# Patient Record
Sex: MALE | Race: OTHER | URBAN - METROPOLITAN AREA
[De-identification: names, ages, dates, MRNs, and addresses within clinical notes are randomized per-mention and may not be internally consistent; named-entity substitution may affect disease eponyms.]

---

## 2022-04-13 ENCOUNTER — APPOINTMENT (RX ONLY)
Dept: URBAN - METROPOLITAN AREA CLINIC 4 | Facility: CLINIC | Age: 37
Setting detail: DERMATOLOGY
End: 2022-04-13

## 2022-04-13 DIAGNOSIS — L72.8 OTHER FOLLICULAR CYSTS OF THE SKIN AND SUBCUTANEOUS TISSUE: ICD-10-CM

## 2022-04-13 DIAGNOSIS — L81.4 OTHER MELANIN HYPERPIGMENTATION: ICD-10-CM

## 2022-04-13 PROBLEM — D48.5 NEOPLASM OF UNCERTAIN BEHAVIOR OF SKIN: Status: ACTIVE | Noted: 2022-04-13

## 2022-04-13 PROCEDURE — ? DEFER

## 2022-04-13 PROCEDURE — 99203 OFFICE O/P NEW LOW 30 MIN: CPT

## 2022-04-13 PROCEDURE — ? OBSERVATION

## 2022-04-13 PROCEDURE — ? COUNSELING

## 2022-04-13 ASSESSMENT — LOCATION SIMPLE DESCRIPTION DERM: LOCATION SIMPLE: RIGHT ANTERIOR NECK

## 2022-04-13 ASSESSMENT — LOCATION DETAILED DESCRIPTION DERM: LOCATION DETAILED: RIGHT CLAVICULAR NECK

## 2022-04-13 ASSESSMENT — LOCATION ZONE DERM: LOCATION ZONE: NECK

## 2023-07-29 ENCOUNTER — APPOINTMENT (OUTPATIENT)
Dept: RADIOLOGY | Facility: MEDICAL CENTER | Age: 38
End: 2023-07-29
Payer: COMMERCIAL

## 2023-07-29 ENCOUNTER — HOSPITAL ENCOUNTER (OUTPATIENT)
Facility: MEDICAL CENTER | Age: 38
End: 2023-07-30
Attending: STUDENT IN AN ORGANIZED HEALTH CARE EDUCATION/TRAINING PROGRAM | Admitting: STUDENT IN AN ORGANIZED HEALTH CARE EDUCATION/TRAINING PROGRAM
Payer: COMMERCIAL

## 2023-07-29 ENCOUNTER — APPOINTMENT (OUTPATIENT)
Dept: RADIOLOGY | Facility: MEDICAL CENTER | Age: 38
End: 2023-07-29
Attending: STUDENT IN AN ORGANIZED HEALTH CARE EDUCATION/TRAINING PROGRAM
Payer: COMMERCIAL

## 2023-07-29 DIAGNOSIS — E83.42 HYPOMAGNESEMIA: ICD-10-CM

## 2023-07-29 DIAGNOSIS — E87.6 HYPOKALEMIA: ICD-10-CM

## 2023-07-29 PROBLEM — R53.1 WEAKNESS: Status: ACTIVE | Noted: 2023-07-29

## 2023-07-29 PROBLEM — I10 HYPERTENSION: Status: ACTIVE | Noted: 2023-07-29

## 2023-07-29 PROBLEM — R51.9 INTRACTABLE HEADACHE: Status: ACTIVE | Noted: 2023-07-29

## 2023-07-29 PROBLEM — E86.0 DEHYDRATION: Status: ACTIVE | Noted: 2023-07-29

## 2023-07-29 PROBLEM — R19.7 DIARRHEA: Status: ACTIVE | Noted: 2023-07-29

## 2023-07-29 PROBLEM — R73.9 HYPERGLYCEMIA: Status: ACTIVE | Noted: 2023-07-29

## 2023-07-29 PROBLEM — N17.9 AKI (ACUTE KIDNEY INJURY) (HCC): Status: ACTIVE | Noted: 2023-07-29

## 2023-07-29 LAB
ALBUMIN SERPL BCP-MCNC: 4.2 G/DL (ref 3.2–4.9)
ALBUMIN SERPL BCP-MCNC: 4.3 G/DL (ref 3.2–4.9)
ALBUMIN/GLOB SERPL: 1.3 G/DL
ALBUMIN/GLOB SERPL: 1.4 G/DL
ALP SERPL-CCNC: 59 U/L (ref 30–99)
ALP SERPL-CCNC: 62 U/L (ref 30–99)
ALT SERPL-CCNC: 52 U/L (ref 2–50)
ALT SERPL-CCNC: 53 U/L (ref 2–50)
AMPHET UR QL SCN: NEGATIVE
ANION GAP SERPL CALC-SCNC: 14 MMOL/L (ref 7–16)
ANION GAP SERPL CALC-SCNC: 16 MMOL/L (ref 7–16)
APPEARANCE UR: CLEAR
AST SERPL-CCNC: 46 U/L (ref 12–45)
AST SERPL-CCNC: 49 U/L (ref 12–45)
BARBITURATES UR QL SCN: NEGATIVE
BASOPHILS # BLD AUTO: 0.9 % (ref 0–1.8)
BASOPHILS # BLD: 0.05 K/UL (ref 0–0.12)
BENZODIAZ UR QL SCN: NEGATIVE
BILIRUB SERPL-MCNC: 0.4 MG/DL (ref 0.1–1.5)
BILIRUB SERPL-MCNC: 0.7 MG/DL (ref 0.1–1.5)
BILIRUB UR QL STRIP.AUTO: NEGATIVE
BUN SERPL-MCNC: 13 MG/DL (ref 8–22)
BUN SERPL-MCNC: 15 MG/DL (ref 8–22)
BZE UR QL SCN: NEGATIVE
CALCIUM ALBUM COR SERPL-MCNC: 8.7 MG/DL (ref 8.5–10.5)
CALCIUM ALBUM COR SERPL-MCNC: 8.9 MG/DL (ref 8.5–10.5)
CALCIUM SERPL-MCNC: 8.9 MG/DL (ref 8.5–10.5)
CALCIUM SERPL-MCNC: 9.1 MG/DL (ref 8.5–10.5)
CANNABINOIDS UR QL SCN: NEGATIVE
CHLORIDE SERPL-SCNC: 101 MMOL/L (ref 96–112)
CHLORIDE SERPL-SCNC: 102 MMOL/L (ref 96–112)
CO2 SERPL-SCNC: 19 MMOL/L (ref 20–33)
CO2 SERPL-SCNC: 24 MMOL/L (ref 20–33)
COLOR UR: YELLOW
CORTIS SERPL-MCNC: 11 UG/DL (ref 0–23)
CREAT SERPL-MCNC: 0.97 MG/DL (ref 0.5–1.4)
CREAT SERPL-MCNC: 1 MG/DL (ref 0.5–1.4)
CREAT UR-MCNC: 131.35 MG/DL
CRP SERPL HS-MCNC: <0.3 MG/DL (ref 0–0.75)
EKG IMPRESSION: NORMAL
EOSINOPHIL # BLD AUTO: 0.07 K/UL (ref 0–0.51)
EOSINOPHIL NFR BLD: 1.3 % (ref 0–6.9)
ERYTHROCYTE [DISTWIDTH] IN BLOOD BY AUTOMATED COUNT: 41.4 FL (ref 35.9–50)
ERYTHROCYTE [DISTWIDTH] IN BLOOD BY AUTOMATED COUNT: 42 FL (ref 35.9–50)
ERYTHROCYTE [SEDIMENTATION RATE] IN BLOOD BY WESTERGREN METHOD: 4 MM/HOUR (ref 0–20)
EST. AVERAGE GLUCOSE BLD GHB EST-MCNC: 114 MG/DL
FENTANYL UR QL: NEGATIVE
GFR SERPLBLD CREATININE-BSD FMLA CKD-EPI: 100 ML/MIN/1.73 M 2
GFR SERPLBLD CREATININE-BSD FMLA CKD-EPI: 104 ML/MIN/1.73 M 2
GGT SERPL-CCNC: 211 U/L (ref 7–51)
GLOBULIN SER CALC-MCNC: 3 G/DL (ref 1.9–3.5)
GLOBULIN SER CALC-MCNC: 3.3 G/DL (ref 1.9–3.5)
GLUCOSE SERPL-MCNC: 102 MG/DL (ref 65–99)
GLUCOSE SERPL-MCNC: 95 MG/DL (ref 65–99)
GLUCOSE UR STRIP.AUTO-MCNC: NEGATIVE MG/DL
HAV IGM SERPL QL IA: NORMAL
HBA1C MFR BLD: 5.6 % (ref 4–5.6)
HBV CORE IGM SER QL: NORMAL
HBV SURFACE AG SER QL: NORMAL
HCT VFR BLD AUTO: 45.7 % (ref 42–52)
HCT VFR BLD AUTO: 46.2 % (ref 42–52)
HCV AB SER QL: NORMAL
HGB BLD-MCNC: 16.2 G/DL (ref 14–18)
HGB BLD-MCNC: 16.5 G/DL (ref 14–18)
HIV 1+2 AB+HIV1 P24 AG SERPL QL IA: NORMAL
IMM GRANULOCYTES # BLD AUTO: 0.01 K/UL (ref 0–0.11)
IMM GRANULOCYTES NFR BLD AUTO: 0.2 % (ref 0–0.9)
INR PPP: 1.04 (ref 0.87–1.13)
KETONES UR STRIP.AUTO-MCNC: NEGATIVE MG/DL
LACTATE SERPL-SCNC: 2.7 MMOL/L (ref 0.5–2)
LACTATE SERPL-SCNC: 3.1 MMOL/L (ref 0.5–2)
LACTATE SERPL-SCNC: 3.2 MMOL/L (ref 0.5–2)
LACTATE SERPL-SCNC: 3.2 MMOL/L (ref 0.5–2)
LACTATE SERPL-SCNC: 4 MMOL/L (ref 0.5–2)
LDH SERPL L TO P-CCNC: 211 U/L (ref 107–266)
LEUKOCYTE ESTERASE UR QL STRIP.AUTO: NEGATIVE
LIPASE SERPL-CCNC: 50 U/L (ref 11–82)
LYMPHOCYTES # BLD AUTO: 2.03 K/UL (ref 1–4.8)
LYMPHOCYTES NFR BLD: 36.3 % (ref 22–41)
MAGNESIUM SERPL-MCNC: 1.6 MG/DL (ref 1.5–2.5)
MAGNESIUM SERPL-MCNC: 1.9 MG/DL (ref 1.5–2.5)
MCH RBC QN AUTO: 32.4 PG (ref 27–33)
MCH RBC QN AUTO: 32.5 PG (ref 27–33)
MCHC RBC AUTO-ENTMCNC: 35.4 G/DL (ref 32.3–36.5)
MCHC RBC AUTO-ENTMCNC: 35.7 G/DL (ref 32.3–36.5)
MCV RBC AUTO: 91.1 FL (ref 81.4–97.8)
MCV RBC AUTO: 91.4 FL (ref 81.4–97.8)
METHADONE UR QL SCN: NEGATIVE
MICRO URNS: NORMAL
MONOCYTES # BLD AUTO: 0.46 K/UL (ref 0–0.85)
MONOCYTES NFR BLD AUTO: 8.2 % (ref 0–13.4)
NEUTROPHILS # BLD AUTO: 2.98 K/UL (ref 1.82–7.42)
NEUTROPHILS NFR BLD: 53.1 % (ref 44–72)
NITRITE UR QL STRIP.AUTO: NEGATIVE
NRBC # BLD AUTO: 0 K/UL
NRBC BLD-RTO: 0 /100 WBC (ref 0–0.2)
OPIATES UR QL SCN: NEGATIVE
OXYCODONE UR QL SCN: NEGATIVE
PCP UR QL SCN: NEGATIVE
PH UR STRIP.AUTO: 6.5 [PH] (ref 5–8)
PHOSPHATE SERPL-MCNC: 2.8 MG/DL (ref 2.5–4.5)
PLATELET # BLD AUTO: 293 K/UL (ref 164–446)
PLATELET # BLD AUTO: 294 K/UL (ref 164–446)
PMV BLD AUTO: 8.9 FL (ref 9–12.9)
PMV BLD AUTO: 9.1 FL (ref 9–12.9)
POTASSIUM SERPL-SCNC: 3.5 MMOL/L (ref 3.6–5.5)
POTASSIUM SERPL-SCNC: 4.2 MMOL/L (ref 3.6–5.5)
PREALB SERPL-MCNC: 37 MG/DL (ref 18–38)
PROCALCITONIN SERPL-MCNC: 0.07 NG/ML
PROPOXYPH UR QL SCN: NEGATIVE
PROT SERPL-MCNC: 7.2 G/DL (ref 6–8.2)
PROT SERPL-MCNC: 7.6 G/DL (ref 6–8.2)
PROT UR QL STRIP: NEGATIVE MG/DL
PROTHROMBIN TIME: 13.5 SEC (ref 12–14.6)
RBC # BLD AUTO: 5 M/UL (ref 4.7–6.1)
RBC # BLD AUTO: 5.07 M/UL (ref 4.7–6.1)
RBC UR QL AUTO: NEGATIVE
SODIUM SERPL-SCNC: 136 MMOL/L (ref 135–145)
SODIUM SERPL-SCNC: 140 MMOL/L (ref 135–145)
SODIUM UR-SCNC: 249 MMOL/L
SP GR UR STRIP.AUTO: 1.02
TROPONIN T SERPL-MCNC: <6 NG/L (ref 6–19)
UROBILINOGEN UR STRIP.AUTO-MCNC: 0.2 MG/DL
WBC # BLD AUTO: 5.6 K/UL (ref 4.8–10.8)
WBC # BLD AUTO: 7 K/UL (ref 4.8–10.8)

## 2023-07-29 PROCEDURE — 93005 ELECTROCARDIOGRAM TRACING: CPT | Performed by: STUDENT IN AN ORGANIZED HEALTH CARE EDUCATION/TRAINING PROGRAM

## 2023-07-29 PROCEDURE — 99223 1ST HOSP IP/OBS HIGH 75: CPT | Performed by: STUDENT IN AN ORGANIZED HEALTH CARE EDUCATION/TRAINING PROGRAM

## 2023-07-29 PROCEDURE — 84100 ASSAY OF PHOSPHORUS: CPT

## 2023-07-29 PROCEDURE — 700101 HCHG RX REV CODE 250: Performed by: STUDENT IN AN ORGANIZED HEALTH CARE EDUCATION/TRAINING PROGRAM

## 2023-07-29 PROCEDURE — 700111 HCHG RX REV CODE 636 W/ 250 OVERRIDE (IP): Mod: JZ

## 2023-07-29 PROCEDURE — 84145 PROCALCITONIN (PCT): CPT

## 2023-07-29 PROCEDURE — 85652 RBC SED RATE AUTOMATED: CPT

## 2023-07-29 PROCEDURE — 87389 HIV-1 AG W/HIV-1&-2 AB AG IA: CPT

## 2023-07-29 PROCEDURE — 83605 ASSAY OF LACTIC ACID: CPT

## 2023-07-29 PROCEDURE — 84484 ASSAY OF TROPONIN QUANT: CPT

## 2023-07-29 PROCEDURE — 83690 ASSAY OF LIPASE: CPT

## 2023-07-29 PROCEDURE — 76700 US EXAM ABDOM COMPLETE: CPT

## 2023-07-29 PROCEDURE — 80307 DRUG TEST PRSMV CHEM ANLYZR: CPT

## 2023-07-29 PROCEDURE — 700111 HCHG RX REV CODE 636 W/ 250 OVERRIDE (IP): Mod: JZ | Performed by: STUDENT IN AN ORGANIZED HEALTH CARE EDUCATION/TRAINING PROGRAM

## 2023-07-29 PROCEDURE — 87040 BLOOD CULTURE FOR BACTERIA: CPT | Mod: 91

## 2023-07-29 PROCEDURE — 86140 C-REACTIVE PROTEIN: CPT

## 2023-07-29 PROCEDURE — 85027 COMPLETE CBC AUTOMATED: CPT

## 2023-07-29 PROCEDURE — 96375 TX/PRO/DX INJ NEW DRUG ADDON: CPT

## 2023-07-29 PROCEDURE — 700102 HCHG RX REV CODE 250 W/ 637 OVERRIDE(OP): Performed by: STUDENT IN AN ORGANIZED HEALTH CARE EDUCATION/TRAINING PROGRAM

## 2023-07-29 PROCEDURE — 82977 ASSAY OF GGT: CPT

## 2023-07-29 PROCEDURE — G0378 HOSPITAL OBSERVATION PER HR: HCPCS

## 2023-07-29 PROCEDURE — 84300 ASSAY OF URINE SODIUM: CPT

## 2023-07-29 PROCEDURE — 85025 COMPLETE CBC W/AUTO DIFF WBC: CPT

## 2023-07-29 PROCEDURE — 96365 THER/PROPH/DIAG IV INF INIT: CPT

## 2023-07-29 PROCEDURE — 83735 ASSAY OF MAGNESIUM: CPT

## 2023-07-29 PROCEDURE — 83615 LACTATE (LD) (LDH) ENZYME: CPT

## 2023-07-29 PROCEDURE — 82570 ASSAY OF URINE CREATININE: CPT

## 2023-07-29 PROCEDURE — 80074 ACUTE HEPATITIS PANEL: CPT

## 2023-07-29 PROCEDURE — 80053 COMPREHEN METABOLIC PANEL: CPT

## 2023-07-29 PROCEDURE — 84134 ASSAY OF PREALBUMIN: CPT

## 2023-07-29 PROCEDURE — 81003 URINALYSIS AUTO W/O SCOPE: CPT

## 2023-07-29 PROCEDURE — 71045 X-RAY EXAM CHEST 1 VIEW: CPT

## 2023-07-29 PROCEDURE — 96366 THER/PROPH/DIAG IV INF ADDON: CPT

## 2023-07-29 PROCEDURE — 83036 HEMOGLOBIN GLYCOSYLATED A1C: CPT

## 2023-07-29 PROCEDURE — 82533 TOTAL CORTISOL: CPT

## 2023-07-29 PROCEDURE — 93010 ELECTROCARDIOGRAM REPORT: CPT | Performed by: INTERNAL MEDICINE

## 2023-07-29 PROCEDURE — 36415 COLL VENOUS BLD VENIPUNCTURE: CPT

## 2023-07-29 PROCEDURE — 85610 PROTHROMBIN TIME: CPT

## 2023-07-29 PROCEDURE — 700105 HCHG RX REV CODE 258: Mod: JZ

## 2023-07-29 PROCEDURE — 74018 RADEX ABDOMEN 1 VIEW: CPT

## 2023-07-29 PROCEDURE — A9270 NON-COVERED ITEM OR SERVICE: HCPCS | Performed by: STUDENT IN AN ORGANIZED HEALTH CARE EDUCATION/TRAINING PROGRAM

## 2023-07-29 RX ORDER — SODIUM CHLORIDE AND POTASSIUM CHLORIDE 300; 900 MG/100ML; MG/100ML
INJECTION, SOLUTION INTRAVENOUS CONTINUOUS
Status: DISCONTINUED | OUTPATIENT
Start: 2023-07-29 | End: 2023-07-29

## 2023-07-29 RX ORDER — POLYETHYLENE GLYCOL 3350 17 G/17G
1 POWDER, FOR SOLUTION ORAL
Status: DISCONTINUED | OUTPATIENT
Start: 2023-07-29 | End: 2023-07-30 | Stop reason: HOSPADM

## 2023-07-29 RX ORDER — PROMETHAZINE HYDROCHLORIDE 25 MG/1
12.5-25 TABLET ORAL EVERY 4 HOURS PRN
Status: DISCONTINUED | OUTPATIENT
Start: 2023-07-29 | End: 2023-07-30 | Stop reason: HOSPADM

## 2023-07-29 RX ORDER — ACETAMINOPHEN 325 MG/1
650 TABLET ORAL EVERY 6 HOURS PRN
Status: DISCONTINUED | OUTPATIENT
Start: 2023-07-29 | End: 2023-07-30 | Stop reason: HOSPADM

## 2023-07-29 RX ORDER — GUAIFENESIN/DEXTROMETHORPHAN 100-10MG/5
10 SYRUP ORAL EVERY 6 HOURS PRN
Status: DISCONTINUED | OUTPATIENT
Start: 2023-07-29 | End: 2023-07-30 | Stop reason: HOSPADM

## 2023-07-29 RX ORDER — SODIUM CHLORIDE, SODIUM LACTATE, POTASSIUM CHLORIDE, CALCIUM CHLORIDE 600; 310; 30; 20 MG/100ML; MG/100ML; MG/100ML; MG/100ML
INJECTION, SOLUTION INTRAVENOUS CONTINUOUS
Status: DISCONTINUED | OUTPATIENT
Start: 2023-07-29 | End: 2023-07-30

## 2023-07-29 RX ORDER — SODIUM CHLORIDE 9 MG/ML
500 INJECTION, SOLUTION INTRAVENOUS ONCE
Status: COMPLETED | OUTPATIENT
Start: 2023-07-29 | End: 2023-07-29

## 2023-07-29 RX ORDER — PROMETHAZINE HYDROCHLORIDE 12.5 MG/1
12.5-25 SUPPOSITORY RECTAL EVERY 4 HOURS PRN
Status: DISCONTINUED | OUTPATIENT
Start: 2023-07-29 | End: 2023-07-30 | Stop reason: HOSPADM

## 2023-07-29 RX ORDER — BUTALBITAL, ACETAMINOPHEN AND CAFFEINE 50; 325; 40 MG/1; MG/1; MG/1
1 TABLET ORAL EVERY 6 HOURS PRN
Status: DISCONTINUED | OUTPATIENT
Start: 2023-07-29 | End: 2023-07-30 | Stop reason: HOSPADM

## 2023-07-29 RX ORDER — LISINOPRIL 10 MG/1
10 TABLET ORAL DAILY
Status: ON HOLD | COMMUNITY
End: 2024-01-02

## 2023-07-29 RX ORDER — MAGNESIUM SULFATE HEPTAHYDRATE 40 MG/ML
2 INJECTION, SOLUTION INTRAVENOUS ONCE
Status: COMPLETED | OUTPATIENT
Start: 2023-07-29 | End: 2023-07-29

## 2023-07-29 RX ORDER — SODIUM CHLORIDE AND POTASSIUM CHLORIDE 150; 900 MG/100ML; MG/100ML
2000 INJECTION, SOLUTION INTRAVENOUS CONTINUOUS
Status: DISCONTINUED | OUTPATIENT
Start: 2023-07-29 | End: 2023-07-29

## 2023-07-29 RX ORDER — ONDANSETRON 4 MG/1
4 TABLET, ORALLY DISINTEGRATING ORAL EVERY 4 HOURS PRN
Status: DISCONTINUED | OUTPATIENT
Start: 2023-07-29 | End: 2023-07-30 | Stop reason: HOSPADM

## 2023-07-29 RX ORDER — SODIUM CHLORIDE AND POTASSIUM CHLORIDE 150; 900 MG/100ML; MG/100ML
INJECTION, SOLUTION INTRAVENOUS CONTINUOUS
Status: DISCONTINUED | OUTPATIENT
Start: 2023-07-29 | End: 2023-07-29

## 2023-07-29 RX ORDER — SODIUM CHLORIDE, SODIUM LACTATE, POTASSIUM CHLORIDE, AND CALCIUM CHLORIDE .6; .31; .03; .02 G/100ML; G/100ML; G/100ML; G/100ML
500 INJECTION, SOLUTION INTRAVENOUS
Status: DISCONTINUED | OUTPATIENT
Start: 2023-07-29 | End: 2023-07-30 | Stop reason: HOSPADM

## 2023-07-29 RX ORDER — ONDANSETRON 2 MG/ML
4 INJECTION INTRAMUSCULAR; INTRAVENOUS EVERY 4 HOURS PRN
Status: DISCONTINUED | OUTPATIENT
Start: 2023-07-29 | End: 2023-07-30 | Stop reason: HOSPADM

## 2023-07-29 RX ORDER — LOPERAMIDE HYDROCHLORIDE 2 MG/1
2 CAPSULE ORAL 4 TIMES DAILY PRN
Status: DISCONTINUED | OUTPATIENT
Start: 2023-07-29 | End: 2023-07-30 | Stop reason: HOSPADM

## 2023-07-29 RX ORDER — AMOXICILLIN 250 MG
2 CAPSULE ORAL 2 TIMES DAILY
Status: DISCONTINUED | OUTPATIENT
Start: 2023-07-29 | End: 2023-07-30 | Stop reason: HOSPADM

## 2023-07-29 RX ORDER — LABETALOL HYDROCHLORIDE 5 MG/ML
10 INJECTION, SOLUTION INTRAVENOUS EVERY 4 HOURS PRN
Status: DISCONTINUED | OUTPATIENT
Start: 2023-07-29 | End: 2023-07-30 | Stop reason: HOSPADM

## 2023-07-29 RX ORDER — PROCHLORPERAZINE EDISYLATE 5 MG/ML
5-10 INJECTION INTRAMUSCULAR; INTRAVENOUS EVERY 4 HOURS PRN
Status: DISCONTINUED | OUTPATIENT
Start: 2023-07-29 | End: 2023-07-30 | Stop reason: HOSPADM

## 2023-07-29 RX ORDER — IPRATROPIUM BROMIDE AND ALBUTEROL SULFATE 2.5; .5 MG/3ML; MG/3ML
3 SOLUTION RESPIRATORY (INHALATION)
Status: DISCONTINUED | OUTPATIENT
Start: 2023-07-29 | End: 2023-07-30 | Stop reason: HOSPADM

## 2023-07-29 RX ORDER — BISACODYL 10 MG
10 SUPPOSITORY, RECTAL RECTAL
Status: DISCONTINUED | OUTPATIENT
Start: 2023-07-29 | End: 2023-07-30 | Stop reason: HOSPADM

## 2023-07-29 RX ADMIN — POTASSIUM CHLORIDE AND SODIUM CHLORIDE 2000 ML: 900; 150 INJECTION, SOLUTION INTRAVENOUS at 14:31

## 2023-07-29 RX ADMIN — MAGNESIUM SULFATE HEPTAHYDRATE 2 G: 2 INJECTION, SOLUTION INTRAVENOUS at 17:21

## 2023-07-29 RX ADMIN — ONDANSETRON 4 MG: 2 INJECTION INTRAMUSCULAR; INTRAVENOUS at 07:06

## 2023-07-29 RX ADMIN — ACETAMINOPHEN 650 MG: 325 TABLET, FILM COATED ORAL at 21:35

## 2023-07-29 RX ADMIN — SODIUM CHLORIDE 500 ML: 9 INJECTION, SOLUTION INTRAVENOUS at 10:16

## 2023-07-29 RX ADMIN — SODIUM CHLORIDE, POTASSIUM CHLORIDE, SODIUM LACTATE AND CALCIUM CHLORIDE: 600; 310; 30; 20 INJECTION, SOLUTION INTRAVENOUS at 17:25

## 2023-07-29 RX ADMIN — POTASSIUM CHLORIDE AND SODIUM CHLORIDE 2000 ML: 900; 150 INJECTION, SOLUTION INTRAVENOUS at 05:08

## 2023-07-29 ASSESSMENT — COPD QUESTIONNAIRES
HAVE YOU SMOKED AT LEAST 100 CIGARETTES IN YOUR ENTIRE LIFE: NO/DON'T KNOW
DURING THE PAST 4 WEEKS HOW MUCH DID YOU FEEL SHORT OF BREATH: NONE/LITTLE OF THE TIME
DO YOU EVER COUGH UP ANY MUCUS OR PHLEGM?: NO/ONLY WITH OCCASIONAL COLDS OR INFECTIONS
COPD SCREENING SCORE: 0

## 2023-07-29 ASSESSMENT — ENCOUNTER SYMPTOMS
FEVER: 0
DIARRHEA: 0
VOMITING: 0
CHILLS: 0
CHILLS: 1
BLURRED VISION: 0
MYALGIAS: 0
MYALGIAS: 1
HEARTBURN: 0
SHORTNESS OF BREATH: 0
COUGH: 0
ABDOMINAL PAIN: 0
DIZZINESS: 0
HEADACHES: 0
NAUSEA: 0
DOUBLE VISION: 0
DEPRESSION: 0
BRUISES/BLEEDS EASILY: 0
PALPITATIONS: 0
DIARRHEA: 1
HEADACHES: 1
NAUSEA: 1
HEARTBURN: 1

## 2023-07-29 ASSESSMENT — LIFESTYLE VARIABLES
TOTAL SCORE: 0
ALCOHOL_USE: YES
SUBSTANCE_ABUSE: 0
DOES PATIENT WANT TO STOP DRINKING: NO
TOTAL SCORE: 0
AVERAGE NUMBER OF DAYS PER WEEK YOU HAVE A DRINK CONTAINING ALCOHOL: 2
HAVE PEOPLE ANNOYED YOU BY CRITICIZING YOUR DRINKING: NO
CONSUMPTION TOTAL: NEGATIVE
HOW MANY TIMES IN THE PAST YEAR HAVE YOU HAD 5 OR MORE DRINKS IN A DAY: 0
HAVE YOU EVER FELT YOU SHOULD CUT DOWN ON YOUR DRINKING: NO
ON A TYPICAL DAY WHEN YOU DRINK ALCOHOL HOW MANY DRINKS DO YOU HAVE: 3
EVER HAD A DRINK FIRST THING IN THE MORNING TO STEADY YOUR NERVES TO GET RID OF A HANGOVER: NO
TOTAL SCORE: 0
EVER FELT BAD OR GUILTY ABOUT YOUR DRINKING: NO

## 2023-07-29 ASSESSMENT — PATIENT HEALTH QUESTIONNAIRE - PHQ9
2. FEELING DOWN, DEPRESSED, IRRITABLE, OR HOPELESS: NOT AT ALL
2. FEELING DOWN, DEPRESSED, IRRITABLE, OR HOPELESS: NOT AT ALL
1. LITTLE INTEREST OR PLEASURE IN DOING THINGS: NOT AT ALL
SUM OF ALL RESPONSES TO PHQ9 QUESTIONS 1 AND 2: 0
SUM OF ALL RESPONSES TO PHQ9 QUESTIONS 1 AND 2: 0
1. LITTLE INTEREST OR PLEASURE IN DOING THINGS: NOT AT ALL

## 2023-07-29 ASSESSMENT — PAIN DESCRIPTION - PAIN TYPE
TYPE: ACUTE PAIN
TYPE: ACUTE PAIN

## 2023-07-29 NOTE — H&P
Hospital Medicine History & Physical Note    Date of Service  7/29/2023    Primary Care Physician  No primary care provider on file.    Consultants  None    Code Status  Full Code    Chief Complaint  No chief complaint on file.  Weakness    History of Presenting Illness  Abelardo Fenton is a 38 y.o. male with history of hypertension who presented 7/29/2023 as a direct transfer from [facility] to ER for evaluation of generalized weakness.  Patient reported traveling solo to [place] 4 days ago, reported to have viral-like illness with sinusitis, generalized weakness, diarrhea.  He denies sick contact.  Since returning home from the trip, reported difficulty with ambulating, generalized weakness, poor oral intake.  He went to ER 2 days ago, noted to have low potassium and calcium --replaced, no acute findings noted on CT head and therefore discharged.  However, patient return to ER the following day with same complaint, potassium low again per transfer ERP.  As patient requesting transfer for close monitoring and no beds available at [facility], agreed for transfer to [facility] as a direct admit.  Patient was seen by me around [facility] arrival, admitted to medicine service for observation.    I discussed the plan of care with patient and bedside RN.    Review of Systems  Review of Systems   Constitutional:  Positive for chills and malaise/fatigue. Negative for fever.   HENT:  Negative for hearing loss and tinnitus.    Eyes:  Negative for blurred vision and double vision.   Respiratory:  Negative for cough and shortness of breath.    Cardiovascular:  Negative for chest pain and palpitations.   Gastrointestinal:  Positive for diarrhea, heartburn and nausea. Negative for abdominal pain and vomiting.   Genitourinary:  Negative for dysuria and hematuria.   Musculoskeletal:  Negative for joint pain and myalgias.   Skin:  Negative for itching and rash.   Neurological:  Positive for headaches. Negative for dizziness.    Endo/Heme/Allergies:  Does not bruise/bleed easily.   Psychiatric/Behavioral:  Negative for depression and substance abuse.    All other systems reviewed and are negative.      Past Medical History   has no past medical history on file.  Hypertension    Surgical History   has no past surgical history on file.     Family History  family history is not on file.   Family history reviewed with patient. There is no family history that is pertinent to the chief complaint.     Social History   reports that he has never smoked. He has never used smokeless tobacco. He reports current alcohol use. He reports that he does not use drugs.    Allergies  No Known Allergies    Medications  None   Lisinopril 10 mg p.o. daily    Physical Exam  Temp:  [36.9 °C (98.5 °F)] 36.9 °C (98.5 °F)  Pulse:  [90] 90  Resp:  [16] 16  BP: (136)/(95) 136/95  SpO2:  [96 %] 96 %  Blood Pressure: (!) 136/95   Temperature: 36.9 °C (98.5 °F)   Pulse: 90   Respiration: 16   Pulse Oximetry: 96 %       Physical Exam  Vitals and nursing note reviewed.   Constitutional:       General: He is not in acute distress.  HENT:      Head: Normocephalic and atraumatic.      Nose: Nose normal.      Mouth/Throat:      Mouth: Mucous membranes are dry.      Pharynx: Oropharynx is clear.   Eyes:      General: No scleral icterus.     Extraocular Movements: Extraocular movements intact.   Cardiovascular:      Rate and Rhythm: Normal rate and regular rhythm.      Pulses: Normal pulses.      Heart sounds:      No friction rub.   Pulmonary:      Effort: No respiratory distress.      Breath sounds: No wheezing or rales.   Chest:      Chest wall: No tenderness.   Abdominal:      General: There is no distension.      Tenderness: There is no abdominal tenderness. There is no guarding or rebound.   Musculoskeletal:         General: No swelling or tenderness. Normal range of motion.      Cervical back: Neck supple. No tenderness.   Skin:     General: Skin is warm and dry.       Capillary Refill: Capillary refill takes less than 2 seconds.   Neurological:      General: No focal deficit present.      Mental Status: He is alert and oriented to person, place, and time.   Psychiatric:         Mood and Affect: Mood normal.         Laboratory:          No results for input(s): ALTSGPT, ASTSGOT, ALKPHOSPHAT, TBILIRUBIN, DBILIRUBIN, GAMMAGT, AMYLASE, LIPASE, ALB, PREALBUMIN, GLUCOSE in the last 72 hours.      No results for input(s): NTPROBNP in the last 72 hours.      No results for input(s): TROPONINT in the last 72 hours.    Imaging:  DX-CHEST-LIMITED (1 VIEW)    (Results Pending)   [id number] VIEW    (Results Pending)   US-ABDOMEN COMPLETE SURVEY    (Results Pending)       X-Ray:  I have personally reviewed the images and compared with prior images.  EKG:  I have personally reviewed the images and compared with prior images.    Assessment/Plan:  Justification for Admission Status  I anticipate this patient  is appropriate for observation status at this time.      * Weakness- (present on admission)  Assessment & Plan  Likely secondary to viral illness--URI/gastroenteritis  Supportive care  Support IVF  Replace electrolyte as needed  He had CT head at transferring facility which did not show any acute etiology    Diarrhea  Assessment & Plan  PRN imodium    Intractable headache  Assessment & Plan  Resolving  No acute findings noted on CT head at transferring facility  As needed Fioricet  No meningismus sign to suggest meningitis    Hypertension  Assessment & Plan  Hold lisinopril for now given UNIQUE, resume when appropriate    UNIQUE (acute kidney injury) (HCC)  Assessment & Plan  Unknown baseline creatinine  Creatinine 1.25 at facility    IVF  Check FeNa, abdominal ultrasound  Minimize nephrotoxic agents, renally dose meds    Dehydration  Assessment & Plan  H&H-17/47 --- highly hemoconcentrated  IVF    Hypokalemia  Assessment & Plan  Potassium 3.3, diarrhea  Replace as  appropriate    Hyperglycemia  Assessment & Plan  Check HbA1c        VTE prophylaxis: SCDs/TEDs

## 2023-07-29 NOTE — PROGRESS NOTES
This RN received report at change of shift from OSCAR Herrmann. Assumed care of pt, pt updated on plan of care.

## 2023-07-29 NOTE — ASSESSMENT & PLAN NOTE
Unknown baseline creatinine  Creatinine 1.25 at facility  Improved to 0.97 today after fluids.  Abdominal US no acute findings  Continue IVF  Recheck in AM

## 2023-07-29 NOTE — ASSESSMENT & PLAN NOTE
Started with dry cough, headache, diarrhea  Likely secondary to viral illness--URI/gastroenteritis. Covid/flu/rsv swab at OSH negative.  Supportive care  Support IVF  Replace electrolyte as needed  He had CT head at transferring facility which did not show any acute etiology

## 2023-07-29 NOTE — PROGRESS NOTES
4 Eyes Skin Assessment Completed by OSCAR Herrmann and OSCAR Merchant.    Head WDL  Ears WDL  Nose WDL  Mouth WDL  Neck WDL  Breast/Chest WDL  Shoulder Blades WDL  Spine WDL  (R) Arm/Elbow/Hand WDL  (L) Arm/Elbow/Hand WDL  Abdomen WDL  Groin WDL  Scrotum/Coccyx/Buttocks WDL  (R) Leg WDL  (L) Leg WDL  (R) Heel/Foot/Toe WDL  (L) Heel/Foot/Toe WDL          Devices In Places Pulse Ox      Interventions In Place N/A    Possible Skin Injury No    Pictures Uploaded Into Epic N/A  Wound Consult Placed N/A  RN Wound Prevention Protocol Ordered No

## 2023-07-29 NOTE — ASSESSMENT & PLAN NOTE
Resolving  No acute findings noted on CT head at transferring facility  As needed Fioricet  No meningismus sign to suggest meningitis

## 2023-07-29 NOTE — CARE PLAN
The patient is Stable - Low risk of patient condition declining or worsening    Shift Goals  Clinical Goals: chest/abdomen xray, IV fluids  Patient Goals: comfort  Family Goals: not at bedside    Progress made toward(s) clinical / shift goals:    Problem: Knowledge Deficit - Standard  Goal: Patient and family/care givers will demonstrate understanding of plan of care, disease process/condition, diagnostic tests and medications  Description: Target End Date: 7/30/2023    Document in Patient Education    1.  Patient and family/caregiver oriented to unit, equipment, visitation policy and means for communicating concern  2.  Complete/review Learning Assessment  3.  Assess knowledge level of disease process/condition, treatment plan, diagnostic tests and medications  4.  Explain disease process/condition, treatment plan, diagnostic tests and medications  Outcome: Progressing     Problem: Fluid Volume  Goal: Fluid volume balance will be maintained  Description: Target End Date:  7/30/2023    Document on I/O flowsheet    1.  Monitor intake and output as ordered  2.  Promote oral intake as appropriate  3.  Report inadequate intake or output to physician  4.  Administer IV therapy as ordered  5.  Weights per provider order  6.  Assess for signs and symptoms of bleeding  7.  Monitor for signs of fluid overload (respiratory changes, edema, weight gain, increased abdominal girth)  8.  Monitor of signs for inadequate fluid volume (poor skin turgor, dry mucous membranes)  9.  Instruct patient on adherence to fluid restrictions  Outcome: Progressing     Problem: Urinary - Renal Perfusion  Goal: Ability to achieve and maintain adequate renal perfusion and functioning will improve  Description: Target End Date:  7/30/2023    Document on I/O and Assessment flowsheet    1.  Urine output will remain greater than 0.5ml/Kg/HR  2.  Monitor amount and/or characteristics of urine per order/policy. Specific gravity per order/policy  3.   Assess signs and symptoms of renal dysfunction  Outcome: Progressing

## 2023-07-29 NOTE — ASSESSMENT & PLAN NOTE
In setting of recent diarrhea, possible viral illness  Symptoms improving with IVF and electrolyte replacement

## 2023-07-30 VITALS
SYSTOLIC BLOOD PRESSURE: 148 MMHG | DIASTOLIC BLOOD PRESSURE: 98 MMHG | TEMPERATURE: 97.7 F | RESPIRATION RATE: 18 BRPM | HEART RATE: 81 BPM | BODY MASS INDEX: 27.13 KG/M2 | OXYGEN SATURATION: 96 % | WEIGHT: 183.2 LBS | HEIGHT: 69 IN

## 2023-07-30 PROBLEM — E86.0 DEHYDRATION: Status: RESOLVED | Noted: 2023-07-29 | Resolved: 2023-07-30

## 2023-07-30 PROBLEM — R51.9 INTRACTABLE HEADACHE: Status: RESOLVED | Noted: 2023-07-29 | Resolved: 2023-07-30

## 2023-07-30 PROBLEM — R73.9 HYPERGLYCEMIA: Status: RESOLVED | Noted: 2023-07-29 | Resolved: 2023-07-30

## 2023-07-30 PROBLEM — E87.6 HYPOKALEMIA: Status: RESOLVED | Noted: 2023-07-29 | Resolved: 2023-07-30

## 2023-07-30 PROBLEM — R19.7 DIARRHEA: Status: RESOLVED | Noted: 2023-07-29 | Resolved: 2023-07-30

## 2023-07-30 PROBLEM — N17.9 AKI (ACUTE KIDNEY INJURY) (HCC): Status: RESOLVED | Noted: 2023-07-29 | Resolved: 2023-07-30

## 2023-07-30 PROBLEM — R53.1 WEAKNESS: Status: RESOLVED | Noted: 2023-07-29 | Resolved: 2023-07-30

## 2023-07-30 LAB
ALBUMIN SERPL BCP-MCNC: 4 G/DL (ref 3.2–4.9)
ALBUMIN/GLOB SERPL: 1.3 G/DL
ALP SERPL-CCNC: 63 U/L (ref 30–99)
ALT SERPL-CCNC: 46 U/L (ref 2–50)
ANION GAP SERPL CALC-SCNC: 10 MMOL/L (ref 7–16)
AST SERPL-CCNC: 47 U/L (ref 12–45)
BASOPHILS # BLD AUTO: 0.4 % (ref 0–1.8)
BASOPHILS # BLD: 0.03 K/UL (ref 0–0.12)
BILIRUB SERPL-MCNC: 1.1 MG/DL (ref 0.1–1.5)
BUN SERPL-MCNC: 11 MG/DL (ref 8–22)
CALCIUM ALBUM COR SERPL-MCNC: 8.8 MG/DL (ref 8.5–10.5)
CALCIUM SERPL-MCNC: 8.8 MG/DL (ref 8.5–10.5)
CHLORIDE SERPL-SCNC: 102 MMOL/L (ref 96–112)
CO2 SERPL-SCNC: 26 MMOL/L (ref 20–33)
CREAT SERPL-MCNC: 0.96 MG/DL (ref 0.5–1.4)
EOSINOPHIL # BLD AUTO: 0.13 K/UL (ref 0–0.51)
EOSINOPHIL NFR BLD: 1.6 % (ref 0–6.9)
ERYTHROCYTE [DISTWIDTH] IN BLOOD BY AUTOMATED COUNT: 40.9 FL (ref 35.9–50)
GFR SERPLBLD CREATININE-BSD FMLA CKD-EPI: 105 ML/MIN/1.73 M 2
GLOBULIN SER CALC-MCNC: 3 G/DL (ref 1.9–3.5)
GLUCOSE SERPL-MCNC: 102 MG/DL (ref 65–99)
HCT VFR BLD AUTO: 44.7 % (ref 42–52)
HGB BLD-MCNC: 15.7 G/DL (ref 14–18)
IMM GRANULOCYTES # BLD AUTO: 0.02 K/UL (ref 0–0.11)
IMM GRANULOCYTES NFR BLD AUTO: 0.3 % (ref 0–0.9)
LACTATE SERPL-SCNC: 1.4 MMOL/L (ref 0.5–2)
LYMPHOCYTES # BLD AUTO: 1.78 K/UL (ref 1–4.8)
LYMPHOCYTES NFR BLD: 22.5 % (ref 22–41)
MAGNESIUM SERPL-MCNC: 1.9 MG/DL (ref 1.5–2.5)
MCH RBC QN AUTO: 31.9 PG (ref 27–33)
MCHC RBC AUTO-ENTMCNC: 35.1 G/DL (ref 32.3–36.5)
MCV RBC AUTO: 90.9 FL (ref 81.4–97.8)
MONOCYTES # BLD AUTO: 0.81 K/UL (ref 0–0.85)
MONOCYTES NFR BLD AUTO: 10.2 % (ref 0–13.4)
NEUTROPHILS # BLD AUTO: 5.15 K/UL (ref 1.82–7.42)
NEUTROPHILS NFR BLD: 65 % (ref 44–72)
NRBC # BLD AUTO: 0 K/UL
NRBC BLD-RTO: 0 /100 WBC (ref 0–0.2)
PLATELET # BLD AUTO: 258 K/UL (ref 164–446)
PMV BLD AUTO: 9.2 FL (ref 9–12.9)
POTASSIUM SERPL-SCNC: 3.7 MMOL/L (ref 3.6–5.5)
PROT SERPL-MCNC: 7 G/DL (ref 6–8.2)
RBC # BLD AUTO: 4.92 M/UL (ref 4.7–6.1)
SODIUM SERPL-SCNC: 138 MMOL/L (ref 135–145)
WBC # BLD AUTO: 7.9 K/UL (ref 4.8–10.8)

## 2023-07-30 PROCEDURE — 700105 HCHG RX REV CODE 258: Mod: JZ

## 2023-07-30 PROCEDURE — 80053 COMPREHEN METABOLIC PANEL: CPT

## 2023-07-30 PROCEDURE — 700102 HCHG RX REV CODE 250 W/ 637 OVERRIDE(OP): Mod: JZ

## 2023-07-30 PROCEDURE — 99239 HOSP IP/OBS DSCHRG MGMT >30: CPT | Mod: FS | Performed by: INTERNAL MEDICINE

## 2023-07-30 PROCEDURE — 83605 ASSAY OF LACTIC ACID: CPT

## 2023-07-30 PROCEDURE — 36415 COLL VENOUS BLD VENIPUNCTURE: CPT

## 2023-07-30 PROCEDURE — G0378 HOSPITAL OBSERVATION PER HR: HCPCS

## 2023-07-30 PROCEDURE — 85025 COMPLETE CBC W/AUTO DIFF WBC: CPT

## 2023-07-30 PROCEDURE — 83735 ASSAY OF MAGNESIUM: CPT

## 2023-07-30 PROCEDURE — A9270 NON-COVERED ITEM OR SERVICE: HCPCS | Mod: JZ

## 2023-07-30 RX ORDER — POTASSIUM CHLORIDE 20 MEQ/1
40 TABLET, EXTENDED RELEASE ORAL ONCE
Status: COMPLETED | OUTPATIENT
Start: 2023-07-30 | End: 2023-07-30

## 2023-07-30 RX ADMIN — SODIUM CHLORIDE, POTASSIUM CHLORIDE, SODIUM LACTATE AND CALCIUM CHLORIDE: 600; 310; 30; 20 INJECTION, SOLUTION INTRAVENOUS at 02:22

## 2023-07-30 RX ADMIN — POTASSIUM CHLORIDE 40 MEQ: 1500 TABLET, EXTENDED RELEASE ORAL at 08:30

## 2023-07-30 ASSESSMENT — PAIN DESCRIPTION - PAIN TYPE: TYPE: ACUTE PAIN

## 2023-07-30 NOTE — DISCHARGE INSTRUCTIONS
Call your PCP to follow on your hospital visit at earliest opportunity  Can check BMP, magnesium level in a few days, before PCP appointment

## 2023-07-30 NOTE — PROGRESS NOTES
Hospital Medicine Daily Progress Note    Date of Service  7/29/2023    Chief Complaint  Abelardo Fenton is a 38 y.o. male admitted 7/29/2023 with generalized weakness    Hospital Course  Abelardo Fenton is a 38 y.o. male with history of hypertension who presented 7/29/2023 as a direct transfer from [place] to ER for evaluation of generalized weakness.      Patient reported traveling solo to [place] 4 days ago, reported to have viral-like illness with sinusitis, generalized weakness, diarrhea.  He denies sick contact.  Since returning home from the trip, reported difficulty with ambulating, generalized weakness, poor oral intake.  He went to ER 2 days ago, noted to have low potassium and calcium --replaced, no acute findings noted on CT head and therefore discharged.  However, patient return to ER the following day with same complaint, potassium low again per transfer ERP.  As patient requesting transfer for close monitoring and no beds available at [place] [facility], agreed for transfer to [facility] as a direct admit. On arrival, admitted to medicine service for observation.    On outside labs appeared dehydrated with hemoconcentration, UNIQUE with creatinine 1.25. Held lisinopril, started IV fluids. Electrolytes trended and replaced as needed.     Interval Problem Update  7/29: Continuing fluid replacement. Lactic acid elevated, gave additional 500mL bolus after which he reported symptomatic improvement. Replaced potassium and magnesium. In afternoon, K+ normalized. Changed fluids to LR. Ambulating unit independently, feels very close to baseline. Still some mild residual fatigue and body aches. Afebrile, heart rate and BP stable. No leukocytosis, no acute findings on abdominal US, procalcitonin WNL. Anticipate DC in AM.     I have discussed this patient's plan of care and discharge plan at IDT rounds today with Case Management, Nursing, Nursing leadership, and other members of the IDT  team.    Consultants/Specialty  none    Code Status  Full Code    Disposition  The patient is not medically cleared for discharge to home or a post-acute facility.  Anticipate discharge to: home with close outpatient follow-up    I have placed the appropriate orders for post-discharge needs.    Review of Systems  Review of Systems   Constitutional:  Positive for malaise/fatigue. Negative for chills and fever.   HENT:  Negative for hearing loss and tinnitus.    Eyes:  Negative for blurred vision and double vision.   Respiratory:  Negative for cough and shortness of breath.    Cardiovascular:  Negative for chest pain and palpitations.   Gastrointestinal:  Negative for abdominal pain, diarrhea, heartburn, nausea and vomiting.   Genitourinary:  Negative for dysuria and hematuria.   Musculoskeletal:  Positive for myalgias. Negative for joint pain.   Skin:  Negative for itching and rash.   Neurological:  Negative for dizziness and headaches.   Endo/Heme/Allergies:  Does not bruise/bleed easily.   Psychiatric/Behavioral:  Negative for depression and substance abuse.    All other systems reviewed and are negative.       Physical Exam  Temp:  [36.6 °C (97.8 °F)-37.6 °C (99.6 °F)] 37.6 °C (99.6 °F)  Pulse:  [66-90] 80  Resp:  [16-24] 24  BP: (123-147)/(76-96) 143/76  SpO2:  [95 %-97 %] 97 %    Physical Exam  Vitals and nursing note reviewed.   Constitutional:       General: He is not in acute distress.     Appearance: Normal appearance.   HENT:      Head: Normocephalic.      Nose: Nose normal.      Mouth/Throat:      Mouth: Mucous membranes are moist.      Pharynx: Oropharynx is clear.   Eyes:      Conjunctiva/sclera: Conjunctivae normal.      Pupils: Pupils are equal, round, and reactive to light.   Cardiovascular:      Rate and Rhythm: Normal rate and regular rhythm.      Pulses: Normal pulses.      Heart sounds: Normal heart sounds.   Pulmonary:      Effort: Pulmonary effort is normal. No respiratory distress.      Breath  sounds: Normal breath sounds. No rhonchi or rales.   Abdominal:      General: Abdomen is flat. Bowel sounds are normal.      Palpations: Abdomen is soft.      Tenderness: There is no abdominal tenderness.   Musculoskeletal:         General: Normal range of motion.      Cervical back: Normal range of motion and neck supple. No tenderness.   Skin:     General: Skin is warm and dry.      Capillary Refill: Capillary refill takes less than 2 seconds.   Neurological:      General: No focal deficit present.      Mental Status: He is alert and oriented to person, place, and time.   Psychiatric:         Mood and Affect: Mood normal.         Fluids    Intake/Output Summary (Last 24 hours) at 7/29/2023 2216  Last data filed at 7/29/2023 1200  Gross per 24 hour   Intake 1230.57 ml   Output --   Net 1230.57 ml       Laboratory  Recent Labs     07/29/23  0329 07/29/23  1402   WBC 5.6 7.0   RBC 5.00 5.07   HEMOGLOBIN 16.2 16.5   HEMATOCRIT 45.7 46.2   MCV 91.4 91.1   MCH 32.4 32.5   MCHC 35.4 35.7   RDW 42.0 41.4   PLATELETCT 293 294   MPV 9.1 8.9*     Recent Labs     07/29/23  0329 07/29/23  1402   SODIUM 140 136   POTASSIUM 3.5* 4.2   CHLORIDE 102 101   CO2 24 19*   GLUCOSE 102* 95   BUN 15 13   CREATININE 0.97 1.00   CALCIUM 8.9 9.1     Recent Labs     07/29/23  0329   INR 1.04               Imaging  US-ABDOMEN COMPLETE SURVEY   Final Result         1. No acute process seen.   2. Hepatic steatosis is noted.         [id number] VIEW   Final Result         1.  Moderate stool in the colon suggests changes of constipation, otherwise nonspecific bowel gas pattern in the upper abdomen      DX-CHEST-LIMITED (1 VIEW)   Final Result         1.  Slight asymmetric hazy density in the right lung base, could represent subtle infiltrate.           Assessment/Plan  * Weakness- (present on admission)  Assessment & Plan  Started with dry cough, headache, diarrhea  Likely secondary to viral illness--URI/gastroenteritis. Covid/flu/rsv swab at  OSH negative.  Supportive care  Support IVF  Replace electrolyte as needed  He had CT head at transferring facility which did not show any acute etiology    Diarrhea  Assessment & Plan  PRN imodium    Intractable headache  Assessment & Plan  Resolving  No acute findings noted on CT head at transferring facility  As needed Fioricet  No meningismus sign to suggest meningitis    Hyperglycemia  Assessment & Plan  HbA1c 5.6  Resolved    Hypertension  Assessment & Plan  Resume lisinopril in AM if renal function stable  PRN labetalol    UNIQUE (acute kidney injury) (HCC)  Assessment & Plan  Unknown baseline creatinine  Creatinine 1.25 at facility  Improved to 0.97 today after fluids.  Abdominal US no acute findings  Continue IVF  Recheck in AM      Dehydration  Assessment & Plan  In setting of recent diarrhea, possible viral illness  Symptoms improving with IVF and electrolyte replacement    Hypokalemia  Assessment & Plan  In setting of diarrhea  Now 4.1, diarrhea resolved  Trend, replace as appropriate      VTE prophylaxis:   SCDs/TEDs      I have performed a physical exam and reviewed and updated ROS and Plan today (7/29/2023). In review of yesterday's note (7/28/2023), there are no changes except as documented above.

## 2023-07-30 NOTE — PROGRESS NOTES
Patient has been cleared for discharge at this time to home. He has been given AVS paperwork and is without further questions regarding hospital stay and discharge. IV has been removed, patient is dressed and belongings have been collected. Patient is safe to dc

## 2023-07-30 NOTE — DISCHARGE SUMMARY
Discharge Summary    CHIEF COMPLAINT ON ADMISSION  No chief complaint on file.      Reason for Admission  Ataxia and weakness, hypocalcemia     Admission Date  7/29/2023    CODE STATUS  Full Code    HPI & HOSPITAL COURSE  Abelardo Fenton is a 38 y.o. male with history of hypertension who presented 7/29/2023 as a direct transfer from [place] to ER for evaluation of generalized weakness.      Patient reported traveling solo to [place] 4 days ago, reported to have viral-like illness with sinusitis, generalized weakness, diarrhea.  He denies sick contact.  Since returning home from the trip, reported difficulty with ambulating, generalized weakness, poor oral intake.  He went to ER 2 days ago, noted to have low potassium and calcium --replaced, no acute findings noted on CT head and therefore discharged.  However, patient return to ER the following day with same complaint, potassium low again per transfer ERP.  As patient requesting transfer for close monitoring and no beds available at [place], agreed for transfer to [facility] as a direct admit. On arrival, admitted to medicine service for observation and management of suspected viral URI/gastroenteritis.    On outside labs appeared dehydrated with hemoconcentration, UNIQUE with creatinine 1.25. Held lisinopril, started IV fluids. Electrolytes trended and replaced as needed.  Ultrasound abdomen with no acute findings, does have some hepatic steatosis.  Discussed this finding with him and recommended follow-up with PCP.     he morning of 7/29, lactate elevated-given additional fluid bolus and potassium magnesium replaced.  By afternoon potassium normalized patient feels very close to baseline and ambulating unit independently without fever chills or hemodynamic instability.  His lactic acid did elevate to a peak of 4.0 thus he was observed 1 more night on fluids.      On morning of 7/30 lactic acid within normal limits.  CBC and CMP normalized.  He is without  any symptoms and feels back to his baseline state of health with fatigue, body aches and diarrhea resolved.     He will follow with his primary care in 3 to 5 days.  Recommend he check BMP and magnesium before appointment.  UNIQUE resolved, can resume lisinopril.  No new prescriptions required on discharge.  Provided work note.     Therefore, he is discharged in good and stable condition to home with close outpatient follow-up.    The patient recovered much more quickly than anticipated on admission.    Discharge Date  7/30/2023    FOLLOW UP ITEMS POST DISCHARGE  PCP in 3-5 days  Check labs before PCP appointment, provide result to PCP.  Return to [facility] by doctor for decreased urine output, fever/chills, weakness, muscle cramps    DISCHARGE DIAGNOSES  Principal Problem (Resolved):    Weakness (POA: Yes)  Active Problems:    Hypertension (POA: Unknown)  Resolved Problems:    Hypokalemia (POA: Unknown)    Dehydration (POA: Unknown)    UNIQUE (acute kidney injury) (HCC) (POA: Unknown)    Hyperglycemia (POA: Unknown)    Intractable headache (POA: Unknown)    Diarrhea (POA: Unknown)      FOLLOW UP  Sheeba Lloyd M.D.  [address] [place] [id number] [phone]    Schedule an appointment as soon as possible for a visit in 3 day(s)        MEDICATIONS ON DISCHARGE     Medication List        CONTINUE taking these medications        Instructions   lisinopril 10 MG Tabs  Commonly known as: Prinivil   Take 10 mg by mouth every day.  Dose: 10 mg              Allergies  No Known Allergies    DIET  Orders Placed This Encounter   Procedures    Diet Order Diet: Regular     Standing Status:   Standing     Number of Occurrences:   1     Order Specific Question:   Diet:     Answer:   Regular [1]       ACTIVITY  As tolerated.  Weight bearing as tolerated    CONSULTATIONS  none    PROCEDURES  none    LABORATORY  Lab Results   Component Value Date    SODIUM 138 07/30/2023    POTASSIUM 3.7 07/30/2023    CHLORIDE 102  07/30/2023    CO2 26 07/30/2023    GLUCOSE 102 (H) 07/30/2023    BUN 11 07/30/2023    CREATININE 0.96 07/30/2023        Lab Results   Component Value Date    WBC 7.9 07/30/2023    HEMOGLOBIN 15.7 07/30/2023    HEMATOCRIT 44.7 07/30/2023    PLATELETCT 258 07/30/2023

## 2023-07-30 NOTE — CARE PLAN
The patient is Watcher - Medium risk of patient condition declining or worsening    Shift Goals  Clinical Goals: IVF, trend lactic  Patient Goals: go hoe  Family Goals: not at bedside    Progress made toward(s) clinical / shift goals:    Problem: Knowledge Deficit - Standard  Goal: Patient and family/care givers will demonstrate understanding of plan of care, disease process/condition, diagnostic tests and medications  Description: Target End Date: 7/30/2023    Document in Patient Education    1.  Patient and family/caregiver oriented to unit, equipment, visitation policy and means for communicating concern  2.  Complete/review Learning Assessment  3.  Assess knowledge level of disease process/condition, treatment plan, diagnostic tests and medications  4.  Explain disease process/condition, treatment plan, diagnostic tests and medications  Outcome: Progressing     Problem: Fluid Volume  Goal: Fluid volume balance will be maintained  Description: Target End Date:  7/30/2023    Document on I/O flowsheet    1.  Monitor intake and output as ordered  2.  Promote oral intake as appropriate  3.  Report inadequate intake or output to physician  4.  Administer IV therapy as ordered  5.  Weights per provider order  6.  Assess for signs and symptoms of bleeding  7.  Monitor for signs of fluid overload (respiratory changes, edema, weight gain, increased abdominal girth)  8.  Monitor of signs for inadequate fluid volume (poor skin turgor, dry mucous membranes)  9.  Instruct patient on adherence to fluid restrictions  Outcome: Progressing     Problem: Physical Regulation  Goal: Diagnostic test results will improve  Description: Target End Date:  7/30/2023  1.  Monitor lactic acid levels  2.  Monitor ABG's  3.  Monitor diagnostic test results  Outcome: Progressing

## 2023-07-30 NOTE — HOSPITAL COURSE
Abelardo Fenton is a 38 y.o. male with history of hypertension who presented 7/29/2023 as a direct transfer from [place] to ER for evaluation of generalized weakness.      Patient reported traveling solo to [place] 4 days ago, reported to have viral-like illness with sinusitis, generalized weakness, diarrhea.  He denies sick contact.  Since returning home from the trip, reported difficulty with ambulating, generalized weakness, poor oral intake.  He went to ER 2 days ago, noted to have low potassium and calcium --replaced, no acute findings noted on CT head and therefore discharged.  However, patient return to ER the following day with same complaint, potassium low again per transfer ERP.  As patient requesting transfer for close monitoring and no beds available at [place], agreed for transfer to [facility] as a direct admit. On arrival, admitted to medicine service for observation and management of suspected viral URI/gastroenteritis.    On outside labs appeared dehydrated with hemoconcentration, UNIQUE with creatinine 1.25. Held lisinopril, started IV fluids. Electrolytes trended and replaced as needed.  Ultrasound abdomen with no acute findings, does have some hepatic steatosis.  Discussed this finding with him and recommended follow-up with PCP.     he morning of 7/29, lactate elevated-given additional fluid bolus and potassium magnesium replaced.  By afternoon potassium normalized patient feels very close to baseline and ambulating unit independently without fever chills or hemodynamic instability.  His lactic acid did elevate to a peak of 4.0 thus he was observed 1 more night on fluids.      On morning of 7/30 lactic acid within normal limits.  CBC and CMP normalized.  He is without any symptoms and feels back to his baseline state of health with fatigue, body aches and diarrhea resolved.     He will follow with his primary care in 3 to 5 days.  Recommend he check BMP and magnesium before  appointment.  UNIQUE resolved, can resume lisinopril.  No new prescriptions required on discharge.  Provided work note.

## 2023-08-03 ENCOUNTER — HOSPITAL ENCOUNTER (OUTPATIENT)
Dept: RADIOLOGY | Facility: MEDICAL CENTER | Age: 38
End: 2023-08-03
Payer: COMMERCIAL

## 2023-08-03 LAB
BACTERIA BLD CULT: NORMAL
BACTERIA BLD CULT: NORMAL
SIGNIFICANT IND 70042: NORMAL
SIGNIFICANT IND 70042: NORMAL
SITE SITE: NORMAL
SITE SITE: NORMAL
SOURCE SOURCE: NORMAL
SOURCE SOURCE: NORMAL

## 2023-12-26 ENCOUNTER — HOSPITAL ENCOUNTER (OUTPATIENT)
Dept: RADIOLOGY | Facility: MEDICAL CENTER | Age: 38
End: 2023-12-26

## 2023-12-26 ENCOUNTER — HOSPITAL ENCOUNTER (INPATIENT)
Facility: MEDICAL CENTER | Age: 38
LOS: 7 days | DRG: 439 | End: 2024-01-02
Attending: INTERNAL MEDICINE | Admitting: INTERNAL MEDICINE
Payer: COMMERCIAL

## 2023-12-26 DIAGNOSIS — K85.90 SEVERE ACUTE PANCREATITIS: ICD-10-CM

## 2023-12-26 DIAGNOSIS — I10 PRIMARY HYPERTENSION: ICD-10-CM

## 2023-12-26 PROBLEM — R65.10 SIRS (SYSTEMIC INFLAMMATORY RESPONSE SYNDROME) (HCC): Status: ACTIVE | Noted: 2023-12-26

## 2023-12-26 PROBLEM — K86.1 OTHER CHRONIC PANCREATITIS (HCC): Status: ACTIVE | Noted: 2023-12-26

## 2023-12-26 PROBLEM — R74.01 TRANSAMINITIS: Status: ACTIVE | Noted: 2023-12-26

## 2023-12-26 LAB
ERYTHROCYTE [DISTWIDTH] IN BLOOD BY AUTOMATED COUNT: 44.1 FL (ref 35.9–50)
HAV IGM SERPL QL IA: NORMAL
HBV CORE IGM SER QL: NORMAL
HBV SURFACE AG SER QL: NORMAL
HCT VFR BLD AUTO: 54.7 % (ref 42–52)
HCV AB SER QL: NORMAL
HGB BLD-MCNC: 19.4 G/DL (ref 14–18)
LACTATE SERPL-SCNC: 3.7 MMOL/L (ref 0.5–2)
MCH RBC QN AUTO: 31.8 PG (ref 27–33)
MCHC RBC AUTO-ENTMCNC: 35.5 G/DL (ref 32.3–36.5)
MCV RBC AUTO: 89.7 FL (ref 81.4–97.8)
PLATELET # BLD AUTO: 247 K/UL (ref 164–446)
PMV BLD AUTO: 9.7 FL (ref 9–12.9)
RBC # BLD AUTO: 6.1 M/UL (ref 4.7–6.1)
WBC # BLD AUTO: 14 K/UL (ref 4.8–10.8)

## 2023-12-26 PROCEDURE — 83605 ASSAY OF LACTIC ACID: CPT

## 2023-12-26 PROCEDURE — 36415 COLL VENOUS BLD VENIPUNCTURE: CPT

## 2023-12-26 PROCEDURE — 700111 HCHG RX REV CODE 636 W/ 250 OVERRIDE (IP): Mod: JZ

## 2023-12-26 PROCEDURE — 770020 HCHG ROOM/CARE - TELE (206)

## 2023-12-26 PROCEDURE — 85027 COMPLETE CBC AUTOMATED: CPT

## 2023-12-26 PROCEDURE — 99223 1ST HOSP IP/OBS HIGH 75: CPT | Mod: AI | Performed by: INTERNAL MEDICINE

## 2023-12-26 PROCEDURE — 700105 HCHG RX REV CODE 258: Performed by: INTERNAL MEDICINE

## 2023-12-26 PROCEDURE — 80074 ACUTE HEPATITIS PANEL: CPT

## 2023-12-26 PROCEDURE — 700111 HCHG RX REV CODE 636 W/ 250 OVERRIDE (IP): Performed by: INTERNAL MEDICINE

## 2023-12-26 RX ORDER — ENOXAPARIN SODIUM 100 MG/ML
40 INJECTION SUBCUTANEOUS DAILY
Status: DISCONTINUED | OUTPATIENT
Start: 2023-12-27 | End: 2024-01-02 | Stop reason: HOSPADM

## 2023-12-26 RX ORDER — ACETAMINOPHEN 325 MG/1
650 TABLET ORAL EVERY 6 HOURS PRN
Status: ACTIVE | OUTPATIENT
Start: 2023-12-26 | End: 2023-12-26

## 2023-12-26 RX ORDER — HYDROMORPHONE HYDROCHLORIDE 1 MG/ML
1 INJECTION, SOLUTION INTRAMUSCULAR; INTRAVENOUS; SUBCUTANEOUS
Status: DISCONTINUED | OUTPATIENT
Start: 2023-12-26 | End: 2023-12-26

## 2023-12-26 RX ORDER — LABETALOL HYDROCHLORIDE 5 MG/ML
10 INJECTION, SOLUTION INTRAVENOUS EVERY 4 HOURS PRN
Status: DISCONTINUED | OUTPATIENT
Start: 2023-12-26 | End: 2023-12-28

## 2023-12-26 RX ORDER — ONDANSETRON 4 MG/1
4 TABLET, ORALLY DISINTEGRATING ORAL EVERY 4 HOURS PRN
Status: DISCONTINUED | OUTPATIENT
Start: 2023-12-26 | End: 2024-01-02 | Stop reason: HOSPADM

## 2023-12-26 RX ORDER — PROCHLORPERAZINE EDISYLATE 5 MG/ML
5-10 INJECTION INTRAMUSCULAR; INTRAVENOUS EVERY 4 HOURS PRN
Status: DISCONTINUED | OUTPATIENT
Start: 2023-12-26 | End: 2024-01-02 | Stop reason: HOSPADM

## 2023-12-26 RX ORDER — PROMETHAZINE HYDROCHLORIDE 25 MG/1
12.5-25 TABLET ORAL EVERY 4 HOURS PRN
Status: DISCONTINUED | OUTPATIENT
Start: 2023-12-26 | End: 2024-01-02 | Stop reason: HOSPADM

## 2023-12-26 RX ORDER — PROMETHAZINE HYDROCHLORIDE 12.5 MG/1
12.5-25 SUPPOSITORY RECTAL EVERY 4 HOURS PRN
Status: DISCONTINUED | OUTPATIENT
Start: 2023-12-26 | End: 2024-01-02 | Stop reason: HOSPADM

## 2023-12-26 RX ORDER — ONDANSETRON 2 MG/ML
4 INJECTION INTRAMUSCULAR; INTRAVENOUS EVERY 4 HOURS PRN
Status: DISCONTINUED | OUTPATIENT
Start: 2023-12-26 | End: 2024-01-02 | Stop reason: HOSPADM

## 2023-12-26 RX ORDER — AMOXICILLIN 250 MG
2 CAPSULE ORAL 2 TIMES DAILY
Status: DISCONTINUED | OUTPATIENT
Start: 2023-12-27 | End: 2024-01-02 | Stop reason: HOSPADM

## 2023-12-26 RX ORDER — OXYCODONE HYDROCHLORIDE 5 MG/1
5 TABLET ORAL
Status: DISCONTINUED | OUTPATIENT
Start: 2023-12-26 | End: 2024-01-02 | Stop reason: HOSPADM

## 2023-12-26 RX ORDER — OXYCODONE HYDROCHLORIDE 10 MG/1
10 TABLET ORAL
Status: DISCONTINUED | OUTPATIENT
Start: 2023-12-26 | End: 2024-01-02 | Stop reason: HOSPADM

## 2023-12-26 RX ORDER — POLYETHYLENE GLYCOL 3350 17 G/17G
1 POWDER, FOR SOLUTION ORAL
Status: DISCONTINUED | OUTPATIENT
Start: 2023-12-26 | End: 2024-01-02 | Stop reason: HOSPADM

## 2023-12-26 RX ORDER — SODIUM CHLORIDE 9 MG/ML
INJECTION, SOLUTION INTRAVENOUS CONTINUOUS
Status: DISCONTINUED | OUTPATIENT
Start: 2023-12-26 | End: 2023-12-29

## 2023-12-26 RX ORDER — BISACODYL 10 MG
10 SUPPOSITORY, RECTAL RECTAL
Status: DISCONTINUED | OUTPATIENT
Start: 2023-12-26 | End: 2024-01-02 | Stop reason: HOSPADM

## 2023-12-26 RX ORDER — ONDANSETRON 2 MG/ML
4 INJECTION INTRAMUSCULAR; INTRAVENOUS EVERY 4 HOURS PRN
Status: DISCONTINUED | OUTPATIENT
Start: 2023-12-26 | End: 2023-12-26

## 2023-12-26 RX ADMIN — FENTANYL CITRATE 25 MCG: 0.05 INJECTION, SOLUTION INTRAMUSCULAR; INTRAVENOUS at 23:11

## 2023-12-26 RX ADMIN — SODIUM CHLORIDE: 9 INJECTION, SOLUTION INTRAVENOUS at 19:54

## 2023-12-26 RX ADMIN — HYDROMORPHONE HYDROCHLORIDE 1 MG: 1 INJECTION, SOLUTION INTRAMUSCULAR; INTRAVENOUS; SUBCUTANEOUS at 18:55

## 2023-12-26 RX ADMIN — FENTANYL CITRATE 25 MCG: 0.05 INJECTION, SOLUTION INTRAMUSCULAR; INTRAVENOUS at 20:55

## 2023-12-26 ASSESSMENT — COGNITIVE AND FUNCTIONAL STATUS - GENERAL
SUGGESTED CMS G CODE MODIFIER MOBILITY: CH
SUGGESTED CMS G CODE MODIFIER DAILY ACTIVITY: CH
DAILY ACTIVITIY SCORE: 24
MOBILITY SCORE: 24

## 2023-12-26 ASSESSMENT — LIFESTYLE VARIABLES
TOTAL SCORE: 0
CONSUMPTION TOTAL: POSITIVE
DOES PATIENT WANT TO STOP DRINKING: NO
EVER FELT BAD OR GUILTY ABOUT YOUR DRINKING: NO
TOTAL SCORE: 0
HOW MANY TIMES IN THE PAST YEAR HAVE YOU HAD 5 OR MORE DRINKS IN A DAY: 10
AVERAGE NUMBER OF DAYS PER WEEK YOU HAVE A DRINK CONTAINING ALCOHOL: 3
EVER HAD A DRINK FIRST THING IN THE MORNING TO STEADY YOUR NERVES TO GET RID OF A HANGOVER: NO
ON A TYPICAL DAY WHEN YOU DRINK ALCOHOL HOW MANY DRINKS DO YOU HAVE: 4
ALCOHOL_USE: YES
TOTAL SCORE: 0
HAVE YOU EVER FELT YOU SHOULD CUT DOWN ON YOUR DRINKING: NO
HAVE PEOPLE ANNOYED YOU BY CRITICIZING YOUR DRINKING: NO

## 2023-12-26 ASSESSMENT — ENCOUNTER SYMPTOMS
DIZZINESS: 0
MYALGIAS: 0
VOMITING: 1
NAUSEA: 1
CONSTIPATION: 0
HEADACHES: 0
FEVER: 0
DEPRESSION: 0
SHORTNESS OF BREATH: 0
CHILLS: 0
DIARRHEA: 0
ABDOMINAL PAIN: 1
BLURRED VISION: 0

## 2023-12-26 ASSESSMENT — PAIN DESCRIPTION - PAIN TYPE
TYPE: ACUTE PAIN

## 2023-12-26 ASSESSMENT — FIBROSIS 4 INDEX
FIB4 SCORE: 1.01
FIB4 SCORE: 0.97

## 2023-12-26 ASSESSMENT — PATIENT HEALTH QUESTIONNAIRE - PHQ9
SUM OF ALL RESPONSES TO PHQ9 QUESTIONS 1 AND 2: 0
1. LITTLE INTEREST OR PLEASURE IN DOING THINGS: NOT AT ALL
2. FEELING DOWN, DEPRESSED, IRRITABLE, OR HOPELESS: NOT AT ALL

## 2023-12-26 NOTE — PROGRESS NOTES
[facility] DIRECT ADMISSION REPORT  Transferring facility: [facility] ED   Transferring physician: Dr. Hampton     Chief complaint: abdominal pain  Pertinent history & patient course:   37 yo Male with past medical history of GERD on Prilosec and hypertension not on medications who presented today for acute nausea, vomiting and severe epigastric abdominal pain radiating to the back.  Symptoms started last night. Patient drinks alcohol occasionally, denies heaving drinking.  Of note patient's father has a history of hyperlipidemia.  CT abdomen pelvis performed showed thickening around the duodenum, acute pancreatitis in the head of the pancreas, no gallstones.    Unfortunately part of their lab is down they are unable to get a lipase level, or lipid profile.  ERP suspects hyperlipidemia causing acute pancreatitis given family hx    LABS- Na 126. Wbc- 15K, K 3.9, Cr 1.7, GFR 46. Ca 8.0 , , Bilirubin 1.2, VS:  96.2F HR: Initially 138 down to 117, /106, RR 18, 96%-RA. EKG nonspecific changes, sinus tachycardia     Patient currently on IV fluids at 500 mL/h, given fentanyl and Dilaudid for pain control.  Requesting transfer for evaluation of pancreatitis.    Pertinent imaging & lab results: as above   Consultants called prior to transfer and pertinent input from consultants: N/A  Code Status: Full code per transferring provider, I personally verified with the transferring provider patient's code status and the transferring provider has confirmed this with the patient.  Reason for Transfer: acute pancreatitis, unable to perform needed work up due to laboratory issues.   Further work up or recommendations requested prior to transfer: N/A    Patient accepted for transfer: Yes  Accepting [facility] Facility: [facility] Nursing to notify the Triage Coordinator in the RTOC via Voalte or Phone ext. [phone] when patient arrives to the unit. The Triage Coordinator will assign the admitting  provider.    Consultants to be called upon arrival: N/A  Admission status: Inpatient.   Floor requested: telemetry   If ICU transfer, name of intensivist case discussed with and pertinent input from critical care: N/A    The admitting provider is the point of contact for questions or concerns regarding patient's care.

## 2023-12-27 ENCOUNTER — APPOINTMENT (OUTPATIENT)
Dept: RADIOLOGY | Facility: MEDICAL CENTER | Age: 38
DRG: 439 | End: 2023-12-27
Attending: INTERNAL MEDICINE
Payer: COMMERCIAL

## 2023-12-27 PROBLEM — E78.1 HIGH TRIGLYCERIDES: Status: ACTIVE | Noted: 2023-12-27

## 2023-12-27 PROBLEM — E83.42 HYPOMAGNESEMIA: Status: ACTIVE | Noted: 2023-12-27

## 2023-12-27 PROBLEM — E16.2 HYPOGLYCEMIA: Status: ACTIVE | Noted: 2023-12-27

## 2023-12-27 LAB
ALBUMIN SERPL BCP-MCNC: 2.7 G/DL (ref 3.2–4.9)
ALBUMIN SERPL BCP-MCNC: 3 G/DL (ref 3.2–4.9)
ALBUMIN/GLOB SERPL: 0.9 G/DL
ALP SERPL-CCNC: 70 U/L (ref 30–99)
ALT SERPL-CCNC: 106 U/L (ref 2–50)
ANION GAP SERPL CALC-SCNC: 13 MMOL/L (ref 7–16)
AST SERPL-CCNC: 102 U/L (ref 12–45)
BILIRUB SERPL-MCNC: 1.7 MG/DL (ref 0.1–1.5)
BUN SERPL-MCNC: 16 MG/DL (ref 8–22)
CALCIUM ALBUM COR SERPL-MCNC: 7 MG/DL (ref 8.5–10.5)
CALCIUM SERPL-MCNC: 6.2 MG/DL (ref 8.5–10.5)
CALCIUM SERPL-MCNC: 6.2 MG/DL (ref 8.5–10.5)
CHLORIDE SERPL-SCNC: 96 MMOL/L (ref 96–112)
CHOLEST SERPL-MCNC: 179 MG/DL (ref 100–199)
CO2 SERPL-SCNC: 19 MMOL/L (ref 20–33)
CREAT SERPL-MCNC: 1.17 MG/DL (ref 0.5–1.4)
ERYTHROCYTE [DISTWIDTH] IN BLOOD BY AUTOMATED COUNT: 44.7 FL (ref 35.9–50)
GFR SERPLBLD CREATININE-BSD FMLA CKD-EPI: 83 ML/MIN/1.73 M 2
GLOBULIN SER CALC-MCNC: 3.2 G/DL (ref 1.9–3.5)
GLUCOSE SERPL-MCNC: 168 MG/DL (ref 65–99)
HCT VFR BLD AUTO: 51.1 % (ref 42–52)
HDLC SERPL-MCNC: 18 MG/DL
HGB BLD-MCNC: 18.1 G/DL (ref 14–18)
LACTATE SERPL-SCNC: 1.7 MMOL/L (ref 0.5–2)
LACTATE SERPL-SCNC: 2 MMOL/L (ref 0.5–2)
LACTATE SERPL-SCNC: 2 MMOL/L (ref 0.5–2)
LACTATE SERPL-SCNC: 2.4 MMOL/L (ref 0.5–2)
LACTATE SERPL-SCNC: 3 MMOL/L (ref 0.5–2)
LDLC SERPL CALC-MCNC: ABNORMAL MG/DL
LIPASE SERPL-CCNC: 736 U/L (ref 11–82)
MAGNESIUM SERPL-MCNC: 1.4 MG/DL (ref 1.5–2.5)
MCH RBC QN AUTO: 32 PG (ref 27–33)
MCHC RBC AUTO-ENTMCNC: 35.4 G/DL (ref 32.3–36.5)
MCV RBC AUTO: 90.3 FL (ref 81.4–97.8)
PLATELET # BLD AUTO: 207 K/UL (ref 164–446)
PMV BLD AUTO: 9.4 FL (ref 9–12.9)
POTASSIUM SERPL-SCNC: 4.4 MMOL/L (ref 3.6–5.5)
PROT SERPL-MCNC: 6.2 G/DL (ref 6–8.2)
RBC # BLD AUTO: 5.66 M/UL (ref 4.7–6.1)
SODIUM SERPL-SCNC: 128 MMOL/L (ref 135–145)
TRIGL SERPL-MCNC: 684 MG/DL (ref 0–149)
WBC # BLD AUTO: 14.3 K/UL (ref 4.8–10.8)

## 2023-12-27 PROCEDURE — 76705 ECHO EXAM OF ABDOMEN: CPT

## 2023-12-27 PROCEDURE — 36415 COLL VENOUS BLD VENIPUNCTURE: CPT

## 2023-12-27 PROCEDURE — 85027 COMPLETE CBC AUTOMATED: CPT

## 2023-12-27 PROCEDURE — 700102 HCHG RX REV CODE 250 W/ 637 OVERRIDE(OP): Performed by: INTERNAL MEDICINE

## 2023-12-27 PROCEDURE — 700111 HCHG RX REV CODE 636 W/ 250 OVERRIDE (IP)

## 2023-12-27 PROCEDURE — 700111 HCHG RX REV CODE 636 W/ 250 OVERRIDE (IP): Performed by: INTERNAL MEDICINE

## 2023-12-27 PROCEDURE — 770020 HCHG ROOM/CARE - TELE (206)

## 2023-12-27 PROCEDURE — A9270 NON-COVERED ITEM OR SERVICE: HCPCS | Performed by: INTERNAL MEDICINE

## 2023-12-27 PROCEDURE — 99233 SBSQ HOSP IP/OBS HIGH 50: CPT | Performed by: INTERNAL MEDICINE

## 2023-12-27 PROCEDURE — 83605 ASSAY OF LACTIC ACID: CPT | Mod: 91

## 2023-12-27 PROCEDURE — 83735 ASSAY OF MAGNESIUM: CPT

## 2023-12-27 PROCEDURE — 83690 ASSAY OF LIPASE: CPT

## 2023-12-27 PROCEDURE — 80061 LIPID PANEL: CPT

## 2023-12-27 PROCEDURE — 80053 COMPREHEN METABOLIC PANEL: CPT

## 2023-12-27 PROCEDURE — 700105 HCHG RX REV CODE 258: Performed by: INTERNAL MEDICINE

## 2023-12-27 PROCEDURE — 82310 ASSAY OF CALCIUM: CPT

## 2023-12-27 PROCEDURE — 700111 HCHG RX REV CODE 636 W/ 250 OVERRIDE (IP): Mod: JZ | Performed by: INTERNAL MEDICINE

## 2023-12-27 PROCEDURE — 82040 ASSAY OF SERUM ALBUMIN: CPT

## 2023-12-27 RX ORDER — OMEPRAZOLE 20 MG/1
20 CAPSULE, DELAYED RELEASE ORAL DAILY
Status: DISCONTINUED | OUTPATIENT
Start: 2023-12-27 | End: 2023-12-27

## 2023-12-27 RX ORDER — MAGNESIUM SULFATE HEPTAHYDRATE 40 MG/ML
2 INJECTION, SOLUTION INTRAVENOUS ONCE
Status: COMPLETED | OUTPATIENT
Start: 2023-12-27 | End: 2023-12-27

## 2023-12-27 RX ORDER — CALCIUM GLUCONATE 20 MG/ML
2 INJECTION, SOLUTION INTRAVENOUS ONCE
Status: COMPLETED | OUTPATIENT
Start: 2023-12-27 | End: 2023-12-27

## 2023-12-27 RX ORDER — CALCIUM GLUCONATE 20 MG/ML
1 INJECTION, SOLUTION INTRAVENOUS ONCE
Status: COMPLETED | OUTPATIENT
Start: 2023-12-27 | End: 2023-12-28

## 2023-12-27 RX ORDER — CALCIUM CARBONATE 500 MG/1
1000 TABLET, CHEWABLE ORAL 2 TIMES DAILY
Status: DISCONTINUED | OUTPATIENT
Start: 2023-12-27 | End: 2023-12-27

## 2023-12-27 RX ORDER — OMEPRAZOLE 20 MG/1
20 CAPSULE, DELAYED RELEASE ORAL 2 TIMES DAILY
Status: DISCONTINUED | OUTPATIENT
Start: 2023-12-27 | End: 2023-12-30

## 2023-12-27 RX ORDER — CALCIUM GLUCONATE 20 MG/ML
2 INJECTION, SOLUTION INTRAVENOUS ONCE
Status: COMPLETED | OUTPATIENT
Start: 2023-12-27 | End: 2023-12-28

## 2023-12-27 RX ORDER — CALCIUM CARBONATE 500 MG/1
1000 TABLET, CHEWABLE ORAL 3 TIMES DAILY
Status: DISCONTINUED | OUTPATIENT
Start: 2023-12-27 | End: 2024-01-01

## 2023-12-27 RX ORDER — FENOFIBRATE 67 MG/1
67 CAPSULE ORAL DAILY
Status: DISCONTINUED | OUTPATIENT
Start: 2023-12-27 | End: 2024-01-02 | Stop reason: HOSPADM

## 2023-12-27 RX ADMIN — SODIUM CHLORIDE: 9 INJECTION, SOLUTION INTRAVENOUS at 01:03

## 2023-12-27 RX ADMIN — ENOXAPARIN SODIUM 40 MG: 100 INJECTION SUBCUTANEOUS at 17:04

## 2023-12-27 RX ADMIN — MAGNESIUM SULFATE HEPTAHYDRATE 2 G: 2 INJECTION, SOLUTION INTRAVENOUS at 14:59

## 2023-12-27 RX ADMIN — FENTANYL CITRATE 25 MCG: 0.05 INJECTION, SOLUTION INTRAMUSCULAR; INTRAVENOUS at 17:03

## 2023-12-27 RX ADMIN — OXYCODONE HYDROCHLORIDE 10 MG: 10 TABLET ORAL at 19:31

## 2023-12-27 RX ADMIN — LABETALOL HYDROCHLORIDE 10 MG: 5 INJECTION, SOLUTION INTRAVENOUS at 11:08

## 2023-12-27 RX ADMIN — FENTANYL CITRATE 25 MCG: 0.05 INJECTION, SOLUTION INTRAMUSCULAR; INTRAVENOUS at 09:46

## 2023-12-27 RX ADMIN — FENTANYL CITRATE 25 MCG: 0.05 INJECTION, SOLUTION INTRAMUSCULAR; INTRAVENOUS at 03:46

## 2023-12-27 RX ADMIN — OXYCODONE HYDROCHLORIDE 10 MG: 10 TABLET ORAL at 11:07

## 2023-12-27 RX ADMIN — ONDANSETRON 4 MG: 2 INJECTION INTRAMUSCULAR; INTRAVENOUS at 01:00

## 2023-12-27 RX ADMIN — SODIUM CHLORIDE: 9 INJECTION, SOLUTION INTRAVENOUS at 12:34

## 2023-12-27 RX ADMIN — FENOFIBRATE 67 MG: 67 CAPSULE ORAL at 14:56

## 2023-12-27 RX ADMIN — OMEPRAZOLE 20 MG: 20 CAPSULE, DELAYED RELEASE ORAL at 08:12

## 2023-12-27 RX ADMIN — OXYCODONE 5 MG: 5 TABLET ORAL at 14:56

## 2023-12-27 RX ADMIN — ANTACID TABLETS 1000 MG: 500 TABLET, CHEWABLE ORAL at 08:12

## 2023-12-27 RX ADMIN — ANTACID TABLETS 1000 MG: 500 TABLET, CHEWABLE ORAL at 17:04

## 2023-12-27 RX ADMIN — CALCIUM GLUCONATE 2 G: 20 INJECTION, SOLUTION INTRAVENOUS at 03:50

## 2023-12-27 RX ADMIN — CALCIUM GLUCONATE 2 G: 20 INJECTION, SOLUTION INTRAVENOUS at 23:07

## 2023-12-27 RX ADMIN — SODIUM CHLORIDE: 9 INJECTION, SOLUTION INTRAVENOUS at 21:01

## 2023-12-27 RX ADMIN — SODIUM CHLORIDE: 9 INJECTION, SOLUTION INTRAVENOUS at 07:25

## 2023-12-27 RX ADMIN — OXYCODONE HYDROCHLORIDE 10 MG: 10 TABLET ORAL at 07:24

## 2023-12-27 RX ADMIN — FENTANYL CITRATE 25 MCG: 0.05 INJECTION, SOLUTION INTRAMUSCULAR; INTRAVENOUS at 20:58

## 2023-12-27 RX ADMIN — FENTANYL CITRATE 25 MCG: 0.05 INJECTION, SOLUTION INTRAMUSCULAR; INTRAVENOUS at 12:33

## 2023-12-27 RX ADMIN — DOCUSATE SODIUM 50 MG AND SENNOSIDES 8.6 MG 2 TABLET: 8.6; 5 TABLET, FILM COATED ORAL at 06:03

## 2023-12-27 RX ADMIN — FENTANYL CITRATE 25 MCG: 0.05 INJECTION, SOLUTION INTRAMUSCULAR; INTRAVENOUS at 06:00

## 2023-12-27 RX ADMIN — FENTANYL CITRATE 25 MCG: 0.05 INJECTION, SOLUTION INTRAMUSCULAR; INTRAVENOUS at 23:04

## 2023-12-27 RX ADMIN — OMEPRAZOLE 20 MG: 20 CAPSULE, DELAYED RELEASE ORAL at 17:11

## 2023-12-27 RX ADMIN — OXYCODONE HYDROCHLORIDE 10 MG: 10 TABLET ORAL at 04:30

## 2023-12-27 RX ADMIN — LABETALOL HYDROCHLORIDE 10 MG: 5 INJECTION, SOLUTION INTRAVENOUS at 07:24

## 2023-12-27 RX ADMIN — FENTANYL CITRATE 25 MCG: 0.05 INJECTION, SOLUTION INTRAMUSCULAR; INTRAVENOUS at 01:26

## 2023-12-27 ASSESSMENT — PAIN DESCRIPTION - PAIN TYPE
TYPE: ACUTE PAIN

## 2023-12-27 ASSESSMENT — ENCOUNTER SYMPTOMS
WEIGHT LOSS: 0
ABDOMINAL PAIN: 1
PALPITATIONS: 0
DIZZINESS: 0
PHOTOPHOBIA: 0
WEAKNESS: 0
BLURRED VISION: 0
COUGH: 0
HEMOPTYSIS: 0
DOUBLE VISION: 0
CLAUDICATION: 0
FEVER: 0
NAUSEA: 0
DIARRHEA: 0
ORTHOPNEA: 0
SPEECH CHANGE: 0
NECK PAIN: 0
CONSTIPATION: 0
VOMITING: 1
MYALGIAS: 0
CHILLS: 0

## 2023-12-27 ASSESSMENT — FIBROSIS 4 INDEX: FIB4 SCORE: 1.72

## 2023-12-27 NOTE — PROGRESS NOTES
Hospital Medicine Daily Progress Note    Date of Service  12/27/2023    Chief Complaint  Abelardo Fenton is a 38 y.o. male admitted 12/26/2023 with abdominal pain    Hospital Course  38-year-old male with no significant past medical history except GERD presented 12/26 with epigastric pain.  Started couple days before the admission around epigastric area with nausea and vomiting and feeling heartburn, denied any fever or chills however had generalized weakness.  Trying to swallow the patient had some alcohol however patient denied any heavy alcohol use.  No drugs, no history of pancreatitis or other problems.  On admission CT scan for abdomen and pelvis showed thickening around the jejunum with acute pancreatitis in the head of the pancreas.  No gallstones, ultrasound did not show any obstruction.  Labs showed elevated liver enzymes with lipase 736.  IV fluid was initiated.  Labs also showed hypertriglyceridemia 684.        Interval Problem Update  -Evaluated examined the patient at bedside,  patient still having epigastric pain however no nausea or vomiting, tolerating clear diet  -Lipase more than 600 and triglyceride more than 600, start with fenofibrate, continue IV fluid  -Replace magnesium and calcium  -Order MRCP to rule out obstruction  -Labs daily    I have discussed this patient's plan of care and discharge plan at IDT rounds today with Case Management, Nursing, Nursing leadership, and other members of the IDT team.    Consultants/Specialty  None    Code Status  Full Code    Disposition  The patient is not medically cleared for discharge to home or a post-acute facility.  Anticipate discharge to: home with close outpatient follow-up    I have placed the appropriate orders for post-discharge needs.    Review of Systems  Review of Systems   Constitutional:  Negative for chills, fever and weight loss.   HENT:  Negative for ear pain, hearing loss and tinnitus.    Eyes:  Negative for blurred vision,  double vision and photophobia.   Respiratory:  Negative for cough and hemoptysis.    Cardiovascular:  Negative for chest pain, palpitations, orthopnea and claudication.   Gastrointestinal:  Positive for abdominal pain and vomiting. Negative for constipation, diarrhea and nausea.   Genitourinary:  Negative for dysuria, frequency and urgency.   Musculoskeletal:  Negative for myalgias and neck pain.   Skin:  Negative for rash.   Neurological:  Negative for dizziness, speech change and weakness.        Physical Exam  Temp:  [36.2 °C (97.2 °F)-36.5 °C (97.7 °F)] 36.3 °C (97.3 °F)  Pulse:  [105-139] 105  Resp:  [18-21] 18  BP: (135-172)/() 135/99  SpO2:  [92 %-96 %] 94 %    Physical Exam  Constitutional:       Appearance: He is ill-appearing.   Eyes:      General: No scleral icterus.  Cardiovascular:      Rate and Rhythm: Normal rate.      Heart sounds: No murmur heard.  Pulmonary:      Effort: No respiratory distress.      Breath sounds: No wheezing.   Abdominal:      General: There is no distension.      Tenderness: There is abdominal tenderness. There is no right CVA tenderness, left CVA tenderness or guarding.   Musculoskeletal:      Right lower leg: No edema.      Left lower leg: No edema.   Lymphadenopathy:      Cervical: No cervical adenopathy.   Skin:     Coloration: Skin is not jaundiced.      Findings: No bruising, lesion or rash.   Neurological:      General: No focal deficit present.      Mental Status: He is alert and oriented to person, place, and time. Mental status is at baseline.      Cranial Nerves: No cranial nerve deficit.      Motor: No weakness.      Gait: Gait normal.         Fluids    Intake/Output Summary (Last 24 hours) at 12/27/2023 1434  Last data filed at 12/27/2023 0955  Gross per 24 hour   Intake 360 ml   Output 0 ml   Net 360 ml       Laboratory  Recent Labs     12/26/23  1847 12/27/23  0006   WBC 14.0* 14.3*   RBC 6.10 5.66   HEMOGLOBIN 19.4* 18.1*   HEMATOCRIT 54.7* 51.1   MCV 89.7  90.3   MCH 31.8 32.0   MCHC 35.5 35.4   RDW 44.1 44.7   PLATELETCT 247 207   MPV 9.7 9.4     Recent Labs     12/27/23  0006   SODIUM 128*   POTASSIUM 4.4   CHLORIDE 96   CO2 19*   GLUCOSE 168*   BUN 16   CREATININE 1.17   CALCIUM 6.2*             Recent Labs     12/27/23  0006   TRIGLYCERIDE 684*   HDL 18*   LDL see below       Imaging  US-RUQ   Final Result         1. Hepatic steatosis.   2. No cholelithiasis.   3. Common bile duct was not visualized.      [id number]    (Results Pending)        Assessment/Plan  * Pancreatitis- (present on admission)  Assessment & Plan  Likely related to hypertriglyceridemia  Denies significant using alcohol  Diagnosed with CT and symptoms at outside hospital  Elevated lipase more than 600   Right upper quadrant ultrasound did not show any acute finding  Pain control  IV fluids diet n.p.o. and monitoring lipase daily  Start with fenofibrate    High triglycerides  Assessment & Plan  Labs showed triglycerides 684  Came with acute pancreatitis  Start with fenofibrate  Encouraged the patient healthy diet and monitor as outpatient    Hypomagnesemia  Assessment & Plan  Replace IV  Monitor with labs daily    Hypoglycemia  Assessment & Plan  Related to acute pancreatitis  Tums 1000 3 times daily  Labs daily    Transaminitis- (present on admission)  Assessment & Plan  No significant using of alcohol   CT scan reviewed ultrasound did not show any obstruction   Since patient came with elevated liver enzymes, bilirubin and pancreas, will order MRI to rule out any obstruction   or masses.    Continue monitoring with labs daily   Hepatitis panel is negative    SIRS (systemic inflammatory response syndrome) (HCC)- (present on admission)  Assessment & Plan  SIRS criteria identified on my evaluation include:  Tachycardia, with heart rate greater than 90 BPM and Leukocytosis, with WBC greater than 12,000  SIRS is likely related to pancreatitis  WBC 15 at outside facility  No signs of  infection      Hypertension- (present on admission)  Assessment & Plan  Not on any medications  As needed antihypertensives         VTE prophylaxis:   SCDs/TEDs   Xarelto 10mg daily as prophylaxis      I have performed a physical exam and reviewed and updated ROS and Plan today (12/27/2023). In review of yesterday's note (12/26/2023), there are no changes except as documented above.      Greater than 51 minutes spent prepping to see patient (e.g. review of tests) obtaining and/or reviewing separately obtained history. Performing a medically appropriate examination and/ evaluation.  Counseling and educating the patient/family/caregiver.  Ordering medications, tests, or procedures.  Referring and communicating with other health care professionals.  Documenting clinical information in EPIC.  Independently interpreting results and communicating results to patient/family/caregiver.  Care coordination

## 2023-12-27 NOTE — PROGRESS NOTES
4 Eyes Skin Assessment Completed by OSCAR Reich and OSCAR Armstrong.    Head WDL  Ears WDL  Nose WDL  Mouth WDL  Neck WDL  Breast/Chest WDL  Shoulder Blades WDL  Spine WDL  (R) Arm/Elbow/Hand WDL  (L) Arm/Elbow/Hand WDL  Abdomen WDL  Groin WDL  Scrotum/Coccyx/Buttocks WDL  (R) Leg WDL  (L) Leg WDL  (R) Heel/Foot/Toe WDL  (L) Heel/Foot/Toe WDL          Devices In Places Tele Box      Interventions In Place Gray Ear Foams and Pillows    Possible Skin Injury No    Pictures Uploaded Into Epic N/A  Wound Consult Placed N/A  RN Wound Prevention Protocol Ordered No

## 2023-12-27 NOTE — H&P
Hospital Medicine History & Physical Note    Date of Service  12/26/2023    Primary Care Physician  Sheeba Lloyd M.D.    Consultants  N/A      Code Status  Full Code    Chief Complaint  No chief complaint on file.      History of Presenting Illness  Abelardo Fenton is a 38 y.o. male who presented 12/26/2023 with acute nausea, vomiting and severe epigastric abdominal pain radiating to the back.  Symptoms started last night.  He reports normally he uses omeprazole for GERD but has not taken over the last few days.  Initially he thought this was just heartburn due to the holiday and excessive eating.  However the pain did not improve. Patient drinks alcohol occasionally, has been drinking alcohol over the holiday but denies any heavy alcohol use.  At outside hospital: CT abdomen pelvis performed showed thickening around the duodenum, acute pancreatitis in the head of the pancreas, no gallstones.   LABS- Na 126. Wbc- 15K, K 3.9, Cr 1.7, GFR 46. Ca 8.0 , , Bilirubin 1.2, VS:  96.2F HR: Initially 138 down to 117, /106, RR 18, 96%-RA. EKG nonspecific changes, sinus tachycardia.    On arrival to [facility] patient still tachycardic, with severe abdominal pain.  Did get multiple doses of fentanyl in transport still with 8/10 pain.  Reports nausea and vomiting have resolved.    I discussed the plan of care with patient and bedside RN.    Review of Systems  Review of Systems   Constitutional:  Positive for malaise/fatigue. Negative for chills and fever.   HENT:  Negative for congestion.    Eyes:  Negative for blurred vision.   Respiratory:  Negative for shortness of breath.    Cardiovascular:  Negative for chest pain.   Gastrointestinal:  Positive for abdominal pain, nausea and vomiting. Negative for constipation and diarrhea.   Genitourinary:  Negative for dysuria.   Musculoskeletal:  Negative for myalgias.   Skin:  Negative for rash.   Neurological:  Negative for dizziness and  headaches.   Psychiatric/Behavioral:  Negative for depression.    All other systems reviewed and are negative.      Past Medical History  GERD    Surgical History  Denies    Family History  Family history reviewed with patient. There is no family history that is pertinent to the chief complaint.     Social History   reports that he has never smoked. He has never used smokeless tobacco. He reports current alcohol use. He reports that he does not use drugs.    Allergies  No Known Allergies    Medications  Prior to Admission Medications   Prescriptions Last Dose Informant Patient Reported? Taking?   lisinopril (PRINIVIL) 10 MG Tab  Patient Yes No   Sig: Take 10 mg by mouth every day.      Facility-Administered Medications: None       Physical Exam  Temp:  [36.3 °C (97.3 °F)] 36.3 °C (97.3 °F)  Pulse:  [139] 139  Resp:  [21] 21  BP: (161)/(117) 161/117  SpO2:  [92 %] 92 %                          Physical Exam  Vitals and nursing note reviewed.   Constitutional:       General: He is not in acute distress.     Appearance: He is ill-appearing.   HENT:      Head: Normocephalic and atraumatic.      Right Ear: External ear normal.      Left Ear: External ear normal.      Nose: Nose normal.      Mouth/Throat:      Mouth: Mucous membranes are dry.      Pharynx: Oropharynx is clear.   Eyes:      Extraocular Movements: Extraocular movements intact.      Conjunctiva/sclera: Conjunctivae normal.      Pupils: Pupils are equal, round, and reactive to light.   Cardiovascular:      Rate and Rhythm: Regular rhythm. Tachycardia present.      Pulses: Normal pulses.   Pulmonary:      Effort: Pulmonary effort is normal. No respiratory distress.      Breath sounds: Normal breath sounds. No wheezing or rales.   Abdominal:      General: There is distension.      Tenderness: There is abdominal tenderness (Epigastric).   Musculoskeletal:         General: Normal range of motion.      Cervical back: Normal range of motion and neck supple.       "Right lower leg: No edema.      Left lower leg: No edema.   Skin:     General: Skin is warm and dry.   Neurological:      General: No focal deficit present.      Mental Status: He is alert and oriented to person, place, and time.      Cranial Nerves: No cranial nerve deficit.      Motor: No weakness.   Psychiatric:         Mood and Affect: Mood normal.         Behavior: Behavior normal.         Laboratory:          No results for input(s): \"ALTSGPT\", \"ASTSGOT\", \"ALKPHOSPHAT\", \"TBILIRUBIN\", \"DBILIRUBIN\", \"GAMMAGT\", \"AMYLASE\", \"LIPASE\", \"ALB\", \"PREALBUMIN\", \"GLUCOSE\" in the last 72 hours.      No results for input(s): \"NTPROBNP\" in the last 72 hours.      No results for input(s): \"TROPONINT\" in the last 72 hours.    Imaging:  US-RUQ    (Results Pending)       no X-Ray or EKG requiring interpretation    Assessment/Plan:  Justification for Admission Status  I anticipate this patient will require at least two midnights for appropriate medical management, necessitating inpatient admission because acute pancreatitis of unknown etiology, concern for hyperlipidemia from prior facility, stat labs pending.  Will get right upper quadrant ultrasound to look for gallstones.      * Pancreatitis- (present on admission)  Assessment & Plan  Unclear etiology  Diagnosed with CT and symptoms at outside hospital  Lipase, lipid panel pending  Right upper quadrant ultrasound pending  Pain control  Aggressive IV fluids    Transaminitis- (present on admission)  Assessment & Plan  Outside facility reports ,   Repeat CMP pending  CT showed no stones  Right upper quadrant ultrasound pending  Hepatitis panel pending    SIRS (systemic inflammatory response syndrome) (HCC)- (present on admission)  Assessment & Plan  SIRS criteria identified on my evaluation include:  Tachycardia, with heart rate greater than 90 BPM and Leukocytosis, with WBC greater than 12,000  SIRS is likely related to pancreatitis  WBC 15 at outside " facility      Hypertension- (present on admission)  Assessment & Plan  Not on any medications  As needed antihypertensives        VTE prophylaxis: enoxaparin ppx

## 2023-12-27 NOTE — ASSESSMENT & PLAN NOTE
No significant using of alcohol   CT scan reviewed ultrasound did not show any obstruction   Since patient came with elevated liver enzymes, bilirubin and pancreas, will order MRI to rule out any obstruction   or masses.    Continue monitoring with labs daily   Hepatitis panel is negative

## 2023-12-27 NOTE — PROGRESS NOTES
Pt arrived to unit via [facility] at 1825. Pt oriented to room, unit, and plan of care. Tele-monitor placed and monitor room notified. All questions answered at this time. Admitting MD notified of pt arrival; Call light within reach; fall precautions in place.

## 2023-12-27 NOTE — CARE PLAN
The patient is Stable - Low risk of patient condition declining or worsening    Shift Goals  Clinical Goals: pain management, hemodynamic stability  Patient Goals: pain management    Progress made toward(s) clinical / shift goals:    Problem: Knowledge Deficit - Standard  Goal: Patient and family/care givers will demonstrate understanding of plan of care, disease process/condition, diagnostic tests and medications  Description: Target End Date:  1-3 days or as soon as patient condition allows    Document in Patient Education    1.  Patient and family/caregiver oriented to unit, equipment, visitation policy and means for communicating concern  2.  Complete/review Learning Assessment  3.  Assess knowledge level of disease process/condition, treatment plan, diagnostic tests and medications  4.  Explain disease process/condition, treatment plan, diagnostic tests and medications  Outcome: Progressing     Problem: Pain - Standard  Goal: Alleviation of pain or a reduction in pain to the patient’s comfort goal  Description: Target End Date:  Prior to discharge or change in level of care    Document on Vitals flowsheet    1.  Document pain using the appropriate pain scale per order or unit policy  2.  Educate and implement non-pharmacologic comfort measures (i.e. relaxation, distraction, massage, cold/heat therapy, etc.)  3.  Pain management medications as ordered  4.  Reassess pain after pain med administration per policy  5.  If opiods administered assess patient's response to pain medication is appropriate per POSS sedation scale  6.  Follow pain management plan developed in collaboration with patient and interdisciplinary team (including palliative care or pain specialists if applicable)  Outcome: Progressing       Patient is not progressing towards the following goals:

## 2023-12-27 NOTE — PROGRESS NOTES
Telemetry Shift Summary     Rhythm: ST  HR: 114-117    Measurements: .11/.08/.33                  Normal Values  Rhythm: SR  HR:   Measurements: 0.12-0.20/0.08-0.10/0.30-0.52

## 2023-12-27 NOTE — ASSESSMENT & PLAN NOTE
SIRS criteria identified on my evaluation include:  Tachycardia, with heart rate greater than 90 BPM and Leukocytosis, with WBC greater than 12,000  SIRS is likely related to pancreatitis  WBC 15 at outside facility  No signs of infection

## 2023-12-27 NOTE — ASSESSMENT & PLAN NOTE
Labs showed triglycerides 684  Came with acute pancreatitis  Start with fenofibrate  Encouraged the patient healthy diet and monitor as outpatient

## 2023-12-27 NOTE — ASSESSMENT & PLAN NOTE
Likely related to alcoholism  Denies significant using alcohol initially and later admitted to drinking alcohol heavily, hard liquor  Diagnosed with CT and symptoms at outside hospital  Elevated lipase more than 600,\  Hemoglobin dropped significantly more than 15%  Right upper quadrant ultrasound did not show any acute finding  MRCP shows severe pancreatitis with no mass  Patient has pancreatitis before lisinopril or omeprazole  Pain control  Improving on his lipase  Had fever on 12/29, CT scan showed necrosis less than 20%  GI was consulted, no further recommendation, signed off  Continue improving, advance diet as tolerated

## 2023-12-27 NOTE — HOSPITAL COURSE
38-year-old male with no significant past medical history except GERD presented 12/26 with epigastric pain.  Started couple days before the admission around epigastric area with nausea and vomiting and feeling heartburn, denied any fever or chills however had generalized weakness.  Trying to swallow the patient had some alcohol however patient denied any heavy alcohol use.  No drugs, no history of pancreatitis or other problems.  On admission CT scan for abdomen and pelvis showed thickening around the jejunum with acute pancreatitis in the head of the pancreas.  No gallstones, ultrasound did not show any obstruction.  Labs showed elevated liver enzymes with lipase 736.  IV fluid was initiated.  Labs also showed hypertriglyceridemia 684.

## 2023-12-28 ENCOUNTER — APPOINTMENT (OUTPATIENT)
Dept: RADIOLOGY | Facility: MEDICAL CENTER | Age: 38
DRG: 439 | End: 2023-12-28
Attending: INTERNAL MEDICINE
Payer: COMMERCIAL

## 2023-12-28 PROBLEM — E83.51 HYPOCALCEMIA: Status: ACTIVE | Noted: 2023-12-27

## 2023-12-28 LAB
ALBUMIN SERPL BCP-MCNC: 2.8 G/DL (ref 3.2–4.9)
ALBUMIN/GLOB SERPL: 1 G/DL
ALP SERPL-CCNC: 58 U/L (ref 30–99)
ALT SERPL-CCNC: 61 U/L (ref 2–50)
ANION GAP SERPL CALC-SCNC: 11 MMOL/L (ref 7–16)
AST SERPL-CCNC: 84 U/L (ref 12–45)
BASOPHILS # BLD AUTO: 0 % (ref 0–1.8)
BASOPHILS # BLD: 0 K/UL (ref 0–0.12)
BILIRUB SERPL-MCNC: 1.9 MG/DL (ref 0.1–1.5)
BUN SERPL-MCNC: 14 MG/DL (ref 8–22)
CALCIUM ALBUM COR SERPL-MCNC: 8.2 MG/DL (ref 8.5–10.5)
CALCIUM SERPL-MCNC: 7.2 MG/DL (ref 8.5–10.5)
CHLORIDE SERPL-SCNC: 95 MMOL/L (ref 96–112)
CO2 SERPL-SCNC: 22 MMOL/L (ref 20–33)
CREAT SERPL-MCNC: 1 MG/DL (ref 0.5–1.4)
CRP SERPL HS-MCNC: 23.21 MG/DL (ref 0–0.75)
EOSINOPHIL # BLD AUTO: 0 K/UL (ref 0–0.51)
EOSINOPHIL NFR BLD: 0 % (ref 0–6.9)
ERYTHROCYTE [DISTWIDTH] IN BLOOD BY AUTOMATED COUNT: 45.5 FL (ref 35.9–50)
EST. AVERAGE GLUCOSE BLD GHB EST-MCNC: 108 MG/DL
GFR SERPLBLD CREATININE-BSD FMLA CKD-EPI: 100 ML/MIN/1.73 M 2
GLOBULIN SER CALC-MCNC: 2.7 G/DL (ref 1.9–3.5)
GLUCOSE SERPL-MCNC: 110 MG/DL (ref 65–99)
HBA1C MFR BLD: 5.4 % (ref 4–5.6)
HCT VFR BLD AUTO: 42.1 % (ref 42–52)
HGB BLD-MCNC: 14.8 G/DL (ref 14–18)
LACTATE SERPL-SCNC: 1.4 MMOL/L (ref 0.5–2)
LIPASE SERPL-CCNC: 278 U/L (ref 11–82)
LYMPHOCYTES # BLD AUTO: 0.8 K/UL (ref 1–4.8)
LYMPHOCYTES NFR BLD: 8.5 % (ref 22–41)
MAGNESIUM SERPL-MCNC: 2 MG/DL (ref 1.5–2.5)
MANUAL DIFF BLD: NORMAL
MCH RBC QN AUTO: 32.1 PG (ref 27–33)
MCHC RBC AUTO-ENTMCNC: 35.2 G/DL (ref 32.3–36.5)
MCV RBC AUTO: 91.3 FL (ref 81.4–97.8)
MONOCYTES # BLD AUTO: 0.39 K/UL (ref 0–0.85)
MONOCYTES NFR BLD AUTO: 4.2 % (ref 0–13.4)
MORPHOLOGY BLD-IMP: NORMAL
NEUTROPHILS # BLD AUTO: 8.21 K/UL (ref 1.82–7.42)
NEUTROPHILS NFR BLD: 83.9 % (ref 44–72)
NEUTS BAND NFR BLD MANUAL: 3.4 % (ref 0–10)
NRBC # BLD AUTO: 0 K/UL
NRBC BLD-RTO: 0 /100 WBC (ref 0–0.2)
PHOSPHATE SERPL-MCNC: 1.9 MG/DL (ref 2.5–4.5)
PLATELET # BLD AUTO: 154 K/UL (ref 164–446)
PLATELET BLD QL SMEAR: NORMAL
PMV BLD AUTO: 9.9 FL (ref 9–12.9)
POTASSIUM SERPL-SCNC: 3.9 MMOL/L (ref 3.6–5.5)
PROT SERPL-MCNC: 5.5 G/DL (ref 6–8.2)
RBC # BLD AUTO: 4.61 M/UL (ref 4.7–6.1)
RBC BLD AUTO: NORMAL
SODIUM SERPL-SCNC: 128 MMOL/L (ref 135–145)
TSH SERPL DL<=0.005 MIU/L-ACNC: 4.88 UIU/ML (ref 0.38–5.33)
WBC # BLD AUTO: 9.4 K/UL (ref 4.8–10.8)

## 2023-12-28 PROCEDURE — 86140 C-REACTIVE PROTEIN: CPT

## 2023-12-28 PROCEDURE — 700111 HCHG RX REV CODE 636 W/ 250 OVERRIDE (IP): Mod: JZ | Performed by: INTERNAL MEDICINE

## 2023-12-28 PROCEDURE — 83735 ASSAY OF MAGNESIUM: CPT

## 2023-12-28 PROCEDURE — 74181 MRI ABDOMEN W/O CONTRAST: CPT

## 2023-12-28 PROCEDURE — 99233 SBSQ HOSP IP/OBS HIGH 50: CPT | Performed by: INTERNAL MEDICINE

## 2023-12-28 PROCEDURE — A9270 NON-COVERED ITEM OR SERVICE: HCPCS | Performed by: INTERNAL MEDICINE

## 2023-12-28 PROCEDURE — 700111 HCHG RX REV CODE 636 W/ 250 OVERRIDE (IP)

## 2023-12-28 PROCEDURE — 84443 ASSAY THYROID STIM HORMONE: CPT

## 2023-12-28 PROCEDURE — 770020 HCHG ROOM/CARE - TELE (206)

## 2023-12-28 PROCEDURE — 700105 HCHG RX REV CODE 258: Performed by: INTERNAL MEDICINE

## 2023-12-28 PROCEDURE — 85007 BL SMEAR W/DIFF WBC COUNT: CPT

## 2023-12-28 PROCEDURE — 36415 COLL VENOUS BLD VENIPUNCTURE: CPT

## 2023-12-28 PROCEDURE — 700102 HCHG RX REV CODE 250 W/ 637 OVERRIDE(OP): Performed by: INTERNAL MEDICINE

## 2023-12-28 PROCEDURE — 85027 COMPLETE CBC AUTOMATED: CPT

## 2023-12-28 PROCEDURE — 83605 ASSAY OF LACTIC ACID: CPT

## 2023-12-28 PROCEDURE — 700101 HCHG RX REV CODE 250

## 2023-12-28 PROCEDURE — 83690 ASSAY OF LIPASE: CPT

## 2023-12-28 PROCEDURE — 83036 HEMOGLOBIN GLYCOSYLATED A1C: CPT

## 2023-12-28 PROCEDURE — 80053 COMPREHEN METABOLIC PANEL: CPT

## 2023-12-28 PROCEDURE — 84100 ASSAY OF PHOSPHORUS: CPT

## 2023-12-28 RX ORDER — LISINOPRIL 20 MG/1
20 TABLET ORAL
Status: DISCONTINUED | OUTPATIENT
Start: 2023-12-28 | End: 2023-12-30

## 2023-12-28 RX ORDER — METOPROLOL TARTRATE 1 MG/ML
5 INJECTION, SOLUTION INTRAVENOUS
Status: DISCONTINUED | OUTPATIENT
Start: 2023-12-28 | End: 2024-01-02 | Stop reason: HOSPADM

## 2023-12-28 RX ADMIN — ANTACID TABLETS 1000 MG: 500 TABLET, CHEWABLE ORAL at 11:47

## 2023-12-28 RX ADMIN — OMEPRAZOLE 20 MG: 20 CAPSULE, DELAYED RELEASE ORAL at 05:39

## 2023-12-28 RX ADMIN — LISINOPRIL 20 MG: 20 TABLET ORAL at 08:19

## 2023-12-28 RX ADMIN — OXYCODONE HYDROCHLORIDE 10 MG: 10 TABLET ORAL at 05:39

## 2023-12-28 RX ADMIN — FENOFIBRATE 67 MG: 67 CAPSULE ORAL at 05:38

## 2023-12-28 RX ADMIN — SODIUM CHLORIDE: 9 INJECTION, SOLUTION INTRAVENOUS at 19:44

## 2023-12-28 RX ADMIN — OXYCODONE 5 MG: 5 TABLET ORAL at 10:16

## 2023-12-28 RX ADMIN — OMEPRAZOLE 20 MG: 20 CAPSULE, DELAYED RELEASE ORAL at 17:15

## 2023-12-28 RX ADMIN — FENTANYL CITRATE 25 MCG: 0.05 INJECTION, SOLUTION INTRAMUSCULAR; INTRAVENOUS at 03:20

## 2023-12-28 RX ADMIN — SODIUM CHLORIDE: 9 INJECTION, SOLUTION INTRAVENOUS at 05:42

## 2023-12-28 RX ADMIN — CALCIUM GLUCONATE 1 G: 20 INJECTION, SOLUTION INTRAVENOUS at 00:46

## 2023-12-28 RX ADMIN — OXYCODONE HYDROCHLORIDE 10 MG: 10 TABLET ORAL at 13:15

## 2023-12-28 RX ADMIN — ANTACID TABLETS 1000 MG: 500 TABLET, CHEWABLE ORAL at 05:38

## 2023-12-28 RX ADMIN — OXYCODONE HYDROCHLORIDE 10 MG: 10 TABLET ORAL at 00:44

## 2023-12-28 RX ADMIN — ANTACID TABLETS 1000 MG: 500 TABLET, CHEWABLE ORAL at 17:15

## 2023-12-28 RX ADMIN — SODIUM CHLORIDE: 9 INJECTION, SOLUTION INTRAVENOUS at 10:54

## 2023-12-28 RX ADMIN — OXYCODONE HYDROCHLORIDE 10 MG: 10 TABLET ORAL at 21:24

## 2023-12-28 RX ADMIN — OXYCODONE HYDROCHLORIDE 10 MG: 10 TABLET ORAL at 17:15

## 2023-12-28 RX ADMIN — ENOXAPARIN SODIUM 40 MG: 100 INJECTION SUBCUTANEOUS at 17:16

## 2023-12-28 RX ADMIN — METOPROLOL TARTRATE 5 MG: 5 INJECTION INTRAVENOUS at 21:53

## 2023-12-28 ASSESSMENT — ENCOUNTER SYMPTOMS
MYALGIAS: 0
PHOTOPHOBIA: 0
DIZZINESS: 0
SPEECH CHANGE: 0
ABDOMINAL PAIN: 1
WEAKNESS: 0
BLURRED VISION: 0
COUGH: 0
NECK PAIN: 0
CHILLS: 0
WEIGHT LOSS: 0
DOUBLE VISION: 0
CONSTIPATION: 0
VOMITING: 0
FEVER: 0
HEMOPTYSIS: 0
ORTHOPNEA: 0
NAUSEA: 0
CLAUDICATION: 0
PALPITATIONS: 0
DIARRHEA: 0

## 2023-12-28 ASSESSMENT — FIBROSIS 4 INDEX: FIB4 SCORE: 2.51

## 2023-12-28 ASSESSMENT — PAIN DESCRIPTION - PAIN TYPE
TYPE: ACUTE PAIN

## 2023-12-28 NOTE — PROGRESS NOTES
Telemetry Shift Summary     Rhythm: -119    Measurements: .14/.07/.33            Normal Values  Rhythm: SR  HR:   Measurements: 0.12-0.20/0.08-0.10/0.30-0.52

## 2023-12-28 NOTE — CARE PLAN
The patient is Stable - Low risk of patient condition declining or worsening    Shift Goals  Clinical Goals: pain management, bowel rest  Patient Goals: pain management    Progress made toward(s) clinical / shift goals:  clinical goals and plan of care discussed with patient. Pt demonstrated understanding.     Problem: Knowledge Deficit - Standard  Goal: Patient and family/care givers will demonstrate understanding of plan of care, disease process/condition, diagnostic tests and medications  Description: Target End Date:  1-3 days or as soon as patient condition allows    Document in Patient Education    1.  Patient and family/caregiver oriented to unit, equipment, visitation policy and means for communicating concern  2.  Complete/review Learning Assessment  3.  Assess knowledge level of disease process/condition, treatment plan, diagnostic tests and medications  4.  Explain disease process/condition, treatment plan, diagnostic tests and medications  Outcome: Progressing       Patient is not progressing towards the following goals:

## 2023-12-28 NOTE — CARE PLAN
Problem: Pain - Standard  Goal: Alleviation of pain or a reduction in pain to the patient’s comfort goal  Outcome: Progressing   The patient is Stable - Low risk of patient condition declining or worsening  Patient reported 7/10 pain. Patient medicated per the MAR. Patient's pain was better tolerated and patient was able to rest.   Shift Goals  Clinical Goals: pain management  Patient Goals: comfort

## 2023-12-28 NOTE — PROGRESS NOTES
Hospital Medicine Daily Progress Note    Date of Service  12/28/2023    Chief Complaint  Abelardo Fenton is a 38 y.o. male admitted 12/26/2023 with abdominal pain    Hospital Course  38-year-old male with no significant past medical history except GERD presented 12/26 with epigastric pain.  Started couple days before the admission around epigastric area with nausea and vomiting and feeling heartburn, denied any fever or chills however had generalized weakness.  Trying to swallow the patient had some alcohol however patient denied any heavy alcohol use.  No drugs, no history of pancreatitis or other problems.  On admission CT scan for abdomen and pelvis showed thickening around the jejunum with acute pancreatitis in the head of the pancreas.  No gallstones, ultrasound did not show any obstruction.  Labs showed elevated liver enzymes with lipase 736.  IV fluid was initiated.  Labs also showed hypertriglyceridemia 684.        Interval Problem Update  -Evaluated examined the patient at bedside, some improving on his pain, patient agreed to start with clear diet  -Labs reviewed, improving on his lipase and elevated liver enzymes  -Reviewed MRI with the patient, showed severe pancreatitis with no mass, also showed ascites, decrease of fluid 125 and close monitoring  -Patient is not stable for discharge due to severe pancreatitis  -Replace magnesium and calcium    I have discussed this patient's plan of care and discharge plan at IDT rounds today with Case Management, Nursing, Nursing leadership, and other members of the IDT team.    Consultants/Specialty  None    Code Status  Full Code    Disposition  The patient is not medically cleared for discharge to home or a post-acute facility.  Anticipate discharge to: home with close outpatient follow-up    I have placed the appropriate orders for post-discharge needs.    Review of Systems  Review of Systems   Constitutional:  Negative for chills, fever and weight  loss.   HENT:  Negative for ear pain, hearing loss and tinnitus.    Eyes:  Negative for blurred vision, double vision and photophobia.   Respiratory:  Negative for cough and hemoptysis.    Cardiovascular:  Negative for chest pain, palpitations, orthopnea and claudication.   Gastrointestinal:  Positive for abdominal pain. Negative for constipation, diarrhea, nausea and vomiting.   Genitourinary:  Negative for dysuria, frequency and urgency.   Musculoskeletal:  Negative for myalgias and neck pain.   Skin:  Negative for rash.   Neurological:  Negative for dizziness, speech change and weakness.        Physical Exam  Temp:  [36.3 °C (97.3 °F)-37.5 °C (99.5 °F)] 37.2 °C (99 °F)  Pulse:  [110-130] 117  Resp:  [18] 18  BP: (141-155)/() 152/95  SpO2:  [82 %-94 %] 89 %    Physical Exam  Constitutional:       Appearance: He is ill-appearing.   Eyes:      General: No scleral icterus.  Cardiovascular:      Rate and Rhythm: Normal rate.      Heart sounds: No murmur heard.  Pulmonary:      Effort: No respiratory distress.      Breath sounds: No wheezing.   Abdominal:      General: There is no distension.      Tenderness: There is abdominal tenderness. There is no right CVA tenderness, left CVA tenderness or guarding.   Musculoskeletal:      Right lower leg: No edema.      Left lower leg: No edema.   Lymphadenopathy:      Cervical: No cervical adenopathy.   Skin:     Coloration: Skin is not jaundiced.      Findings: No bruising, lesion or rash.   Neurological:      General: No focal deficit present.      Mental Status: He is alert and oriented to person, place, and time. Mental status is at baseline.      Cranial Nerves: No cranial nerve deficit.      Motor: No weakness.      Gait: Gait normal.         Fluids    Intake/Output Summary (Last 24 hours) at 12/28/2023 1325  Last data filed at 12/28/2023 0800  Gross per 24 hour   Intake 4109.92 ml   Output --   Net 4109.92 ml       Laboratory  Recent Labs     12/26/23  2089  12/27/23  0006 12/28/23  0205   WBC 14.0* 14.3* 9.4   RBC 6.10 5.66 4.61*   HEMOGLOBIN 19.4* 18.1* 14.8   HEMATOCRIT 54.7* 51.1 42.1   MCV 89.7 90.3 91.3   MCH 31.8 32.0 32.1   MCHC 35.5 35.4 35.2   RDW 44.1 44.7 45.5   PLATELETCT 247 207 154*   MPV 9.7 9.4 9.9     Recent Labs     12/27/23  0006 12/27/23  1846 12/28/23  0205   SODIUM 128*  --  128*   POTASSIUM 4.4  --  3.9   CHLORIDE 96  --  95*   CO2 19*  --  22   GLUCOSE 168*  --  110*   BUN 16  --  14   CREATININE 1.17  --  1.00   CALCIUM 6.2* 6.2* 7.2*             Recent Labs     12/27/23  0006   TRIGLYCERIDE 684*   HDL 18*   LDL see below       Imaging  [id number]   Final Result      1.  Findings consistent with severe acute pancreatitis with extensive peripancreatic edema and ascites present.   2.  No biliary dilation or common bile duct stone.   3.  Unremarkable gallbladder.   4.  Small bilateral pleural effusions.   5.  Motion artifact limits exam.      US-RUQ   Final Result         1. Hepatic steatosis.   2. No cholelithiasis.   3. Common bile duct was not visualized.           Assessment/Plan  * Pancreatitis- (present on admission)  Assessment & Plan  Likely related to hypertriglyceridemia  Denies significant using alcohol  Diagnosed with CT and symptoms at outside hospital  Elevated lipase more than 600, improving  Right upper quadrant ultrasound did not show any acute finding  MRCP shows severe pancreatitis with no mass  Pain control  Start with diet and close monitoring  Labs daily    High triglycerides  Assessment & Plan  Labs showed triglycerides 684  Came with acute pancreatitis  Start with fenofibrate  Encouraged the patient healthy diet and monitor as outpatient    Hypomagnesemia  Assessment & Plan  Replace IV  Monitor with labs daily    Hypocalcemia  Assessment & Plan  Related to acute pancreatitis  Tums 1000 3 times daily  Labs daily  Improving  Replace magnesium    Transaminitis- (present on admission)  Assessment & Plan  No significant  using of alcohol   CT scan reviewed ultrasound did not show any obstruction   Since patient came with elevated liver enzymes, bilirubin and pancreas, will order MRI to rule out any obstruction   or masses.    Continue monitoring with labs daily   Hepatitis panel is negative    SIRS (systemic inflammatory response syndrome) (HCC)- (present on admission)  Assessment & Plan  SIRS criteria identified on my evaluation include:  Tachycardia, with heart rate greater than 90 BPM and Leukocytosis, with WBC greater than 12,000  SIRS is likely related to pancreatitis  WBC 15 at outside facility  No signs of infection      Hypertension- (present on admission)  Assessment & Plan  Not on any medications  As needed antihypertensives         VTE prophylaxis:   SCDs/TEDs   enoxaparin ppx      I have performed a physical exam and reviewed and updated ROS and Plan today (12/28/2023). In review of yesterday's note (12/27/2023), there are no changes except as documented above.      Greater than 51 minutes spent prepping to see patient (e.g. review of tests) obtaining and/or reviewing separately obtained history. Performing a medically appropriate examination and/ evaluation.  Counseling and educating the patient/family/caregiver.  Ordering medications, tests, or procedures.  Referring and communicating with other health care professionals.  Documenting clinical information in EPIC.  Independently interpreting results and communicating results to patient/family/caregiver.  Care coordination

## 2023-12-28 NOTE — CARE PLAN
Problem: Pain - Standard  Goal: Alleviation of pain or a reduction in pain to the patient’s comfort goal  Outcome: Progressing  Patient states since yesterday he is able to go longer without needing pain medication.   The patient is Watcher - Medium risk of patient condition declining or worsening    Shift Goals  Clinical Goals: monitor VS  Patient Goals: comfort

## 2023-12-29 ENCOUNTER — APPOINTMENT (OUTPATIENT)
Dept: RADIOLOGY | Facility: MEDICAL CENTER | Age: 38
DRG: 439 | End: 2023-12-29
Attending: INTERNAL MEDICINE
Payer: COMMERCIAL

## 2023-12-29 PROBLEM — R50.9 FEVER: Status: ACTIVE | Noted: 2023-12-29

## 2023-12-29 LAB
ALBUMIN SERPL BCP-MCNC: 2.8 G/DL (ref 3.2–4.9)
ALBUMIN/GLOB SERPL: 1 G/DL
ALP SERPL-CCNC: 54 U/L (ref 30–99)
ALT SERPL-CCNC: 57 U/L (ref 2–50)
ANION GAP SERPL CALC-SCNC: 11 MMOL/L (ref 7–16)
AST SERPL-CCNC: 94 U/L (ref 12–45)
BASOPHILS # BLD AUTO: 0.4 % (ref 0–1.8)
BASOPHILS # BLD: 0.04 K/UL (ref 0–0.12)
BILIRUB SERPL-MCNC: 1.5 MG/DL (ref 0.1–1.5)
BUN SERPL-MCNC: 9 MG/DL (ref 8–22)
CA-I SERPL-SCNC: 1 MMOL/L (ref 1.1–1.3)
CALCIUM ALBUM COR SERPL-MCNC: 7.7 MG/DL (ref 8.5–10.5)
CALCIUM SERPL-MCNC: 6.7 MG/DL (ref 8.5–10.5)
CHLORIDE SERPL-SCNC: 91 MMOL/L (ref 96–112)
CO2 SERPL-SCNC: 23 MMOL/L (ref 20–33)
CREAT SERPL-MCNC: 0.84 MG/DL (ref 0.5–1.4)
CRP SERPL HS-MCNC: 27.85 MG/DL (ref 0–0.75)
EOSINOPHIL # BLD AUTO: 0.04 K/UL (ref 0–0.51)
EOSINOPHIL NFR BLD: 0.4 % (ref 0–6.9)
ERYTHROCYTE [DISTWIDTH] IN BLOOD BY AUTOMATED COUNT: 45.7 FL (ref 35.9–50)
GFR SERPLBLD CREATININE-BSD FMLA CKD-EPI: 116 ML/MIN/1.73 M 2
GLOBULIN SER CALC-MCNC: 2.9 G/DL (ref 1.9–3.5)
GLUCOSE SERPL-MCNC: 84 MG/DL (ref 65–99)
HCT VFR BLD AUTO: 34.1 % (ref 42–52)
HGB BLD-MCNC: 11.8 G/DL (ref 14–18)
IMM GRANULOCYTES # BLD AUTO: 0.17 K/UL (ref 0–0.11)
IMM GRANULOCYTES NFR BLD AUTO: 1.7 % (ref 0–0.9)
LACTATE SERPL-SCNC: 1.1 MMOL/L (ref 0.5–2)
LACTATE SERPL-SCNC: 1.1 MMOL/L (ref 0.5–2)
LIPASE SERPL-CCNC: 159 U/L (ref 11–82)
LYMPHOCYTES # BLD AUTO: 1.18 K/UL (ref 1–4.8)
LYMPHOCYTES NFR BLD: 11.8 % (ref 22–41)
MAGNESIUM SERPL-MCNC: 2 MG/DL (ref 1.5–2.5)
MCH RBC QN AUTO: 32.3 PG (ref 27–33)
MCHC RBC AUTO-ENTMCNC: 34.6 G/DL (ref 32.3–36.5)
MCV RBC AUTO: 93.4 FL (ref 81.4–97.8)
MONOCYTES # BLD AUTO: 0.9 K/UL (ref 0–0.85)
MONOCYTES NFR BLD AUTO: 9 % (ref 0–13.4)
NEUTROPHILS # BLD AUTO: 7.65 K/UL (ref 1.82–7.42)
NEUTROPHILS NFR BLD: 76.7 % (ref 44–72)
NRBC # BLD AUTO: 0 K/UL
NRBC BLD-RTO: 0 /100 WBC (ref 0–0.2)
PHOSPHATE SERPL-MCNC: 1.6 MG/DL (ref 2.5–4.5)
PLATELET # BLD AUTO: 139 K/UL (ref 164–446)
PMV BLD AUTO: 10.3 FL (ref 9–12.9)
POTASSIUM SERPL-SCNC: 3.6 MMOL/L (ref 3.6–5.5)
PROCALCITONIN SERPL-MCNC: 0.82 NG/ML
PROT SERPL-MCNC: 5.7 G/DL (ref 6–8.2)
RBC # BLD AUTO: 3.65 M/UL (ref 4.7–6.1)
SODIUM SERPL-SCNC: 125 MMOL/L (ref 135–145)
SODIUM SERPL-SCNC: 126 MMOL/L (ref 135–145)
SODIUM SERPL-SCNC: 127 MMOL/L (ref 135–145)
WBC # BLD AUTO: 10 K/UL (ref 4.8–10.8)

## 2023-12-29 PROCEDURE — 84100 ASSAY OF PHOSPHORUS: CPT

## 2023-12-29 PROCEDURE — 80053 COMPREHEN METABOLIC PANEL: CPT

## 2023-12-29 PROCEDURE — 83735 ASSAY OF MAGNESIUM: CPT

## 2023-12-29 PROCEDURE — A9270 NON-COVERED ITEM OR SERVICE: HCPCS | Performed by: INTERNAL MEDICINE

## 2023-12-29 PROCEDURE — 84295 ASSAY OF SERUM SODIUM: CPT | Mod: 91

## 2023-12-29 PROCEDURE — 83690 ASSAY OF LIPASE: CPT

## 2023-12-29 PROCEDURE — 700101 HCHG RX REV CODE 250: Performed by: INTERNAL MEDICINE

## 2023-12-29 PROCEDURE — 700102 HCHG RX REV CODE 250 W/ 637 OVERRIDE(OP): Performed by: INTERNAL MEDICINE

## 2023-12-29 PROCEDURE — 700105 HCHG RX REV CODE 258: Performed by: INTERNAL MEDICINE

## 2023-12-29 PROCEDURE — 700111 HCHG RX REV CODE 636 W/ 250 OVERRIDE (IP): Mod: JZ | Performed by: INTERNAL MEDICINE

## 2023-12-29 PROCEDURE — 83605 ASSAY OF LACTIC ACID: CPT

## 2023-12-29 PROCEDURE — 87040 BLOOD CULTURE FOR BACTERIA: CPT | Mod: 91

## 2023-12-29 PROCEDURE — 86140 C-REACTIVE PROTEIN: CPT

## 2023-12-29 PROCEDURE — 82330 ASSAY OF CALCIUM: CPT

## 2023-12-29 PROCEDURE — 700117 HCHG RX CONTRAST REV CODE 255: Performed by: INTERNAL MEDICINE

## 2023-12-29 PROCEDURE — 74170 CT ABD WO CNTRST FLWD CNTRST: CPT

## 2023-12-29 PROCEDURE — 770020 HCHG ROOM/CARE - TELE (206)

## 2023-12-29 PROCEDURE — 85025 COMPLETE CBC W/AUTO DIFF WBC: CPT

## 2023-12-29 PROCEDURE — 71045 X-RAY EXAM CHEST 1 VIEW: CPT

## 2023-12-29 PROCEDURE — 36415 COLL VENOUS BLD VENIPUNCTURE: CPT

## 2023-12-29 PROCEDURE — 700111 HCHG RX REV CODE 636 W/ 250 OVERRIDE (IP): Performed by: INTERNAL MEDICINE

## 2023-12-29 PROCEDURE — 84145 PROCALCITONIN (PCT): CPT

## 2023-12-29 PROCEDURE — 99233 SBSQ HOSP IP/OBS HIGH 50: CPT | Performed by: INTERNAL MEDICINE

## 2023-12-29 RX ORDER — CALCIUM GLUCONATE 20 MG/ML
1 INJECTION, SOLUTION INTRAVENOUS ONCE
Status: DISCONTINUED | OUTPATIENT
Start: 2023-12-29 | End: 2023-12-29

## 2023-12-29 RX ORDER — FUROSEMIDE 10 MG/ML
40 INJECTION INTRAMUSCULAR; INTRAVENOUS
Status: DISCONTINUED | OUTPATIENT
Start: 2023-12-29 | End: 2023-12-30

## 2023-12-29 RX ORDER — CALCIUM GLUCONATE 20 MG/ML
1 INJECTION, SOLUTION INTRAVENOUS ONCE
Status: COMPLETED | OUTPATIENT
Start: 2023-12-29 | End: 2023-12-29

## 2023-12-29 RX ORDER — AMLODIPINE BESYLATE 5 MG/1
5 TABLET ORAL
Status: DISCONTINUED | OUTPATIENT
Start: 2023-12-29 | End: 2024-01-02 | Stop reason: HOSPADM

## 2023-12-29 RX ORDER — ACETAMINOPHEN 325 MG/1
650 TABLET ORAL EVERY 4 HOURS PRN
Status: DISCONTINUED | OUTPATIENT
Start: 2023-12-29 | End: 2024-01-02 | Stop reason: HOSPADM

## 2023-12-29 RX ORDER — CALCIUM GLUCONATE 20 MG/ML
2 INJECTION, SOLUTION INTRAVENOUS ONCE
Status: COMPLETED | OUTPATIENT
Start: 2023-12-29 | End: 2023-12-29

## 2023-12-29 RX ADMIN — FENOFIBRATE 67 MG: 67 CAPSULE ORAL at 05:07

## 2023-12-29 RX ADMIN — SODIUM PHOSPHATE, MONOBASIC, MONOHYDRATE AND SODIUM PHOSPHATE, DIBASIC, ANHYDROUS 30 MMOL: 142; 276 INJECTION, SOLUTION INTRAVENOUS at 10:00

## 2023-12-29 RX ADMIN — ANTACID TABLETS 1000 MG: 500 TABLET, CHEWABLE ORAL at 18:33

## 2023-12-29 RX ADMIN — CALCIUM GLUCONATE 1 G: 20 INJECTION, SOLUTION INTRAVENOUS at 13:11

## 2023-12-29 RX ADMIN — CALCIUM GLUCONATE 2 G: 20 INJECTION, SOLUTION INTRAVENOUS at 10:43

## 2023-12-29 RX ADMIN — OMEPRAZOLE 20 MG: 20 CAPSULE, DELAYED RELEASE ORAL at 05:06

## 2023-12-29 RX ADMIN — ANTACID TABLETS 1000 MG: 500 TABLET, CHEWABLE ORAL at 05:07

## 2023-12-29 RX ADMIN — OXYCODONE 5 MG: 5 TABLET ORAL at 09:10

## 2023-12-29 RX ADMIN — OXYCODONE HYDROCHLORIDE 10 MG: 10 TABLET ORAL at 14:37

## 2023-12-29 RX ADMIN — FUROSEMIDE 40 MG: 10 INJECTION, SOLUTION INTRAVENOUS at 16:27

## 2023-12-29 RX ADMIN — AMLODIPINE BESYLATE 5 MG: 5 TABLET ORAL at 13:08

## 2023-12-29 RX ADMIN — SODIUM CHLORIDE: 9 INJECTION, SOLUTION INTRAVENOUS at 03:31

## 2023-12-29 RX ADMIN — OXYCODONE 5 MG: 5 TABLET ORAL at 05:15

## 2023-12-29 RX ADMIN — IOHEXOL 90 ML: 350 INJECTION, SOLUTION INTRAVENOUS at 23:18

## 2023-12-29 RX ADMIN — OXYCODONE 5 MG: 5 TABLET ORAL at 01:24

## 2023-12-29 RX ADMIN — LISINOPRIL 20 MG: 20 TABLET ORAL at 05:07

## 2023-12-29 RX ADMIN — ENOXAPARIN SODIUM 40 MG: 100 INJECTION SUBCUTANEOUS at 18:32

## 2023-12-29 RX ADMIN — ONDANSETRON 4 MG: 2 INJECTION INTRAMUSCULAR; INTRAVENOUS at 21:08

## 2023-12-29 RX ADMIN — OXYCODONE HYDROCHLORIDE 10 MG: 10 TABLET ORAL at 21:59

## 2023-12-29 RX ADMIN — FUROSEMIDE 40 MG: 10 INJECTION, SOLUTION INTRAVENOUS at 11:31

## 2023-12-29 RX ADMIN — ANTACID TABLETS 1000 MG: 500 TABLET, CHEWABLE ORAL at 11:32

## 2023-12-29 RX ADMIN — ACETAMINOPHEN 650 MG: 325 TABLET, FILM COATED ORAL at 16:25

## 2023-12-29 RX ADMIN — OMEPRAZOLE 20 MG: 20 CAPSULE, DELAYED RELEASE ORAL at 18:33

## 2023-12-29 ASSESSMENT — ENCOUNTER SYMPTOMS
WEIGHT LOSS: 0
DIARRHEA: 0
ORTHOPNEA: 0
NAUSEA: 0
COUGH: 0
PALPITATIONS: 0
DOUBLE VISION: 0
DIZZINESS: 0
BLURRED VISION: 0
HEMOPTYSIS: 0
SPEECH CHANGE: 0
CONSTIPATION: 0
PHOTOPHOBIA: 0
NECK PAIN: 0
VOMITING: 0
CLAUDICATION: 0
CHILLS: 0
WEAKNESS: 0
ABDOMINAL PAIN: 1
FEVER: 0
MYALGIAS: 0

## 2023-12-29 ASSESSMENT — PAIN DESCRIPTION - PAIN TYPE
TYPE: ACUTE PAIN

## 2023-12-29 ASSESSMENT — FIBROSIS 4 INDEX: FIB4 SCORE: 3.22

## 2023-12-29 NOTE — PROGRESS NOTES
Bedside report received, assumed care of patient. Resting in bed. A&O x4. Tele monitoring in place. Educated on fall risk, all fall precautions in place. Call light within reach, bed locked and in lowest position, denied other needs at this time.

## 2023-12-29 NOTE — CARE PLAN
The patient is Stable - Low risk of patient condition declining or worsening    Shift Goals  Clinical Goals: Monitor BP, monitor HR, monitor labs  Patient Goals: pain control  Family Goals: GRANT    Progress made toward(s) clinical / shift goals:        Problem: Knowledge Deficit - Standard  Goal: Patient and family/care givers will demonstrate understanding of plan of care, disease process/condition, diagnostic tests and medications  Outcome: Progressing     Problem: Pain - Standard  Goal: Alleviation of pain or a reduction in pain to the patient’s comfort goal  Outcome: Progressing       Patient is not progressing towards the following goals:

## 2023-12-29 NOTE — DISCHARGE PLANNING
Case Management Discharge Planning    Admission Date: 12/26/2023  GMLOS: 3.1  ALOS: 3    6-Clicks ADL Score: 24  6-Clicks Mobility Score: 24    Anticipated Discharge Dispo: Discharge Disposition: Discharged to home/self care (01)    DME Needed: No    Action(s) Taken: Discussed pt during IDT rounds. Per attending, pt anticipated to dc in two days. Per attending, no needs anticipated.     Escalations Completed: None    Medically Clear: No    Next Steps: F/U with medical team to discuss discharge needs and barriers.    Barriers to Discharge: Medical clearance    Is the patient up for discharge tomorrow: No

## 2023-12-30 LAB
ALBUMIN SERPL BCP-MCNC: 2.9 G/DL (ref 3.2–4.9)
ALBUMIN/GLOB SERPL: 0.9 G/DL
ALP SERPL-CCNC: 58 U/L (ref 30–99)
ALT SERPL-CCNC: 58 U/L (ref 2–50)
ANION GAP SERPL CALC-SCNC: 16 MMOL/L (ref 7–16)
ANISOCYTOSIS BLD QL SMEAR: ABNORMAL
APPEARANCE UR: ABNORMAL
AST SERPL-CCNC: 95 U/L (ref 12–45)
BACTERIA #/AREA URNS HPF: NEGATIVE /HPF
BASOPHILS # BLD AUTO: 0 % (ref 0–1.8)
BASOPHILS # BLD: 0 K/UL (ref 0–0.12)
BILIRUB SERPL-MCNC: 1.3 MG/DL (ref 0.1–1.5)
BILIRUB UR QL STRIP.AUTO: NEGATIVE
BUN SERPL-MCNC: 6 MG/DL (ref 8–22)
CALCIUM ALBUM COR SERPL-MCNC: 8.4 MG/DL (ref 8.5–10.5)
CALCIUM SERPL-MCNC: 7.5 MG/DL (ref 8.5–10.5)
CHLORIDE SERPL-SCNC: 88 MMOL/L (ref 96–112)
CO2 SERPL-SCNC: 24 MMOL/L (ref 20–33)
COLOR UR: YELLOW
CREAT SERPL-MCNC: 0.77 MG/DL (ref 0.5–1.4)
CRP SERPL HS-MCNC: 30.72 MG/DL (ref 0–0.75)
EOSINOPHIL # BLD AUTO: 0 K/UL (ref 0–0.51)
EOSINOPHIL NFR BLD: 0 % (ref 0–6.9)
EPI CELLS #/AREA URNS HPF: NEGATIVE /HPF
ERYTHROCYTE [DISTWIDTH] IN BLOOD BY AUTOMATED COUNT: 42.8 FL (ref 35.9–50)
GFR SERPLBLD CREATININE-BSD FMLA CKD-EPI: 119 ML/MIN/1.73 M 2
GLOBULIN SER CALC-MCNC: 3.3 G/DL (ref 1.9–3.5)
GLUCOSE SERPL-MCNC: 109 MG/DL (ref 65–99)
GLUCOSE UR STRIP.AUTO-MCNC: NEGATIVE MG/DL
HCT VFR BLD AUTO: 33.3 % (ref 42–52)
HGB BLD-MCNC: 11.7 G/DL (ref 14–18)
HYALINE CASTS #/AREA URNS LPF: ABNORMAL /LPF
INR PPP: 1.15 (ref 0.87–1.13)
KETONES UR STRIP.AUTO-MCNC: 40 MG/DL
LACTATE SERPL-SCNC: 0.9 MMOL/L (ref 0.5–2)
LACTATE SERPL-SCNC: 1 MMOL/L (ref 0.5–2)
LACTATE SERPL-SCNC: 1 MMOL/L (ref 0.5–2)
LACTATE SERPL-SCNC: 1.1 MMOL/L (ref 0.5–2)
LDH SERPL L TO P-CCNC: 1088 U/L (ref 107–266)
LEUKOCYTE ESTERASE UR QL STRIP.AUTO: NEGATIVE
LIPASE SERPL-CCNC: 86 U/L (ref 11–82)
LYMPHOCYTES # BLD AUTO: 1.05 K/UL (ref 1–4.8)
LYMPHOCYTES NFR BLD: 10.8 % (ref 22–41)
MANUAL DIFF BLD: NORMAL
MCH RBC QN AUTO: 32.2 PG (ref 27–33)
MCHC RBC AUTO-ENTMCNC: 35.1 G/DL (ref 32.3–36.5)
MCV RBC AUTO: 91.7 FL (ref 81.4–97.8)
METAMYELOCYTES NFR BLD MANUAL: 4.2 %
MICRO URNS: ABNORMAL
MONOCYTES # BLD AUTO: 0.97 K/UL (ref 0–0.85)
MONOCYTES NFR BLD AUTO: 10 % (ref 0–13.4)
MORPHOLOGY BLD-IMP: NORMAL
MYELOCYTES NFR BLD MANUAL: 0.8 %
NEUTROPHILS # BLD AUTO: 7.2 K/UL (ref 1.82–7.42)
NEUTROPHILS NFR BLD: 72.5 % (ref 44–72)
NEUTS BAND NFR BLD MANUAL: 1.7 % (ref 0–10)
NITRITE UR QL STRIP.AUTO: NEGATIVE
NRBC # BLD AUTO: 0 K/UL
NRBC BLD-RTO: 0 /100 WBC (ref 0–0.2)
PH UR STRIP.AUTO: 7 [PH] (ref 5–8)
PLATELET # BLD AUTO: 158 K/UL (ref 164–446)
PLATELET BLD QL SMEAR: NORMAL
PMV BLD AUTO: 9.7 FL (ref 9–12.9)
POTASSIUM SERPL-SCNC: 3.2 MMOL/L (ref 3.6–5.5)
PROCALCITONIN SERPL-MCNC: 0.94 NG/ML
PROT SERPL-MCNC: 6.2 G/DL (ref 6–8.2)
PROT UR QL STRIP: 30 MG/DL
PROTHROMBIN TIME: 14.9 SEC (ref 12–14.6)
RBC # BLD AUTO: 3.63 M/UL (ref 4.7–6.1)
RBC # URNS HPF: ABNORMAL /HPF
RBC BLD AUTO: PRESENT
RBC UR QL AUTO: ABNORMAL
SODIUM SERPL-SCNC: 128 MMOL/L (ref 135–145)
SODIUM SERPL-SCNC: 128 MMOL/L (ref 135–145)
SODIUM SERPL-SCNC: 131 MMOL/L (ref 135–145)
SP GR UR STRIP.AUTO: 1.01
UROBILINOGEN UR STRIP.AUTO-MCNC: 1 MG/DL
WBC # BLD AUTO: 9.7 K/UL (ref 4.8–10.8)
WBC #/AREA URNS HPF: ABNORMAL /HPF

## 2023-12-30 PROCEDURE — 85610 PROTHROMBIN TIME: CPT

## 2023-12-30 PROCEDURE — 700105 HCHG RX REV CODE 258: Performed by: INTERNAL MEDICINE

## 2023-12-30 PROCEDURE — 84145 PROCALCITONIN (PCT): CPT

## 2023-12-30 PROCEDURE — 80053 COMPREHEN METABOLIC PANEL: CPT

## 2023-12-30 PROCEDURE — 770020 HCHG ROOM/CARE - TELE (206)

## 2023-12-30 PROCEDURE — 99233 SBSQ HOSP IP/OBS HIGH 50: CPT | Performed by: INTERNAL MEDICINE

## 2023-12-30 PROCEDURE — 84295 ASSAY OF SERUM SODIUM: CPT | Mod: 91

## 2023-12-30 PROCEDURE — 86140 C-REACTIVE PROTEIN: CPT

## 2023-12-30 PROCEDURE — 83690 ASSAY OF LIPASE: CPT

## 2023-12-30 PROCEDURE — A9270 NON-COVERED ITEM OR SERVICE: HCPCS | Performed by: INTERNAL MEDICINE

## 2023-12-30 PROCEDURE — 700111 HCHG RX REV CODE 636 W/ 250 OVERRIDE (IP): Mod: JZ | Performed by: INTERNAL MEDICINE

## 2023-12-30 PROCEDURE — 85007 BL SMEAR W/DIFF WBC COUNT: CPT

## 2023-12-30 PROCEDURE — 81001 URINALYSIS AUTO W/SCOPE: CPT

## 2023-12-30 PROCEDURE — 700102 HCHG RX REV CODE 250 W/ 637 OVERRIDE(OP): Performed by: INTERNAL MEDICINE

## 2023-12-30 PROCEDURE — 85027 COMPLETE CBC AUTOMATED: CPT

## 2023-12-30 PROCEDURE — 99222 1ST HOSP IP/OBS MODERATE 55: CPT | Performed by: SPECIALIST

## 2023-12-30 PROCEDURE — 83605 ASSAY OF LACTIC ACID: CPT | Mod: 91

## 2023-12-30 PROCEDURE — 36415 COLL VENOUS BLD VENIPUNCTURE: CPT

## 2023-12-30 PROCEDURE — 83615 LACTATE (LD) (LDH) ENZYME: CPT

## 2023-12-30 RX ORDER — POTASSIUM CHLORIDE 20 MEQ/1
40 TABLET, EXTENDED RELEASE ORAL 2 TIMES DAILY
Status: COMPLETED | OUTPATIENT
Start: 2023-12-30 | End: 2023-12-30

## 2023-12-30 RX ORDER — CALCIUM GLUCONATE 20 MG/ML
1 INJECTION, SOLUTION INTRAVENOUS ONCE
Status: COMPLETED | OUTPATIENT
Start: 2023-12-30 | End: 2023-12-30

## 2023-12-30 RX ORDER — SODIUM CHLORIDE 9 MG/ML
INJECTION, SOLUTION INTRAVENOUS CONTINUOUS
Status: DISCONTINUED | OUTPATIENT
Start: 2023-12-30 | End: 2024-01-01

## 2023-12-30 RX ADMIN — AMLODIPINE BESYLATE 5 MG: 5 TABLET ORAL at 04:44

## 2023-12-30 RX ADMIN — ACETAMINOPHEN 650 MG: 325 TABLET, FILM COATED ORAL at 03:27

## 2023-12-30 RX ADMIN — POTASSIUM CHLORIDE 40 MEQ: 1500 TABLET, EXTENDED RELEASE ORAL at 18:45

## 2023-12-30 RX ADMIN — OXYCODONE HYDROCHLORIDE 10 MG: 10 TABLET ORAL at 03:38

## 2023-12-30 RX ADMIN — CALCIUM GLUCONATE 1 G: 20 INJECTION, SOLUTION INTRAVENOUS at 09:49

## 2023-12-30 RX ADMIN — OXYCODONE 5 MG: 5 TABLET ORAL at 21:05

## 2023-12-30 RX ADMIN — FENOFIBRATE 67 MG: 67 CAPSULE ORAL at 04:43

## 2023-12-30 RX ADMIN — POTASSIUM CHLORIDE 40 MEQ: 1500 TABLET, EXTENDED RELEASE ORAL at 09:38

## 2023-12-30 RX ADMIN — OXYCODONE HYDROCHLORIDE 10 MG: 10 TABLET ORAL at 16:02

## 2023-12-30 RX ADMIN — ANTACID TABLETS 1000 MG: 500 TABLET, CHEWABLE ORAL at 04:44

## 2023-12-30 RX ADMIN — OMEPRAZOLE 20 MG: 20 CAPSULE, DELAYED RELEASE ORAL at 04:43

## 2023-12-30 RX ADMIN — OXYCODONE HYDROCHLORIDE 10 MG: 10 TABLET ORAL at 09:46

## 2023-12-30 RX ADMIN — SODIUM CHLORIDE: 9 INJECTION, SOLUTION INTRAVENOUS at 10:34

## 2023-12-30 RX ADMIN — ANTACID TABLETS 1000 MG: 500 TABLET, CHEWABLE ORAL at 18:45

## 2023-12-30 RX ADMIN — ENOXAPARIN SODIUM 40 MG: 100 INJECTION SUBCUTANEOUS at 18:45

## 2023-12-30 RX ADMIN — LISINOPRIL 20 MG: 20 TABLET ORAL at 04:44

## 2023-12-30 RX ADMIN — FUROSEMIDE 40 MG: 10 INJECTION, SOLUTION INTRAVENOUS at 04:44

## 2023-12-30 RX ADMIN — ANTACID TABLETS 1000 MG: 500 TABLET, CHEWABLE ORAL at 13:16

## 2023-12-30 ASSESSMENT — ENCOUNTER SYMPTOMS
ORTHOPNEA: 0
SPEECH CHANGE: 0
BLURRED VISION: 0
PHOTOPHOBIA: 0
CLAUDICATION: 0
FEVER: 0
CHILLS: 0
ABDOMINAL PAIN: 1
HEMOPTYSIS: 0
DOUBLE VISION: 0
COUGH: 0
PALPITATIONS: 0
DIARRHEA: 0
VOMITING: 0
WEAKNESS: 0
NAUSEA: 0
WEIGHT LOSS: 0
NECK PAIN: 0
CONSTIPATION: 0
MYALGIAS: 0
DIZZINESS: 0

## 2023-12-30 ASSESSMENT — PAIN DESCRIPTION - PAIN TYPE
TYPE: ACUTE PAIN
TYPE: ACUTE PAIN

## 2023-12-30 NOTE — CARE PLAN
Problem: Pain - Standard  Goal: Alleviation of pain or a reduction in pain to the patient’s comfort goal  Outcome: Progressing  Patient able to use non-pharmacological methods to help achieve his comfort goal.    The patient is Watcher - Medium risk of patient condition declining or worsening    Shift Goals  Clinical Goals: monitor VS  Patient Goals: comfort  Family Goals: GRANT

## 2023-12-30 NOTE — ASSESSMENT & PLAN NOTE
Developed fever on 12/29   Repeat CT scan for chest to rule out necrosis pancreatitis  Blood culture negative till now  Improved  Close monitoring and start with antibiotics if needed  Resolved

## 2023-12-30 NOTE — CONSULTS
GASTROENTEROLOGY CONSULTATION    PATIENT NAME: Abelardo Fenton  : 1985  CSN: [id number]  MRN:  [id number]     CONSULTATION DATE:  2023    PRIMARY CARE PROVIDER:  Sheeba Lloyd M.D.      REASON FOR CONSULT:  Acute pancreatitis  Consult requested by Dr. Romulo Solano    HISTORY OF PRESENT ILLNESS:  bAelardo Fenton is a 38 y.o. male who has a history of GERD and hypertension, taking Omeprazole and Lisinopril at home, started having epigastric pain and nausea 2 days before admission. He was found to have a lipase of 736 and CT scan findings consistent with pancreatitis. No gallbladder issues seen on CT scan. MRCP show findings consistent with severe acute pancreatitis with extensive peripancreatic edema and ascites present. Small bilateral pleural effusions. Patient does not drink or have family history of pancreatitis. He did develop pain after having broth yesterday and had a fever. Ct scan shows necrosis at less than 20 percent of pancreas but no gas. Today patient is improving in pain and overall feeling.     PAST MEDICAL HISTORY:  No past medical history on file.    PAST SURGICAL HISTORY:  No past surgical history on file.     CURRENT MEDS:  Current Facility-Administered Medications   Medication Dose Route Frequency Provider Last Rate Last Admin    potassium chloride SA (Kdur) tablet 40 mEq  40 mEq Oral BID Romulo Solano M.D.   40 mEq at 23 0938    NS infusion   Intravenous Continuous Romulo Solano M.D. 83 mL/hr at 23 1034 New Bag at 23 1034    amLODIPine (Norvasc) tablet 5 mg  5 mg Oral Q DAY Romulo Solano M.D.   5 mg at 23 0444    acetaminophen (Tylenol) tablet 650 mg  650 mg Oral Q4HRS PRN Romulo Solano M.D.   650 mg at 23 0327    lisinopril (Prinivil) tablet 20 mg  20 mg Oral Q DAY Romulo Solano M.D.   20 mg at 23 0444    Metoprolol Tartrate (Lopressor) injection 5 mg  5 mg Intravenous Q5 MIN PRN Breonna  L Gee, A.P.R.N.   5 mg at 12/28/23 2153    fenofibrate micronized (Lofibra) capsule 67 mg  67 mg Oral DAILY Solrosanna Solano M.D.   67 mg at 12/30/23 0443    omeprazole (PriLOSEC) capsule 20 mg  20 mg Oral BID Solrosanna Solano M.D.   20 mg at 12/30/23 0443    calcium carbonate (Tums) chewable tab 1,000 mg  1,000 mg Oral TID [facility]   1,000 mg at 12/30/23 0444    senna-docusate (Pericolace Or Senokot S) 8.6-50 MG per tablet 2 Tablet  2 Tablet Oral BID ABIOLA Francis.O.   2 Tablet at 12/27/23 0603    And    polyethylene glycol/lytes (Miralax) Packet 1 Packet  1 Packet Oral QDAY PRN Kristal Keenan D.O.        And    magnesium hydroxide (Milk Of Magnesia) suspension 30 mL  30 mL Oral QDAY PRN ABIOLA Francis.O.        And    bisacodyl (Dulcolax) suppository 10 mg  10 mg Rectal QDAY PRN REGI FrancisO.        enoxaparin (Lovenox) inj 40 mg  40 mg Subcutaneous DAILY AT 1800 ABIOLA Francis.O.   40 mg at 12/29/23 1832    ondansetron (Zofran) syringe/vial injection 4 mg  4 mg Intravenous Q4HRS PRN ABIOLA Francis.O.   4 mg at 12/29/23 2108    ondansetron (Zofran ODT) dispertab 4 mg  4 mg Oral Q4HRS PRN REGI FrancisO.        promethazine (Phenergan) tablet 12.5-25 mg  12.5-25 mg Oral Q4HRS PRN REGI FrancisO.        promethazine (Phenergan) suppository 12.5-25 mg  12.5-25 mg Rectal Q4HRS PRN REGI FrancisO.        prochlorperazine (Compazine) injection 5-10 mg  5-10 mg Intravenous Q4HRS PRN Kristal Keenan D.O.        Pharmacy Consult Request ...Pain Management Review 1 Each  1 Each Other PHARMACY TO DOSE Kristal Keenan D.O.        oxyCODONE immediate-release (Roxicodone) tablet 5 mg  5 mg Oral Q3HRS PRN Kristal Keenan D.O.   5 mg at 12/29/23 0910    Or    oxyCODONE immediate release (Roxicodone) tablet 10 mg  10 mg Oral Q3HRS PRN REGI FrancisOMalou   10 mg at 12/30/23 0946    fentaNYL (Sublimaze) injection 25 mcg  25 mcg Intravenous Q2HRS PRN Breonna WALTERS  "NATHANAEL Flynn   25 mcg at 12/28/23 0320        ALLERGIES:  No Known Allergies    SOCIAL HISTORY:  Social History     Socioeconomic History    Marital status: Single     Spouse name: Not on file    Number of children: Not on file    Years of education: Not on file    Highest education level: Not on file   Occupational History    Not on file   Tobacco Use    Smoking status: Never    Smokeless tobacco: Never   Vaping Use    Vaping Use: Never used   Substance and Sexual Activity    Alcohol use: Yes     Comment: socially and on weekends    Drug use: Never    Sexual activity: Yes   Other Topics Concern    Not on file   Social History Narrative    Not on file     Social Determinants of Health     Financial Resource Strain: Not on file   Food Insecurity: Not on file   Transportation Needs: Not on file   Physical Activity: Not on file   Stress: Not on file   Social Connections: Not on file   Intimate Partner Violence: Not on file   Housing Stability: Not on file       FAMILY HISTORY:  No family history on file.     REVIEW OF SYSTEMS:  General ROS: Negative for - chills, fever, night sweats or weight loss.  HEENT ROS: Negative  Respiratory ROS: Negative for - cough or shortness of breath.  Cardiovascular ROS:  Negative for - chest pain or palpitations.  Gastrointestinal ROS: As per the history of present illness.  Genito-Urinary ROS: Negative  Musculoskeletal ROS: Negative.  Neurological ROS: Negative  Skin ROS: negative  Hematology ROS: negative  Endocrinology ROS: Negative        PHYSICAL EXAM:  VITALS: /77   Pulse (!) 125   Temp 36.4 °C (97.5 °F) (Temporal)   Resp 16   Ht 1.753 m (5' 9\") Comment: populated per Flowsheet hx  Wt 91.7 kg (202 lb 2.6 oz)   SpO2 95%   BMI 29.85 kg/m²   GEN:  Abelardo Fenton is a 38 y.o. male in no acute distress.  HEENT: Mucous membranes pink and moist.  Sclera anicteric.    NECK:    Neck supple without lymphadenopathy or thyromegaly.  LUNGS: Clear to auscultation " "posteriorly.  HEART: Regular rate and rhythm. S1 and S2 normal. No murmurs, gallops  ABD:  Pain in epigastric area, RUQ  RECTAL: Not done at this time.  EXT:  Without cyanosis, deformity or pitting edema.  SKIN:  Pink, warm, dry.  NEURO: Grossly intact, A/OR.    LABS:  Recent Labs     12/28/23  0205 12/29/23  0052 12/30/23  0029   WBC 9.4 10.0 9.7   MCV 91.3 93.4 91.7     Recent Labs     12/28/23  0205 12/29/23  0242 12/30/23  0029   GLUCOSE 110* 84 109*   BUN 14 9 6*   CO2 22 23 24     Lab Results   Component Value Date    INR 1.15 (H) 12/30/2023    INR 1.04 07/29/2023     No components found for: \"ALT\", \"AST\", \"GGT\", \"ALKPHOS\"  No results found for: \"BILINEO\"      @LASTGCAT(BY2201)@     @LASTGCAT(AI9977)@       IMPRESSION/PLAN:  Acute pancreatitis  Necrosis - sterile, no gas seen  Abdominal pain - not tolerating orals at this time      The most likely etiology of pancreatitis is omeprazole followed by lisinopril He is still on both these meds and I would stop them and in future use other meds for GERD and hypertension.  Usually people do not get pancreatitis from triglycerides below 1000. Nevertheless high triglycerides can make pancreatitis worse in cases from other etiologies.   At this time there is no evidence of infected necrosis. If clinical picture does not improve it is recommended to repeat scan to further assess for infection, necrosis and/or pseudocyst formation       Elham Gupta M.D.  Gastroenterology      "

## 2023-12-30 NOTE — CARE PLAN
The patient is Stable - Low risk of patient condition declining or worsening    Shift Goals  Clinical Goals: Monitor vital signs, pain management  Patient Goals: Pain control  Family Goals: GRANT    Progress made toward(s) clinical / shift goals:        Problem: Knowledge Deficit - Standard  Goal: Patient and family/care givers will demonstrate understanding of plan of care, disease process/condition, diagnostic tests and medications  Outcome: Progressing     Problem: Pain - Standard  Goal: Alleviation of pain or a reduction in pain to the patient’s comfort goal  Outcome: Progressing       Patient is not progressing towards the following goals:

## 2023-12-30 NOTE — PROGRESS NOTES
Hospital Medicine Daily Progress Note    Date of Service  12/29/2023    Chief Complaint  Abelardo Fenton is a 38 y.o. male admitted 12/26/2023 with abdominal pain    Hospital Course:    38-year-old male with no significant past medical history except GERD presented 12/26 with epigastric pain.  Started couple days before the admission around epigastric area with nausea and vomiting and feeling heartburn, denied any fever or chills however had generalized weakness.  Trying to swallow the patient had some alcohol however patient denied any heavy alcohol use.  No drugs, no history of pancreatitis or other problems.  On admission CT scan for abdomen and pelvis showed thickening around the jejunum with acute pancreatitis in the head of the pancreas.  No gallstones, ultrasound did not show any obstruction.  Labs showed elevated liver enzymes with lipase 736.  IV fluid was initiated.  Labs also showed hypertriglyceridemia 684.  Fenofibrate was initiated, patient received IV fluid with improving his pain however patient developed some distention in his abdomen and swelling, IV fluid was discontinued and received IV Lasix, his hematocrit dropped from 19 on admission to 11.8 and patient needed multiple calcium IV doses, his lipase improved from 736 to 159.    On 12/29, patient developed fever, blood culture was ordered, repeat CT scan to rule out any abscess or fluid collection.        Interval Problem Update  -Evaluated examined the patient at bedside, some improving on her symptoms however patient still having abdominal pain with distention  -Significant worsening on his hematocrit, calcium is low at 6.7, replaced IV calcium, holding on IV fluid and started IV Lasix,   -chest x-ray showed congestion with mild pleural effusion, patient is on room air.  -Uncontrolled hypertension, increase lisinopril and add amlodipine  -Patient developed fever today, blood culture was ordered and repeat CT scan to evaluate his  pancreatitis  -Case was discussed with the patient and all family    I have discussed this patient's plan of care and discharge plan at IDT rounds today with Case Management, Nursing, Nursing leadership, and other members of the IDT team.    Consultants/Specialty  None    Code Status  Full Code    Disposition  The patient is not medically cleared for discharge to home or a post-acute facility.  Anticipate discharge to: home with close outpatient follow-up    I have placed the appropriate orders for post-discharge needs.    Review of Systems  Review of Systems   Constitutional:  Negative for chills, fever and weight loss.   HENT:  Negative for ear pain, hearing loss and tinnitus.    Eyes:  Negative for blurred vision, double vision and photophobia.   Respiratory:  Negative for cough and hemoptysis.    Cardiovascular:  Negative for chest pain, palpitations, orthopnea and claudication.   Gastrointestinal:  Positive for abdominal pain. Negative for constipation, diarrhea, nausea and vomiting.   Genitourinary:  Negative for dysuria, frequency and urgency.   Musculoskeletal:  Negative for myalgias and neck pain.   Skin:  Negative for rash.   Neurological:  Negative for dizziness, speech change and weakness.        Physical Exam  Temp:  [36.5 °C (97.7 °F)-38.2 °C (100.8 °F)] 38.2 °C (100.8 °F)  Pulse:  [1-145] 120  Resp:  [18-20] 20  BP: (119-177)/() 154/95  SpO2:  [89 %-94 %] 90 %    Physical Exam  Constitutional:       Appearance: He is ill-appearing.   Eyes:      General: No scleral icterus.  Cardiovascular:      Rate and Rhythm: Normal rate.      Heart sounds: No murmur heard.  Pulmonary:      Effort: No respiratory distress.      Breath sounds: Rales present. No wheezing.   Abdominal:      General: There is distension.      Tenderness: There is abdominal tenderness. There is no right CVA tenderness, left CVA tenderness or guarding.   Musculoskeletal:      Right lower leg: Edema present.      Left lower leg:  Edema present.   Lymphadenopathy:      Cervical: No cervical adenopathy.   Skin:     Coloration: Skin is not jaundiced.      Findings: No bruising, lesion or rash.   Neurological:      General: No focal deficit present.      Mental Status: He is alert and oriented to person, place, and time. Mental status is at baseline.      Cranial Nerves: No cranial nerve deficit.      Motor: No weakness.      Gait: Gait normal.         Fluids    Intake/Output Summary (Last 24 hours) at 12/29/2023 1615  Last data filed at 12/29/2023 1200  Gross per 24 hour   Intake 788.29 ml   Output 1500 ml   Net -711.71 ml       Laboratory  Recent Labs     12/27/23  0006 12/28/23  0205 12/29/23  0052   WBC 14.3* 9.4 10.0   RBC 5.66 4.61* 3.65*   HEMOGLOBIN 18.1* 14.8 11.8*   HEMATOCRIT 51.1 42.1 34.1*   MCV 90.3 91.3 93.4   MCH 32.0 32.1 32.3   MCHC 35.4 35.2 34.6   RDW 44.7 45.5 45.7   PLATELETCT 207 154* 139*   MPV 9.4 9.9 10.3     Recent Labs     12/27/23  0006 12/27/23  1846 12/28/23  0205 12/29/23  0242 12/29/23  0841   SODIUM 128*  --  128* 125* 126*   POTASSIUM 4.4  --  3.9 3.6  --    CHLORIDE 96  --  95* 91*  --    CO2 19*  --  22 23  --    GLUCOSE 168*  --  110* 84  --    BUN 16  --  14 9  --    CREATININE 1.17  --  1.00 0.84  --    CALCIUM 6.2* 6.2* 7.2* 6.7*  --              Recent Labs     12/27/23  0006   TRIGLYCERIDE 684*   HDL 18*   LDL see below       Imaging  DX-CHEST-PORTABLE (1 VIEW)   Final Result      1.  Low lung volumes with bilateral lower lung airspace disease.      [id number]   Final Result      1.  Findings consistent with severe acute pancreatitis with extensive peripancreatic edema and ascites present.   2.  No biliary dilation or common bile duct stone.   3.  Unremarkable gallbladder.   4.  Small bilateral pleural effusions.   5.  Motion artifact limits exam.      US-RUQ   Final Result         1. Hepatic steatosis.   2. No cholelithiasis.   3. Common bile duct was not visualized.      CT-PANCREAS AND ABDOMEN  WITH & W/O    (Results Pending)        Assessment/Plan  * Severe acute pancreatitis- (present on admission)  Assessment & Plan  Likely related to hypertriglyceridemia  Denies significant using alcohol  Diagnosed with CT and symptoms at outside hospital  Elevated lipase more than 600,\  Hemoglobin dropped significantly more than 15%  Right upper quadrant ultrasound did not show any acute finding  MRCP shows severe pancreatitis with no mass  Pain control  Improving on his lipase  Had fever on 12/29, CT scan of her abdomen is pending, to rule out necrosis or fluid collection  Decreased IV fluid and IV Lasix as needed    High triglycerides  Assessment & Plan  Labs showed triglycerides 684  Came with acute pancreatitis  Start with fenofibrate  Encouraged the patient healthy diet and monitor as outpatient    Fever  Assessment & Plan  Developed fever on 12/29  Repeat CT scan for chest to rule out necrosis pancreatitis  Blood culture  Close monitoring and start with antibiotics if needed    Hypomagnesemia  Assessment & Plan  Replace IV  Monitor with labs daily    Hypocalcemia  Assessment & Plan  Related to acute pancreatitis  Tums 1000 3 times daily  Labs daily  Improving  Replace magnesium    Transaminitis- (present on admission)  Assessment & Plan  No significant using of alcohol   CT scan reviewed ultrasound did not show any obstruction   Since patient came with elevated liver enzymes, bilirubin and pancreas, will order MRI to rule out any obstruction   or masses.    Continue monitoring with labs daily   Hepatitis panel is negative    SIRS (systemic inflammatory response syndrome) (HCC)- (present on admission)  Assessment & Plan  SIRS criteria identified on my evaluation include:  Tachycardia, with heart rate greater than 90 BPM and Leukocytosis, with WBC greater than 12,000  SIRS is likely related to pancreatitis  WBC 15 at outside facility  No signs of infection      Hypertension- (present on admission)  Assessment &  Plan  Not on any medications  Started lisinopril and later added amlodipine         VTE prophylaxis:   SCDs/TEDs   enoxaparin ppx    Patient has severe pancreatitis and high risk for deterioration, low threshold to discuss the case with intensivist and transferred to the ICU if needed      I have performed a physical exam and reviewed and updated ROS and Plan today (12/29/2023). In review of yesterday's note (12/28/2023), there are no changes except as documented above.      Greater than 51 minutes spent prepping to see patient (e.g. review of tests) obtaining and/or reviewing separately obtained history. Performing a medically appropriate examination and/ evaluation.  Counseling and educating the patient/family/caregiver.  Ordering medications, tests, or procedures.  Referring and communicating with other health care professionals.  Documenting clinical information in EPIC.  Independently interpreting results and communicating results to patient/family/caregiver.  Care coordination

## 2023-12-30 NOTE — PROGRESS NOTES
Bedside report received, assumed care of patient. Resting in bed A&O x4. Tele monitoring in place. Educated on fall risk, all fall precautions in place. Call light within reach, bed locked and in lowest position, denied other needs at this time.

## 2023-12-31 LAB
ALBUMIN SERPL BCP-MCNC: 2.9 G/DL (ref 3.2–4.9)
ALBUMIN/GLOB SERPL: 0.9 G/DL
ALP SERPL-CCNC: 57 U/L (ref 30–99)
ALT SERPL-CCNC: 53 U/L (ref 2–50)
ANION GAP SERPL CALC-SCNC: 15 MMOL/L (ref 7–16)
ANISOCYTOSIS BLD QL SMEAR: ABNORMAL
AST SERPL-CCNC: 68 U/L (ref 12–45)
BASOPHILS # BLD AUTO: 0 % (ref 0–1.8)
BASOPHILS # BLD: 0 K/UL (ref 0–0.12)
BILIRUB SERPL-MCNC: 0.8 MG/DL (ref 0.1–1.5)
BUN SERPL-MCNC: 6 MG/DL (ref 8–22)
CALCIUM ALBUM COR SERPL-MCNC: 8.7 MG/DL (ref 8.5–10.5)
CALCIUM SERPL-MCNC: 7.8 MG/DL (ref 8.5–10.5)
CHLORIDE SERPL-SCNC: 92 MMOL/L (ref 96–112)
CO2 SERPL-SCNC: 23 MMOL/L (ref 20–33)
CREAT SERPL-MCNC: 0.7 MG/DL (ref 0.5–1.4)
CRP SERPL HS-MCNC: 31.81 MG/DL (ref 0–0.75)
EOSINOPHIL # BLD AUTO: 0 K/UL (ref 0–0.51)
EOSINOPHIL NFR BLD: 0 % (ref 0–6.9)
ERYTHROCYTE [DISTWIDTH] IN BLOOD BY AUTOMATED COUNT: 45.5 FL (ref 35.9–50)
GFR SERPLBLD CREATININE-BSD FMLA CKD-EPI: 122 ML/MIN/1.73 M 2
GLOBULIN SER CALC-MCNC: 3.2 G/DL (ref 1.9–3.5)
GLUCOSE SERPL-MCNC: 96 MG/DL (ref 65–99)
HCT VFR BLD AUTO: 32.3 % (ref 42–52)
HGB BLD-MCNC: 11.2 G/DL (ref 14–18)
LDH SERPL L TO P-CCNC: 1063 U/L (ref 107–266)
LIPASE SERPL-CCNC: 58 U/L (ref 11–82)
LYMPHOCYTES # BLD AUTO: 1.32 K/UL (ref 1–4.8)
LYMPHOCYTES NFR BLD: 12.1 % (ref 22–41)
MAGNESIUM SERPL-MCNC: 2.3 MG/DL (ref 1.5–2.5)
MANUAL DIFF BLD: NORMAL
MCH RBC QN AUTO: 32.6 PG (ref 27–33)
MCHC RBC AUTO-ENTMCNC: 34.7 G/DL (ref 32.3–36.5)
MCV RBC AUTO: 93.9 FL (ref 81.4–97.8)
MICROCYTES BLD QL SMEAR: ABNORMAL
MONOCYTES # BLD AUTO: 1.79 K/UL (ref 0–0.85)
MONOCYTES NFR BLD AUTO: 16.4 % (ref 0–13.4)
MORPHOLOGY BLD-IMP: NORMAL
MYELOCYTES NFR BLD MANUAL: 0.8 %
NEUTROPHILS # BLD AUTO: 7.71 K/UL (ref 1.82–7.42)
NEUTROPHILS NFR BLD: 69 % (ref 44–72)
NEUTS BAND NFR BLD MANUAL: 1.7 % (ref 0–10)
NRBC # BLD AUTO: 0 K/UL
NRBC BLD-RTO: 0 /100 WBC (ref 0–0.2)
PHOSPHATE SERPL-MCNC: 1.2 MG/DL (ref 2.5–4.5)
PLATELET # BLD AUTO: 212 K/UL (ref 164–446)
PLATELET BLD QL SMEAR: NORMAL
PMV BLD AUTO: 10.1 FL (ref 9–12.9)
POTASSIUM SERPL-SCNC: 3.4 MMOL/L (ref 3.6–5.5)
PROCALCITONIN SERPL-MCNC: 0.84 NG/ML
PROT SERPL-MCNC: 6.1 G/DL (ref 6–8.2)
RBC # BLD AUTO: 3.44 M/UL (ref 4.7–6.1)
RBC BLD AUTO: PRESENT
SODIUM SERPL-SCNC: 127 MMOL/L (ref 135–145)
SODIUM SERPL-SCNC: 127 MMOL/L (ref 135–145)
SODIUM SERPL-SCNC: 130 MMOL/L (ref 135–145)
WBC # BLD AUTO: 10.9 K/UL (ref 4.8–10.8)

## 2023-12-31 PROCEDURE — 770020 HCHG ROOM/CARE - TELE (206)

## 2023-12-31 PROCEDURE — 700102 HCHG RX REV CODE 250 W/ 637 OVERRIDE(OP): Performed by: INTERNAL MEDICINE

## 2023-12-31 PROCEDURE — A9270 NON-COVERED ITEM OR SERVICE: HCPCS | Performed by: INTERNAL MEDICINE

## 2023-12-31 PROCEDURE — 36415 COLL VENOUS BLD VENIPUNCTURE: CPT

## 2023-12-31 PROCEDURE — 700111 HCHG RX REV CODE 636 W/ 250 OVERRIDE (IP): Mod: JZ | Performed by: INTERNAL MEDICINE

## 2023-12-31 PROCEDURE — 83690 ASSAY OF LIPASE: CPT

## 2023-12-31 PROCEDURE — 83735 ASSAY OF MAGNESIUM: CPT

## 2023-12-31 PROCEDURE — 84145 PROCALCITONIN (PCT): CPT

## 2023-12-31 PROCEDURE — 85007 BL SMEAR W/DIFF WBC COUNT: CPT

## 2023-12-31 PROCEDURE — 700105 HCHG RX REV CODE 258: Performed by: INTERNAL MEDICINE

## 2023-12-31 PROCEDURE — 84295 ASSAY OF SERUM SODIUM: CPT | Mod: 91

## 2023-12-31 PROCEDURE — 83615 LACTATE (LD) (LDH) ENZYME: CPT

## 2023-12-31 PROCEDURE — 84100 ASSAY OF PHOSPHORUS: CPT

## 2023-12-31 PROCEDURE — 86140 C-REACTIVE PROTEIN: CPT

## 2023-12-31 PROCEDURE — 700101 HCHG RX REV CODE 250: Performed by: INTERNAL MEDICINE

## 2023-12-31 PROCEDURE — 99232 SBSQ HOSP IP/OBS MODERATE 35: CPT | Performed by: INTERNAL MEDICINE

## 2023-12-31 PROCEDURE — 85027 COMPLETE CBC AUTOMATED: CPT

## 2023-12-31 PROCEDURE — 80053 COMPREHEN METABOLIC PANEL: CPT

## 2023-12-31 RX ORDER — FUROSEMIDE 10 MG/ML
40 INJECTION INTRAMUSCULAR; INTRAVENOUS ONCE
Status: COMPLETED | OUTPATIENT
Start: 2023-12-31 | End: 2023-12-31

## 2023-12-31 RX ORDER — FAMOTIDINE 20 MG/1
20 TABLET, FILM COATED ORAL 2 TIMES DAILY
Status: DISCONTINUED | OUTPATIENT
Start: 2023-12-31 | End: 2024-01-02 | Stop reason: HOSPADM

## 2023-12-31 RX ADMIN — SODIUM CHLORIDE: 9 INJECTION, SOLUTION INTRAVENOUS at 05:01

## 2023-12-31 RX ADMIN — OXYCODONE HYDROCHLORIDE 10 MG: 10 TABLET ORAL at 08:33

## 2023-12-31 RX ADMIN — FUROSEMIDE 40 MG: 10 INJECTION, SOLUTION INTRAVENOUS at 10:46

## 2023-12-31 RX ADMIN — OXYCODONE HYDROCHLORIDE 10 MG: 10 TABLET ORAL at 00:48

## 2023-12-31 RX ADMIN — ONDANSETRON 4 MG: 4 TABLET, ORALLY DISINTEGRATING ORAL at 21:26

## 2023-12-31 RX ADMIN — ANTACID TABLETS 1000 MG: 500 TABLET, CHEWABLE ORAL at 04:56

## 2023-12-31 RX ADMIN — OXYCODONE 5 MG: 5 TABLET ORAL at 16:22

## 2023-12-31 RX ADMIN — OXYCODONE HYDROCHLORIDE 10 MG: 10 TABLET ORAL at 21:26

## 2023-12-31 RX ADMIN — ENOXAPARIN SODIUM 40 MG: 100 INJECTION SUBCUTANEOUS at 17:15

## 2023-12-31 RX ADMIN — FAMOTIDINE 20 MG: 20 TABLET ORAL at 17:15

## 2023-12-31 RX ADMIN — FENOFIBRATE 67 MG: 67 CAPSULE ORAL at 04:56

## 2023-12-31 RX ADMIN — ANTACID TABLETS 1000 MG: 500 TABLET, CHEWABLE ORAL at 12:26

## 2023-12-31 RX ADMIN — POTASSIUM PHOSPHATE, MONOBASIC AND POTASSIUM PHOSPHATE, DIBASIC 30 MMOL: 224; 236 INJECTION, SOLUTION, CONCENTRATE INTRAVENOUS at 08:34

## 2023-12-31 RX ADMIN — AMLODIPINE BESYLATE 5 MG: 5 TABLET ORAL at 04:57

## 2023-12-31 RX ADMIN — ANTACID TABLETS 1000 MG: 500 TABLET, CHEWABLE ORAL at 17:15

## 2023-12-31 ASSESSMENT — ENCOUNTER SYMPTOMS
VOMITING: 0
ABDOMINAL PAIN: 1
SPEECH CHANGE: 0
COUGH: 0
FEVER: 0
NAUSEA: 0
PALPITATIONS: 0
CHILLS: 0
MYALGIAS: 0
CLAUDICATION: 0
CONSTIPATION: 0
DIARRHEA: 0
PHOTOPHOBIA: 0
DOUBLE VISION: 0
DIZZINESS: 0
WEAKNESS: 0
WEIGHT LOSS: 0
NECK PAIN: 0
BLURRED VISION: 0
HEMOPTYSIS: 0
ORTHOPNEA: 0

## 2023-12-31 ASSESSMENT — PAIN DESCRIPTION - PAIN TYPE
TYPE: ACUTE PAIN
TYPE: ACUTE PAIN

## 2023-12-31 ASSESSMENT — FIBROSIS 4 INDEX: FIB4 SCORE: 1.59

## 2023-12-31 NOTE — PROGRESS NOTES
Hospital Medicine Daily Progress Note    Date of Service  12/30/2023    Chief Complaint  Abelardo Fenton is a 38 y.o. male admitted 12/26/2023 with abdominal pain    Hospital Course:    38-year-old male with no significant past medical history except GERD presented 12/26 with epigastric pain.  Started couple days before the admission around epigastric area with nausea and vomiting and feeling heartburn, denied any fever or chills however had generalized weakness.  Trying to swallow the patient had some alcohol however patient denied any heavy alcohol use.  No drugs, no history of pancreatitis or other problems.  On admission CT scan for abdomen and pelvis showed thickening around the jejunum with acute pancreatitis in the head of the pancreas.  No gallstones, ultrasound did not show any obstruction.  Labs showed elevated liver enzymes with lipase 736.  IV fluid was initiated.  Labs also showed hypertriglyceridemia 684.  Fenofibrate was initiated, patient received IV fluid with improving his pain however patient developed some distention in his abdomen and swelling, IV fluid was discontinued and received IV Lasix, his hematocrit dropped from 19 on admission to 11.8 and patient needed multiple calcium IV doses, his lipase improved from 736 to 159.  Due to still continuous pain and developing fever, CT scan was done on 12/29 and showed necrosis less than 20%, GI was consulted and continue same treatment, holding lisinopril and omeprazole.    On 12/29, patient developed fever, blood culture was ordered, repeat CT scan to rule out any abscess or fluid collection.        Interval Problem Update  -Evaluated examined the patient at bedside, patient still having pain, CT scan showed necrosis around 20%, GI was consulted, holding omeprazole and lisinopril.  -Patient had fever last night, blood cultures still pending, continue holding antibiotics  -Continue monitoring sodium  -Improving on his lipase  -Continue IV  fluid gently with close monitoring, clear diet  -Case was discussed in detail with the family and patient      I have discussed this patient's plan of care and discharge plan at IDT rounds today with Case Management, Nursing, Nursing leadership, and other members of the IDT team.    Consultants/Specialty  None    Code Status  Full Code    Disposition  The patient is not medically cleared for discharge to home or a post-acute facility.  Anticipate discharge to: home with close outpatient follow-up    I have placed the appropriate orders for post-discharge needs.    Review of Systems  Review of Systems   Constitutional:  Negative for chills, fever and weight loss.   HENT:  Negative for ear pain, hearing loss and tinnitus.    Eyes:  Negative for blurred vision, double vision and photophobia.   Respiratory:  Negative for cough and hemoptysis.    Cardiovascular:  Negative for chest pain, palpitations, orthopnea and claudication.   Gastrointestinal:  Positive for abdominal pain. Negative for constipation, diarrhea, nausea and vomiting.   Genitourinary:  Negative for dysuria, frequency and urgency.   Musculoskeletal:  Negative for myalgias and neck pain.   Skin:  Negative for rash.   Neurological:  Negative for dizziness, speech change and weakness.        Physical Exam  Temp:  [36.1 °C (97 °F)-38.5 °C (101.3 °F)] 37.6 °C (99.7 °F)  Pulse:  [108-132] 125  Resp:  [16-22] 22  BP: (113-147)/() 138/97  SpO2:  [92 %-95 %] 92 %    Physical Exam  Constitutional:       Appearance: He is ill-appearing.   Eyes:      General: No scleral icterus.  Cardiovascular:      Rate and Rhythm: Normal rate.      Heart sounds: No murmur heard.  Pulmonary:      Effort: No respiratory distress.      Breath sounds: Rales present. No wheezing.   Abdominal:      General: There is distension.      Tenderness: There is abdominal tenderness. There is no right CVA tenderness, left CVA tenderness or guarding.   Musculoskeletal:      Right lower leg:  Edema present.      Left lower leg: Edema present.   Lymphadenopathy:      Cervical: No cervical adenopathy.   Skin:     Coloration: Skin is not jaundiced.      Findings: No bruising, lesion or rash.   Neurological:      General: No focal deficit present.      Mental Status: He is alert and oriented to person, place, and time. Mental status is at baseline.      Cranial Nerves: No cranial nerve deficit.      Motor: No weakness.      Gait: Gait normal.         Fluids    Intake/Output Summary (Last 24 hours) at 12/30/2023 1739  Last data filed at 12/30/2023 1211  Gross per 24 hour   Intake 1220 ml   Output 1795 ml   Net -575 ml       Laboratory  Recent Labs     12/28/23  0205 12/29/23  0052 12/30/23  0029   WBC 9.4 10.0 9.7   RBC 4.61* 3.65* 3.63*   HEMOGLOBIN 14.8 11.8* 11.7*   HEMATOCRIT 42.1 34.1* 33.3*   MCV 91.3 93.4 91.7   MCH 32.1 32.3 32.2   MCHC 35.2 34.6 35.1   RDW 45.5 45.7 42.8   PLATELETCT 154* 139* 158*   MPV 9.9 10.3 9.7     Recent Labs     12/28/23  0205 12/29/23  0242 12/29/23  0841 12/29/23  1611 12/30/23  0029 12/30/23  0855   SODIUM 128* 125*   < > 127* 128* 128*   POTASSIUM 3.9 3.6  --   --  3.2*  --    CHLORIDE 95* 91*  --   --  88*  --    CO2 22 23  --   --  24  --    GLUCOSE 110* 84  --   --  109*  --    BUN 14 9  --   --  6*  --    CREATININE 1.00 0.84  --   --  0.77  --    CALCIUM 7.2* 6.7*  --   --  7.5*  --     < > = values in this interval not displayed.     Recent Labs     12/30/23  1021   INR 1.15*                 Imaging  CT-PANCREAS AND ABDOMEN WITH & W/O   Final Result      1.  Acute pancreatitis with some degree of pancreatic necrosis involving less than 20% of the pancreatic parenchyma.   2.  Hyperdense fluid anterior to the pancreas, likely representing a component of hemorrhagic pancreatitis.   3.  No organized hematomas. No organized/drainable pancreatic or peripancreatic fluid collections.   4.  Small volume ascites.   5.  Hepatic steatosis.   6.  Partially visualized bilateral  pleural effusions and associated atelectasis.            DX-CHEST-PORTABLE (1 VIEW)   Final Result      1.  Low lung volumes with bilateral lower lung airspace disease.      [id number]   Final Result      1.  Findings consistent with severe acute pancreatitis with extensive peripancreatic edema and ascites present.   2.  No biliary dilation or common bile duct stone.   3.  Unremarkable gallbladder.   4.  Small bilateral pleural effusions.   5.  Motion artifact limits exam.      US-RUQ   Final Result         1. Hepatic steatosis.   2. No cholelithiasis.   3. Common bile duct was not visualized.           Assessment/Plan  * Severe acute pancreatitis- (present on admission)  Assessment & Plan  Versus medications likely related to hypertriglyceridemia  Denies significant using alcohol  Diagnosed with CT and symptoms at outside hospital  Elevated lipase more than 600,\  Hemoglobin dropped significantly more than 15%  Right upper quadrant ultrasound did not show any acute finding  MRCP shows severe pancreatitis with no mass  Pain control  Improving on his lipase  Had fever on 12/29, CT scan showed necrosis less than 20%  GI was consulted, continue IV fluid and holding omeprazole and lisinopril for possible cause of pancreatitis    High triglycerides  Assessment & Plan  Labs showed triglycerides 684  Came with acute pancreatitis  Start with fenofibrate  Encouraged the patient healthy diet and monitor as outpatient    Fever  Assessment & Plan  Developed fever on 12/29  Repeat CT scan for chest to rule out necrosis pancreatitis  Blood culture is pending  Close monitoring and start with antibiotics if needed    Hypomagnesemia  Assessment & Plan  Replace IV  Monitor with labs daily    Hypocalcemia  Assessment & Plan  Related to acute pancreatitis  Tums 1000 3 times daily  Labs daily  Improving  Replace magnesium    Transaminitis- (present on admission)  Assessment & Plan  No significant using of alcohol   CT scan reviewed  ultrasound did not show any obstruction   Since patient came with elevated liver enzymes, bilirubin and pancreas, will order MRI to rule out any obstruction   or masses.    Continue monitoring with labs daily   Hepatitis panel is negative    SIRS (systemic inflammatory response syndrome) (HCC)- (present on admission)  Assessment & Plan  SIRS criteria identified on my evaluation include:  Tachycardia, with heart rate greater than 90 BPM and Leukocytosis, with WBC greater than 12,000  SIRS is likely related to pancreatitis  WBC 15 at outside facility  No signs of infection      Hypertension- (present on admission)  Assessment & Plan  Not on any medications  Started lisinopril and later added amlodipine         VTE prophylaxis:   SCDs/TEDs   enoxaparin ppx    Patient has severe pancreatitis and high risk for deterioration, low threshold to discuss the case with intensivist and transferred to the ICU if needed      I have performed a physical exam and reviewed and updated ROS and Plan today (12/30/2023). In review of yesterday's note (12/29/2023), there are no changes except as documented above.      Greater than 51 minutes spent prepping to see patient (e.g. review of tests) obtaining and/or reviewing separately obtained history. Performing a medically appropriate examination and/ evaluation.  Counseling and educating the patient/family/caregiver.  Ordering medications, tests, or procedures.  Referring and communicating with other health care professionals.  Documenting clinical information in EPIC.  Independently interpreting results and communicating results to patient/family/caregiver.  Care coordination

## 2023-12-31 NOTE — PROGRESS NOTES
Hospital Medicine Daily Progress Note    Date of Service  12/31/2023    Chief Complaint  Abelardo Fenton is a 38 y.o. male admitted 12/26/2023 with abdominal pain    Hospital Course:    38-year-old male with no significant past medical history except GERD presented 12/26 with epigastric pain.  Started couple days before the admission around epigastric area with nausea and vomiting and feeling heartburn, denied any fever or chills however had generalized weakness.  Trying to swallow the patient had some alcohol however patient denied any heavy alcohol use.  No drugs, no history of pancreatitis or other problems.  On admission CT scan for abdomen and pelvis showed thickening around the jejunum with acute pancreatitis in the head of the pancreas.  No gallstones, ultrasound did not show any obstruction.  Labs showed elevated liver enzymes with lipase 736.  IV fluid was initiated.  Labs also showed hypertriglyceridemia 684.  Fenofibrate was initiated, patient received IV fluid with improving his pain however patient developed some distention in his abdomen and swelling, IV fluid was discontinued and received IV Lasix, his hematocrit dropped from 19 on admission to 11.8 and patient needed multiple calcium IV doses, his lipase improved from 736 to 159.  Due to still continuous pain and developing fever, CT scan was done on 12/29 and showed necrosis less than 20%, GI was consulted and continue same treatment, holding lisinopril and omeprazole.    On 12/29, patient developed fever, blood culture was ordered, repeat CT scan to rule out any abscess or fluid collection.        Interval Problem Update  -Evaluated examined the patient at bedside, mild abdominal pain, distention, patient has lower extremity edema, decreased IV fluid.  -No leukocytosis and improving on his lipase.  -No more fever.  -Improving hospital  -Advance diet as tolerated      I have discussed this patient's plan of care and discharge plan at IDT  rounds today with Case Management, Nursing, Nursing leadership, and other members of the IDT team.    Consultants/Specialty  None    Code Status  Full Code    Disposition  The patient is not medically cleared for discharge to home or a post-acute facility.  Anticipate discharge to: home with close outpatient follow-up    I have placed the appropriate orders for post-discharge needs.    Review of Systems  Review of Systems   Constitutional:  Negative for chills, fever and weight loss.   HENT:  Negative for ear pain, hearing loss and tinnitus.    Eyes:  Negative for blurred vision, double vision and photophobia.   Respiratory:  Negative for cough and hemoptysis.    Cardiovascular:  Negative for chest pain, palpitations, orthopnea and claudication.   Gastrointestinal:  Positive for abdominal pain. Negative for constipation, diarrhea, nausea and vomiting.   Genitourinary:  Negative for dysuria, frequency and urgency.   Musculoskeletal:  Negative for myalgias and neck pain.   Skin:  Negative for rash.   Neurological:  Negative for dizziness, speech change and weakness.        Physical Exam  Temp:  [36.7 °C (98.1 °F)-37.6 °C (99.7 °F)] 36.7 °C (98.1 °F)  Pulse:  [119-125] 121  Resp:  [14-22] 19  BP: (130-164)/() 130/87  SpO2:  [88 %-92 %] 92 %    Physical Exam  Constitutional:       Appearance: He is ill-appearing.   Eyes:      General: No scleral icterus.  Cardiovascular:      Rate and Rhythm: Normal rate.      Heart sounds: No murmur heard.  Pulmonary:      Effort: No respiratory distress.      Breath sounds: Rales present. No wheezing.   Abdominal:      General: There is distension.      Tenderness: There is abdominal tenderness. There is no right CVA tenderness, left CVA tenderness or guarding.   Musculoskeletal:      Right lower leg: Edema present.      Left lower leg: Edema present.   Lymphadenopathy:      Cervical: No cervical adenopathy.   Skin:     Coloration: Skin is not jaundiced.      Findings: No  bruising, lesion or rash.   Neurological:      General: No focal deficit present.      Mental Status: He is alert and oriented to person, place, and time. Mental status is at baseline.      Cranial Nerves: No cranial nerve deficit.      Motor: No weakness.      Gait: Gait normal.         Fluids    Intake/Output Summary (Last 24 hours) at 12/31/2023 1355  Last data filed at 12/31/2023 0501  Gross per 24 hour   Intake 60 ml   Output 2700 ml   Net -2640 ml       Laboratory  Recent Labs     12/29/23  0052 12/30/23  0029 12/31/23  0105   WBC 10.0 9.7 10.9*   RBC 3.65* 3.63* 3.44*   HEMOGLOBIN 11.8* 11.7* 11.2*   HEMATOCRIT 34.1* 33.3* 32.3*   MCV 93.4 91.7 93.9   MCH 32.3 32.2 32.6   MCHC 34.6 35.1 34.7   RDW 45.7 42.8 45.5   PLATELETCT 139* 158* 212   MPV 10.3 9.7 10.1     Recent Labs     12/29/23  0242 12/29/23  0841 12/30/23  0029 12/30/23  0855 12/30/23  1802 12/31/23  0105 12/31/23  0913   SODIUM 125*   < > 128*   < > 131* 130* 127*   POTASSIUM 3.6  --  3.2*  --   --  3.4*  --    CHLORIDE 91*  --  88*  --   --  92*  --    CO2 23  --  24  --   --  23  --    GLUCOSE 84  --  109*  --   --  96  --    BUN 9  --  6*  --   --  6*  --    CREATININE 0.84  --  0.77  --   --  0.70  --    CALCIUM 6.7*  --  7.5*  --   --  7.8*  --     < > = values in this interval not displayed.     Recent Labs     12/30/23  1021   INR 1.15*                 Imaging  CT-PANCREAS AND ABDOMEN WITH & W/O   Final Result      1.  Acute pancreatitis with some degree of pancreatic necrosis involving less than 20% of the pancreatic parenchyma.   2.  Hyperdense fluid anterior to the pancreas, likely representing a component of hemorrhagic pancreatitis.   3.  No organized hematomas. No organized/drainable pancreatic or peripancreatic fluid collections.   4.  Small volume ascites.   5.  Hepatic steatosis.   6.  Partially visualized bilateral pleural effusions and associated atelectasis.            DX-CHEST-PORTABLE (1 VIEW)   Final Result      1.  Low lung  volumes with bilateral lower lung airspace disease.      [id number]   Final Result      1.  Findings consistent with severe acute pancreatitis with extensive peripancreatic edema and ascites present.   2.  No biliary dilation or common bile duct stone.   3.  Unremarkable gallbladder.   4.  Small bilateral pleural effusions.   5.  Motion artifact limits exam.      US-RUQ   Final Result         1. Hepatic steatosis.   2. No cholelithiasis.   3. Common bile duct was not visualized.      MR-BRAIN-WITH & W/O    (Results Pending)   MR-ORBITS,FACE,NECK-WITH&W/O & SEQUENCES    (Results Pending)        Assessment/Plan  * Severe acute pancreatitis- (present on admission)  Assessment & Plan  Versus medications likely related to hypertriglyceridemia  Denies significant using alcohol  Diagnosed with CT and symptoms at outside hospital  Elevated lipase more than 600,\  Hemoglobin dropped significantly more than 15%  Right upper quadrant ultrasound did not show any acute finding  MRCP shows severe pancreatitis with no mass  Patient has pancreatitis before lisinopril or omeprazole  Pain control  Improving on his lipase  Had fever on 12/29, CT scan showed necrosis less than 20%  GI was consulted, no further recommendation, signed off  Continue improving, advance diet as tolerated    High triglycerides  Assessment & Plan  Labs showed triglycerides 684  Came with acute pancreatitis  Start with fenofibrate  Encouraged the patient healthy diet and monitor as outpatient    Fever  Assessment & Plan  Developed fever on 12/29  Repeat CT scan for chest to rule out necrosis pancreatitis  Blood culture negative till now  Improved  Close monitoring and start with antibiotics if needed      Hypomagnesemia  Assessment & Plan  Replace IV  Monitor with labs daily    Hypocalcemia  Assessment & Plan  Related to acute pancreatitis  Tums 1000 3 times daily  Labs daily  Improving  Replace magnesium    Transaminitis- (present on  admission)  Assessment & Plan  No significant using of alcohol   CT scan reviewed ultrasound did not show any obstruction   Since patient came with elevated liver enzymes, bilirubin and pancreas, will order MRI to rule out any obstruction   or masses.    Continue monitoring with labs daily   Hepatitis panel is negative    SIRS (systemic inflammatory response syndrome) (HCC)- (present on admission)  Assessment & Plan  SIRS criteria identified on my evaluation include:  Tachycardia, with heart rate greater than 90 BPM and Leukocytosis, with WBC greater than 12,000  SIRS is likely related to pancreatitis  WBC 15 at outside facility  No signs of infection      Hypertension- (present on admission)  Assessment & Plan  Not on any medications  Started lisinopril and later added amlodipine         VTE prophylaxis:   SCDs/TEDs   enoxaparin ppx    Patient has severe pancreatitis and high risk for deterioration, low threshold to discuss the case with intensivist and transferred to the ICU if needed      I have performed a physical exam and reviewed and updated ROS and Plan today (12/31/2023). In review of yesterday's note (12/30/2023), there are no changes except as documented above.

## 2023-12-31 NOTE — CARE PLAN
Problem: Urinary - Renal Perfusion  Goal: Ability to achieve and maintain adequate renal perfusion and functioning will improve  Outcome: Progressing  Patient has adequate urine output.    The patient is Watcher - Medium risk of patient condition declining or worsening    Shift Goals  Clinical Goals: monitor electrolytes  Patient Goals: pain control  Family Goals: GRANT

## 2023-12-31 NOTE — CARE PLAN
The patient is Stable - Low risk of patient condition declining or worsening    Shift Goals  Clinical Goals: Pain control  Patient Goals: Pain control  Family Goals: GRANT    Progress made toward(s) clinical / shift goals:        Problem: Knowledge Deficit - Standard  Goal: Patient and family/care givers will demonstrate understanding of plan of care, disease process/condition, diagnostic tests and medications  Outcome: Progressing     Problem: Pain - Standard  Goal: Alleviation of pain or a reduction in pain to the patient’s comfort goal  Outcome: Progressing     Problem: Hemodynamics  Goal: Patient's hemodynamics, fluid balance and neurologic status will be stable or improve  Outcome: Progressing     Problem: Respiratory  Goal: Patient will achieve/maintain optimum respiratory ventilation and gas exchange  Outcome: Progressing       Patient is not progressing towards the following goals:

## 2023-12-31 NOTE — PROGRESS NOTES
Lipase down trending. Mild leukocytosis 10.9. if patient develops concerns/signs for infected necrosis or does not improve, repeat scan.   No changes from previous recommendation to refrain from omeprazole and lisinopril and to utilize other agents that cause GERD and hypertension.     No inventions from acute GI team.  GI to sign off.  Please reconsult for any further questions or concerns.

## 2024-01-01 ENCOUNTER — APPOINTMENT (OUTPATIENT)
Dept: RADIOLOGY | Facility: MEDICAL CENTER | Age: 39
DRG: 439 | End: 2024-01-01
Attending: INTERNAL MEDICINE
Payer: COMMERCIAL

## 2024-01-01 PROBLEM — H02.402 DROOPING EYELID, LEFT: Status: ACTIVE | Noted: 2024-01-01

## 2024-01-01 LAB
ALBUMIN SERPL BCP-MCNC: 3.1 G/DL (ref 3.2–4.9)
ALBUMIN/GLOB SERPL: 0.9 G/DL
ALP SERPL-CCNC: 72 U/L (ref 30–99)
ALT SERPL-CCNC: 64 U/L (ref 2–50)
ANION GAP SERPL CALC-SCNC: 16 MMOL/L (ref 7–16)
AST SERPL-CCNC: 82 U/L (ref 12–45)
BASOPHILS # BLD AUTO: 0 % (ref 0–1.8)
BASOPHILS # BLD: 0 K/UL (ref 0–0.12)
BILIRUB SERPL-MCNC: 0.7 MG/DL (ref 0.1–1.5)
BUN SERPL-MCNC: 7 MG/DL (ref 8–22)
CALCIUM ALBUM COR SERPL-MCNC: 8.8 MG/DL (ref 8.5–10.5)
CALCIUM SERPL-MCNC: 8.1 MG/DL (ref 8.5–10.5)
CHLORIDE SERPL-SCNC: 89 MMOL/L (ref 96–112)
CO2 SERPL-SCNC: 23 MMOL/L (ref 20–33)
CREAT SERPL-MCNC: 0.7 MG/DL (ref 0.5–1.4)
CRP SERPL HS-MCNC: 26.37 MG/DL (ref 0–0.75)
EOSINOPHIL # BLD AUTO: 0.1 K/UL (ref 0–0.51)
EOSINOPHIL NFR BLD: 0.8 % (ref 0–6.9)
ERYTHROCYTE [DISTWIDTH] IN BLOOD BY AUTOMATED COUNT: 44.7 FL (ref 35.9–50)
GFR SERPLBLD CREATININE-BSD FMLA CKD-EPI: 122 ML/MIN/1.73 M 2
GLOBULIN SER CALC-MCNC: 3.4 G/DL (ref 1.9–3.5)
GLUCOSE SERPL-MCNC: 109 MG/DL (ref 65–99)
HCT VFR BLD AUTO: 32.5 % (ref 42–52)
HGB BLD-MCNC: 11.3 G/DL (ref 14–18)
LDH SERPL L TO P-CCNC: 925 U/L (ref 107–266)
LYMPHOCYTES # BLD AUTO: 0.99 K/UL (ref 1–4.8)
LYMPHOCYTES NFR BLD: 7.7 % (ref 22–41)
MAGNESIUM SERPL-MCNC: 2.3 MG/DL (ref 1.5–2.5)
MANUAL DIFF BLD: NORMAL
MCH RBC QN AUTO: 32.2 PG (ref 27–33)
MCHC RBC AUTO-ENTMCNC: 34.8 G/DL (ref 32.3–36.5)
MCV RBC AUTO: 92.6 FL (ref 81.4–97.8)
METAMYELOCYTES NFR BLD MANUAL: 2.6 %
MONOCYTES # BLD AUTO: 1.54 K/UL (ref 0–0.85)
MONOCYTES NFR BLD AUTO: 12 % (ref 0–13.4)
MORPHOLOGY BLD-IMP: NORMAL
NEUTROPHILS # BLD AUTO: 9.84 K/UL (ref 1.82–7.42)
NEUTROPHILS NFR BLD: 75.2 % (ref 44–72)
NEUTS BAND NFR BLD MANUAL: 1.7 % (ref 0–10)
NRBC # BLD AUTO: 0 K/UL
NRBC BLD-RTO: 0 /100 WBC (ref 0–0.2)
PHOSPHATE SERPL-MCNC: 1.6 MG/DL (ref 2.5–4.5)
PLATELET # BLD AUTO: 299 K/UL (ref 164–446)
PLATELET BLD QL SMEAR: NORMAL
PMV BLD AUTO: 10 FL (ref 9–12.9)
POIKILOCYTOSIS BLD QL SMEAR: NORMAL
POLYCHROMASIA BLD QL SMEAR: NORMAL
POTASSIUM SERPL-SCNC: 3.2 MMOL/L (ref 3.6–5.5)
PROCALCITONIN SERPL-MCNC: 0.58 NG/ML
PROT SERPL-MCNC: 6.5 G/DL (ref 6–8.2)
RBC # BLD AUTO: 3.51 M/UL (ref 4.7–6.1)
RBC BLD AUTO: PRESENT
SODIUM SERPL-SCNC: 128 MMOL/L (ref 135–145)
SODIUM SERPL-SCNC: 128 MMOL/L (ref 135–145)
TARGETS BLD QL SMEAR: NORMAL
WBC # BLD AUTO: 12.8 K/UL (ref 4.8–10.8)

## 2024-01-01 PROCEDURE — A9270 NON-COVERED ITEM OR SERVICE: HCPCS | Performed by: INTERNAL MEDICINE

## 2024-01-01 PROCEDURE — 86140 C-REACTIVE PROTEIN: CPT

## 2024-01-01 PROCEDURE — 700105 HCHG RX REV CODE 258: Performed by: INTERNAL MEDICINE

## 2024-01-01 PROCEDURE — 770020 HCHG ROOM/CARE - TELE (206)

## 2024-01-01 PROCEDURE — 80053 COMPREHEN METABOLIC PANEL: CPT

## 2024-01-01 PROCEDURE — 700102 HCHG RX REV CODE 250 W/ 637 OVERRIDE(OP): Performed by: INTERNAL MEDICINE

## 2024-01-01 PROCEDURE — 70543 MRI ORBT/FAC/NCK W/O &W/DYE: CPT

## 2024-01-01 PROCEDURE — 700111 HCHG RX REV CODE 636 W/ 250 OVERRIDE (IP): Mod: JZ | Performed by: INTERNAL MEDICINE

## 2024-01-01 PROCEDURE — 99233 SBSQ HOSP IP/OBS HIGH 50: CPT | Performed by: INTERNAL MEDICINE

## 2024-01-01 PROCEDURE — 70553 MRI BRAIN STEM W/O & W/DYE: CPT

## 2024-01-01 PROCEDURE — 700117 HCHG RX CONTRAST REV CODE 255: Performed by: INTERNAL MEDICINE

## 2024-01-01 PROCEDURE — 84295 ASSAY OF SERUM SODIUM: CPT

## 2024-01-01 PROCEDURE — 84100 ASSAY OF PHOSPHORUS: CPT

## 2024-01-01 PROCEDURE — 85007 BL SMEAR W/DIFF WBC COUNT: CPT

## 2024-01-01 PROCEDURE — 83615 LACTATE (LD) (LDH) ENZYME: CPT

## 2024-01-01 PROCEDURE — 84145 PROCALCITONIN (PCT): CPT

## 2024-01-01 PROCEDURE — 83735 ASSAY OF MAGNESIUM: CPT

## 2024-01-01 PROCEDURE — 700101 HCHG RX REV CODE 250: Performed by: INTERNAL MEDICINE

## 2024-01-01 PROCEDURE — 85027 COMPLETE CBC AUTOMATED: CPT

## 2024-01-01 PROCEDURE — A9579 GAD-BASE MR CONTRAST NOS,1ML: HCPCS | Performed by: INTERNAL MEDICINE

## 2024-01-01 RX ORDER — CALCIUM CARBONATE 500 MG/1
1000 TABLET, CHEWABLE ORAL 2 TIMES DAILY
Status: DISCONTINUED | OUTPATIENT
Start: 2024-01-01 | End: 2024-01-02 | Stop reason: HOSPADM

## 2024-01-01 RX ORDER — FUROSEMIDE 10 MG/ML
40 INJECTION INTRAMUSCULAR; INTRAVENOUS ONCE
Status: COMPLETED | OUTPATIENT
Start: 2024-01-01 | End: 2024-01-01

## 2024-01-01 RX ADMIN — POTASSIUM PHOSPHATE, MONOBASIC AND POTASSIUM PHOSPHATE, DIBASIC 30 MMOL: 224; 236 INJECTION, SOLUTION, CONCENTRATE INTRAVENOUS at 09:46

## 2024-01-01 RX ADMIN — ACETAMINOPHEN 650 MG: 325 TABLET, FILM COATED ORAL at 18:01

## 2024-01-01 RX ADMIN — FENOFIBRATE 67 MG: 67 CAPSULE ORAL at 04:47

## 2024-01-01 RX ADMIN — ANTACID TABLETS 1000 MG: 500 TABLET, CHEWABLE ORAL at 04:47

## 2024-01-01 RX ADMIN — FAMOTIDINE 20 MG: 20 TABLET ORAL at 04:47

## 2024-01-01 RX ADMIN — FUROSEMIDE 40 MG: 10 INJECTION, SOLUTION INTRAVENOUS at 17:16

## 2024-01-01 RX ADMIN — OXYCODONE HYDROCHLORIDE 10 MG: 10 TABLET ORAL at 06:50

## 2024-01-01 RX ADMIN — OXYCODONE 5 MG: 5 TABLET ORAL at 02:56

## 2024-01-01 RX ADMIN — ENOXAPARIN SODIUM 40 MG: 100 INJECTION SUBCUTANEOUS at 17:15

## 2024-01-01 RX ADMIN — ANTACID TABLETS 1000 MG: 500 TABLET, CHEWABLE ORAL at 17:15

## 2024-01-01 RX ADMIN — GADOTERIDOL 18 ML: 279.3 INJECTION, SOLUTION INTRAVENOUS at 06:55

## 2024-01-01 RX ADMIN — OXYCODONE HYDROCHLORIDE 10 MG: 10 TABLET ORAL at 12:47

## 2024-01-01 RX ADMIN — AMLODIPINE BESYLATE 5 MG: 5 TABLET ORAL at 04:43

## 2024-01-01 RX ADMIN — OXYCODONE HYDROCHLORIDE 10 MG: 10 TABLET ORAL at 19:40

## 2024-01-01 RX ADMIN — ACETAMINOPHEN 650 MG: 325 TABLET, FILM COATED ORAL at 12:50

## 2024-01-01 RX ADMIN — FAMOTIDINE 20 MG: 20 TABLET ORAL at 18:00

## 2024-01-01 RX ADMIN — SODIUM CHLORIDE: 9 INJECTION, SOLUTION INTRAVENOUS at 03:00

## 2024-01-01 ASSESSMENT — ENCOUNTER SYMPTOMS
ABDOMINAL PAIN: 1
CHILLS: 0
CLAUDICATION: 0
BLURRED VISION: 0
MYALGIAS: 0
DIZZINESS: 0
NECK PAIN: 0
COUGH: 0
NAUSEA: 0
DOUBLE VISION: 0
PHOTOPHOBIA: 0
HEMOPTYSIS: 0
DIARRHEA: 0
FEVER: 0
CONSTIPATION: 0
ORTHOPNEA: 0
WEIGHT LOSS: 0
PALPITATIONS: 0
WEAKNESS: 0
VOMITING: 0
SPEECH CHANGE: 0

## 2024-01-01 ASSESSMENT — PAIN DESCRIPTION - PAIN TYPE
TYPE: ACUTE PAIN
TYPE: ACUTE PAIN;CHRONIC PAIN
TYPE: ACUTE PAIN

## 2024-01-01 ASSESSMENT — FIBROSIS 4 INDEX: FIB4 SCORE: 1.23

## 2024-01-01 NOTE — PROGRESS NOTES
Monitor Summary:     Rhythm: ST  Rate: 114-129  Ectopy: None  Measurements: .13/.08/.34                 12 Hour Chart Check

## 2024-01-01 NOTE — DIETARY
Nutrition Services: Brief Update    Pt is on day 6 of NPO/clear liquids.     Discussed with RN, appears to be tolerating clear liquids and MD in agreement to advance diet to low fat. Weight trend is up and down, via stand up scales pt is -4.2 kg in 1 day.     Will monitor for adequate PO intake. RD following.

## 2024-01-01 NOTE — CARE PLAN
The patient is Stable - Low risk of patient condition declining or worsening    Shift Goals  Clinical Goals: Pain control  Patient Goals: Pain / Nausea control  Family Goals: GRANT    Progress made toward(s) clinical / shift goals:        Problem: Knowledge Deficit - Standard  Goal: Patient and family/care givers will demonstrate understanding of plan of care, disease process/condition, diagnostic tests and medications  Outcome: Progressing     Problem: Pain - Standard  Goal: Alleviation of pain or a reduction in pain to the patient’s comfort goal  Outcome: Not Progressing     Problem: Hemodynamics  Goal: Patient's hemodynamics, fluid balance and neurologic status will be stable or improve  Outcome: Progressing     Problem: Respiratory  Goal: Patient will achieve/maintain optimum respiratory ventilation and gas exchange  Outcome: Progressing       Patient is not progressing towards the following goals:      Problem: Pain - Standard  Goal: Alleviation of pain or a reduction in pain to the patient’s comfort goal  Outcome: Not Progressing

## 2024-01-02 ENCOUNTER — PHARMACY VISIT (OUTPATIENT)
Dept: PHARMACY | Facility: MEDICAL CENTER | Age: 39
End: 2024-01-02
Payer: COMMERCIAL

## 2024-01-02 VITALS
OXYGEN SATURATION: 94 % | TEMPERATURE: 97.9 F | SYSTOLIC BLOOD PRESSURE: 131 MMHG | RESPIRATION RATE: 18 BRPM | HEIGHT: 69 IN | HEART RATE: 107 BPM | WEIGHT: 179.23 LBS | BODY MASS INDEX: 26.55 KG/M2 | DIASTOLIC BLOOD PRESSURE: 86 MMHG

## 2024-01-02 LAB
ALBUMIN SERPL BCP-MCNC: 3.2 G/DL (ref 3.2–4.9)
ALBUMIN/GLOB SERPL: 0.9 G/DL
ALP SERPL-CCNC: 80 U/L (ref 30–99)
ALT SERPL-CCNC: 60 U/L (ref 2–50)
ANION GAP SERPL CALC-SCNC: 17 MMOL/L (ref 7–16)
AST SERPL-CCNC: 49 U/L (ref 12–45)
BASOPHILS # BLD AUTO: 0.9 % (ref 0–1.8)
BASOPHILS # BLD: 0.16 K/UL (ref 0–0.12)
BILIRUB SERPL-MCNC: 0.6 MG/DL (ref 0.1–1.5)
BUN SERPL-MCNC: 8 MG/DL (ref 8–22)
CALCIUM ALBUM COR SERPL-MCNC: 8.9 MG/DL (ref 8.5–10.5)
CALCIUM SERPL-MCNC: 8.3 MG/DL (ref 8.5–10.5)
CHLORIDE SERPL-SCNC: 90 MMOL/L (ref 96–112)
CO2 SERPL-SCNC: 22 MMOL/L (ref 20–33)
CREAT SERPL-MCNC: 0.71 MG/DL (ref 0.5–1.4)
CRP SERPL HS-MCNC: 24.98 MG/DL (ref 0–0.75)
EOSINOPHIL # BLD AUTO: 0 K/UL (ref 0–0.51)
EOSINOPHIL NFR BLD: 0 % (ref 0–6.9)
ERYTHROCYTE [DISTWIDTH] IN BLOOD BY AUTOMATED COUNT: 43.1 FL (ref 35.9–50)
GFR SERPLBLD CREATININE-BSD FMLA CKD-EPI: 122 ML/MIN/1.73 M 2
GLOBULIN SER CALC-MCNC: 3.5 G/DL (ref 1.9–3.5)
GLUCOSE SERPL-MCNC: 111 MG/DL (ref 65–99)
HCT VFR BLD AUTO: 33.5 % (ref 42–52)
HGB BLD-MCNC: 11.9 G/DL (ref 14–18)
LIPASE SERPL-CCNC: 85 U/L (ref 11–82)
LYMPHOCYTES # BLD AUTO: 0.91 K/UL (ref 1–4.8)
LYMPHOCYTES NFR BLD: 5.1 % (ref 22–41)
MAGNESIUM SERPL-MCNC: 2.3 MG/DL (ref 1.5–2.5)
MANUAL DIFF BLD: NORMAL
MCH RBC QN AUTO: 32.2 PG (ref 27–33)
MCHC RBC AUTO-ENTMCNC: 35.5 G/DL (ref 32.3–36.5)
MCV RBC AUTO: 90.8 FL (ref 81.4–97.8)
METAMYELOCYTES NFR BLD MANUAL: 1.7 %
MONOCYTES # BLD AUTO: 0.91 K/UL (ref 0–0.85)
MONOCYTES NFR BLD AUTO: 5.1 % (ref 0–13.4)
MORPHOLOGY BLD-IMP: NORMAL
NEUTROPHILS # BLD AUTO: 15.61 K/UL (ref 1.82–7.42)
NEUTROPHILS NFR BLD: 84.7 % (ref 44–72)
NEUTS BAND NFR BLD MANUAL: 2.5 % (ref 0–10)
NRBC # BLD AUTO: 0 K/UL
NRBC BLD-RTO: 0 /100 WBC (ref 0–0.2)
PHOSPHATE SERPL-MCNC: 2.4 MG/DL (ref 2.5–4.5)
PLATELET # BLD AUTO: 403 K/UL (ref 164–446)
PLATELET BLD QL SMEAR: NORMAL
PMV BLD AUTO: 10 FL (ref 9–12.9)
POTASSIUM SERPL-SCNC: 2.9 MMOL/L (ref 3.6–5.5)
POTASSIUM SERPL-SCNC: 3.5 MMOL/L (ref 3.6–5.5)
PROT SERPL-MCNC: 6.7 G/DL (ref 6–8.2)
RBC # BLD AUTO: 3.69 M/UL (ref 4.7–6.1)
RBC BLD AUTO: NORMAL
SODIUM SERPL-SCNC: 129 MMOL/L (ref 135–145)
WBC # BLD AUTO: 17.9 K/UL (ref 4.8–10.8)

## 2024-01-02 PROCEDURE — 83690 ASSAY OF LIPASE: CPT

## 2024-01-02 PROCEDURE — A9270 NON-COVERED ITEM OR SERVICE: HCPCS | Performed by: INTERNAL MEDICINE

## 2024-01-02 PROCEDURE — 86140 C-REACTIVE PROTEIN: CPT

## 2024-01-02 PROCEDURE — 700102 HCHG RX REV CODE 250 W/ 637 OVERRIDE(OP): Performed by: INTERNAL MEDICINE

## 2024-01-02 PROCEDURE — 85007 BL SMEAR W/DIFF WBC COUNT: CPT

## 2024-01-02 PROCEDURE — 99239 HOSP IP/OBS DSCHRG MGMT >30: CPT | Performed by: INTERNAL MEDICINE

## 2024-01-02 PROCEDURE — 700111 HCHG RX REV CODE 636 W/ 250 OVERRIDE (IP): Performed by: INTERNAL MEDICINE

## 2024-01-02 PROCEDURE — RXMED WILLOW AMBULATORY MEDICATION CHARGE: Performed by: INTERNAL MEDICINE

## 2024-01-02 PROCEDURE — 84100 ASSAY OF PHOSPHORUS: CPT

## 2024-01-02 PROCEDURE — 85027 COMPLETE CBC AUTOMATED: CPT

## 2024-01-02 PROCEDURE — 83735 ASSAY OF MAGNESIUM: CPT

## 2024-01-02 PROCEDURE — 80053 COMPREHEN METABOLIC PANEL: CPT

## 2024-01-02 PROCEDURE — 84132 ASSAY OF SERUM POTASSIUM: CPT

## 2024-01-02 RX ORDER — FENOFIBRATE 67 MG/1
67 CAPSULE ORAL DAILY
Qty: 30 CAPSULE | Refills: 2 | Status: ON HOLD | OUTPATIENT
Start: 2024-01-03 | End: 2024-01-19

## 2024-01-02 RX ORDER — M-VIT,TX,IRON,MINS/CALC/FOLIC 27MG-0.4MG
1 TABLET ORAL DAILY
Qty: 30 TABLET | Refills: 11 | Status: SHIPPED | OUTPATIENT
Start: 2024-01-02 | End: 2024-01-17

## 2024-01-02 RX ORDER — POTASSIUM CHLORIDE 7.45 MG/ML
10 INJECTION INTRAVENOUS
Status: COMPLETED | OUTPATIENT
Start: 2024-01-02 | End: 2024-01-02

## 2024-01-02 RX ORDER — FAMOTIDINE 20 MG/1
20 TABLET, FILM COATED ORAL 2 TIMES DAILY
Qty: 60 TABLET | Refills: 2 | Status: ON HOLD | OUTPATIENT
Start: 2024-01-02 | End: 2024-02-15

## 2024-01-02 RX ORDER — AMLODIPINE BESYLATE 5 MG/1
5 TABLET ORAL DAILY
Qty: 30 TABLET | Refills: 2 | Status: SHIPPED | OUTPATIENT
Start: 2024-01-03

## 2024-01-02 RX ORDER — ACETAMINOPHEN 325 MG/1
650 TABLET ORAL EVERY 4 HOURS PRN
Qty: 30 TABLET | Refills: 0 | Status: SHIPPED | OUTPATIENT
Start: 2024-01-02 | End: 2024-03-25

## 2024-01-02 RX ORDER — OXYCODONE HYDROCHLORIDE 5 MG/1
5 TABLET ORAL
Qty: 15 TABLET | Refills: 0 | Status: SHIPPED | OUTPATIENT
Start: 2024-01-02 | End: 2024-01-07

## 2024-01-02 RX ADMIN — FAMOTIDINE 20 MG: 20 TABLET ORAL at 05:54

## 2024-01-02 RX ADMIN — POTASSIUM CHLORIDE 10 MEQ: 7.46 INJECTION, SOLUTION INTRAVENOUS at 10:36

## 2024-01-02 RX ADMIN — METOPROLOL TARTRATE 12.5 MG: 25 TABLET, FILM COATED ORAL at 10:36

## 2024-01-02 RX ADMIN — FENOFIBRATE 67 MG: 67 CAPSULE ORAL at 05:53

## 2024-01-02 RX ADMIN — OXYCODONE HYDROCHLORIDE 10 MG: 10 TABLET ORAL at 09:37

## 2024-01-02 RX ADMIN — ANTACID TABLETS 1000 MG: 500 TABLET, CHEWABLE ORAL at 05:52

## 2024-01-02 RX ADMIN — POTASSIUM CHLORIDE 10 MEQ: 7.46 INJECTION, SOLUTION INTRAVENOUS at 11:41

## 2024-01-02 RX ADMIN — OXYCODONE 5 MG: 5 TABLET ORAL at 05:54

## 2024-01-02 RX ADMIN — POTASSIUM CHLORIDE 10 MEQ: 7.46 INJECTION, SOLUTION INTRAVENOUS at 09:35

## 2024-01-02 RX ADMIN — OXYCODONE HYDROCHLORIDE 10 MG: 10 TABLET ORAL at 00:39

## 2024-01-02 RX ADMIN — AMLODIPINE BESYLATE 5 MG: 5 TABLET ORAL at 05:53

## 2024-01-02 ASSESSMENT — PAIN DESCRIPTION - PAIN TYPE
TYPE: ACUTE PAIN

## 2024-01-02 NOTE — ASSESSMENT & PLAN NOTE
MRI brain is pending however MRI orbital did not show any acute finding  Possible related to inflammation??  Continue monitoring, no other neurofocal deficit

## 2024-01-02 NOTE — DISCHARGE INSTRUCTIONS
Acute Pancreatitis    Acute pancreatitis happens when a gland called the pancreas suddenly develops inflammation, making it irritated and swollen. The pancreas is found on the left side of the abdomen, behind the stomach. The pancreas makes proteins (enzymes) that help to digest food. It also releases the hormones glucagon and insulin. These help to regulate blood sugar.  Most sudden (acute) attacks of this condition last a few days and can cause serious problems. Some people become dehydrated and develop low blood pressure. In severe cases, bleeding in the abdomen can lead to shock and can be life-threatening. The lungs, heart, and kidneys may stop working.  What are the causes?  This condition may be caused by:  Heavy alcohol use.  Drug use.  Gallstones or other conditions that can block the tube that drains the pancreas (pancreatic duct).  A tumor in the pancreas.  Other causes include:  Being exposed to certain medicines or certain chemicals.  Having health conditions such as diabetes, high triglycerides, or high calcium levels in your blood. High calcium levels are usually caused by the parathyroid gland being too active.  An infection in the pancreas.  Damage caused by an accident (trauma) or by the poison (venom) of a scorpion sting.  Abdominal surgery.  Autoimmune pancreatitis. This is when the body's disease-fighting system (immune system) attacks the pancreas.  Genes that are passed from parent to child (inherited).  In some cases, the cause of this condition is not known.  What are the signs or symptoms?  Symptoms of this condition include:  Pain in the upper abdomen that may spread (radiate) to the back. Pain may be severe and often worsens after you eat.  A tender and swollen abdomen.  Nausea and vomiting.  Fever.  How is this diagnosed?  This condition may be diagnosed based on:  A physical exam.  Blood tests. These include an increased (elevated) level of lipase or amylase.  Imaging tests, such as CT  scans, MRIs, or an ultrasound of the abdomen.  How is this treated?  Treatment for this condition often requires a hospital stay and may include:  Pain medicine.  IV fluids.  Placing a tube in the stomach to remove stomach contents and to control vomiting (nasogastric tube, or NG tube).  Not eating until vomiting has lessened.  Treating any underlying conditions that may be the cause. Treatment may include:  Antibiotic medicines, if your condition is caused by an infection.  Steroid medicine, if your condition is caused by your immune system attacking your pancreas (autoimmune disease).  Surgery on the gallbladder or pancreas, if your condition is caused by gallstones or another blockage.  Follow these instructions at home:  Medicines  Take over-the-counter and prescription medicines only as told by your health care provider.  If you were prescribed an antibiotic medicine, take it as told by your health care provider. Do not stop using the antibiotic even if you start to feel better.  Ask your health care provider if the medicine prescribed to you:  Requires you to avoid driving or using machinery.  Can cause constipation. You may need to take these actions to prevent or treat constipation:  Take over-the-counter or prescription medicines.  Eat foods that are high in fiber, such as beans, whole grains, and fresh fruits and vegetables.  Limit foods that are high in fat and processed sugars, such as fried or sweet foods.  Eating and drinking    Follow instructions from your health care provider about diet. This may involve avoiding alcohol and having less fat in your diet.  Eat smaller, more frequent meals. Doing this causes the pancreas to make less digestive fluid.  Drink enough fluid to keep your urine pale yellow.  Do not drink alcohol if it caused your condition.  General instructions  Do not use any products that contain nicotine or tobacco. These products include cigarettes, chewing tobacco, and vaping devices,  such as e-cigarettes. If you need help quitting, ask your health care provider.  Get plenty of rest.  If directed, check your blood sugar at home as told by your health care provider.  Keep all follow-up visits. This is important.  Contact a health care provider if:  You do not get better as fast as expected.  Your symptoms get worse or you get new symptoms.  You keep having pain, weakness, or nausea.  You get better and then pain comes back.  You have a fever.  Get help right away if:  You vomit every time you eat or drink.  Your pain becomes severe.  Your skin or the white parts of your eyes turn yellow (jaundice).  You have sudden swelling in your abdomen.  You feel dizzy or you faint.  Your blood sugar is high (over 300 mg/dL).  You vomit blood.  These symptoms may be an emergency. Get help right away. Call 911.  Do not wait to see if the symptoms will go away.  Do not drive yourself to the hospital.  Summary  Acute pancreatitis happens when inflammation of the pancreas suddenly occurs and the pancreas becomes irritated and swollen.  This condition is typically caused by heavy alcohol use, drug use, or gallstones.  Treatment for this condition usually requires a stay in the hospital.  This information is not intended to replace advice given to you by your health care provider. Make sure you discuss any questions you have with your health care provider.  Document Revised: 11/08/2022 Document Reviewed: 11/08/2022  [employer] Patient Education © 2023 [employer]  Alcohol Intoxication  Alcohol intoxication happens when you cannot think clearly or function well after drinking alcohol. This can happen after just one drink. The effect that alcohol has on how you think and function depends on:  How much alcohol you drank.  Your age, your weight, and whether you are a man or a woman.  How often you drink alcohol.  If you have other medical problems.  Alcohol intoxication can range from mild to severe. It can be  dangerous, especially if:  You drink a large amount of alcohol in a short time (binge drink).  You drink alcohol and take drugs or medicines.  What are the causes?  This condition is caused by drinking alcohol.  What increases the risk?  Peer pressure in young adults.  Difficulty managing stress.  History of drug or alcohol abuse.  Drinking alcohol and taking drugs.  Family history of drug or alcohol abuse.  Low body weight.  Binge drinking.  What are the signs or symptoms?  Feeling relaxed or sleepy.  Having mild difficulty with coordination, speech, memory, or attention.  Strong anger or extreme sadness.  Severe difficulty with coordination, speech, memory, or attention.  Other symptoms may include:  Passing out.  Vomiting.  Confusion.  Slow breathing.  Coma.  How is this treated?  This condition may be treated with:  Close monitoring in the hospital.  IV fluids to add water in your body.  Medicine to treat vomiting or to get rid of alcohol in the body.  Counseling about the dangers of alcohol.  Oxygen therapy or a breathing machine (ventilator).  You may also be treated for substance use disorder.  Follow these instructions at home:  Eating and drinking    Do not drink alcohol if:  Your doctor tells you not to drink.  You are pregnant, may be pregnant, or are planning to get pregnant.  You are under the legal drinking age (21 years old in the [place]  You are taking medicines that you should not take with alcohol.  Alcohol causes your medical problem to get worse.  You have to drive or do activities that need you to be alert.  You have substance use disorder. This is when using alcohol again and again causes problems with your health, your relationships, or with what you need to do at work, home, or school.  Ask your doctor if alcohol is safe for you. If you are allowed to drink, limit how much you have to:  0-1 drink a day for women who are not pregnant.  0-2 drinks a day for men.  Know how much alcohol is in  your drink. In the [place] one drink equals one 12 oz bottle of beer (355 mL), one 5 oz glass of wine (148 mL), or one 1½ oz glass of hard liquor (44 mL).  Be sure to eat before you drink alcohol.  Alcohol increases peeing. It is important to stay hydrated and avoid caffeine.  Caffeine may be in coffee, tea, and some sodas.  Caffeine can make you thirsty.  Do not drink more than one drink in an hour.  If you are having more than one drink, have a drink without alcohol (such as water) between your drinks.  General instructions    Take over-the-counter and prescription medicines only as told by your doctor.  Do not drive after drinking any amount of alcohol. Plan for a designated  or another way to go home.  Have someone you trust stay with you while you are intoxicated. Youshould not be left alone.  Contact a doctor if:  You do not feel better after a few days.  You have problems at work, at school, or at home due to drinking.  You have trouble with any of the following:  Movement.  Talking.  Memory.  Paying attention to things.  Get help right away if:  You have trouble staying awake.  You are light-headed or faint.  You feel very confused.  You have trouble staying awake.  You are vomiting blood. The blood may be bright red or look like coffee grounds.  You have been told that you have had jerky movements that you cannot control (seizure).  You have blood in your poop (stool). The blood may:  Be bright red.  Make your poop black and tarry and make it smell bad.  These symptoms may be an emergency. Get help right away. Call 911.  Do not wait to see if the symptoms will go away.  Do not drive yourself to the hospital.  Also, get help right away if:  You have thoughts about hurting yourself or others.  Take one of these steps if you feel like you may hurt yourself or others, or have thoughts about taking your own life:  Call 911.  Call the National Suicide Prevention Lifeline at 1-929.560.4556 or 782. This is open  24 hours a day.  Text the Crisis Text Line at 373808.  Summary  Alcohol intoxication happens when you cannot think clearly or function well after drinking alcohol. This can happen after just one drink.  If your doctor says that alcohol is safe for you, limit how much you drink.  Contact a doctor if you have problems at work, at school, or at home due to drinking.  Get help right away if you have thoughts about hurting yourself or others.  This information is not intended to replace advice given to you by your health care provider. Make sure you discuss any questions you have with your health care provider.  Document Revised: 03/06/2023 Document Reviewed: 03/06/2023  [employer] Patient Education © 2023 [employer]  Pancreatitis Eating Plan  Pancreatitis is when your pancreas gets irritated and swollen (inflamed). The pancreas is a small gland behind your stomach. It helps your body digest food and regulate your blood sugar. Pancreatitis can affect how your body digests food, especially foods with fat. You may also have other symptoms such as pain in your abdomen (abdominal pain) or nausea.  When you have pancreatitis, following a low-fat eating plan may help you manage symptoms and recover faster. Work with your health care provider or a dietitian to create an eating plan that is right for you.  What are tips for following this plan?  Reading food labels  Use the information on food labels to help keep track of how much fat you eat:  Check the serving size.  Look for the amount of total fat in grams (g) in one serving.  Low-fat foods have 3 g of fat or less per serving.  Fat-free foods have 0.5 g of fat or less per serving.  Keep track of how much fat you eat based on how many servings you eat.  For example, if you eat two servings, the amount of fat you eat will be twice what is listed on the label.  Shopping    Buy low-fat or nonfat foods, such as:  Fresh, frozen, or canned fruits and vegetables.  Grains, including  pasta, bread, and rice.  Lean meat, poultry, fish, and other protein foods.  Low-fat or nonfat dairy.  Avoid buying bakery products and other sweets made with whole milk, butter, and eggs.  Avoid buying snack foods with added fat, such as anything with butter or cheese flavoring.  Cooking  Remove skin from poultry, and remove extra fat from meat.  Limit the amount of fat and oil you use to 6 tsp (30 mL) or less per day.  Cook using low-fat methods, such as boiling, broiling, grilling, steaming, or baking.  Use spray oil to cook. Add fat-free chicken broth to add flavor and moisture.  Avoid adding cream to thicken soups or sauces. Use other thickeners such as corn starch or tomato paste.  Meal planning    Eat a low-fat diet as told by your dietitian. For most people, this means having no more than 55-65 g of fat each day.  Eat small, frequent meals throughout the day. For example, you may have 5 or 6 small meals instead of 3 large meals.  Drink enough fluid to keep your urine pale yellow.  Do not drink alcohol. Talk to your health care provider if you need help stopping.  Limit how much caffeine you have, including black coffee, black and green tea, soft drinks with caffeine, and energy drinks.  Plan to include a variety of foods in your diet. Include fruits, vegetables, whole grains and lean proteins, and low-fat or nonfat dairy. You need a balanced diet for good overall health.  General information  Let your health care provider or dietitian know if you have unplanned weight loss on this eating plan.  You may be told to follow a clear liquid or soft food diet when symptoms come back, which is called a flare. Talk with your health care provider about how to manage your diet during symptoms of a flare.  Take any vitamins or supplements as told by your health care provider. You may need to take:  Extra vitamins that dissolve in fat (are fat soluble), such as vitamins A, D, E, and K.  Nutritional supplements.  Work  with a dietitian, especially if you have other conditions such as obesity, osteoporosis, or diabetes mellitus.  Some people need extra treatments, such as:  Pills or capsules to replace enzymes (oral pancreatic enzyme replacement therapy).  Feedings through a tube in the stomach or intestine (enteral feedings).  What foods should I avoid?  Fruits  Fried fruits. Fruits served with butter or cream.  Vegetables  Fried vegetables. Vegetables cooked with butter, cheese, or cream.  Grains  Biscuits, waffles, donuts, pastries, and croissants. Pies and cookies. Butter-flavored popcorn. Regular crackers.  Meats and other proteins  Fatty cuts of meat. Poultry with skin. Organ meats. Precooked or cured meat, such as sausages or meat loaves. Whole eggs. Nuts and nut butters.  Dairy  Whole and 2% milk. Whole milk yogurt. Whole milk ice cream. Cream and half-and-half. Cheese, such as cream cheese. Sour cream.  Beverages  Wine, beer, and liquor.  The items listed above may not be a complete list of foods and beverages you should avoid. Contact a dietitian for more information.  Summary  Pancreatitis can affect how your body digests food, especially foods with fat.  When you have pancreatitis, it is recommended that you follow a low-fat eating plan to help you recover faster and manage symptoms.  Do not drink alcohol. Limit the amount of caffeine you have, and drink enough fluid to keep your urine pale yellow.  This information is not intended to replace advice given to you by your health care provider. Make sure you discuss any questions you have with your health care provider.  Document Revised: 12/07/2022 Document Reviewed: 12/07/2022  [employer] Patient Education © 2023 [employer]

## 2024-01-02 NOTE — PROGRESS NOTES
[facility] Medicine Daily Progress Note    Date of Service  1/1/2024    Chief Complaint  Abelardo Fenton is a 38 y.o. male admitted 12/26/2023 with abdominal pain    Hospital Course:    38-year-old male with no significant past medical history except GERD presented 12/26 with epigastric pain.  Started couple days before the admission around epigastric area with nausea and vomiting and feeling heartburn, denied any fever or chills however had generalized weakness.  Trying to swallow the patient had some alcohol however patient denied any heavy alcohol use.  No drugs, no history of pancreatitis or other problems.  On admission CT scan for abdomen and pelvis showed thickening around the jejunum with acute pancreatitis in the head of the pancreas.  No gallstones, ultrasound did not show any obstruction.  Labs showed elevated liver enzymes with lipase 736.  IV fluid was initiated.  Labs also showed hypertriglyceridemia 684.  Fenofibrate was initiated, patient received IV fluid with improving his pain however patient developed some distention in his abdomen and swelling, IV fluid was discontinued and received IV Lasix, his hematocrit dropped from 19 on admission to 11.8 and patient needed multiple calcium IV doses, his lipase improved from 736 to 159.  Due to still continuous pain and developing fever, CT scan was done on 12/29 and showed necrosis less than 20%, GI was consulted and continue same treatment, holding lisinopril and omeprazole.  However later patient admitted he was using alcohol heavily recently.    On 12/29, patient developed fever, blood culture was ordered, repeat CT scan to rule out any abscess or fluid collection.        Interval Problem Update  -Evaluated examined the patient at bedside, mild abdominal pain, no fever all chills, abdominal distention, patient feels better, patient asked to advance his diet, continue IV fluids gently.  -Improving on his inflammation markers, mildly worsening  leukocytosis  -Patient admitted he was drinking heavily before admission, encouraged him to quit drinking.      I have discussed this patient's plan of care and discharge plan at IDT rounds today with Case Management, Nursing, Nursing leadership, and other members of the IDT team.    Consultants/Specialty  None    Code Status  Full Code    Disposition  The patient is not medically cleared for discharge to home or a post-acute facility.  Anticipate discharge to: home with close outpatient follow-up    I have placed the appropriate orders for post-discharge needs.    Review of Systems  Review of Systems   Constitutional:  Negative for chills, fever and weight loss.   HENT:  Negative for ear pain, hearing loss and tinnitus.    Eyes:  Negative for blurred vision, double vision and photophobia.   Respiratory:  Negative for cough and hemoptysis.    Cardiovascular:  Negative for chest pain, palpitations, orthopnea and claudication.   Gastrointestinal:  Positive for abdominal pain. Negative for constipation, diarrhea, nausea and vomiting.   Genitourinary:  Negative for dysuria, frequency and urgency.   Musculoskeletal:  Negative for myalgias and neck pain.   Skin:  Negative for rash.   Neurological:  Negative for dizziness, speech change and weakness.        Physical Exam  Temp:  [36.6 °C (97.9 °F)-37.2 °C (99 °F)] 36.7 °C (98.1 °F)  Pulse:  [106-119] 106  Resp:  [17-19] 17  BP: (128-160)/() 148/105  SpO2:  [91 %-96 %] 93 %    Physical Exam  Constitutional:       Appearance: He is not ill-appearing.   Eyes:      General: No scleral icterus.  Cardiovascular:      Rate and Rhythm: Normal rate.      Heart sounds: No murmur heard.  Pulmonary:      Effort: No respiratory distress.      Breath sounds: No wheezing or rales.   Abdominal:      General: There is distension.      Tenderness: There is abdominal tenderness. There is no right CVA tenderness, left CVA tenderness or guarding.   Musculoskeletal:      Right lower leg:  No edema.      Left lower leg: No edema.   Lymphadenopathy:      Cervical: No cervical adenopathy.   Skin:     Coloration: Skin is not jaundiced.      Findings: No bruising, lesion or rash.   Neurological:      General: No focal deficit present.      Mental Status: He is alert and oriented to person, place, and time. Mental status is at baseline.      Cranial Nerves: No cranial nerve deficit.      Motor: No weakness.      Gait: Gait normal.      Comments: droop on the left  Pupil are equal and react  No other neurofocal deficit         Fluids    Intake/Output Summary (Last 24 hours) at 1/1/2024 1604  Last data filed at 1/1/2024 0900  Gross per 24 hour   Intake 1270 ml   Output 1600 ml   Net -330 ml       Laboratory  Recent Labs     12/30/23  0029 12/31/23  0105 01/01/24  0111   WBC 9.7 10.9* 12.8*   RBC 3.63* 3.44* 3.51*   HEMOGLOBIN 11.7* 11.2* 11.3*   HEMATOCRIT 33.3* 32.3* 32.5*   MCV 91.7 93.9 92.6   MCH 32.2 32.6 32.2   MCHC 35.1 34.7 34.8   RDW 42.8 45.5 44.7   PLATELETCT 158* 212 299   MPV 9.7 10.1 10.0     Recent Labs     12/30/23  0029 12/30/23  0855 12/31/23  0105 12/31/23  0913 12/31/23  1740 01/01/24  0111 01/01/24  0910   SODIUM 128*   < > 130*   < > 127* 128* 128*   POTASSIUM 3.2*  --  3.4*  --   --  3.2*  --    CHLORIDE 88*  --  92*  --   --  89*  --    CO2 24  --  23  --   --  23  --    GLUCOSE 109*  --  96  --   --  109*  --    BUN 6*  --  6*  --   --  7*  --    CREATININE 0.77  --  0.70  --   --  0.70  --    CALCIUM 7.5*  --  7.8*  --   --  8.1*  --     < > = values in this interval not displayed.     Recent Labs     12/30/23  1021   INR 1.15*                 Imaging  MR-ORBITS,FACE,NECK-WITH&W/O & SEQUENCES   Final Result      1.  Normal MRI scan of the orbits with and without gadolinium enhancement.      2.  No evidence of mass in the cavernous sinus or evidence of intraconal or extraconal mass or abnormal enhancement of the left 3rd cranial nerve..      CT-PANCREAS AND ABDOMEN WITH & W/O   Final  Result      1.  Acute pancreatitis with some degree of pancreatic necrosis involving less than 20% of the pancreatic parenchyma.   2.  Hyperdense fluid anterior to the pancreas, likely representing a component of hemorrhagic pancreatitis.   3.  No organized hematomas. No organized/drainable pancreatic or peripancreatic fluid collections.   4.  Small volume ascites.   5.  Hepatic steatosis.   6.  Partially visualized bilateral pleural effusions and associated atelectasis.            DX-CHEST-PORTABLE (1 VIEW)   Final Result      1.  Low lung volumes with bilateral lower lung airspace disease.      HG-[id number]-A/O   Final Result      1.  Findings consistent with severe acute pancreatitis with extensive peripancreatic edema and ascites present.   2.  No biliary dilation or common bile duct stone.   3.  Unremarkable gallbladder.   4.  Small bilateral pleural effusions.   5.  Motion artifact limits exam.      US-RUQ   Final Result         1. Hepatic steatosis.   2. No cholelithiasis.   3. Common bile duct was not visualized.      MR-BRAIN-WITH & W/O    (Results Pending)        Assessment/Plan  * Severe acute pancreatitis- (present on admission)  Assessment & Plan  Likely related to alcoholism  Denies significant using alcohol initially and later admitted to drinking alcohol heavily, hard liquor  Diagnosed with CT and symptoms at outside hospital  Elevated lipase more than 600,\  Hemoglobin dropped significantly more than 15%  Right upper quadrant ultrasound did not show any acute finding  MRCP shows severe pancreatitis with no mass  Patient has pancreatitis before lisinopril or omeprazole  Pain control  Improving on his lipase  Had fever on 12/29, CT scan showed necrosis less than 20%  GI was consulted, no further recommendation, signed off  Continue improving, advance diet as tolerated    High triglycerides  Assessment & Plan  Labs showed triglycerides 684  Came with acute pancreatitis  Start with fenofibrate  Encouraged  the patient healthy diet and monitor as outpatient    Drooping eyelid, left  Assessment & Plan  MRI brain is pending however MRI orbital did not show any acute finding  Possible related to inflammation??  Continue monitoring, no other neurofocal deficit    Fever  Assessment & Plan  Developed fever on 12/29   Repeat CT scan for chest to rule out necrosis pancreatitis  Blood culture negative till now  Improved  Close monitoring and start with antibiotics if needed  Resolved      Hypomagnesemia  Assessment & Plan  Replace IV  Monitor with labs daily    Hypocalcemia  Assessment & Plan  Related to acute pancreatitis  Tums 1000 3 times daily  Labs daily  Improving  Replace magnesium    Transaminitis- (present on admission)  Assessment & Plan  No significant using of alcohol   CT scan reviewed ultrasound did not show any obstruction   Since patient came with elevated liver enzymes, bilirubin and pancreas, will order MRI to rule out any obstruction   or masses.    Continue monitoring with labs daily   Hepatitis panel is negative    SIRS (systemic inflammatory response syndrome) (HCC)- (present on admission)  Assessment & Plan  SIRS criteria identified on my evaluation include:  Tachycardia, with heart rate greater than 90 BPM and Leukocytosis, with WBC greater than 12,000  SIRS is likely related to pancreatitis  WBC 15 at outside facility  No signs of infection      Hypertension- (present on admission)  Assessment & Plan  Not on any medications  Started lisinopril and later added amlodipine         VTE prophylaxis:   SCDs/TEDs   enoxaparin ppx    Patient has severe pancreatitis and high risk for deterioration, low threshold to discuss the case with intensivist and transferred to the ICU if needed      I have performed a physical exam and reviewed and updated ROS and Plan today (1/1/2024). In review of yesterday's note (12/31/2023), there are no changes except as documented above.

## 2024-01-02 NOTE — PROGRESS NOTES
Monitor summary:        Rhythm: sinus tach  Rate: 109-118  Ectopy:   Measurements: .14/.06/.30        12hr chart check

## 2024-01-02 NOTE — PROGRESS NOTES
Bedside report received from day RN; assumed pt care. Pt assessment complete. Pt AxO4. Reviewed plan of care with pt. Pt tele monitored sinus rhythm. Chart and labs reviewed. Bed in lowest position, and 2 side rails up. Pt educated on call light; call light with in reach.

## 2024-01-02 NOTE — PROGRESS NOTES
Patient being discharged via discharge lounge. Pt educated on discharge instructions and new prescriptions, verbalized understanding. Follow up appointment made with PCP. PIV removed prior to coming down to lounge per pt. Patient going home via car with family.

## 2024-01-02 NOTE — DISCHARGE PLANNING
Case Management Discharge Planning    Admission Date: 12/26/2023  GMLOS: 3.1  ALOS: 7    6-Clicks ADL Score: 24  6-Clicks Mobility Score: 24    Anticipated Discharge Dispo: Discharge Disposition: Discharged to home/self care (01)    DME Needed: No    Action(s) Taken: Discussed pt during morning rounds. Per attending, pt anticipated to dc later today or tomorrow. Pending medical clearance. No CM needs identified.    Discussed pt during IDT rounds. Per attending, pt anticipated to dc today. Medically cleared.    Escalations Completed: None    Medically Clear: No    Next Steps: F/U with medical team to discuss discharge needs and barriers.    Barriers to Discharge: Medical clearance

## 2024-01-02 NOTE — DISCHARGE SUMMARY
Discharge Summary    CHIEF COMPLAINT ON ADMISSION  No chief complaint on file.      Reason for Admission  Severe acute pancreatitis likely related to alcoholism    Admission Date  12/26/2023    CODE STATUS  Full Code    HPI & HOSPITAL COURSE    38-year-old male with no significant past medical history except GERD presented 12/26 with epigastric pain.  Started couple days before the admission around epigastric area with nausea and vomiting and feeling heartburn, denied any fever or chills however had generalized weakness.  Trying to swallow the patient had some alcohol however patient denied any heavy alcohol use.  No drugs, no history of pancreatitis or other problems.  On admission CT scan for abdomen and pelvis showed thickening around the jejunum with acute pancreatitis in the head of the pancreas.  No gallstones, ultrasound did not show any obstruction.  Labs showed elevated liver enzymes with lipase 736.  IV fluid was initiated.  Labs also showed hypertriglyceridemia 684.  Fenofibrate was initiated, patient received IV fluid with improving his pain however patient developed some distention in his abdomen and swelling, IV fluid was discontinued and received IV Lasix, his hematocrit dropped from 19 on admission to 11.8 and patient needed multiple calcium IV doses, his lipase improved from 736 to 159.  Due to still continuous pain and developing fever, CT scan was done on 12/29 and showed necrosis less than 20%, GI was consulted and continue same treatment, holding lisinopril and omeprazole.  However later patient admitted he was using alcohol heavily recently.  Patient was tolerating diet, no significant pain, improving on his labs except leukocytosis however no fever, patient feels okay and asks for discharge.  Long discussion was done with the patient about importance of following diet, avoid alcohol drinking and follow-up closely with PCP, GI referral was placed, encouraged the patient to come back to the  emergency for any severe pain or severe fever, patient understood and agreed.  Discussed with the patient the risk of complication including not limited to pseudocyst, and abscess, patient understood and patient states he will follow closely with GI and PCP.    Patient had drooping of eyelid on the left, MRI for orbital and brain did not show any nerve damage or mass, it has been improving, follow-up with PCP.    Patient has history of hypertension, not taking any medications at home, will discontinue lisinopril since put him at higher risk for pancreatitis, start with metoprolol continue amlodipine with improving.    Patient has hypertriglyceridemia and ratio around 600, start with fenofibrate and follow-up with PCP also encouraged the patient for healthy diet.    Therefore, he is discharged in good and stable condition to home with close outpatient follow-up.    The patient met 2-midnight criteria for an inpatient stay at the time of discharge.    Discharge Date  01/02/24      FOLLOW UP ITEMS POST DISCHARGE  Follow-up with PCP to repeat labs  Follow-up with PCP for GI referral    DISCHARGE DIAGNOSES  Principal Problem:    Severe acute pancreatitis (POA: Yes)  Active Problems:    High triglycerides (POA: Unknown)    Hypertension (POA: Yes)    SIRS (systemic inflammatory response syndrome) (HCC) (POA: Yes)    Transaminitis (POA: Yes)    Hypocalcemia (POA: Unknown)    Hypomagnesemia (POA: Unknown)    Fever (POA: Unknown)    Drooping eyelid, left (POA: Unknown)  Resolved Problems:    * No resolved hospital problems. *      FOLLOW UP  Sheeba Lloyd M.D.  [address] [facility] [address]  [phone]    Follow up in 2 day(s)      [facility] - [address] [address] [phone]  Follow up in 1 week(s)        MEDICATIONS ON DISCHARGE     Medication List        START taking these medications        Instructions   acetaminophen 325 MG  Tabs  Commonly known as: Tylenol   Take 2 Tablets by mouth every four hours as needed for Fever or Moderate Pain.  Dose: 650 mg     amLODIPine 5 MG Tabs  Start taking on: January 3, 2024  Commonly known as: Norvasc   Take 1 Tablet by mouth every day.  Dose: 5 mg     Calcium Carbonate Antacid 1000 MG Chew   Chew 1 Tablet 2 times a day.  Dose: 1,000 mg     famotidine 20 MG Tabs  Commonly known as: Pepcid   Take 1 Tablet by mouth 2 times a day.  Dose: 20 mg     fenofibrate micronized 67 MG capsule  Start taking on: January 3, 2024  Commonly known as: Lofibra   Take 1 Capsule by mouth every day.  Dose: 67 mg     metoprolol tartrate 25 MG Tabs  Commonly known as: Lopressor   Take 0.5 Tablets by mouth 2 times a day.  Dose: 12.5 mg     oxyCODONE immediate-release 5 MG Tabs  Commonly known as: Roxicodone   Take 1 Tablet by mouth every 3 hours as needed for Severe Pain for up to 5 days.  Dose: 5 mg     therapeutic multivitamin-minerals Tabs   Take 1 Tablet by mouth every day.  Dose: 1 Tablet            STOP taking these medications      lisinopril 10 MG Tabs  Commonly known as: Prinivil              Allergies  No Known Allergies    DIET  Orders Placed This Encounter   Procedures    Diet Order Diet: Regular; Nutrient modifications: (optional): Low Fat     Standing Status:   Standing     Number of Occurrences:   1     Order Specific Question:   Diet:     Answer:   Regular [1]     Order Specific Question:   Nutrient modifications: (optional)     Answer:   Low Fat [5]       ACTIVITY  As tolerated.  Weight bearing as tolerated    CONSULTATIONS  GI    PROCEDURES  None    LABORATORY  Lab Results   Component Value Date    SODIUM 129 (L) 01/02/2024    POTASSIUM 2.9 (L) 01/02/2024    CHLORIDE 90 (L) 01/02/2024    CO2 22 01/02/2024    GLUCOSE 111 (H) 01/02/2024    BUN 8 01/02/2024    CREATININE 0.71 01/02/2024        Lab Results   Component Value Date    WBC 17.9 (H) 01/02/2024    HEMOGLOBIN 11.9 (L) 01/02/2024    HEMATOCRIT 33.5 (L)  01/02/2024    PLATELETCT 403 01/02/2024        Total time of the discharge process exceeds 35 minutes.

## 2024-01-02 NOTE — CARE PLAN
The patient is Stable - Low risk of patient condition declining or worsening    Shift Goals  Clinical Goals: Pain management  Patient Goals: Pain control  Family Goals: GRANT    Progress made toward(s) clinical / shift goals:    Problem: Pain - Standard  Goal: Alleviation of pain or a reduction in pain to the patient’s comfort goal  Outcome: Not Progressing       Patient is not progressing towards the following goals:      Problem: Pain - Standard  Goal: Alleviation of pain or a reduction in pain to the patient’s comfort goal  Outcome: Not Progressing

## 2024-01-02 NOTE — PROGRESS NOTES
Received report from NOC shift RN. Patient is A&Ox4, VSS, no signs of distress. All questions and concerns answered, call light in reach, plan of care ongoing.

## 2024-01-17 ENCOUNTER — HOSPITAL ENCOUNTER (OUTPATIENT)
Dept: RADIOLOGY | Facility: MEDICAL CENTER | Age: 39
End: 2024-01-17

## 2024-01-17 ENCOUNTER — APPOINTMENT (OUTPATIENT)
Dept: RADIOLOGY | Facility: MEDICAL CENTER | Age: 39
DRG: 439 | End: 2024-01-17
Attending: EMERGENCY MEDICINE
Payer: COMMERCIAL

## 2024-01-17 ENCOUNTER — HOSPITAL ENCOUNTER (INPATIENT)
Facility: MEDICAL CENTER | Age: 39
LOS: 2 days | DRG: 439 | End: 2024-01-19
Attending: EMERGENCY MEDICINE | Admitting: STUDENT IN AN ORGANIZED HEALTH CARE EDUCATION/TRAINING PROGRAM
Payer: COMMERCIAL

## 2024-01-17 DIAGNOSIS — K86.3 PANCREATIC PSEUDOCYST: ICD-10-CM

## 2024-01-17 DIAGNOSIS — I10 PRIMARY HYPERTENSION: ICD-10-CM

## 2024-01-17 PROBLEM — A41.9 SEPSIS (HCC): Status: ACTIVE | Noted: 2024-01-17

## 2024-01-17 LAB
ALBUMIN SERPL BCP-MCNC: 3.8 G/DL (ref 3.2–4.9)
ALBUMIN/GLOB SERPL: 1 G/DL
ALP SERPL-CCNC: 129 U/L (ref 30–99)
ALT SERPL-CCNC: 35 U/L (ref 2–50)
ANION GAP SERPL CALC-SCNC: 16 MMOL/L (ref 7–16)
APPEARANCE UR: CLEAR
AST SERPL-CCNC: 32 U/L (ref 12–45)
BASOPHILS # BLD AUTO: 0.5 % (ref 0–1.8)
BASOPHILS # BLD: 0.05 K/UL (ref 0–0.12)
BILIRUB SERPL-MCNC: 0.3 MG/DL (ref 0.1–1.5)
BILIRUB UR QL STRIP.AUTO: NEGATIVE
BUN SERPL-MCNC: 12 MG/DL (ref 8–22)
CALCIUM ALBUM COR SERPL-MCNC: 9.4 MG/DL (ref 8.5–10.5)
CALCIUM SERPL-MCNC: 9.2 MG/DL (ref 8.5–10.5)
CHLORIDE SERPL-SCNC: 100 MMOL/L (ref 96–112)
CO2 SERPL-SCNC: 21 MMOL/L (ref 20–33)
COLOR UR: YELLOW
CREAT SERPL-MCNC: 1 MG/DL (ref 0.5–1.4)
EOSINOPHIL # BLD AUTO: 0.05 K/UL (ref 0–0.51)
EOSINOPHIL NFR BLD: 0.5 % (ref 0–6.9)
ERYTHROCYTE [DISTWIDTH] IN BLOOD BY AUTOMATED COUNT: 46.9 FL (ref 35.9–50)
GFR SERPLBLD CREATININE-BSD FMLA CKD-EPI: 100 ML/MIN/1.73 M 2
GLOBULIN SER CALC-MCNC: 3.8 G/DL (ref 1.9–3.5)
GLUCOSE SERPL-MCNC: 95 MG/DL (ref 65–99)
GLUCOSE UR STRIP.AUTO-MCNC: NEGATIVE MG/DL
HCT VFR BLD AUTO: 32.7 % (ref 42–52)
HGB BLD-MCNC: 10.8 G/DL (ref 14–18)
IMM GRANULOCYTES # BLD AUTO: 0.04 K/UL (ref 0–0.11)
IMM GRANULOCYTES NFR BLD AUTO: 0.4 % (ref 0–0.9)
KETONES UR STRIP.AUTO-MCNC: 15 MG/DL
LACTATE SERPL-SCNC: 2 MMOL/L (ref 0.5–2)
LEUKOCYTE ESTERASE UR QL STRIP.AUTO: NEGATIVE
LIPASE SERPL-CCNC: 78 U/L (ref 11–82)
LYMPHOCYTES # BLD AUTO: 1.44 K/UL (ref 1–4.8)
LYMPHOCYTES NFR BLD: 13.1 % (ref 22–41)
MCH RBC QN AUTO: 31.2 PG (ref 27–33)
MCHC RBC AUTO-ENTMCNC: 33 G/DL (ref 32.3–36.5)
MCV RBC AUTO: 94.5 FL (ref 81.4–97.8)
MICRO URNS: ABNORMAL
MONOCYTES # BLD AUTO: 1.4 K/UL (ref 0–0.85)
MONOCYTES NFR BLD AUTO: 12.7 % (ref 0–13.4)
NEUTROPHILS # BLD AUTO: 8.01 K/UL (ref 1.82–7.42)
NEUTROPHILS NFR BLD: 72.8 % (ref 44–72)
NITRITE UR QL STRIP.AUTO: NEGATIVE
NRBC # BLD AUTO: 0 K/UL
NRBC BLD-RTO: 0 /100 WBC (ref 0–0.2)
PH UR STRIP.AUTO: 5 [PH] (ref 5–8)
PLATELET # BLD AUTO: 639 K/UL (ref 164–446)
PMV BLD AUTO: 9.1 FL (ref 9–12.9)
POTASSIUM SERPL-SCNC: 4 MMOL/L (ref 3.6–5.5)
PROT SERPL-MCNC: 7.6 G/DL (ref 6–8.2)
PROT UR QL STRIP: NEGATIVE MG/DL
RBC # BLD AUTO: 3.46 M/UL (ref 4.7–6.1)
RBC UR QL AUTO: NEGATIVE
SODIUM SERPL-SCNC: 137 MMOL/L (ref 135–145)
SP GR UR STRIP.AUTO: 1.04
UROBILINOGEN UR STRIP.AUTO-MCNC: 0.2 MG/DL
WBC # BLD AUTO: 11 K/UL (ref 4.8–10.8)

## 2024-01-17 PROCEDURE — 700102 HCHG RX REV CODE 250 W/ 637 OVERRIDE(OP): Performed by: STUDENT IN AN ORGANIZED HEALTH CARE EDUCATION/TRAINING PROGRAM

## 2024-01-17 PROCEDURE — 81003 URINALYSIS AUTO W/O SCOPE: CPT

## 2024-01-17 PROCEDURE — 700111 HCHG RX REV CODE 636 W/ 250 OVERRIDE (IP): Performed by: STUDENT IN AN ORGANIZED HEALTH CARE EDUCATION/TRAINING PROGRAM

## 2024-01-17 PROCEDURE — 700111 HCHG RX REV CODE 636 W/ 250 OVERRIDE (IP): Mod: JZ | Performed by: EMERGENCY MEDICINE

## 2024-01-17 PROCEDURE — 700111 HCHG RX REV CODE 636 W/ 250 OVERRIDE (IP): Mod: JZ | Performed by: STUDENT IN AN ORGANIZED HEALTH CARE EDUCATION/TRAINING PROGRAM

## 2024-01-17 PROCEDURE — 87040 BLOOD CULTURE FOR BACTERIA: CPT

## 2024-01-17 PROCEDURE — 87086 URINE CULTURE/COLONY COUNT: CPT

## 2024-01-17 PROCEDURE — 96376 TX/PRO/DX INJ SAME DRUG ADON: CPT

## 2024-01-17 PROCEDURE — 85025 COMPLETE CBC W/AUTO DIFF WBC: CPT

## 2024-01-17 PROCEDURE — 83690 ASSAY OF LIPASE: CPT

## 2024-01-17 PROCEDURE — 700105 HCHG RX REV CODE 258: Performed by: STUDENT IN AN ORGANIZED HEALTH CARE EDUCATION/TRAINING PROGRAM

## 2024-01-17 PROCEDURE — 36415 COLL VENOUS BLD VENIPUNCTURE: CPT

## 2024-01-17 PROCEDURE — 71045 X-RAY EXAM CHEST 1 VIEW: CPT

## 2024-01-17 PROCEDURE — 99222 1ST HOSP IP/OBS MODERATE 55: CPT | Performed by: SPECIALIST

## 2024-01-17 PROCEDURE — 99285 EMERGENCY DEPT VISIT HI MDM: CPT

## 2024-01-17 PROCEDURE — 96375 TX/PRO/DX INJ NEW DRUG ADDON: CPT

## 2024-01-17 PROCEDURE — 770020 HCHG ROOM/CARE - TELE (206)

## 2024-01-17 PROCEDURE — 80053 COMPREHEN METABOLIC PANEL: CPT

## 2024-01-17 PROCEDURE — 99223 1ST HOSP IP/OBS HIGH 75: CPT | Performed by: STUDENT IN AN ORGANIZED HEALTH CARE EDUCATION/TRAINING PROGRAM

## 2024-01-17 PROCEDURE — A9270 NON-COVERED ITEM OR SERVICE: HCPCS | Performed by: STUDENT IN AN ORGANIZED HEALTH CARE EDUCATION/TRAINING PROGRAM

## 2024-01-17 PROCEDURE — 83605 ASSAY OF LACTIC ACID: CPT

## 2024-01-17 PROCEDURE — 700105 HCHG RX REV CODE 258: Performed by: EMERGENCY MEDICINE

## 2024-01-17 PROCEDURE — 96365 THER/PROPH/DIAG IV INF INIT: CPT

## 2024-01-17 RX ORDER — FENOFIBRATE 67 MG/1
67 CAPSULE ORAL DAILY
Status: DISCONTINUED | OUTPATIENT
Start: 2024-01-18 | End: 2024-01-19 | Stop reason: HOSPADM

## 2024-01-17 RX ORDER — BISACODYL 10 MG
10 SUPPOSITORY, RECTAL RECTAL
Status: DISCONTINUED | OUTPATIENT
Start: 2024-01-17 | End: 2024-01-19 | Stop reason: HOSPADM

## 2024-01-17 RX ORDER — SODIUM CHLORIDE, SODIUM LACTATE, POTASSIUM CHLORIDE, CALCIUM CHLORIDE 600; 310; 30; 20 MG/100ML; MG/100ML; MG/100ML; MG/100ML
INJECTION, SOLUTION INTRAVENOUS CONTINUOUS
Status: ACTIVE | OUTPATIENT
Start: 2024-01-17 | End: 2024-01-18

## 2024-01-17 RX ORDER — HYDROMORPHONE HYDROCHLORIDE 1 MG/ML
0.25 INJECTION, SOLUTION INTRAMUSCULAR; INTRAVENOUS; SUBCUTANEOUS
Status: DISCONTINUED | OUTPATIENT
Start: 2024-01-17 | End: 2024-01-19 | Stop reason: HOSPADM

## 2024-01-17 RX ORDER — SODIUM CHLORIDE, SODIUM LACTATE, POTASSIUM CHLORIDE, AND CALCIUM CHLORIDE .6; .31; .03; .02 G/100ML; G/100ML; G/100ML; G/100ML
30 INJECTION, SOLUTION INTRAVENOUS ONCE
Status: COMPLETED | OUTPATIENT
Start: 2024-01-17 | End: 2024-01-17

## 2024-01-17 RX ORDER — MORPHINE SULFATE 4 MG/ML
4 INJECTION INTRAVENOUS ONCE
Status: COMPLETED | OUTPATIENT
Start: 2024-01-17 | End: 2024-01-17

## 2024-01-17 RX ORDER — OXYCODONE HYDROCHLORIDE 5 MG/1
5 TABLET ORAL
Status: DISCONTINUED | OUTPATIENT
Start: 2024-01-17 | End: 2024-01-19 | Stop reason: HOSPADM

## 2024-01-17 RX ORDER — SODIUM CHLORIDE, SODIUM LACTATE, POTASSIUM CHLORIDE, CALCIUM CHLORIDE 600; 310; 30; 20 MG/100ML; MG/100ML; MG/100ML; MG/100ML
1000 INJECTION, SOLUTION INTRAVENOUS ONCE
Status: COMPLETED | OUTPATIENT
Start: 2024-01-17 | End: 2024-01-17

## 2024-01-17 RX ORDER — HEPARIN SODIUM 5000 [USP'U]/ML
5000 INJECTION, SOLUTION INTRAVENOUS; SUBCUTANEOUS EVERY 8 HOURS
Status: DISCONTINUED | OUTPATIENT
Start: 2024-01-17 | End: 2024-01-19 | Stop reason: HOSPADM

## 2024-01-17 RX ORDER — OXYCODONE HYDROCHLORIDE 5 MG/1
2.5 TABLET ORAL
Status: DISCONTINUED | OUTPATIENT
Start: 2024-01-17 | End: 2024-01-19 | Stop reason: HOSPADM

## 2024-01-17 RX ORDER — POLYETHYLENE GLYCOL 3350 17 G/17G
1 POWDER, FOR SOLUTION ORAL
Status: DISCONTINUED | OUTPATIENT
Start: 2024-01-17 | End: 2024-01-19 | Stop reason: HOSPADM

## 2024-01-17 RX ORDER — ACETAMINOPHEN 10 MG/ML
1000 INJECTION, SOLUTION INTRAVENOUS ONCE
Status: COMPLETED | OUTPATIENT
Start: 2024-01-17 | End: 2024-01-17

## 2024-01-17 RX ORDER — OXYCODONE HYDROCHLORIDE 5 MG/1
5 TABLET ORAL EVERY 6 HOURS PRN
COMMUNITY
Start: 2024-01-16

## 2024-01-17 RX ORDER — AMOXICILLIN 250 MG
2 CAPSULE ORAL 2 TIMES DAILY
Status: DISCONTINUED | OUTPATIENT
Start: 2024-01-17 | End: 2024-01-19 | Stop reason: HOSPADM

## 2024-01-17 RX ORDER — ACETAMINOPHEN 325 MG/1
650 TABLET ORAL EVERY 6 HOURS PRN
Status: DISCONTINUED | OUTPATIENT
Start: 2024-01-17 | End: 2024-01-19 | Stop reason: HOSPADM

## 2024-01-17 RX ADMIN — HEPARIN SODIUM 5000 UNITS: 5000 INJECTION, SOLUTION INTRAVENOUS; SUBCUTANEOUS at 21:57

## 2024-01-17 RX ADMIN — PIPERACILLIN AND TAZOBACTAM 4.5 G: 4; .5 INJECTION, POWDER, FOR SOLUTION INTRAVENOUS at 16:55

## 2024-01-17 RX ADMIN — PIPERACILLIN AND TAZOBACTAM 4.5 G: 4; .5 INJECTION, POWDER, FOR SOLUTION INTRAVENOUS at 21:57

## 2024-01-17 RX ADMIN — MORPHINE SULFATE 4 MG: 4 INJECTION, SOLUTION INTRAMUSCULAR; INTRAVENOUS at 15:55

## 2024-01-17 RX ADMIN — ACETAMINOPHEN 1000 MG: 1000 INJECTION INTRAVENOUS at 16:53

## 2024-01-17 RX ADMIN — OXYCODONE 5 MG: 5 TABLET ORAL at 21:35

## 2024-01-17 RX ADMIN — MORPHINE SULFATE 4 MG: 4 INJECTION, SOLUTION INTRAMUSCULAR; INTRAVENOUS at 16:28

## 2024-01-17 RX ADMIN — SODIUM CHLORIDE, POTASSIUM CHLORIDE, SODIUM LACTATE AND CALCIUM CHLORIDE: 600; 310; 30; 20 INJECTION, SOLUTION INTRAVENOUS at 21:41

## 2024-01-17 RX ADMIN — SODIUM CHLORIDE, POTASSIUM CHLORIDE, SODIUM LACTATE AND CALCIUM CHLORIDE 1000 ML: 600; 310; 30; 20 INJECTION, SOLUTION INTRAVENOUS at 16:25

## 2024-01-17 RX ADMIN — SODIUM CHLORIDE, POTASSIUM CHLORIDE, SODIUM LACTATE AND CALCIUM CHLORIDE 2244 ML: 600; 310; 30; 20 INJECTION, SOLUTION INTRAVENOUS at 18:08

## 2024-01-17 ASSESSMENT — ENCOUNTER SYMPTOMS
ABDOMINAL PAIN: 1
CHILLS: 1
VOMITING: 1
SHORTNESS OF BREATH: 0
COUGH: 0
FEVER: 1
SPUTUM PRODUCTION: 0
NAUSEA: 1

## 2024-01-17 ASSESSMENT — PATIENT HEALTH QUESTIONNAIRE - PHQ9
2. FEELING DOWN, DEPRESSED, IRRITABLE, OR HOPELESS: NOT AT ALL
SUM OF ALL RESPONSES TO PHQ9 QUESTIONS 1 AND 2: 0
1. LITTLE INTEREST OR PLEASURE IN DOING THINGS: NOT AT ALL

## 2024-01-17 ASSESSMENT — LIFESTYLE VARIABLES
TOTAL SCORE: 0
HAVE PEOPLE ANNOYED YOU BY CRITICIZING YOUR DRINKING: NO
DOES PATIENT WANT TO STOP DRINKING: NO
EVER HAD A DRINK FIRST THING IN THE MORNING TO STEADY YOUR NERVES TO GET RID OF A HANGOVER: NO
TOTAL SCORE: 0
HAVE YOU EVER FELT YOU SHOULD CUT DOWN ON YOUR DRINKING: NO
CONSUMPTION TOTAL: INCOMPLETE
TOTAL SCORE: 0
EVER FELT BAD OR GUILTY ABOUT YOUR DRINKING: NO
ALCOHOL_USE: NO

## 2024-01-17 ASSESSMENT — PAIN DESCRIPTION - PAIN TYPE
TYPE: ACUTE PAIN

## 2024-01-17 ASSESSMENT — COGNITIVE AND FUNCTIONAL STATUS - GENERAL
SUGGESTED CMS G CODE MODIFIER DAILY ACTIVITY: CH
MOBILITY SCORE: 24
DAILY ACTIVITIY SCORE: 24
SUGGESTED CMS G CODE MODIFIER MOBILITY: CH

## 2024-01-17 ASSESSMENT — PAIN DESCRIPTION - DESCRIPTORS: DESCRIPTORS: ACHING;SHARP

## 2024-01-17 ASSESSMENT — FIBROSIS 4 INDEX
FIB4 SCORE: 0.3
FIB4 SCORE: 0.57

## 2024-01-17 NOTE — ED TRIAGE NOTES
BIB Fixed wing from [facility] with   Chief Complaint   Patient presents with    Abdominal Pain     Right upper/lower ABD pain radiating to back     Recent hx of Pancreatitis with hospital admit on 12/26/23. States symptoms worsening and pain never resolved. Presented for a scheduled US today and was sent to ED for R upper/lower ABD pain 10/10 radiating to back.     Pain 1/10; ; Afebrile. Denies N/V/D.     CT results showed pancreatic mass/fluid. Received 1 gram Rocephin, 2 mg dilaudid, and 1 liter NS bolus PTA.

## 2024-01-17 NOTE — ED PROVIDER NOTES
ED PHYSICIAN NOTE    CHIEF COMPLAINT  Chief Complaint   Patient presents with    Abdominal Pain     Right upper/lower ABD pain radiating to back         EXTERNAL RECORDS REVIEWED  Inpatient Notes  , Outpatient Notes  , and Outpatient labs & studies      HPI/ROS  LIMITATION TO HISTORY   Select: : None  OUTSIDE HISTORIAN(S):       Patient is a 38 y.o. male w/ a PMHx of GERD, recent hospitalization from 12/26-1/2/23 for pancreatitis 2/2 hypertriglyceridemia of 684 and alcohol use complicated by necrosis <20%, who presents to the ED on 1/17/24 for abdominal pain. This AM, was scheduled for an ultrasound due to increased abdominal swelling, showed abnormalities. Did a CT scan, showed edema around the pancreas, pancreatic pseudocyst, and was sent to the [facility] ED for further evaluation by ER doctor at [place]. Review of CT scan states there is a 15 cm x 11 cm x19 cm pseudocyst without necrosis.  Has constant abdominal pain since 12/26, but worst today. Described as stinging, intermittent, radiates to the lower abdomen and back. Has associated night sweats (2 hours after he sleeps, started 1 week ago), 20 pound weight loss, and early satiety. Denies fever, chills, dizziness, lightheadedness, nausea, vomiting, diarrhea, constipation, melena, hematochezia, chest pain, palpitations, SOB, cough, nor dysuria. Aggravated with movement, denies any aggravation with eating. Alleviated with oxycodone and Dilaudid. Denies any family history of malignancy. Denies any family history of early MI. Family history of cholesterol problems. Denies any smoking, no alcohol use since 25'th, nor marijuana drug use.     Labs from [place] include . No leukocytosis, normocytic anemia of HgB 12.2, Plt 703, Cr 1.19 (Baseline 0.70), Ca 9.3, AST/ALT/T bili WNL, lipid panel WNL, Trig 132, lipase 91.     PAST MEDICAL HISTORY  No past medical history on file.    SOCIAL HISTORY  Social History     Tobacco Use    Smoking status: Never     "Smokeless tobacco: Never   Vaping Use    Vaping Use: Never used   Substance Use Topics    Alcohol use: Yes     Comment: socially and on weekends    Drug use: Never       CURRENT MEDICATIONS  Home Medications    **Home medications have not yet been reviewed for this encounter**         ALLERGIES  No Known Allergies    PHYSICAL EXAM  VITAL SIGNS: Ht 1.753 m (5' 9\")   Wt 74.8 kg (164 lb 14.5 oz)   BMI 24.35 kg/m²    Constitutional: Awake and alert. Appears uncomfortable.  HENT: Normal inspection  Eyes: Normal inspection  Neck: Grossly normal range of motion.  Cardiovascular: Normal heart rate, Normal rhythm.  Symmetric peripheral pulses.   Thorax & Lungs: No respiratory distress, No wheezing, No rales, No rhonchi, No chest tenderness.   Abdomen: Bowel sounds normal. Distended abdomen, tense, especially in the epigastric, mild rebound tenderness, no guarding.   Skin: No obvious rash.  Back: No tenderness, No CVA tenderness.   Extremities: No clubbing, cyanosis, edema, no Homans or cords.  Neurologic: Grossly normal   Psychiatric: Normal for situation     DIAGNOSTIC STUDIES / PROCEDURES  LABS/EKG  Results for orders placed or performed during the hospital encounter of 01/17/24   LIPASE   Result Value Ref Range    Lipase 78 11 - 82 U/L   COMP METABOLIC PANEL   Result Value Ref Range    Sodium 137 135 - 145 mmol/L    Potassium 4.0 3.6 - 5.5 mmol/L    Chloride 100 96 - 112 mmol/L    Co2 21 20 - 33 mmol/L    Anion Gap 16.0 7.0 - 16.0    Glucose 95 65 - 99 mg/dL    Bun 12 8 - 22 mg/dL    Creatinine 1.00 0.50 - 1.40 mg/dL    Calcium 9.2 8.5 - 10.5 mg/dL    Correct Calcium 9.4 8.5 - 10.5 mg/dL    AST(SGOT) 32 12 - 45 U/L    ALT(SGPT) 35 2 - 50 U/L    Alkaline Phosphatase 129 (H) 30 - 99 U/L    Total Bilirubin 0.3 0.1 - 1.5 mg/dL    Albumin 3.8 3.2 - 4.9 g/dL    Total Protein 7.6 6.0 - 8.2 g/dL    Globulin 3.8 (H) 1.9 - 3.5 g/dL    A-G Ratio 1.0 g/dL   CBC WITH DIFFERENTIAL   Result Value Ref Range    WBC 11.0 (H) 4.8 - 10.8 " K/uL    RBC 3.46 (L) 4.70 - 6.10 M/uL    Hemoglobin 10.8 (L) 14.0 - 18.0 g/dL    Hematocrit 32.7 (L) 42.0 - 52.0 %    MCV 94.5 81.4 - 97.8 fL    MCH 31.2 27.0 - 33.0 pg    MCHC 33.0 32.3 - 36.5 g/dL    RDW 46.9 35.9 - 50.0 fL    Platelet Count 639 (H) 164 - 446 K/uL    MPV 9.1 9.0 - 12.9 fL    Neutrophils-Polys 72.80 (H) 44.00 - 72.00 %    Lymphocytes 13.10 (L) 22.00 - 41.00 %    Monocytes 12.70 0.00 - 13.40 %    Eosinophils 0.50 0.00 - 6.90 %    Basophils 0.50 0.00 - 1.80 %    Immature Granulocytes 0.40 0.00 - 0.90 %    Nucleated RBC 0.00 0.00 - 0.20 /100 WBC    Neutrophils (Absolute) 8.01 (H) 1.82 - 7.42 K/uL    Lymphs (Absolute) 1.44 1.00 - 4.80 K/uL    Monos (Absolute) 1.40 (H) 0.00 - 0.85 K/uL    Eos (Absolute) 0.05 0.00 - 0.51 K/uL    Baso (Absolute) 0.05 0.00 - 0.12 K/uL    Immature Granulocytes (abs) 0.04 0.00 - 0.11 K/uL    NRBC (Absolute) 0.00 K/uL   ESTIMATED GFR   Result Value Ref Range    GFR (CKD-EPI) 100 >60 mL/min/1.73 m 2      I have independently interpreted this EKG as documented above  Rhythm strip interpretation-        COURSE & MEDICAL DECISION MAKING    INITIAL ASSESSMENT, COURSE AND PLAN  Care Narrative:     1430: Patient is a 38 y.o. male w/ a PMHx of GERD, recent hospitalization from 12/26-1/2/23 for pancreatitis 2/2 hypertriglyceridemia of 684 and alcohol use complicated by necrosis <20%, who presents to the ED on 1/17/24 for abdominal pain. Was seen at [place] today.  Alarming physical exam.  Abnormal CT imaging with large 15 cm x 11 cm x 19 cm pancreatic pseudocyst. Patient was sent to [facility] ED for further evaluation.  Will repeat CBC, CMP, and lipase. Will consult GI. Labs from [facility] showed Cr 1.19 with baseline 0.70. Will continue to monitor.  Consult gastroenterology    1530: Appears appears uncomfortable. Will give 1 dose of morphine 4 mg IVP.    Patient complained of continued pain.  He was given morphine.    I spoke with Dr. Gupta.  She reviewed imaging.  Stated  patient would be better served by EUS drainage of pseudocyst.  This is not offered at this facility.  It is available at [facility].    I spoke with Dr. Jack at [facility].  She does not perform EUS.  Recommended alternative facility.    Reached out to [facility].  [facility] spoke with Fabrice Pemberton and Dr. Santiago who reviewed images.  They informed us the patient is not a candidate for EUS.  Stated patient would be better served by interventional.    Patient developed a fever here of 101.  I have ordered IV fluid.  He was ordered Zosyn.  I now am concerned that patient has an infected pseudocyst.  Patient will be admitted to this hospital.  Hospitalist giovanna.  Notified Dr. Gupta.        DISPOSITION AND DISCUSSIONS  Discussed with Dr. Condon, gastroenterology.  Consulted Dr. June for admission     IMPRESSION  1. Pancreatic pseudocyst with associated infection      This dictation was created using voice recognition software. The accuracy of the dictation is limited to the abilities of the software. I expect there may be some errors of grammar and possibly content. The nursing notes were reviewed and certain aspects of this information were incorporated into this note.     Electronically signed by: Chris Francois M.D., 1/17/2024.  Patient was seen independently and in conjunction with [facility] internal medicine resident     Electronically signed by: Art Woods D.O., 1/17/2024

## 2024-01-17 NOTE — ED NOTES
Med Rec complete per patient   Allergies reviewed  Antibiotics in the past 30 days:no  Anticoagulant in past 14 days:no  Pharmacy patient utilizes:[facility] in [place]

## 2024-01-18 PROBLEM — R00.0 TACHYCARDIA: Status: ACTIVE | Noted: 2024-01-18

## 2024-01-18 LAB
ANION GAP SERPL CALC-SCNC: 11 MMOL/L (ref 7–16)
BUN SERPL-MCNC: 8 MG/DL (ref 8–22)
CALCIUM SERPL-MCNC: 8.7 MG/DL (ref 8.5–10.5)
CHLORIDE SERPL-SCNC: 99 MMOL/L (ref 96–112)
CO2 SERPL-SCNC: 23 MMOL/L (ref 20–33)
CREAT SERPL-MCNC: 0.82 MG/DL (ref 0.5–1.4)
ERYTHROCYTE [DISTWIDTH] IN BLOOD BY AUTOMATED COUNT: 46 FL (ref 35.9–50)
GFR SERPLBLD CREATININE-BSD FMLA CKD-EPI: 116 ML/MIN/1.73 M 2
GLUCOSE SERPL-MCNC: 110 MG/DL (ref 65–99)
HCT VFR BLD AUTO: 33.9 % (ref 42–52)
HGB BLD-MCNC: 11.3 G/DL (ref 14–18)
INR PPP: 1.28 (ref 0.87–1.13)
LACTATE SERPL-SCNC: 0.7 MMOL/L (ref 0.5–2)
LACTATE SERPL-SCNC: 1.3 MMOL/L (ref 0.5–2)
MCH RBC QN AUTO: 30.9 PG (ref 27–33)
MCHC RBC AUTO-ENTMCNC: 33.3 G/DL (ref 32.3–36.5)
MCV RBC AUTO: 92.6 FL (ref 81.4–97.8)
PLATELET # BLD AUTO: 616 K/UL (ref 164–446)
PMV BLD AUTO: 8.8 FL (ref 9–12.9)
POTASSIUM SERPL-SCNC: 3.6 MMOL/L (ref 3.6–5.5)
PROTHROMBIN TIME: 16.1 SEC (ref 12–14.6)
RBC # BLD AUTO: 3.66 M/UL (ref 4.7–6.1)
SODIUM SERPL-SCNC: 133 MMOL/L (ref 135–145)
WBC # BLD AUTO: 10 K/UL (ref 4.8–10.8)

## 2024-01-18 PROCEDURE — 80048 BASIC METABOLIC PNL TOTAL CA: CPT

## 2024-01-18 PROCEDURE — 83605 ASSAY OF LACTIC ACID: CPT | Mod: 91

## 2024-01-18 PROCEDURE — 700105 HCHG RX REV CODE 258: Performed by: STUDENT IN AN ORGANIZED HEALTH CARE EDUCATION/TRAINING PROGRAM

## 2024-01-18 PROCEDURE — 36415 COLL VENOUS BLD VENIPUNCTURE: CPT

## 2024-01-18 PROCEDURE — 700102 HCHG RX REV CODE 250 W/ 637 OVERRIDE(OP): Performed by: INTERNAL MEDICINE

## 2024-01-18 PROCEDURE — 85610 PROTHROMBIN TIME: CPT

## 2024-01-18 PROCEDURE — A9270 NON-COVERED ITEM OR SERVICE: HCPCS | Performed by: INTERNAL MEDICINE

## 2024-01-18 PROCEDURE — 99233 SBSQ HOSP IP/OBS HIGH 50: CPT | Performed by: INTERNAL MEDICINE

## 2024-01-18 PROCEDURE — 700111 HCHG RX REV CODE 636 W/ 250 OVERRIDE (IP): Mod: JZ | Performed by: STUDENT IN AN ORGANIZED HEALTH CARE EDUCATION/TRAINING PROGRAM

## 2024-01-18 PROCEDURE — 700102 HCHG RX REV CODE 250 W/ 637 OVERRIDE(OP): Performed by: STUDENT IN AN ORGANIZED HEALTH CARE EDUCATION/TRAINING PROGRAM

## 2024-01-18 PROCEDURE — 99232 SBSQ HOSP IP/OBS MODERATE 35: CPT | Performed by: NURSE PRACTITIONER

## 2024-01-18 PROCEDURE — A9270 NON-COVERED ITEM OR SERVICE: HCPCS | Performed by: STUDENT IN AN ORGANIZED HEALTH CARE EDUCATION/TRAINING PROGRAM

## 2024-01-18 PROCEDURE — 85027 COMPLETE CBC AUTOMATED: CPT

## 2024-01-18 PROCEDURE — 770020 HCHG ROOM/CARE - TELE (206)

## 2024-01-18 RX ORDER — AMOXICILLIN AND CLAVULANATE POTASSIUM 875; 125 MG/1; MG/1
1 TABLET, FILM COATED ORAL EVERY 12 HOURS
Status: DISCONTINUED | OUTPATIENT
Start: 2024-01-18 | End: 2024-01-19 | Stop reason: HOSPADM

## 2024-01-18 RX ADMIN — AMOXICILLIN AND CLAVULANATE POTASSIUM 1 TABLET: 875; 125 TABLET, FILM COATED ORAL at 14:30

## 2024-01-18 RX ADMIN — OXYCODONE 5 MG: 5 TABLET ORAL at 08:43

## 2024-01-18 RX ADMIN — OXYCODONE 5 MG: 5 TABLET ORAL at 00:38

## 2024-01-18 RX ADMIN — HEPARIN SODIUM 5000 UNITS: 5000 INJECTION, SOLUTION INTRAVENOUS; SUBCUTANEOUS at 21:55

## 2024-01-18 RX ADMIN — OXYCODONE 5 MG: 5 TABLET ORAL at 03:59

## 2024-01-18 RX ADMIN — OXYCODONE 5 MG: 5 TABLET ORAL at 21:54

## 2024-01-18 RX ADMIN — OXYCODONE 5 MG: 5 TABLET ORAL at 15:52

## 2024-01-18 RX ADMIN — ACETAMINOPHEN 650 MG: 325 TABLET, FILM COATED ORAL at 05:43

## 2024-01-18 RX ADMIN — HEPARIN SODIUM 5000 UNITS: 5000 INJECTION, SOLUTION INTRAVENOUS; SUBCUTANEOUS at 14:30

## 2024-01-18 RX ADMIN — METOPROLOL TARTRATE 12.5 MG: 25 TABLET, FILM COATED ORAL at 17:44

## 2024-01-18 RX ADMIN — FENOFIBRATE 67 MG: 67 CAPSULE ORAL at 05:44

## 2024-01-18 RX ADMIN — METOPROLOL TARTRATE 12.5 MG: 25 TABLET, FILM COATED ORAL at 08:40

## 2024-01-18 RX ADMIN — PIPERACILLIN AND TAZOBACTAM 4.5 G: 4; .5 INJECTION, POWDER, FOR SOLUTION INTRAVENOUS at 05:41

## 2024-01-18 RX ADMIN — OXYCODONE 5 MG: 5 TABLET ORAL at 12:14

## 2024-01-18 RX ADMIN — OXYCODONE 5 MG: 5 TABLET ORAL at 18:53

## 2024-01-18 RX ADMIN — HEPARIN SODIUM 5000 UNITS: 5000 INJECTION, SOLUTION INTRAVENOUS; SUBCUTANEOUS at 05:44

## 2024-01-18 RX ADMIN — ACETAMINOPHEN 650 MG: 325 TABLET, FILM COATED ORAL at 19:46

## 2024-01-18 ASSESSMENT — ENCOUNTER SYMPTOMS
VOMITING: 0
VOMITING: 1
BACK PAIN: 0
DEPRESSION: 0
COUGH: 0
DIZZINESS: 0
NAUSEA: 0
WEAKNESS: 0
NAUSEA: 1
FEVER: 0
SHORTNESS OF BREATH: 0
DIARRHEA: 0
CHILLS: 1
CONSTIPATION: 0
FEVER: 1
BLOOD IN STOOL: 0
HEARTBURN: 0
CHILLS: 0
SPUTUM PRODUCTION: 0
ABDOMINAL PAIN: 1
BLURRED VISION: 0

## 2024-01-18 ASSESSMENT — PAIN DESCRIPTION - PAIN TYPE: TYPE: ACUTE PAIN

## 2024-01-18 ASSESSMENT — FIBROSIS 4 INDEX: FIB4 SCORE: 0.32

## 2024-01-18 NOTE — PROGRESS NOTES
..Gastroenterology Progress Note               Author:  NATHANAEL Denise Date & Time Created: 1/18/2024 8:22 AM       Patient ID:  Name:             Abelardo Fenton  YOB: 1985  Age:                 38 y.o.  male  MRN:               [id number]        Medical Decision Making, by Problem:  Active Hospital Problems    Diagnosis     Sepsis (HCC) [A41.9]     Pseudocyst, pancreas [K86.3]     SIRS (systemic inflammatory response syndrome) (HCC) [R65.10]            Presenting Chief Complaint:  Pancreatic pseudocyst    Interval History:  1/18: Patient seen and examined with father at bedside.  Continues to have abdominal distention with discomfort, having normal bowel movements.  Has been n.p.o. and is hungry.  Continues to have significant drenching night sweats.  Labs reviewed.      Hospital Medications:  Current Facility-Administered Medications   Medication Dose Frequency Provider Last Rate Last Admin    metoprolol tartrate (Lopressor) tablet 12.5 mg  12.5 mg BID Natalia Gordon M.D.        fenofibrate micronized (Lofibra) capsule 67 mg  67 mg DAILY Wood June M.D.   67 mg at 01/18/24 0544    senna-docusate (Pericolace Or Senokot S) 8.6-50 MG per tablet 2 Tablet  2 Tablet BID Wood June M.D.        And    polyethylene glycol/lytes (Miralax) Packet 1 Packet  1 Packet QDAY PRN Wood June M.D.        And    magnesium hydroxide (Milk Of Magnesia) suspension 30 mL  30 mL QDAY PRN Wood June M.D.        And    bisacodyl (Dulcolax) suppository 10 mg  10 mg QDAY PRN Wood June M.D.        lactated ringers infusion   Continuous Nancie Kauffman A.P.R.N. 125 mL/hr at 01/18/24 0550 Rate Change at 01/18/24 0550    heparin injection 5,000 Units  5,000 Units Q8HRS Wood June M.D.   5,000 Units at 01/18/24 0544    acetaminophen (Tylenol) tablet 650 mg  650 mg Q6HRS PRN Wood June M.D.   650 mg at 01/18/24 0516    Pharmacy Consult Request ...Pain Management Review 1 Each  1 Each PHARMACY  "TO DOSE Wood June M.D.        oxyCODONE immediate-release (Roxicodone) tablet 2.5 mg  2.5 mg Q3HRS PRN Wood June M.D.        Or    oxyCODONE immediate-release (Roxicodone) tablet 5 mg  5 mg Q3HRS PRN Wood June M.D.   5 mg at 01/18/24 0359    Or    HYDROmorphone (Dilaudid) injection 0.25 mg  0.25 mg Q3HRS PRN Wood June M.D.        piperacillin-tazobactam (Zosyn) 4.5 g in  mL IVPB  4.5 g Q8HRS Wood June M.D. 25 mL/hr at 01/18/24 0541 4.5 g at 01/18/24 0541   Last reviewed on 1/17/2024  6:41 PM by Maxwell Pickard R.N.       Review of Systems:  Review of Systems   Constitutional:  Negative for chills, fever and malaise/fatigue.   HENT:  Negative for hearing loss.    Eyes:  Negative for blurred vision.   Respiratory:  Negative for cough and shortness of breath.    Cardiovascular:  Negative for chest pain and leg swelling.   Gastrointestinal:  Positive for abdominal pain. Negative for blood in stool, constipation, diarrhea, heartburn, melena, nausea and vomiting.   Genitourinary:  Negative for dysuria.   Musculoskeletal:  Negative for back pain.   Skin:  Negative for rash.   Neurological:  Negative for dizziness and weakness.   Psychiatric/Behavioral:  Negative for depression.    All other systems reviewed and are negative.        Vital signs:  Weight/BMI: Body mass index is 24.42 kg/m².  /85   Pulse (!) 113   Temp 36.8 °C (98.2 °F) (Temporal)   Resp 18   Ht 1.753 m (5' 9\")   Wt 75 kg (165 lb 5.5 oz)   SpO2 97%   Vitals:    01/17/24 1931 01/18/24 0012 01/18/24 0355 01/18/24 0733   BP: 135/83 (!) 138/96 (!) 143/90 134/85   Pulse: (!) 104 (!) 117 (!) 123 (!) 113   Resp: 18 18 20 18   Temp: 36.7 °C (98.1 °F) 37.3 °C (99.1 °F) 37.3 °C (99.1 °F) 36.8 °C (98.2 °F)   TempSrc: Temporal Temporal Temporal Temporal   SpO2: 96% 94% 93% 97%   Weight:   75 kg (165 lb 5.5 oz)    Height:         Oxygen Therapy:  Pulse Oximetry: 97 %, O2 (LPM): 0, O2 Delivery Device: None - Room Air    Intake/Output " Summary (Last 24 hours) at 1/18/2024 0822  Last data filed at 1/18/2024 0733  Gross per 24 hour   Intake 1340 ml   Output 300 ml   Net 1040 ml         Physical Exam:  Physical Exam  Vitals and nursing note reviewed.   Constitutional:       General: He is not in acute distress.     Appearance: Normal appearance. He is not ill-appearing.   HENT:      Head: Normocephalic and atraumatic.      Right Ear: External ear normal.      Left Ear: External ear normal.      Nose: Nose normal.      Mouth/Throat:      Mouth: Mucous membranes are moist.      Pharynx: Oropharynx is clear.   Eyes:      General: No scleral icterus.  Cardiovascular:      Rate and Rhythm: Normal rate and regular rhythm.      Pulses: Normal pulses.      Heart sounds: Normal heart sounds.   Pulmonary:      Effort: Pulmonary effort is normal.      Breath sounds: Normal breath sounds.   Abdominal:      General: Bowel sounds are normal. There is distension.      Tenderness: There is abdominal tenderness.   Musculoskeletal:         General: Normal range of motion.      Cervical back: Normal range of motion and neck supple.   Skin:     General: Skin is warm and dry.      Capillary Refill: Capillary refill takes less than 2 seconds.      Coloration: Skin is pale.   Neurological:      Mental Status: He is alert and oriented to person, place, and time.   Psychiatric:         Mood and Affect: Mood normal.         Behavior: Behavior normal.           Labs:  Recent Labs     01/17/24  1436 01/18/24  0017   SODIUM 137 133*   POTASSIUM 4.0 3.6   CHLORIDE 100 99   CO2 21 23   BUN 12 8   CREATININE 1.00 0.82   CALCIUM 9.2 8.7     Recent Labs     01/17/24  1436 01/18/24  0017   ALTSGPT 35  --    ASTSGOT 32  --    ALKPHOSPHAT 129*  --    TBILIRUBIN 0.3  --    LIPASE 78  --    GLUCOSE 95 110*     Recent Labs     01/17/24  1436 01/18/24  0017   WBC 11.0* 10.0   NEUTSPOLYS 72.80*  --    LYMPHOCYTES 13.10*  --    MONOCYTES 12.70  --    EOSINOPHILS 0.50  --    BASOPHILS 0.50  --     ASTSGOT 32  --    ALTSGPT 35  --    ALKPHOSPHAT 129*  --    TBILIRUBIN 0.3  --      Recent Labs     01/17/24  1436 01/18/24  0017   RBC 3.46* 3.66*   HEMOGLOBIN 10.8* 11.3*   HEMATOCRIT 32.7* 33.9*   PLATELETCT 639* 616*   PROTHROMBTM  --  16.1*   INR  --  1.28*     Recent Results (from the past 24 hour(s))   LIPASE    Collection Time: 01/17/24  2:36 PM   Result Value Ref Range    Lipase 78 11 - 82 U/L   COMP METABOLIC PANEL    Collection Time: 01/17/24  2:36 PM   Result Value Ref Range    Sodium 137 135 - 145 mmol/L    Potassium 4.0 3.6 - 5.5 mmol/L    Chloride 100 96 - 112 mmol/L    Co2 21 20 - 33 mmol/L    Anion Gap 16.0 7.0 - 16.0    Glucose 95 65 - 99 mg/dL    Bun 12 8 - 22 mg/dL    Creatinine 1.00 0.50 - 1.40 mg/dL    Calcium 9.2 8.5 - 10.5 mg/dL    Correct Calcium 9.4 8.5 - 10.5 mg/dL    AST(SGOT) 32 12 - 45 U/L    ALT(SGPT) 35 2 - 50 U/L    Alkaline Phosphatase 129 (H) 30 - 99 U/L    Total Bilirubin 0.3 0.1 - 1.5 mg/dL    Albumin 3.8 3.2 - 4.9 g/dL    Total Protein 7.6 6.0 - 8.2 g/dL    Globulin 3.8 (H) 1.9 - 3.5 g/dL    A-G Ratio 1.0 g/dL   CBC WITH DIFFERENTIAL    Collection Time: 01/17/24  2:36 PM   Result Value Ref Range    WBC 11.0 (H) 4.8 - 10.8 K/uL    RBC 3.46 (L) 4.70 - 6.10 M/uL    Hemoglobin 10.8 (L) 14.0 - 18.0 g/dL    Hematocrit 32.7 (L) 42.0 - 52.0 %    MCV 94.5 81.4 - 97.8 fL    MCH 31.2 27.0 - 33.0 pg    MCHC 33.0 32.3 - 36.5 g/dL    RDW 46.9 35.9 - 50.0 fL    Platelet Count 639 (H) 164 - 446 K/uL    MPV 9.1 9.0 - 12.9 fL    Neutrophils-Polys 72.80 (H) 44.00 - 72.00 %    Lymphocytes 13.10 (L) 22.00 - 41.00 %    Monocytes 12.70 0.00 - 13.40 %    Eosinophils 0.50 0.00 - 6.90 %    Basophils 0.50 0.00 - 1.80 %    Immature Granulocytes 0.40 0.00 - 0.90 %    Nucleated RBC 0.00 0.00 - 0.20 /100 WBC    Neutrophils (Absolute) 8.01 (H) 1.82 - 7.42 K/uL    Lymphs (Absolute) 1.44 1.00 - 4.80 K/uL    Monos (Absolute) 1.40 (H) 0.00 - 0.85 K/uL    Eos (Absolute) 0.05 0.00 - 0.51 K/uL    Baso (Absolute)  0.05 0.00 - 0.12 K/uL    Immature Granulocytes (abs) 0.04 0.00 - 0.11 K/uL    NRBC (Absolute) 0.00 K/uL   ESTIMATED GFR    Collection Time: 01/17/24  2:36 PM   Result Value Ref Range    GFR (CKD-EPI) 100 >60 mL/min/1.73 m 2   Lactic Acid    Collection Time: 01/17/24  4:42 PM   Result Value Ref Range    Lactic Acid 2.0 0.5 - 2.0 mmol/L   Urinalysis    Collection Time: 01/17/24  7:45 PM    Specimen: Urine   Result Value Ref Range    Color Yellow     Character Clear     Specific Gravity 1.042 <1.035    Ph 5.0 5.0 - 8.0    Glucose Negative Negative mg/dL    Ketones 15 (A) Negative mg/dL    Protein Negative Negative mg/dL    Bilirubin Negative Negative    Urobilinogen, Urine 0.2 Negative    Nitrite Negative Negative    Leukocyte Esterase Negative Negative    Occult Blood Negative Negative    Micro Urine Req see below    CBC without Differential    Collection Time: 01/18/24 12:17 AM   Result Value Ref Range    WBC 10.0 4.8 - 10.8 K/uL    RBC 3.66 (L) 4.70 - 6.10 M/uL    Hemoglobin 11.3 (L) 14.0 - 18.0 g/dL    Hematocrit 33.9 (L) 42.0 - 52.0 %    MCV 92.6 81.4 - 97.8 fL    MCH 30.9 27.0 - 33.0 pg    MCHC 33.3 32.3 - 36.5 g/dL    RDW 46.0 35.9 - 50.0 fL    Platelet Count 616 (H) 164 - 446 K/uL    MPV 8.8 (L) 9.0 - 12.9 fL   Basic Metabolic Panel (BMP)    Collection Time: 01/18/24 12:17 AM   Result Value Ref Range    Sodium 133 (L) 135 - 145 mmol/L    Potassium 3.6 3.6 - 5.5 mmol/L    Chloride 99 96 - 112 mmol/L    Co2 23 20 - 33 mmol/L    Glucose 110 (H) 65 - 99 mg/dL    Bun 8 8 - 22 mg/dL    Creatinine 0.82 0.50 - 1.40 mg/dL    Calcium 8.7 8.5 - 10.5 mg/dL    Anion Gap 11.0 7.0 - 16.0   Prothrombin time (INR)    Collection Time: 01/18/24 12:17 AM   Result Value Ref Range    PT 16.1 (H) 12.0 - 14.6 sec    INR 1.28 (H) 0.87 - 1.13   Lactic Acid    Collection Time: 01/18/24 12:17 AM   Result Value Ref Range    Lactic Acid 1.3 0.5 - 2.0 mmol/L   ESTIMATED GFR    Collection Time: 01/18/24 12:17 AM   Result Value Ref Range     GFR (CKD-EPI) 116 >60 mL/min/1.73 m 2   Repeat Lactic Acid    Collection Time: 01/18/24  2:21 AM   Result Value Ref Range    Lactic Acid 0.7 0.5 - 2.0 mmol/L       Radiology Review:  DX-CHEST-PORTABLE (1 VIEW)   Final Result      No acute cardiac or pulmonary abnormalities are identified.      OUTSIDE IMAGES-CT ABDOMEN /PELVIS   Final Result      OUTSIDE IMAGES-US ABDOMEN   Final Result            MDM (Data Review):   -Records reviewed and summarized in current documentation  -I personally reviewed and interpreted the laboratory results  -I personally reviewed the radiology images    Assessment/Recommendations:  Pancreatitis secondary to alcohol in December 2023  Pancreatic pseudocyst  Prior alcohol use  Abdominal distention and pain  Night sweats, unclear etiology, discussed with primary team  Hepatic steatosis  Elevated INR  Thrombocytosis    Recommendations:  Reviewed ultrasound from [facility], no ascites to drain  Distention secondary to pancreatic pseudocyst  Zosyn switched to Augmentin   Low-fat diet  Patient has not had an alcoholic beverage since December 25  Extensive discussion had with him and his father about etiology and pathophysiology of pancreatic pseudocyst and that they need time to mature in order for any type of gastroenterology intervention and that sometimes they will resolve on their own  All questions answered  Patient has an appointment with [facility] on February 16 at 1130.    Discussed with patient, his father, Dr. Gordon, Dr. Choe    Core Quality Measures   Reviewed items::  Labs, Medications and Radiology reports reviewed

## 2024-01-18 NOTE — CARE PLAN
The patient is Stable - Low risk of patient condition declining or worsening    Shift Goals  Clinical Goals: Pain control, VSS, NPO midnight  Patient Goals: pain control, sleep  Family Goals: NA    Progress made toward(s) clinical / shift goals:    Problem: Pain - Standard  Goal: Alleviation of pain or a reduction in pain to the patient’s comfort goal  Outcome: Progressing     Problem: Hemodynamics  Goal: Patient's hemodynamics, fluid balance and neurologic status will be stable or improve  Outcome: Progressing

## 2024-01-18 NOTE — ASSESSMENT & PLAN NOTE
Patient on metoprolol as outpatient but was held on admission   HR in the 110-120 this AM  Starting metoprolol back   Monitor on tele

## 2024-01-18 NOTE — CONSULTS
GASTROENTEROLOGY CONSULTATION    PATIENT NAME: Abelardo Fenton  : 1985  CSN: [id number]  MRN:  [id number]     CONSULTATION DATE:  2024    PRIMARY CARE PROVIDER:  Sheeba Lloyd M.D.      REASON FOR CONSULT:  Pancreatic pseudocyst  Consult requested by Dr. Chris Francois    HISTORY OF PRESENT ILLNESS:  Abelardo Fenton is a 38 y.o. male Patient is a 38 y.o. male w/ a PMHx of GERD, recent hospitalization from -23 for pancreatitis secondary to alcohol use complicated by necrosis <20%, who presents to the ED on 24 for abdominal pain/fullness.. This morning he was scheduled for an ultrasound due to increased abdominal swelling, showed abnormalities. Did a CT scan, showed edema around the pancreas, pancreatic pseudocyst, and was sent to the ER or further evaluation from [place].  Review of CT scan states there is a 15 cm x 11 cm x19 cm pseudocyst without necrosis.  Has constant abdominal pain since , but worst today. Described as stinging, intermittent, radiates to the lower abdomen and back. Has associated night sweats (2 hours after he sleeps, started 1 week ago), 20 pound weight loss, and early satiety. Denies fever, chills,  nausea, vomiting or diarrhea,. Aggravated with movement, denies any aggravation with eating. Alleviated with oxycodone and Dilaudid.  no alcohol use since , nor marijuana drug use.        PAST MEDICAL HISTORY:  Past Medical History:   Diagnosis Date    Hyperlipemia     Hypertension        PAST SURGICAL HISTORY:  History reviewed. No pertinent surgical history.     CURRENT MEDS:  Current Facility-Administered Medications   Medication Dose Route Frequency Provider Last Rate Last Admin    fenofibrate micronized (Lofibra) capsule 67 mg  67 mg Oral DAILY Wood June M.D.   67 mg at 24 0544    senna-docusate (Pericolace Or Senokot S) 8.6-50 MG per tablet 2 Tablet  2 Tablet Oral BID Wood June M.D.        And    polyethylene  glycol/lytes (Miralax) Packet 1 Packet  1 Packet Oral QDAY PRN Wood June M.D.        And    magnesium hydroxide (Milk Of Magnesia) suspension 30 mL  30 mL Oral QDAY PRN Wood June M.D.        And    bisacodyl (Dulcolax) suppository 10 mg  10 mg Rectal QDAY PRN Wood June M.D.        lactated ringers infusion   Intravenous Continuous Nancie DAVE Kauffman, A.P.R.N. 125 mL/hr at 01/18/24 0550 Rate Change at 01/18/24 0550    heparin injection 5,000 Units  5,000 Units Subcutaneous Q8HRS Wood June M.D.   5,000 Units at 01/18/24 0544    acetaminophen (Tylenol) tablet 650 mg  650 mg Oral Q6HRS PRN Wood June M.D.   650 mg at 01/18/24 0543    Pharmacy Consult Request ...Pain Management Review 1 Each  1 Each Other PHARMACY TO DOSE Wood June M.D.        oxyCODONE immediate-release (Roxicodone) tablet 2.5 mg  2.5 mg Oral Q3HRS PRN Wood June M.D.        Or    oxyCODONE immediate-release (Roxicodone) tablet 5 mg  5 mg Oral Q3HRS PRN Wood June M.D.   5 mg at 01/18/24 0359    Or    HYDROmorphone (Dilaudid) injection 0.25 mg  0.25 mg Intravenous Q3HRS PRN Wood June M.D.        piperacillin-tazobactam (Zosyn) 4.5 g in  mL IVPB  4.5 g Intravenous Q8HRS Wood June M.D. 25 mL/hr at 01/18/24 0541 4.5 g at 01/18/24 0541        ALLERGIES:  No Known Allergies    SOCIAL HISTORY:  Social History     Socioeconomic History    Marital status: Single     Spouse name: Not on file    Number of children: Not on file    Years of education: Not on file    Highest education level: Not on file   Occupational History    Not on file   Tobacco Use    Smoking status: Never    Smokeless tobacco: Never   Vaping Use    Vaping Use: Never used   Substance and Sexual Activity    Alcohol use: Yes     Comment: No alcohol since 12/25/23    Drug use: Never    Sexual activity: Yes   Other Topics Concern    Not on file   Social History Narrative    Not on file     Social Determinants of Health     Financial Resource Strain: Not on file   Food  "Insecurity: Not on file   Transportation Needs: Not on file   Physical Activity: Not on file   Stress: Not on file   Social Connections: Not on file   Intimate Partner Violence: Not on file   Housing Stability: Not on file       FAMILY HISTORY:  History reviewed. No pertinent family history.     REVIEW OF SYSTEMS:  General ROS: Negative for - chills, fever, night sweats or weight loss.  HEENT ROS: Negative  Respiratory ROS: Negative for - cough or shortness of breath.  Cardiovascular ROS:  Negative for - chest pain or palpitations.  Gastrointestinal ROS: As per the history of present illness.  Genito-Urinary ROS: Negative  Musculoskeletal ROS: Negative.  Neurological ROS: Negative  Skin ROS: negative  Hematology ROS: negative  Endocrinology ROS: Negative        PHYSICAL EXAM:  VITALS: BP (!) 143/90 Comment: RN notified  Pulse (!) 123 Comment: RN notified  Temp 37.3 °C (99.1 °F) (Temporal)   Resp 20   Ht 1.753 m (5' 9\")   Wt 75 kg (165 lb 5.5 oz)   SpO2 93%   BMI 24.42 kg/m²   GEN:  Abelardo Fenton is a 38 y.o. male in no acute distress.  HEENT: Mucous membranes pink and moist.  Sclera anicteric.    NECK:    Neck supple without lymphadenopathy or thyromegaly.  LUNGS: Clear to auscultation posteriorly.  HEART: Regular rate and rhythm. S1 and S2 normal. No murmurs, gallops  RECTAL: Not done at this time.  EXT:  Without cyanosis, deformity or pitting edema.  SKIN:  Pink, warm, dry.  NEURO: Grossly intact, A/OR.    LABS:  Recent Labs     01/17/24  1436 01/18/24  0017   WBC 11.0* 10.0   MCV 94.5 92.6     Recent Labs     01/17/24  1436 01/18/24  0017   GLUCOSE 95 110*   BUN 12 8   CO2 21 23     Lab Results   Component Value Date    INR 1.28 (H) 01/18/2024    INR 1.15 (H) 12/30/2023    INR 1.04 07/29/2023     No components found for: \"ALT\", \"AST\", \"GGT\", \"ALKPHOS\"  No results found for: \"BILINEO\"      @LASTIMGCAT([id number])@     @LASTIMGCAT([id number])@       IMPRESSION/PLAN:  Pancreatitis secondary to alcohol " 12/2023. Triglycerides usually do not cause pancreatitis unless they are over a 1000. Elevations can worsen the course however  Pancreatic pseudocyst - we were called from the ED for curbside consult. We looked at films and discussed findings with radiologist. This was done bc leticia order to do an EUS drainage a mature rim is needed on the pseudocyst. He also notes a hemorrhagic versus complex fluid collection that may make drainage more difficult.  He does not identify any mass effect on stomach or duodenum. I did not recommend EUS drainage. I recommended consulting the advanced procedure Gi doc at [facility] to see if he would like to perform drainage. We do not have EUS at [facility] so these patients should not be sent here if this is a probability to occur. EUS drainage has much more success then IR drainage therefore this is the modality preferred. Dr. Miller would like a full 6 weeks of development of cyst before EUS drainage even though the rim is thickened and complete. He also hopes that he has resolution of the cyst without intervention needed. Due to this patient was not transfered.       Elham Gupta M.D.  Gastroenterology

## 2024-01-18 NOTE — ED NOTES
Pt noted to be shivering. Temp 101.6 F. Dr. Francois notified, blood cultures & lactic drawn + second IV started.

## 2024-01-18 NOTE — H&P
Hospital Medicine History & Physical Note    Date of Service  1/17/2024    Primary Care Physician  Sheeba Lloyd M.D.    Consultants  GI        Code Status  Full Code    Chief Complaint  Chief Complaint   Patient presents with    Abdominal Pain     Right upper/lower ABD pain radiating to back         History of Presenting Illness  Abelardo Fenton is a 38 y.o. male with past medical history of pancreatitis who was transferred from [facility] on 1/17/2024 with pseudocyst.  Reportedly patient was evaluated this a.m. for abdominal pain subsequent CT noted with 15 cm x 11 cm x19 cm pseudocyst without necrosis.  Case was discussed with GI Dr. Gupta who recommended drainage via EUS.  ER physician discussed case with Dr. Esqueda who recommended no drainage.  GI team to evaluate in the a.m.      Patient seen and examined at bedside.  Continue to complain of abdominal pain with nausea.  He does report some chills, had episode of fever in the ED one 101.6 .    Labs reviewed noted with leukocytosis, hemoglobin 10.8, sodium 137, lipase 28, lactic acid 2.  Chest x-ray unremarkable.    I spoke and discussed the case with the ER physician, Dr. Elizabeth .    Patient will be admitted to the hospital for further evaluation and management for acute persistent abdominal pain in setting of pseudocyst, need for further workup for ongoing fever..Need to rule out sepsis. Requiring IV narcotics for pain management, monitor for toxicity      .    I discussed the plan of care with patient and family.    Review of Systems  Review of Systems   Constitutional:  Positive for chills and fever.   Respiratory:  Negative for cough, sputum production and shortness of breath.    Cardiovascular:  Negative for chest pain.   Gastrointestinal:  Positive for abdominal pain, nausea and vomiting.       Past Medical History   has a past medical history of Hyperlipemia and Hypertension.    Surgical History   has no past surgical  history on file.     Family History  family history is not on file.   Family history reviewed with patient. There is no family history that is pertinent to the chief complaint.     Social History   reports that he has never smoked. He has never used smokeless tobacco. He reports current alcohol use. He reports that he does not use drugs.    Allergies  No Known Allergies    Medications  Prior to Admission Medications   Prescriptions Last Dose Informant Patient Reported? Taking?   Calcium Carbonate Antacid 1000 MG Chew Tab 1/16/2024 at PM Patient No No   Sig: Chew 1 Tablet 2 times a day.   acetaminophen (TYLENOL) 325 MG Tab 1/15/2024 at PRN Patient No No   Sig: Take 2 Tablets by mouth every four hours as needed for Fever or Moderate Pain.   amLODIPine (NORVASC) 5 MG Tab 1/16/2024 at AM Patient No No   Sig: Take 1 Tablet by mouth every day.   famotidine (PEPCID) 20 MG Tab 1/16/2024 at PM Patient No No   Sig: Take 1 Tablet by mouth 2 times a day.   fenofibrate micronized (LOFIBRA) 67 MG capsule 1/16/2024 at AM Patient No No   Sig: Take 1 Capsule by mouth every day.   metoprolol tartrate (LOPRESSOR) 25 MG Tab 1/16/2024 at PM Patient No No   Sig: Take 0.5 Tablets by mouth 2 times a day.   oxyCODONE immediate-release (ROXICODONE) 5 MG Tab 1/17/2024 at 0000 Patient Yes Yes   Sig: Take 5 mg by mouth every 6 hours as needed for Severe Pain.      Facility-Administered Medications: None       Physical Exam  Temp:  [36.1 °C (96.9 °F)-38.7 °C (101.6 °F)] 38.7 °C (101.6 °F)  Pulse:  [105-114] 110  Resp:  [16-22] 22  BP: (156-158)/() 156/89  SpO2:  [94 %-97 %] 94 %  Blood Pressure: (!) 156/89   Temperature: (!) 38.7 °C (101.6 °F)   Pulse: (!) 110   Respiration: (!) 22   Pulse Oximetry: 94 %       Physical Exam  Constitutional:       Appearance: He is ill-appearing.   Cardiovascular:      Rate and Rhythm: Regular rhythm. Tachycardia present.      Pulses: Normal pulses.      Heart sounds: Normal heart sounds.   Pulmonary:       "Effort: Pulmonary effort is normal.      Breath sounds: Normal breath sounds.   Abdominal:      Tenderness: There is abdominal tenderness.   Musculoskeletal:      Right lower leg: No edema.      Left lower leg: No edema.   Skin:     General: Skin is warm.   Neurological:      General: No focal deficit present.      Mental Status: He is alert and oriented to person, place, and time.   Psychiatric:         Mood and Affect: Mood normal.         Laboratory:  Recent Labs     01/17/24  1436   WBC 11.0*   RBC 3.46*   HEMOGLOBIN 10.8*   HEMATOCRIT 32.7*   MCV 94.5   MCH 31.2   MCHC 33.0   RDW 46.9   PLATELETCT 639*   MPV 9.1     Recent Labs     01/17/24  1436   SODIUM 137   POTASSIUM 4.0   CHLORIDE 100   CO2 21   GLUCOSE 95   BUN 12   CREATININE 1.00   CALCIUM 9.2     Recent Labs     01/17/24  1436   ALTSGPT 35   ASTSGOT 32   ALKPHOSPHAT 129*   TBILIRUBIN 0.3   LIPASE 78   GLUCOSE 95         No results for input(s): \"NTPROBNP\" in the last 72 hours.      No results for input(s): \"TROPONINT\" in the last 72 hours.    Imaging:  DX-CHEST-PORTABLE (1 VIEW)   Final Result      No acute cardiac or pulmonary abnormalities are identified.      OUTSIDE IMAGES-CT ABDOMEN /PELVIS   Final Result      OUTSIDE IMAGES-US ABDOMEN   Final Result          X-Ray:  I have personally reviewed the images and compared with prior images.    Assessment/Plan:  Justification for Admission Status  I anticipate this patient will require at least two midnights for appropriate medical management, necessitating inpatient admission for further evaluation and management for acute persistent abdominal pain in setting of pseudocyst, need for further workup for ongoing fever..Need to rule out sepsis. Requiring IV narcotics for pain management, monitor for toxicity        Pseudocyst, pancreas  Assessment & Plan  CT from outside facility noted with 15 cm x 11 cm x19 cm pseudocyst    Dr. Gupta who recommended drainage via EUS.    ER physician discussed case with " Dr. Esqueda who recommended no drainage.    GI team to evaluate in the a.m.    Continue IV fluid  On IV antibiotic  Pain management, requiring IV narcotics, monitor for toxicity     SIRS (systemic inflammatory response syndrome) (HCC)- (present on admission)  Assessment & Plan  SIRS criteria identified on my evaluation include:  Fever, with temperature greater than 100.9 deg F, Tachycardia, with heart rate greater than 90 BPM, and Tachypnea, with respirations greater than 20 per minute  SIRS is non-infectious, the patient does not have sepsis      Fever also could be from ongoing pancreatitis/pseudocyst  Will continue empirical antibiotic  De-escalate antibiotic as needed              VTE prophylaxis: SCDs/TEDs and heparin ppx    I independently reviewed pertinent clinical lab tests since admission and ordered additional follow up clinical lab tests.  Admission order was placed.  Labs ordered for follow-up.  I independently reviewed pertinent radiographic images and the radiologist's reports since admission and ordered additional follow up radiographic studies.  The details of the available patient records in [facility] (including laboratory tests, culture data, medications, imaging, and other pertinent diagnostic tests) and that information was utilized as warranted in today's medical decision making for this patient.  I personally evaluated the patient condition at bedside.     Care interventions include:   Review of interval medical and surgical history, current medications and outpatient medication reconciliation, interval imaging studies and radiologist interpretation, and interval laboratory values.

## 2024-01-18 NOTE — ASSESSMENT & PLAN NOTE
SIRS criteria identified on my evaluation include:  Fever, with temperature greater than 100.9 deg F, Tachycardia, with heart rate greater than 90 BPM, and Tachypnea, with respirations greater than 20 per minute  SIRS is non-infectious, the patient does not have sepsis      Fever also could be from ongoing pancreatitis/pseudocyst  Will continue empirical antibiotic  De-escalate antibiotic as needed

## 2024-01-18 NOTE — PROGRESS NOTES
Patient is a 38-year-old male with a past medical history of GERD and recent hospitalization from 12/26/2023 through 1/2/2024 for pancreatitis.  CT at that time demonstrated acute pancreatitis with no gallbladder issues.  MRCP consistent with severe acute pancreatitis with extensive peripancreatic edema and ascites present.  Repeat CT scan showed necrosis at less than 20% of the pancreas and no gas.  He was discharged with recommendations to follow-up with outpatient gastroenterology.    He went to an appointment this morning for an ultrasound due to increased abdominal swelling which showed abnormalities and subsequently underwent a CT scan.  Read at the [facility] showed edema around the pancreas with pancreatic pseudocyst and therefore he was sent to [facility] for further evaluation.     Gastroenterology team consulted by ED physician. Dr. Gupta and myself discussed the results of the CT completed today 1/17 with our radiologist who reports a multiloculated pseudocyst which does have a mature rim amenable to drainage via EUS.  He also notes a hemorrhagic versus complex fluid collection.  He does not identify any mass effect.    Given that [facility] does not have EUS capability, we recommend that patient transfer to [facility] for intervention.     ..MIRIAM Denise.

## 2024-01-18 NOTE — DISCHARGE PLANNING
Case Management Discharge Planning    Admission Date: 1/17/2024  GMLOS: 3.1  ALOS: 1    6-Clicks ADL Score: 24  6-Clicks Mobility Score: 24      Anticipated Discharge Dispo: Discharge Disposition: Discharged to home/self care (01)    DME Needed: No    Action(s) Taken: Updated Provider/Nurse on Discharge Plan and DC Assessment Complete (See below)    Escalations Completed: None    Medically Clear: No    Next Steps: Anticipate discharge to home tomorrow. No needs identified.    Barriers to Discharge: Medical clearance    Is the patient up for discharge tomorrow: Yes    Is transport arranged for discharge disposition: Yes, pt;s father will  when medically cleared.

## 2024-01-18 NOTE — CARE PLAN
The patient is Stable - Low risk of patient condition declining or worsening    Shift Goals  Clinical Goals: Pain control, VSS, NPO midnight  Patient Goals: pain control, sleep  Family Goals: NA    Progress made toward(s) clinical / shift goals:      Problem: Pain - Standard  Goal: Alleviation of pain or a reduction in pain to the patient’s comfort goal  Outcome: Progressing     Problem: Knowledge Deficit - Standard  Goal: Patient and family/care givers will demonstrate understanding of plan of care, disease process/condition, diagnostic tests and medications  Outcome: Progressing     Problem: Fluid Volume  Goal: Fluid volume balance will be maintained  Outcome: Progressing     Problem: Physical Regulation  Goal: Diagnostic test results will improve  Outcome: Progressing       Patient is not progressing towards the following goals:

## 2024-01-18 NOTE — PROGRESS NOTES
Hospital Medicine Daily Progress Note    Date of Service  1/18/2024    Chief Complaint  Abelardo Fenton is a 38 y.o. male admitted 1/17/2024 with abdominal pain     Hospital Course  This is a 38 y.o. male with past medical history of pancreatitis who was transferred from [facility] on 1/17/2024 with pseudocyst.  Reportedly patient was evaluated this a.m. for abdominal pain subsequent CT noted with 15 cm x 11 cm x19 cm pseudocyst without necrosis.  Case was discussed with GI Dr. Gupta who recommended drainage via EUS.  ER physician discussed case with Dr. Esqueda who recommended no drainage   Will need total of 6 weeks to let the pseudocyst mature before doing a EUS       Interval Problem Update  Patient seen and examined, and case discussed with GI,  darinel khan.  He is tachycardic this AM, was on metoprolol as outpatient but this has been held on admission. Restarting it back   Will witch abx from zosyn to Augmentin   I have discussed this patient's plan of care and discharge plan at IDT rounds today with Case Management, Nursing, Nursing leadership, and other members of the IDT team.    Consultants/Specialty  GI    Code Status  Full Code    Disposition  The patient is not medically cleared for discharge to home or a post-acute facility.      I have placed the appropriate orders for post-discharge needs.    Review of Systems  Review of Systems   Constitutional:  Positive for chills and fever.   Respiratory:  Negative for cough, sputum production and shortness of breath.    Cardiovascular:  Negative for chest pain.   Gastrointestinal:  Positive for abdominal pain, nausea and vomiting.        Physical Exam  Temp:  [36.1 °C (96.9 °F)-38.7 °C (101.6 °F)] 37.2 °C (99 °F)  Pulse:  [104-123] 108  Resp:  [16-22] 18  BP: (132-158)/() 132/84  SpO2:  [93 %-97 %] 97 %    Physical Exam  Constitutional:       Appearance: He is ill-appearing.   Cardiovascular:      Rate and Rhythm: Regular rhythm.  Tachycardia present.      Pulses: Normal pulses.      Heart sounds: Normal heart sounds.   Pulmonary:      Effort: Pulmonary effort is normal.      Breath sounds: Normal breath sounds.   Abdominal:      Tenderness: There is abdominal tenderness.   Musculoskeletal:      Right lower leg: No edema.      Left lower leg: No edema.   Skin:     General: Skin is warm.   Neurological:      General: No focal deficit present.      Mental Status: He is alert and oriented to person, place, and time.   Psychiatric:         Mood and Affect: Mood normal.         Fluids    Intake/Output Summary (Last 24 hours) at 1/18/2024 1233  Last data filed at 1/18/2024 1209  Gross per 24 hour   Intake 1820 ml   Output 300 ml   Net 1520 ml       Laboratory  Recent Labs     01/17/24  1436 01/18/24  0017   WBC 11.0* 10.0   RBC 3.46* 3.66*   HEMOGLOBIN 10.8* 11.3*   HEMATOCRIT 32.7* 33.9*   MCV 94.5 92.6   MCH 31.2 30.9   MCHC 33.0 33.3   RDW 46.9 46.0   PLATELETCT 639* 616*   MPV 9.1 8.8*     Recent Labs     01/17/24  1436 01/18/24  0017   SODIUM 137 133*   POTASSIUM 4.0 3.6   CHLORIDE 100 99   CO2 21 23   GLUCOSE 95 110*   BUN 12 8   CREATININE 1.00 0.82   CALCIUM 9.2 8.7     Recent Labs     01/18/24  0017   INR 1.28*               Imaging  DX-CHEST-PORTABLE (1 VIEW)   Final Result      No acute cardiac or pulmonary abnormalities are identified.      OUTSIDE IMAGES-CT ABDOMEN /PELVIS   Final Result      OUTSIDE IMAGES-US ABDOMEN   Final Result           Assessment/Plan  Tachycardia  Assessment & Plan  Patient on metoprolol as outpatient but was held on admission   HR in the 110-120 this AM  Starting metoprolol back   Monitor on tele       Pseudocyst, pancreas  Assessment & Plan  CT from outside facility noted with 15 cm x 11 cm x19 cm pseudocyst    Dr. Gupta who recommended drainage via EUS.    ER physician discussed case with Dr. Esqueda who recommended no drainage.    GI following today and case discussed with GI. Will need to follow up as  outpatient for EUS as per GI rec   Was on zosyn will switch to Augmentin today   Pain management, requiring IV narcotics, monitor for toxicity     SIRS (systemic inflammatory response syndrome) (HCC)- (present on admission)  Assessment & Plan  SIRS criteria identified on my evaluation include:  Fever, with temperature greater than 100.9 deg F, Tachycardia, with heart rate greater than 90 BPM, and Tachypnea, with respirations greater than 20 per minute  SIRS is non-infectious, the patient does not have sepsis      Fever also could be from ongoing pancreatitis/pseudocyst  Will continue empirical antibiotic  De-escalate antibiotic as needed             VTE prophylaxis: heparin ppx     Greater than 51 minutes spent prepping to see patient (e.g. review of tests) obtaining and/or reviewing separately obtained history. Performing a medically appropriate examination and/ evaluation.  Counseling and educating the patient/family/caregiver.  Ordering medications, tests, or procedures.  Referring and communicating with other health care professionals.  Documenting clinical information in EPIC.  Independently interpreting results and communicating results to patient/family/caregiver.  Care coordination.    I have performed a physical exam and reviewed and updated ROS and Plan today (1/18/2024). In review of yesterday's note (1/17/2024), there are no changes except as documented above.

## 2024-01-18 NOTE — PROGRESS NOTES
Bedside report received at 1900 and assumed care of patient. Patient is resting in bed, denied pain at this time. A&O x4 and on room air. Tele monitoring in place. Educated on fall risk, all fall precautions in place. Call light and belongings within reach, bed locked and in lowest position, denied other needs at this time. Hourly rounding in place.

## 2024-01-18 NOTE — DIETARY
"Nutrition services: Day 1 of admit.  Abelardo Fenton is a 38 y.o. male with admitting DX of Sepsis (HCC) [A41.9]    Consult received for MST 2: unplanned wt loss 14-23 lb x 1 month, no poor PO intake PTA. RD visited pt at bedside. Pt reports weighed 185 lb on 12/26. States decreased PO intake r/t admit diet being an \"all liquid diet\" then progressing to low fat. Pt reports early satiety, \"eating quite a bit less\" than usual. RD encouraged 5-6 small meals daily to help achieve adequate PO intake in setting of early satiety. Pt declined small meal portions, but agreeable to snacks and nutrition supplements between meals to optimize PO intake. Pt would like a low fat diet this admit.    Assessment:  Height: 175.3 cm (5' 9\")  Weight: 75 kg (165 lb 5.5 oz)  Body mass index is 24.42 kg/m²., BMI classification: Normal  Diet/Intake: Regular; % x 1 meal per ADLs    Evaluation:   PMHx includes pancreatitis, HDL, HTN. Dx list includes sepsis, pancreatic pseudocyst, SIRS, tachycardia.  Labs: Na 133, glu 110  MAR: augmentin, LR, oxycodone  No documented wounds or edema  +BM 1/16  Wt hx per chart review shows 9.7% wt loss in ~5.5 months, 7.8% wt loss x 2.5 weeks; severe wt loss:  01/18/24 75 kg (165 lb 5.5 oz)  01/01/24 81.3 kg (179 lb 3.7 oz)  07/29/23 83.1 kg (183 lb 3.2 oz)    Malnutrition Risk: Pt meets 2 ASPEN criteria for severe acute malnutrition related to pancreatitis AEB pt w/ severe wt loss 7.8% x 2.5 weeks per chart hx and pt report of PO intake likely <50% of normal based on pt interview.    Recommendations/Plan:  Provide low fat diet per pt preference. Provide BID protein snacks between meals to optimize kcal/protein intake and promote wt stability.   Encourage intake of meals and snacks %.  Document intake of all PO as % taken in ADL's to provide interdisciplinary communication across all shifts.   Monitor weight.  Nutrition rep will continue to see patient for ongoing meal and snack " preferences.     RD following.

## 2024-01-18 NOTE — DISCHARGE PLANNING
Care Transition Team Assessment  In the case of an emergency, pt's legal NOK is mother, Portia Fenton     RNCM met with pt at bedside and obtained the information used in this assessment. Pt verified accuracy of facesheet. Pt lives in a single story home in [place]  gupx10l/o son who lives I week with pt, alternate week with mother.  Pt uses [facility]. Prior to current hospitalization, pt was completely independent in ADLS/IADLS. Pt drives and is able to attend necessary MD appointments.Pt has a good support system. Pt denies any hx of substance use and denies any dx of mh. Owns no DME. Good family support at home.    Information Source:pt  Orientation Level: Oriented X4  Information Given By: Patient  Informant's Name: Abelardo  Who is responsible for making decisions for patient? : Patient         Elopement Risk  Legal Hold: No  Ambulatory or Self Mobile in Wheelchair: No-Not an Elopement Risk  Elopement Risk: Not at Risk for Elopement    Interdisciplinary Discharge Planning  Does Admitting Nurse Feel This Could be a Complex Discharge?: No  Primary Care Physician: Ava Wheeler  Lives with - Patient's Self Care Capacity: Child Less than 18 Years of Age  Patient or legal guardian wants to designate a caregiver: No  Support Systems: Family Member(s)  Housing / Facility: 1 Story House  Do You Take your Prescribed Medications Regularly: Yes  Mobility Issues: No  Durable Medical Equipment: Not Applicable    Discharge Preparedness  What is your plan after discharge?: Home with help  What are your discharge supports?: Parent  Prior Functional Level: Ambulatory, Drives Self, Independent with Activities of Daily Living, Independent with Medication Management  Difficulity with ADLs: None  Difficulity with IADLs: None    Functional Assesment  Prior Functional Level: Ambulatory, Drives Self, Independent with Activities of Daily Living, Independent with Medication Management    Finances  Prescription  Coverage: Yes    Vision / Hearing Impairment  Vision Impairment : No  Hearing Impairment : No              Domestic Abuse  Have you ever been the victim of abuse or violence?: No  Physical Abuse or Sexual Abuse: No  Verbal Abuse or Emotional Abuse: No  Possible Abuse/Neglect Reported to:: Not Applicable    Psychological Assessment  History of Substance Abuse: None  History of Psychiatric Problems: No         Anticipated Discharge Information  Discharge Disposition: Discharged to home/self care (01)

## 2024-01-18 NOTE — ASSESSMENT & PLAN NOTE
CT from outside facility noted with 15 cm x 11 cm x19 cm pseudocyst    Dr. Gupta who recommended drainage via EUS.    ER physician discussed case with Dr. Esqueda who recommended no drainage.    GI following today and case discussed with GI. Will need to follow up as outpatient for EUS as per GI rec   Was on zosyn will switch to Augmentin today   Pain management, requiring IV narcotics, monitor for toxicity

## 2024-01-19 VITALS
BODY MASS INDEX: 24.98 KG/M2 | OXYGEN SATURATION: 95 % | HEART RATE: 102 BPM | TEMPERATURE: 98.8 F | SYSTOLIC BLOOD PRESSURE: 139 MMHG | HEIGHT: 69 IN | DIASTOLIC BLOOD PRESSURE: 88 MMHG | WEIGHT: 168.65 LBS | RESPIRATION RATE: 18 BRPM

## 2024-01-19 LAB
ALBUMIN SERPL BCP-MCNC: 3.1 G/DL (ref 3.2–4.9)
ALBUMIN/GLOB SERPL: 0.8 G/DL
ALP SERPL-CCNC: 125 U/L (ref 30–99)
ALT SERPL-CCNC: 35 U/L (ref 2–50)
ANION GAP SERPL CALC-SCNC: 16 MMOL/L (ref 7–16)
AST SERPL-CCNC: 31 U/L (ref 12–45)
BACTERIA UR CULT: NORMAL
BILIRUB SERPL-MCNC: 0.4 MG/DL (ref 0.1–1.5)
BUN SERPL-MCNC: 7 MG/DL (ref 8–22)
CALCIUM ALBUM COR SERPL-MCNC: 9.1 MG/DL (ref 8.5–10.5)
CALCIUM SERPL-MCNC: 8.4 MG/DL (ref 8.5–10.5)
CHLORIDE SERPL-SCNC: 97 MMOL/L (ref 96–112)
CO2 SERPL-SCNC: 21 MMOL/L (ref 20–33)
CREAT SERPL-MCNC: 0.79 MG/DL (ref 0.5–1.4)
ERYTHROCYTE [DISTWIDTH] IN BLOOD BY AUTOMATED COUNT: 44.9 FL (ref 35.9–50)
ERYTHROCYTE [DISTWIDTH] IN BLOOD BY AUTOMATED COUNT: 45 FL (ref 35.9–50)
GFR SERPLBLD CREATININE-BSD FMLA CKD-EPI: 118 ML/MIN/1.73 M 2
GLOBULIN SER CALC-MCNC: 3.7 G/DL (ref 1.9–3.5)
GLUCOSE SERPL-MCNC: 88 MG/DL (ref 65–99)
HCT VFR BLD AUTO: 30.2 % (ref 42–52)
HCT VFR BLD AUTO: 32.2 % (ref 42–52)
HGB BLD-MCNC: 10.1 G/DL (ref 14–18)
HGB BLD-MCNC: 10.7 G/DL (ref 14–18)
LIPASE SERPL-CCNC: 60 U/L (ref 11–82)
MCH RBC QN AUTO: 30.4 PG (ref 27–33)
MCH RBC QN AUTO: 30.7 PG (ref 27–33)
MCHC RBC AUTO-ENTMCNC: 33.2 G/DL (ref 32.3–36.5)
MCHC RBC AUTO-ENTMCNC: 33.4 G/DL (ref 32.3–36.5)
MCV RBC AUTO: 91 FL (ref 81.4–97.8)
MCV RBC AUTO: 92.3 FL (ref 81.4–97.8)
PLATELET # BLD AUTO: 526 K/UL (ref 164–446)
PLATELET # BLD AUTO: 529 K/UL (ref 164–446)
PMV BLD AUTO: 9 FL (ref 9–12.9)
PMV BLD AUTO: 9.3 FL (ref 9–12.9)
POTASSIUM SERPL-SCNC: 3.7 MMOL/L (ref 3.6–5.5)
PROT SERPL-MCNC: 6.8 G/DL (ref 6–8.2)
RBC # BLD AUTO: 3.32 M/UL (ref 4.7–6.1)
RBC # BLD AUTO: 3.49 M/UL (ref 4.7–6.1)
SIGNIFICANT IND 70042: NORMAL
SITE SITE: NORMAL
SODIUM SERPL-SCNC: 134 MMOL/L (ref 135–145)
SOURCE SOURCE: NORMAL
WBC # BLD AUTO: 11.1 K/UL (ref 4.8–10.8)
WBC # BLD AUTO: 9.9 K/UL (ref 4.8–10.8)

## 2024-01-19 PROCEDURE — 700102 HCHG RX REV CODE 250 W/ 637 OVERRIDE(OP): Performed by: STUDENT IN AN ORGANIZED HEALTH CARE EDUCATION/TRAINING PROGRAM

## 2024-01-19 PROCEDURE — A9270 NON-COVERED ITEM OR SERVICE: HCPCS | Performed by: INTERNAL MEDICINE

## 2024-01-19 PROCEDURE — 36415 COLL VENOUS BLD VENIPUNCTURE: CPT

## 2024-01-19 PROCEDURE — A9270 NON-COVERED ITEM OR SERVICE: HCPCS | Performed by: STUDENT IN AN ORGANIZED HEALTH CARE EDUCATION/TRAINING PROGRAM

## 2024-01-19 PROCEDURE — 99232 SBSQ HOSP IP/OBS MODERATE 35: CPT | Performed by: NURSE PRACTITIONER

## 2024-01-19 PROCEDURE — 700102 HCHG RX REV CODE 250 W/ 637 OVERRIDE(OP): Performed by: INTERNAL MEDICINE

## 2024-01-19 PROCEDURE — 85027 COMPLETE CBC AUTOMATED: CPT | Mod: 91

## 2024-01-19 PROCEDURE — 99239 HOSP IP/OBS DSCHRG MGMT >30: CPT | Performed by: INTERNAL MEDICINE

## 2024-01-19 PROCEDURE — 80053 COMPREHEN METABOLIC PANEL: CPT

## 2024-01-19 PROCEDURE — 700111 HCHG RX REV CODE 636 W/ 250 OVERRIDE (IP): Performed by: STUDENT IN AN ORGANIZED HEALTH CARE EDUCATION/TRAINING PROGRAM

## 2024-01-19 PROCEDURE — 83690 ASSAY OF LIPASE: CPT

## 2024-01-19 RX ORDER — AMOXICILLIN AND CLAVULANATE POTASSIUM 875; 125 MG/1; MG/1
1 TABLET, FILM COATED ORAL EVERY 12 HOURS
Qty: 6 TABLET | Refills: 0 | Status: ACTIVE | OUTPATIENT
Start: 2024-01-19 | End: 2024-01-22

## 2024-01-19 RX ADMIN — OXYCODONE 5 MG: 5 TABLET ORAL at 07:38

## 2024-01-19 RX ADMIN — HEPARIN SODIUM 5000 UNITS: 5000 INJECTION, SOLUTION INTRAVENOUS; SUBCUTANEOUS at 04:20

## 2024-01-19 RX ADMIN — METOPROLOL TARTRATE 12.5 MG: 25 TABLET, FILM COATED ORAL at 04:20

## 2024-01-19 RX ADMIN — AMOXICILLIN AND CLAVULANATE POTASSIUM 1 TABLET: 875; 125 TABLET, FILM COATED ORAL at 04:20

## 2024-01-19 RX ADMIN — METOPROLOL TARTRATE 12.5 MG: 25 TABLET, FILM COATED ORAL at 09:18

## 2024-01-19 RX ADMIN — OXYCODONE 5 MG: 5 TABLET ORAL at 04:20

## 2024-01-19 RX ADMIN — OXYCODONE 5 MG: 5 TABLET ORAL at 01:12

## 2024-01-19 RX ADMIN — HEPARIN SODIUM 5000 UNITS: 5000 INJECTION, SOLUTION INTRAVENOUS; SUBCUTANEOUS at 14:19

## 2024-01-19 RX ADMIN — OXYCODONE 2.5 MG: 5 TABLET ORAL at 14:21

## 2024-01-19 RX ADMIN — OXYCODONE 5 MG: 5 TABLET ORAL at 11:10

## 2024-01-19 ASSESSMENT — ENCOUNTER SYMPTOMS
DEPRESSION: 0
FEVER: 0
WEAKNESS: 0
DIZZINESS: 0
BACK PAIN: 0
SORE THROAT: 0
VOMITING: 0
ABDOMINAL PAIN: 1
NAUSEA: 0
CHILLS: 0
INSOMNIA: 0
COUGH: 0
BLURRED VISION: 0
HEARTBURN: 0
SHORTNESS OF BREATH: 0
NERVOUS/ANXIOUS: 0
CONSTIPATION: 0
BLOOD IN STOOL: 0
DIARRHEA: 0

## 2024-01-19 ASSESSMENT — FIBROSIS 4 INDEX: FIB4 SCORE: 0.36

## 2024-01-19 NOTE — CARE PLAN
Problem: Pain - Standard  Goal: Alleviation of pain or a reduction in pain to the patient’s comfort goal  Outcome: Progressing     Problem: Knowledge Deficit - Standard  Goal: Patient and family/care givers will demonstrate understanding of plan of care, disease process/condition, diagnostic tests and medications  Outcome: Progressing     Problem: Hemodynamics  Goal: Patient's hemodynamics, fluid balance and neurologic status will be stable or improve  Outcome: Progressing     Problem: Fluid Volume  Goal: Fluid volume balance will be maintained  Outcome: Progressing     Problem: Urinary - Renal Perfusion  Goal: Ability to achieve and maintain adequate renal perfusion and functioning will improve  Outcome: Progressing     Problem: Respiratory  Goal: Patient will achieve/maintain optimum respiratory ventilation and gas exchange  Outcome: Progressing     Problem: Mechanical Ventilation  Goal: Safe management of artificial airway and ventilation  Outcome: Progressing  Goal: Successful weaning off mechanical ventilator, spontaneously maintains adequate gas exchange  Outcome: Progressing  Goal: Patient will be able to express needs and understand communication  Outcome: Progressing     Problem: Physical Regulation  Goal: Diagnostic test results will improve  Outcome: Progressing  Goal: Signs and symptoms of infection will decrease  Outcome: Progressing   The patient is Stable - Low risk of patient condition declining or worsening    Shift Goals  Clinical Goals: vitals wnl, pain control  Patient Goals: pain control  Family Goals: eulalia    Progress made toward(s) clinical / shift goals:    Pt cooperative with care, pain meds controlling pain, vitals wnl    Patient is not progressing towards the following goals:

## 2024-01-19 NOTE — PROGRESS NOTES
..Gastroenterology Progress Note               Author:  NATHANAEL Shaver   Date & Time Created: 1/19/2024 3:38 PM       Patient ID:  Name:             Abelardo Fenton  YOB: 1985  Age:                 38 y.o.  male  MRN:               [id number]        Medical Decision Making, by Problem:  Active Hospital Problems    Diagnosis     Tachycardia [R00.0]     Sepsis (HCC) [A41.9]     Pseudocyst, pancreas [K86.3]     SIRS (systemic inflammatory response syndrome) (HCC) [R65.10]            Presenting Chief Complaint:  Pancreatic pseudocyst    Interval History:  1/18: Patient seen and examined with father at bedside.  Continues to have abdominal distention with discomfort, having normal bowel movements.  Has been n.p.o. and is hungry.  Continues to have significant drenching night sweats.  Labs reviewed.    1/19/2024: patient seen at bedside. Reports feeling well without nausea, vomiting. Lingering abdominal distention with discomfort. +BMs.       Hospital Medications:  Current Facility-Administered Medications   Medication Dose Frequency Provider Last Rate Last Admin    metoprolol tartrate (Lopressor) tablet 25 mg  25 mg BID Natalia Gordon M.D.        amoxicillin-clavulanate (Augmentin) 875-125 MG per tablet 1 Tablet  1 Tablet Q12HRS Natalia Gordon M.D.   1 Tablet at 01/19/24 0420    [Held by provider] fenofibrate micronized (Lofibra) capsule 67 mg  67 mg DAILY Wood June M.D.   67 mg at 01/18/24 0544    senna-docusate (Pericolace Or Senokot S) 8.6-50 MG per tablet 2 Tablet  2 Tablet BID Wood June M.D.        And    polyethylene glycol/lytes (Miralax) Packet 1 Packet  1 Packet QDAY PRN Wood June M.D.        And    magnesium hydroxide (Milk Of Magnesia) suspension 30 mL  30 mL QDAY PRN Wood June M.D.        And    bisacodyl (Dulcolax) suppository 10 mg  10 mg QDAY PRN Wood June M.D.        heparin injection 5,000 Units  5,000 Units Q8HRS Wood June M.D.   5,000 Units at 01/19/24 1692  "   acetaminophen (Tylenol) tablet 650 mg  650 mg Q6HRS PRN Wood June M.D.   650 mg at 01/18/24 1946    Pharmacy Consult Request ...Pain Management Review 1 Each  1 Each PHARMACY TO DOSE Wood June M.D.        oxyCODONE immediate-release (Roxicodone) tablet 2.5 mg  2.5 mg Q3HRS PRN Wood June M.D.   2.5 mg at 01/19/24 1421    Or    oxyCODONE immediate-release (Roxicodone) tablet 5 mg  5 mg Q3HRS PRN Wood June M.D.   5 mg at 01/19/24 1110    Or    HYDROmorphone (Dilaudid) injection 0.25 mg  0.25 mg Q3HRS PRN Wood June M.D.       Last reviewed on 1/17/2024  6:41 PM by Maxwell Pickard R.N.       Review of Systems:  Review of Systems   Constitutional:  Negative for chills, fever and malaise/fatigue.   HENT:  Negative for congestion and sore throat.    Eyes:  Negative for blurred vision.   Respiratory:  Negative for cough and shortness of breath.    Cardiovascular:  Negative for chest pain and leg swelling.   Gastrointestinal:  Positive for abdominal pain. Negative for blood in stool, constipation, diarrhea, heartburn, melena, nausea and vomiting.   Genitourinary:  Negative for dysuria.   Musculoskeletal:  Negative for back pain.   Skin:  Negative for rash.   Neurological:  Negative for dizziness and weakness.   Psychiatric/Behavioral:  Negative for depression. The patient is not nervous/anxious and does not have insomnia.    All other systems reviewed and are negative.        Vital signs:  Weight/BMI: Body mass index is 24.89 kg/m².  /88   Pulse (!) 102   Temp 37.1 °C (98.8 °F) (Temporal)   Resp 18   Ht 1.753 m (5' 9.02\")   Wt 76.5 kg (168 lb 10.4 oz)   SpO2 95%   Vitals:    01/18/24 2339 01/19/24 0348 01/19/24 0729 01/19/24 1138   BP: 116/76 117/75 117/76 139/88   Pulse: 97 (!) 113 (!) 114 (!) 102   Resp: 18 19 18 18   Temp: 37.1 °C (98.7 °F) 37.6 °C (99.7 °F) 37 °C (98.6 °F) 37.1 °C (98.8 °F)   TempSrc: Temporal Temporal Temporal Temporal   SpO2: 93% 92% 95% 95%   Weight:  76.5 kg (168 lb " "10.4 oz)     Height:  1.753 m (5' 9.02\")       Oxygen Therapy:  Pulse Oximetry: 95 %, O2 (LPM): 0, O2 Delivery Device: None - Room Air    Intake/Output Summary (Last 24 hours) at 1/19/2024 1538  Last data filed at 1/19/2024 1138  Gross per 24 hour   Intake 440 ml   Output 0 ml   Net 440 ml           Physical Exam:  Physical Exam  Vitals and nursing note reviewed.   Constitutional:       General: He is not in acute distress.     Appearance: Normal appearance. He is not ill-appearing.   HENT:      Head: Normocephalic and atraumatic.      Nose: Nose normal. No congestion.      Mouth/Throat:      Mouth: Mucous membranes are moist.      Pharynx: Oropharynx is clear. No oropharyngeal exudate.   Eyes:      General: No scleral icterus.     Extraocular Movements: Extraocular movements intact.      Conjunctiva/sclera: Conjunctivae normal.   Cardiovascular:      Rate and Rhythm: Normal rate and regular rhythm.      Pulses: Normal pulses.      Heart sounds: Normal heart sounds.   Pulmonary:      Effort: Pulmonary effort is normal.      Breath sounds: Normal breath sounds.   Abdominal:      General: Bowel sounds are normal. There is distension.      Tenderness: There is abdominal tenderness.   Musculoskeletal:         General: Normal range of motion.      Cervical back: Normal range of motion and neck supple.   Skin:     General: Skin is warm and dry.      Capillary Refill: Capillary refill takes less than 2 seconds.      Coloration: Skin is pale.   Neurological:      General: No focal deficit present.      Mental Status: He is alert and oriented to person, place, and time. Mental status is at baseline.   Psychiatric:         Mood and Affect: Mood normal.         Behavior: Behavior normal.           Labs:  Recent Labs     01/17/24  1436 01/18/24  0017 01/19/24  0219   SODIUM 137 133* 134*   POTASSIUM 4.0 3.6 3.7   CHLORIDE 100 99 97   CO2 21 23 21   BUN 12 8 7*   CREATININE 1.00 0.82 0.79   CALCIUM 9.2 8.7 8.4*       Recent Labs "     01/17/24  1436 01/18/24  0017 01/19/24 0219   ALTSGPT 35  --  35   ASTSGOT 32  --  31   ALKPHOSPHAT 129*  --  125*   TBILIRUBIN 0.3  --  0.4   LIPASE 78  --  60   GLUCOSE 95 110* 88       Recent Labs     01/17/24  1436 01/18/24  0017 01/19/24 0219 01/19/24  1332   WBC 11.0* 10.0 11.1* 9.9   NEUTSPOLYS 72.80*  --   --   --    LYMPHOCYTES 13.10*  --   --   --    MONOCYTES 12.70  --   --   --    EOSINOPHILS 0.50  --   --   --    BASOPHILS 0.50  --   --   --    ASTSGOT 32  --  31  --    ALTSGPT 35  --  35  --    ALKPHOSPHAT 129*  --  125*  --    TBILIRUBIN 0.3  --  0.4  --        Recent Labs     01/18/24  0017 01/19/24 0219 01/19/24  1332   RBC 3.66* 3.32* 3.49*   HEMOGLOBIN 11.3* 10.1* 10.7*   HEMATOCRIT 33.9* 30.2* 32.2*   PLATELETCT 616* 529* 526*   PROTHROMBTM 16.1*  --   --    INR 1.28*  --   --        Recent Results (from the past 24 hour(s))   CBC WITHOUT DIFFERENTIAL    Collection Time: 01/19/24  2:19 AM   Result Value Ref Range    WBC 11.1 (H) 4.8 - 10.8 K/uL    RBC 3.32 (L) 4.70 - 6.10 M/uL    Hemoglobin 10.1 (L) 14.0 - 18.0 g/dL    Hematocrit 30.2 (L) 42.0 - 52.0 %    MCV 91.0 81.4 - 97.8 fL    MCH 30.4 27.0 - 33.0 pg    MCHC 33.4 32.3 - 36.5 g/dL    RDW 45.0 35.9 - 50.0 fL    Platelet Count 529 (H) 164 - 446 K/uL    MPV 9.3 9.0 - 12.9 fL   Comp Metabolic Panel    Collection Time: 01/19/24  2:19 AM   Result Value Ref Range    Sodium 134 (L) 135 - 145 mmol/L    Potassium 3.7 3.6 - 5.5 mmol/L    Chloride 97 96 - 112 mmol/L    Co2 21 20 - 33 mmol/L    Anion Gap 16.0 7.0 - 16.0    Glucose 88 65 - 99 mg/dL    Bun 7 (L) 8 - 22 mg/dL    Creatinine 0.79 0.50 - 1.40 mg/dL    Calcium 8.4 (L) 8.5 - 10.5 mg/dL    Correct Calcium 9.1 8.5 - 10.5 mg/dL    AST(SGOT) 31 12 - 45 U/L    ALT(SGPT) 35 2 - 50 U/L    Alkaline Phosphatase 125 (H) 30 - 99 U/L    Total Bilirubin 0.4 0.1 - 1.5 mg/dL    Albumin 3.1 (L) 3.2 - 4.9 g/dL    Total Protein 6.8 6.0 - 8.2 g/dL    Globulin 3.7 (H) 1.9 - 3.5 g/dL    A-G Ratio 0.8 g/dL    LIPASE    Collection Time: 01/19/24  2:19 AM   Result Value Ref Range    Lipase 60 11 - 82 U/L   ESTIMATED GFR    Collection Time: 01/19/24  2:19 AM   Result Value Ref Range    GFR (CKD-EPI) 118 >60 mL/min/1.73 m 2   CBC WITHOUT DIFFERENTIAL    Collection Time: 01/19/24  1:32 PM   Result Value Ref Range    WBC 9.9 4.8 - 10.8 K/uL    RBC 3.49 (L) 4.70 - 6.10 M/uL    Hemoglobin 10.7 (L) 14.0 - 18.0 g/dL    Hematocrit 32.2 (L) 42.0 - 52.0 %    MCV 92.3 81.4 - 97.8 fL    MCH 30.7 27.0 - 33.0 pg    MCHC 33.2 32.3 - 36.5 g/dL    RDW 44.9 35.9 - 50.0 fL    Platelet Count 526 (H) 164 - 446 K/uL    MPV 9.0 9.0 - 12.9 fL       Radiology Review:  DX-CHEST-PORTABLE (1 VIEW)   Final Result      No acute cardiac or pulmonary abnormalities are identified.      OUTSIDE IMAGES-CT ABDOMEN /PELVIS   Final Result      OUTSIDE IMAGES-US ABDOMEN   Final Result            MDM (Data Review):   -Records reviewed and summarized in current documentation  -I personally reviewed and interpreted the laboratory results  -I personally reviewed the radiology images    Assessment/Recommendations:  Pancreatitis secondary to alcohol in December 2023  Pancreatic pseudocyst  Prior alcohol use  Abdominal distention and pain  Night sweats, unclear etiology, discussed with primary team  Hepatic steatosis  Elevated INR  Thrombocytosis    Recommendations:  Reviewed ultrasound from [place], no ascites to drain  Distention secondary to pancreatic pseudocyst  Zosyn switched to Augmentin   Low-fat diet  Patient has not had an alcoholic beverage since December 25  Extensive discussion had with him and his father about etiology and pathophysiology of pancreatic pseudocyst and that they need time to mature in order for any type of gastroenterology intervention and that sometimes they will resolve on their own  All questions answered  Patient has an appointment with [facility] on February 16 at 1130.    No further inventions from acute GI team.  GI to sign off.   Please reconsult for any further questions or concerns.      Discussed with patient,  Dr. Gordon, Dr. Gupta    Core Quality Measures   Reviewed items::  Labs, Medications and Radiology reports reviewed

## 2024-01-19 NOTE — DOCUMENTATION QUERY
[facility]                                                                       Query Response Note      PATIENT:               TATI VASQUEZ  ACCT #:                  [id number]  MRN:                     [id number]  :                      1985  ADMIT DATE:       2024 2:05 PM  DISCH DATE:          RESPONDING  PROVIDER #:        [id number]           QUERY TEXT:    Per RD evaluation on 2024, patient meets ASPEN criteria for Severe malnutrition AEB severe wt loss 7.8% x 2.5 weeks per chart hx and pt report of PO intake likely <50% of normal.      Based upon your judgment and the above clinical indicators, please select the most appropriate diagnosis for the findings.      The patient's clinical indicators include:  Clinical Findings: American Society for Parenteral and Enteral Nutrition (ASPEN) criteria (presence of at least two)  Per dietary consult dated 2024:   - severe wt loss 7.8% x 2.5 weeks per chart hx  - report of PO intake likely <50% of normal    Additional documentation per dietary consult:  - Body mass index is 24.42 kg/m²., BMI classification: Normal    Risk Factors: Abdominal distension in the setting of pancreatic pseudocyst, early satiety, unplanned weight loss    Treatment: RD consult; document oral intake; monitor wt.; nutrition rep; low fat diet; BID protein snacks      Contact me with any questions.    Thank you for your time and attention,  Nelly Kapoor RN, [email or web address]  Connect via email, Voalte or messenger.  Options provided:   -- Severe malnutrition   -- Moderate malnutrition   -- Mild malnutrition   -- Insignificant finding/no effect on patient stay and treatment   -- Other explanation, (please specify other explanation)      Query created by: Nelly Kapoor on 2024 9:01 AM    RESPONSE TEXT:    Insignificant finding/no effect on patient stay and treatment          Electronically  signed by:  ANDERS ALVARADO MD 1/19/2024 10:25 AM

## 2024-01-19 NOTE — PROGRESS NOTES
Assumed care of pt. Report received.  Pt A/Ox4. Pt with c/o headache, pt given tylenol per request.  Pt without further needs at this time. Respirations even and unlabored.  Call light in reach, bed in low position.  Pt able to walk and stand without difficulty or assistance.

## 2024-01-19 NOTE — CARE PLAN
The patient is Stable - Low risk of patient condition declining or worsening    Shift Goals  Clinical Goals: vitals wnl, pain control  Patient Goals: pain control  Family Goals: eulalia    Progress made toward(s) clinical / shift goals:    Problem: Pain - Standard  Goal: Alleviation of pain or a reduction in pain to the patient’s comfort goal  Outcome: Progressing     Problem: Knowledge Deficit - Standard  Goal: Patient and family/care givers will demonstrate understanding of plan of care, disease process/condition, diagnostic tests and medications  Outcome: Progressing

## 2024-01-21 NOTE — DISCHARGE SUMMARY
Discharge Summary    CHIEF COMPLAINT ON ADMISSION  Chief Complaint   Patient presents with    Abdominal Pain     Right upper/lower ABD pain radiating to back         Reason for Admission  EMS     Admission Date  1/17/2024    CODE STATUS  Prior    HPI & HOSPITAL COURSE  This is a 38 y.o. male  with past medical history of pancreatitis who was transferred from [facility] ED on 1/17/2024 with pseudocyst.  Reportedly patient was evaluated this a.m. for abdominal pain subsequent CT noted with 15 cm x 11 cm x19 cm pseudocyst without necrosis.  Case was discussed with GI Dr. Gupta who recommended drainage via EUS.  ER physician discussed case with Dr. Esqueda who recommended no drainage   Will need total of 6 weeks to let the pseudocyst mature before doing a EUS . Patient was seen by GI here and plan is for him to follow up with GI as outpatient .   He was tachycardic but his metoprolol was held on admission, which was restarted. Patient now doing better, he was also empirically  started on abx   Cultures have remained negative   He is doing better will discharge patient home today     The patient met 2-midnight criteria for an inpatient stay at the time of discharge.    Discharge Date  1/19/2024    FOLLOW UP ITEMS POST DISCHARGE  GI    DISCHARGE DIAGNOSES  Principal Problem:    Sepsis (HCC) (POA: Yes)  Active Problems:    SIRS (systemic inflammatory response syndrome) (HCC) (POA: Yes)    Pseudocyst, pancreas (POA: Unknown)    Tachycardia (POA: Unknown)  Resolved Problems:    * No resolved hospital problems. *      FOLLOW UP  No future appointments.  Sheeba Lloyd M.D.  [address] [facility] [id number] [phone]            MEDICATIONS ON DISCHARGE     Medication List        START taking these medications        Instructions   amoxicillin-clavulanate 875-125 MG Tabs  Commonly known as: Augmentin   Take 1 Tablet by mouth every 12 hours for 3 days.  Dose: 1 Tablet            CHANGE how you  take these medications        Instructions   metoprolol tartrate 25 MG Tabs  What changed: how much to take  Commonly known as: Lopressor   Take 1 Tablet by mouth 2 times a day.  Dose: 25 mg            CONTINUE taking these medications        Instructions   acetaminophen 325 MG Tabs  Commonly known as: Tylenol   Take 2 Tablets by mouth every four hours as needed for Fever or Moderate Pain.  Dose: 650 mg     amLODIPine 5 MG Tabs  Commonly known as: Norvasc   Take 1 Tablet by mouth every day.  Dose: 5 mg     Antacid Ultra Strength 1000 MG Chew  Generic drug: Calcium Carbonate Antacid   Chew 1 Tablet 2 times a day.  Dose: 1,000 mg     famotidine 20 MG Tabs  Commonly known as: Pepcid   Take 1 Tablet by mouth 2 times a day.  Dose: 20 mg     oxyCODONE immediate-release 5 MG Tabs  Commonly known as: Roxicodone   Take 5 mg by mouth every 6 hours as needed for Severe Pain.  Dose: 5 mg            STOP taking these medications      fenofibrate micronized 67 MG capsule  Commonly known as: Lofibra              Allergies  No Known Allergies    DIET  No orders of the defined types were placed in this encounter.      ACTIVITY  As tolerated.  Weight bearing as tolerated    CONSULTATIONS  GI     PROCEDURES  None     LABORATORY  Lab Results   Component Value Date    SODIUM 134 (L) 01/19/2024    POTASSIUM 3.7 01/19/2024    CHLORIDE 97 01/19/2024    CO2 21 01/19/2024    GLUCOSE 88 01/19/2024    BUN 7 (L) 01/19/2024    CREATININE 0.79 01/19/2024        Lab Results   Component Value Date    WBC 9.9 01/19/2024    HEMOGLOBIN 10.7 (L) 01/19/2024    HEMATOCRIT 32.2 (L) 01/19/2024    PLATELETCT 526 (H) 01/19/2024        Total time of the discharge process exceeds 35 minutes.

## 2024-01-23 NOTE — DOCUMENTATION QUERY
[facility]                                                                       Query Response Note      PATIENT:               TATI VASQUEZ  ACCT #:                  [id number]  MRN:                     [id number]  :                      1985  ADMIT DATE:       2024 2:05 PM  DISCH DATE:        2024 6:02 PM  RESPONDING  PROVIDER #:        [id number]           QUERY TEXT:    There is conflicting documentation with regards to the diagnosis of Sepsis.    -SIRS is non-infectious, the patient does not have sepsis is documented in the  H&P and  progress note.  -Sepsis is documented in  discharge summary.      Please provide the most accurate diagnosis based upon the clinical indicators and treatment.    The patient's Clinical Indicators include:  Clinical Findings:  WBC 10; Lactic Acid 2.0  T 101.6; ; RR 22    Risk Factors: Pancreatic pseudocyst    Treatments: Imaging, Zosyn, Augmentin, pain management, restart metoprolol      Contact me with any questions.    Thank you for your time and attention,  Nelly Kapoor RN, CDI  [email or web address]  Connect via email, Voalte or messenger.  Options provided:   -- Sepsis Ruled In   -- Sepsis Ruled Out. SIRS of noninfectious origin ruled in   -- Other explanation, (please specify other explanation)   -- Unable to determine      Query created by: Nelly Kapoor on 2024 10:29 AM    RESPONSE TEXT:    Sepsis Ruled Out. SIRS of noninfectious origin ruled in          Electronically signed by:  ANDERS ALVARADO MD 2024 6:35 PM

## 2024-02-14 ENCOUNTER — APPOINTMENT (OUTPATIENT)
Dept: RADIOLOGY | Facility: MEDICAL CENTER | Age: 39
End: 2024-02-14
Attending: STUDENT IN AN ORGANIZED HEALTH CARE EDUCATION/TRAINING PROGRAM
Payer: COMMERCIAL

## 2024-02-14 ENCOUNTER — HOSPITAL ENCOUNTER (INPATIENT)
Facility: MEDICAL CENTER | Age: 39
LOS: 7 days | DRG: 871 | End: 2024-02-21
Attending: STUDENT IN AN ORGANIZED HEALTH CARE EDUCATION/TRAINING PROGRAM | Admitting: STUDENT IN AN ORGANIZED HEALTH CARE EDUCATION/TRAINING PROGRAM
Payer: COMMERCIAL

## 2024-02-14 ENCOUNTER — HOSPITAL ENCOUNTER (EMERGENCY)
Facility: MEDICAL CENTER | Age: 39
DRG: 871 | End: 2024-02-28
Attending: STUDENT IN AN ORGANIZED HEALTH CARE EDUCATION/TRAINING PROGRAM | Admitting: STUDENT IN AN ORGANIZED HEALTH CARE EDUCATION/TRAINING PROGRAM
Payer: COMMERCIAL

## 2024-02-14 ENCOUNTER — HOSPITAL ENCOUNTER (EMERGENCY)
Facility: MEDICAL CENTER | Age: 39
End: 2024-02-14
Attending: STUDENT IN AN ORGANIZED HEALTH CARE EDUCATION/TRAINING PROGRAM
Payer: COMMERCIAL

## 2024-02-14 VITALS
TEMPERATURE: 101.1 F | DIASTOLIC BLOOD PRESSURE: 74 MMHG | HEIGHT: 68 IN | WEIGHT: 159.83 LBS | OXYGEN SATURATION: 95 % | HEART RATE: 106 BPM | SYSTOLIC BLOOD PRESSURE: 125 MMHG | BODY MASS INDEX: 24.22 KG/M2 | RESPIRATION RATE: 24 BRPM

## 2024-02-14 DIAGNOSIS — K86.3 INFECTED PANCREATIC PSEUDOCYST: ICD-10-CM

## 2024-02-14 DIAGNOSIS — K85.90 SEVERE ACUTE PANCREATITIS: ICD-10-CM

## 2024-02-14 DIAGNOSIS — K86.3 INFECTED PSEUDOCYST OF PANCREAS: ICD-10-CM

## 2024-02-14 DIAGNOSIS — R10.13 EPIGASTRIC PAIN: ICD-10-CM

## 2024-02-14 LAB
ALBUMIN SERPL BCP-MCNC: 3.9 G/DL (ref 3.2–4.9)
ALBUMIN/GLOB SERPL: 0.9 G/DL
ALP SERPL-CCNC: 830 U/L (ref 30–99)
ALT SERPL-CCNC: 94 U/L (ref 2–50)
ANION GAP SERPL CALC-SCNC: 18 MMOL/L (ref 7–16)
APPEARANCE UR: CLEAR
AST SERPL-CCNC: 16 U/L (ref 12–45)
BACTERIA #/AREA URNS HPF: ABNORMAL /HPF
BASOPHILS # BLD AUTO: 0.2 % (ref 0–1.8)
BASOPHILS # BLD: 0.03 K/UL (ref 0–0.12)
BILIRUB SERPL-MCNC: 0.6 MG/DL (ref 0.1–1.5)
BILIRUB UR QL STRIP.AUTO: ABNORMAL
BUN SERPL-MCNC: 16 MG/DL (ref 8–22)
CALCIUM ALBUM COR SERPL-MCNC: 9.4 MG/DL (ref 8.5–10.5)
CALCIUM SERPL-MCNC: 9.3 MG/DL (ref 8.4–10.2)
CHLORIDE SERPL-SCNC: 96 MMOL/L (ref 96–112)
CO2 SERPL-SCNC: 21 MMOL/L (ref 20–33)
COLOR UR: YELLOW
CREAT SERPL-MCNC: 0.86 MG/DL (ref 0.5–1.4)
EOSINOPHIL # BLD AUTO: 0.02 K/UL (ref 0–0.51)
EOSINOPHIL NFR BLD: 0.1 % (ref 0–6.9)
EPI CELLS #/AREA URNS HPF: ABNORMAL /HPF
ERYTHROCYTE [DISTWIDTH] IN BLOOD BY AUTOMATED COUNT: 50.4 FL (ref 35.9–50)
GFR SERPLBLD CREATININE-BSD FMLA CKD-EPI: 115 ML/MIN/1.73 M 2
GLOBULIN SER CALC-MCNC: 4.2 G/DL (ref 1.9–3.5)
GLUCOSE SERPL-MCNC: 114 MG/DL (ref 65–99)
GLUCOSE UR STRIP.AUTO-MCNC: NEGATIVE MG/DL
HCT VFR BLD AUTO: 34 % (ref 42–52)
HGB BLD-MCNC: 11.1 G/DL (ref 14–18)
IMM GRANULOCYTES # BLD AUTO: 0.08 K/UL (ref 0–0.11)
IMM GRANULOCYTES NFR BLD AUTO: 0.6 % (ref 0–0.9)
KETONES UR STRIP.AUTO-MCNC: NEGATIVE MG/DL
LACTATE SERPL-SCNC: 1.2 MMOL/L (ref 0.5–2)
LACTATE SERPL-SCNC: 2.4 MMOL/L (ref 0.5–2)
LEUKOCYTE ESTERASE UR QL STRIP.AUTO: NEGATIVE
LIPASE SERPL-CCNC: 34 U/L (ref 11–82)
LYMPHOCYTES # BLD AUTO: 1.47 K/UL (ref 1–4.8)
LYMPHOCYTES NFR BLD: 10.8 % (ref 22–41)
MCH RBC QN AUTO: 30.7 PG (ref 27–33)
MCHC RBC AUTO-ENTMCNC: 32.6 G/DL (ref 32.3–36.5)
MCV RBC AUTO: 93.9 FL (ref 81.4–97.8)
MICRO URNS: ABNORMAL
MONOCYTES # BLD AUTO: 1.87 K/UL (ref 0–0.85)
MONOCYTES NFR BLD AUTO: 13.7 % (ref 0–13.4)
MUCOUS THREADS #/AREA URNS HPF: ABNORMAL /HPF
NEUTROPHILS # BLD AUTO: 10.18 K/UL (ref 1.82–7.42)
NEUTROPHILS NFR BLD: 74.6 % (ref 44–72)
NITRITE UR QL STRIP.AUTO: NEGATIVE
NRBC # BLD AUTO: 0 K/UL
NRBC BLD-RTO: 0 /100 WBC (ref 0–0.2)
PH UR STRIP.AUTO: 5.5 [PH] (ref 5–8)
PLATELET # BLD AUTO: 582 K/UL (ref 164–446)
PMV BLD AUTO: 9.2 FL (ref 9–12.9)
POTASSIUM SERPL-SCNC: 3.8 MMOL/L (ref 3.6–5.5)
PROT SERPL-MCNC: 8.1 G/DL (ref 6–8.2)
PROT UR QL STRIP: 30 MG/DL
RBC # BLD AUTO: 3.62 M/UL (ref 4.7–6.1)
RBC # URNS HPF: ABNORMAL /HPF
RBC UR QL AUTO: ABNORMAL
SODIUM SERPL-SCNC: 135 MMOL/L (ref 135–145)
SP GR UR STRIP.AUTO: >=1.03
WBC # BLD AUTO: 13.7 K/UL (ref 4.8–10.8)
WBC #/AREA URNS HPF: ABNORMAL /HPF

## 2024-02-14 PROCEDURE — 770006 HCHG ROOM/CARE - MED/SURG/GYN SEMI*

## 2024-02-14 PROCEDURE — 99285 EMERGENCY DEPT VISIT HI MDM: CPT

## 2024-02-14 PROCEDURE — 99291 CRITICAL CARE FIRST HOUR: CPT | Performed by: STUDENT IN AN ORGANIZED HEALTH CARE EDUCATION/TRAINING PROGRAM

## 2024-02-14 PROCEDURE — 700111 HCHG RX REV CODE 636 W/ 250 OVERRIDE (IP): Performed by: STUDENT IN AN ORGANIZED HEALTH CARE EDUCATION/TRAINING PROGRAM

## 2024-02-14 PROCEDURE — 74177 CT ABD & PELVIS W/CONTRAST: CPT

## 2024-02-14 PROCEDURE — 83690 ASSAY OF LIPASE: CPT

## 2024-02-14 PROCEDURE — 87040 BLOOD CULTURE FOR BACTERIA: CPT | Mod: 91

## 2024-02-14 PROCEDURE — 85025 COMPLETE CBC W/AUTO DIFF WBC: CPT

## 2024-02-14 PROCEDURE — 96365 THER/PROPH/DIAG IV INF INIT: CPT

## 2024-02-14 PROCEDURE — 81001 URINALYSIS AUTO W/SCOPE: CPT

## 2024-02-14 PROCEDURE — A9270 NON-COVERED ITEM OR SERVICE: HCPCS | Performed by: STUDENT IN AN ORGANIZED HEALTH CARE EDUCATION/TRAINING PROGRAM

## 2024-02-14 PROCEDURE — 83605 ASSAY OF LACTIC ACID: CPT | Mod: 91

## 2024-02-14 PROCEDURE — 36415 COLL VENOUS BLD VENIPUNCTURE: CPT

## 2024-02-14 PROCEDURE — 96375 TX/PRO/DX INJ NEW DRUG ADDON: CPT

## 2024-02-14 PROCEDURE — 700111 HCHG RX REV CODE 636 W/ 250 OVERRIDE (IP): Mod: JZ | Performed by: STUDENT IN AN ORGANIZED HEALTH CARE EDUCATION/TRAINING PROGRAM

## 2024-02-14 PROCEDURE — 700102 HCHG RX REV CODE 250 W/ 637 OVERRIDE(OP): Performed by: STUDENT IN AN ORGANIZED HEALTH CARE EDUCATION/TRAINING PROGRAM

## 2024-02-14 PROCEDURE — 80053 COMPREHEN METABOLIC PANEL: CPT

## 2024-02-14 PROCEDURE — 700105 HCHG RX REV CODE 258: Performed by: STUDENT IN AN ORGANIZED HEALTH CARE EDUCATION/TRAINING PROGRAM

## 2024-02-14 PROCEDURE — 700117 HCHG RX CONTRAST REV CODE 255: Performed by: STUDENT IN AN ORGANIZED HEALTH CARE EDUCATION/TRAINING PROGRAM

## 2024-02-14 PROCEDURE — 96376 TX/PRO/DX INJ SAME DRUG ADON: CPT

## 2024-02-14 RX ORDER — SODIUM CHLORIDE, SODIUM LACTATE, POTASSIUM CHLORIDE, CALCIUM CHLORIDE 600; 310; 30; 20 MG/100ML; MG/100ML; MG/100ML; MG/100ML
INJECTION, SOLUTION INTRAVENOUS CONTINUOUS
Status: DISCONTINUED | OUTPATIENT
Start: 2024-02-14 | End: 2024-02-16

## 2024-02-14 RX ORDER — METRONIDAZOLE 500 MG/100ML
500 INJECTION, SOLUTION INTRAVENOUS ONCE
Status: COMPLETED | OUTPATIENT
Start: 2024-02-14 | End: 2024-02-14

## 2024-02-14 RX ORDER — HYDROMORPHONE HYDROCHLORIDE 1 MG/ML
0.5 INJECTION, SOLUTION INTRAMUSCULAR; INTRAVENOUS; SUBCUTANEOUS ONCE
Status: COMPLETED | OUTPATIENT
Start: 2024-02-14 | End: 2024-02-14

## 2024-02-14 RX ORDER — SODIUM CHLORIDE, SODIUM LACTATE, POTASSIUM CHLORIDE, CALCIUM CHLORIDE 600; 310; 30; 20 MG/100ML; MG/100ML; MG/100ML; MG/100ML
1000 INJECTION, SOLUTION INTRAVENOUS ONCE
Status: COMPLETED | OUTPATIENT
Start: 2024-02-14 | End: 2024-02-14

## 2024-02-14 RX ORDER — CEFTRIAXONE 2 G/1
2000 INJECTION, POWDER, FOR SOLUTION INTRAMUSCULAR; INTRAVENOUS ONCE
Status: COMPLETED | OUTPATIENT
Start: 2024-02-14 | End: 2024-02-14

## 2024-02-14 RX ORDER — ONDANSETRON 2 MG/ML
4 INJECTION INTRAMUSCULAR; INTRAVENOUS ONCE
Status: COMPLETED | OUTPATIENT
Start: 2024-02-14 | End: 2024-02-14

## 2024-02-14 RX ORDER — ACETAMINOPHEN 500 MG
1000 TABLET ORAL ONCE
Status: COMPLETED | OUTPATIENT
Start: 2024-02-14 | End: 2024-02-14

## 2024-02-14 RX ORDER — ACETAMINOPHEN 325 MG/1
650 TABLET ORAL EVERY 6 HOURS PRN
Status: DISCONTINUED | OUTPATIENT
Start: 2024-02-14 | End: 2024-02-15

## 2024-02-14 RX ORDER — HYDROMORPHONE HYDROCHLORIDE 1 MG/ML
1 INJECTION, SOLUTION INTRAMUSCULAR; INTRAVENOUS; SUBCUTANEOUS EVERY 4 HOURS PRN
Status: DISCONTINUED | OUTPATIENT
Start: 2024-02-14 | End: 2024-02-15

## 2024-02-14 RX ORDER — ONDANSETRON 2 MG/ML
4 INJECTION INTRAMUSCULAR; INTRAVENOUS EVERY 4 HOURS PRN
Status: DISCONTINUED | OUTPATIENT
Start: 2024-02-14 | End: 2024-02-21 | Stop reason: HOSPADM

## 2024-02-14 RX ORDER — AMLODIPINE BESYLATE 5 MG/1
5 TABLET ORAL DAILY
Status: DISCONTINUED | OUTPATIENT
Start: 2024-02-15 | End: 2024-02-21 | Stop reason: HOSPADM

## 2024-02-14 RX ADMIN — IOHEXOL 100 ML: 350 INJECTION, SOLUTION INTRAVENOUS at 16:43

## 2024-02-14 RX ADMIN — METRONIDAZOLE 500 MG: 5 INJECTION, SOLUTION INTRAVENOUS at 17:20

## 2024-02-14 RX ADMIN — SODIUM CHLORIDE, POTASSIUM CHLORIDE, SODIUM LACTATE AND CALCIUM CHLORIDE 1000 ML: 600; 310; 30; 20 INJECTION, SOLUTION INTRAVENOUS at 16:00

## 2024-02-14 RX ADMIN — HYDROMORPHONE HYDROCHLORIDE 0.5 MG: 1 INJECTION, SOLUTION INTRAMUSCULAR; INTRAVENOUS; SUBCUTANEOUS at 15:58

## 2024-02-14 RX ADMIN — CEFTRIAXONE SODIUM 2000 MG: 2 INJECTION, POWDER, FOR SOLUTION INTRAMUSCULAR; INTRAVENOUS at 17:12

## 2024-02-14 RX ADMIN — HYDROMORPHONE HYDROCHLORIDE 1 MG: 1 INJECTION, SOLUTION INTRAMUSCULAR; INTRAVENOUS; SUBCUTANEOUS at 23:09

## 2024-02-14 RX ADMIN — ONDANSETRON 4 MG: 2 INJECTION INTRAMUSCULAR; INTRAVENOUS at 15:57

## 2024-02-14 RX ADMIN — SODIUM CHLORIDE, POTASSIUM CHLORIDE, SODIUM LACTATE AND CALCIUM CHLORIDE: 600; 310; 30; 20 INJECTION, SOLUTION INTRAVENOUS at 23:08

## 2024-02-14 RX ADMIN — ACETAMINOPHEN 1000 MG: 500 TABLET ORAL at 18:36

## 2024-02-14 RX ADMIN — HYDROMORPHONE HYDROCHLORIDE 0.5 MG: 1 INJECTION, SOLUTION INTRAMUSCULAR; INTRAVENOUS; SUBCUTANEOUS at 18:25

## 2024-02-14 RX ADMIN — SODIUM CHLORIDE, POTASSIUM CHLORIDE, SODIUM LACTATE AND CALCIUM CHLORIDE 1000 ML: 600; 310; 30; 20 INJECTION, SOLUTION INTRAVENOUS at 18:24

## 2024-02-14 RX ADMIN — SODIUM CHLORIDE, POTASSIUM CHLORIDE, SODIUM LACTATE AND CALCIUM CHLORIDE 1000 ML: 600; 310; 30; 20 INJECTION, SOLUTION INTRAVENOUS at 19:30

## 2024-02-14 ASSESSMENT — FIBROSIS 4 INDEX
FIB4 SCORE: 0.36
FIB4 SCORE: 0.1

## 2024-02-14 ASSESSMENT — ENCOUNTER SYMPTOMS
RESPIRATORY NEGATIVE: 1
PSYCHIATRIC NEGATIVE: 1
ABDOMINAL PAIN: 1
CARDIOVASCULAR NEGATIVE: 1
NAUSEA: 1
NEUROLOGICAL NEGATIVE: 1
EYES NEGATIVE: 1
VOMITING: 1
MUSCULOSKELETAL NEGATIVE: 1

## 2024-02-14 ASSESSMENT — PAIN DESCRIPTION - PAIN TYPE: TYPE: ACUTE PAIN

## 2024-02-14 NOTE — ED PROVIDER NOTES
"ED Provider Note    CHIEF COMPLAINT  Chief Complaint   Patient presents with    Abdominal Pain     Ongoing since 12/26  Dx with pancreatitis and pancreatic cyst  Associated with abd distention \" fluid in there \"  Current pain radiates to ribs and around to Rt back    Fever     X 2 d T-Max 102.1F yesterday  No N/V/D       EXTERNAL RECORDS REVIEWED  Inpatient Notes patient was discharged from the hospital 1/19/2024 after admission 1/17/2024.  He was transferred from [place] [facility] with a pseudocyst of his pancreas CT scan demonstrated 15 x 11 x 19 cm pseudocyst without necrosis.  Dr. Condon with GI was consulted recommending drainage via EUS.  Case was otherwise discussed with Dr. Manzo who recommended no drainage.  He was recommended for 6 weeks of cyst maturation prior to attempting EUS.  Plan was for him to follow-up outpatient with gastroenterology.    HPI/ROS  LIMITATION TO HISTORY   Select: : None  OUTSIDE HISTORIAN(S):  Family mother at bedside who reports that the patient has lost between 20 and 30 pounds since onset of this problem.    Abelardo Fenton is a 38 y.o. male who presents to the emergency department for evaluation of epigastric abdominal pain radiating up to the right upper quadrant and mid back on the right side as well as worsening abdominal distention.  Patient reports since he was discharged from the hospital his symptoms have worsened and now he is in constant pain but is only intermittently improved with oxycodone.  He also reports that he has had fevers up to 102 degrees at home over the last few days.  He states he has a very poor appetite and has lost approximately 30 pounds in the last 3 weeks.  He denies any nausea vomiting or diarrhea, urinary symptoms.    PAST MEDICAL HISTORY   has a past medical history of Hyperlipemia, Hypertension, and Pancreatitis.    SURGICAL HISTORY   has a past surgical history that includes no pertinent past surgical history.    FAMILY " "HISTORY  History reviewed. No pertinent family history.    SOCIAL HISTORY  Social History     Tobacco Use    Smoking status: Never    Smokeless tobacco: Never   Vaping Use    Vaping Use: Never used   Substance and Sexual Activity    Alcohol use: Yes     Comment: No alcohol since 12/25/23    Drug use: Never    Sexual activity: Yes       CURRENT MEDICATIONS  Home Medications    **Home medications have not yet been reviewed for this encounter**         ALLERGIES  No Known Allergies    PHYSICAL EXAM  VITAL SIGNS: BP (!) 141/91   Pulse (!) 103   Temp 37.3 °C (99.1 °F) (Temporal)   Resp 16   Ht 1.727 m (5' 8\")   Wt 72.5 kg (159 lb 13.3 oz)   SpO2 100%   BMI 24.30 kg/m²    Constitutional: Uncomfortable appearing, very pleasant  HEENT: Atraumatic, normocephalic, pupils are equal round reactive to light, nose normal, mouth shows dry mucous membranes  Neck: Supple, no JVD, no tracheal deviation  Cardiovascular: Tachycardic, regular, no murmur, rub or gallop, 2+ pulses peripherally x4  Thorax & Lungs: No respiratory distress, no wheezes, rales or rhonchi, no chest wall tenderness.  GI: Significantly distended with epigastric and right upper quadrant tenderness with guarding, no rebound  Skin: Warm, dry, no acute rash or lesion  Musculoskeletal: Moving all extremities, no acute deformity, no edema, no tenderness  Neurologic: A&Ox3, at baseline mentation, cranial nerves II through XII are grossly intact, no sensory deficit, no ataxia  Psychiatric: Appropriate affect for situation at this time          DIAGNOSTIC STUDIES / PROCEDURES    LABS  Labs Reviewed   CBC WITH DIFFERENTIAL - Abnormal; Notable for the following components:       Result Value    WBC 13.7 (*)     RBC 3.62 (*)     Hemoglobin 11.1 (*)     Hematocrit 34.0 (*)     RDW 50.4 (*)     Platelet Count 582 (*)     Neutrophils-Polys 74.60 (*)     Lymphocytes 10.80 (*)     Monocytes 13.70 (*)     Neutrophils (Absolute) 10.18 (*)     Monos (Absolute) 1.87 (*)     " All other components within normal limits   COMP METABOLIC PANEL - Abnormal; Notable for the following components:    Anion Gap 18.0 (*)     Glucose 114 (*)     ALT(SGPT) 94 (*)     Alkaline Phosphatase 830 (*)     Globulin 4.2 (*)     All other components within normal limits   LIPASE   ESTIMATED GFR   URINALYSIS   LACTIC ACID   BLOOD CULTURE   BLOOD CULTURE         RADIOLOGY  I have independently interpreted the diagnostic imaging associated with this visit and am waiting the final reading from the radiologist.   My preliminary interpretation is as follows: Large pancreatic fluid collection with associated gas.    Radiologist interpretation:   CT-ABDOMEN-PELVIS WITH   Final Result      1.  Again seen pancreatitis with large large multifocal peripancreatic fluid collections with the largest component seen extending inferiorly from the pancreatic head into the mesentery into the upper pelvis measuring 18 x 16 x 12 cm in size. There are    new multifocal areas of gas within those fluid collections which suggest presence of infected pseudocyst.      2.  Fatty change of the liver.      3.  New intrahepatic biliary ductal dilatation which may represent a component of biliary obstruction by the pancreatic pseudocysts.      4.  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 Superior mesenteric vein is also not identified in that area.      5.  Gastroesophageal and splenic varices.            COURSE & MEDICAL DECISION MAKING    ED Observation Status? No; Patient does not meet criteria for ED Observation.     INITIAL ASSESSMENT, COURSE AND PLAN  Care Narrative:     3:25 PM patient presents to the emergency department for reevaluation of epigastric and right upper quadrant pain, poor p.o. intake and abdominal distention.  Patient with fevers documented up to 102 degrees at home over the last week and arrives tachycardic, ill-appearing and uncomfortable appearing at  the time of my evaluation though afebrile currently with reassuring blood pressure.  I am concerned for superimposed infection on pancreatic cyst with resultant sepsis.  Initiated therapy with broad-spectrum IV antibiotics to cover anaerobes, IV fluids and pain control with Dilaudid and Zofran.  Lab work initiated from triage remarkable for leukocytosis to 13.7 with a left shift new from prior testing.  Complete metabolic panel with elevated alkaline phosphatase at 830 and slight elevation in ALT both of which are increased from prior testing.  Patient also with an anion gap metabolic acidosis which I suspect may be secondary to starvation ketosis versus lactic acidosis.  Lactate and blood cultures ordered at this time.  Will repeat CT of the abdomen pelvis with contrast to further assess.  Will discuss case with GI.    5:05 PM CT scan demonstrates persistent pancreatic pseudocyst now with loculations and gas concerning for infected cyst.  Will page GI.    5:10 PM Case discussed with Dr. Guevara with GI consultants.  Plans to discuss case with interventional radiology and call back with the plan of care for the patient.  Agrees with intervention so far.    6:00 PM Case discussed with Dr. Guevara.  He recommends transfer to [facility] for IR guided biopsy of infected area to guide antibiotic management, antimicrobial therapy prior to transgastric procedure.  Discussed this with the patient who is on board with this plan.  Pain is well-controlled after dose of Dilaudid.  Will page transfer center.    6:30 PM patient notably febrile to 103.3.  Tylenol ordered.  Patient already receiving appropriate 30 cc/kg IV fluid in the setting of sepsis.  He has received appropriate antibiotics.    7:00 PM Case discussed with admitting hospitalist Dr. Damico who accepts the patient for admission.  Transport to be arranged.    CRITICAL CARE  The very real possibilty of a deterioration of this patient's condition required  the highest level of my preparedness for sudden, emergent intervention.  I provided critical care services, which included medication orders, frequent reevaluations of the patient's condition and response to treatment, ordering and reviewing test results, and discussing the case with various consultants.  The critical care time associated with the care of the patient was 30 minutes. Review chart for interventions. This time is exclusive of any other billable procedures.         HYDRATION: Based on the patient's presentation of Dehydration and Tachycardia the patient was given IV fluids. IV Hydration was used because oral hydration was not adequate alone. Upon recheck following hydration, the patient was improved without requiring ongoing resuscitation.      ADDITIONAL PROBLEM LIST  Infected pancreatic pseudocyst    DISPOSITION AND DISCUSSIONS  I have discussed management of the patient with the following physicians and FRANC's: Dr. Guevara, gastroenterology and Dr. Damico hospitalist    Discussion of management with other Hospitals in [place] or appropriate source(s): None     Decision tools and prescription drugs considered including, but not limited to: Antibiotics ceftriaxone and Flagyl and Pain Medications Dilaudid .    FINAL DIAGNOSIS  1. Infected pancreatic pseudocyst    2. Epigastric pain    3.     Critical care time of 30 minutes       Electronically signed by: Raleigh La M.D., 2/14/2024 3:25 PM

## 2024-02-14 NOTE — ED NOTES
PIV placed in triage. Patient educated on need to remove PIV prior to patient leaving the hospital. Patient demonstrated verbal understanding of all education provided. NPO at this time.

## 2024-02-15 ENCOUNTER — APPOINTMENT (OUTPATIENT)
Dept: RADIOLOGY | Facility: MEDICAL CENTER | Age: 39
DRG: 871 | End: 2024-02-15
Attending: STUDENT IN AN ORGANIZED HEALTH CARE EDUCATION/TRAINING PROGRAM
Payer: COMMERCIAL

## 2024-02-15 LAB
AMYLASE FLD-CCNC: 259 U/L
ANION GAP SERPL CALC-SCNC: 15 MMOL/L (ref 7–16)
BASOPHILS # BLD AUTO: 0 % (ref 0–1.8)
BASOPHILS # BLD: 0 K/UL (ref 0–0.12)
BODY FLD TYPE: NORMAL
BUN SERPL-MCNC: 9 MG/DL (ref 8–22)
CALCIUM SERPL-MCNC: 8.4 MG/DL (ref 8.5–10.5)
CHLORIDE SERPL-SCNC: 97 MMOL/L (ref 96–112)
CO2 SERPL-SCNC: 22 MMOL/L (ref 20–33)
CREAT SERPL-MCNC: 0.74 MG/DL (ref 0.5–1.4)
EOSINOPHIL # BLD AUTO: 0 K/UL (ref 0–0.51)
EOSINOPHIL NFR BLD: 0 % (ref 0–6.9)
ERYTHROCYTE [DISTWIDTH] IN BLOOD BY AUTOMATED COUNT: 48.3 FL (ref 35.9–50)
GFR SERPLBLD CREATININE-BSD FMLA CKD-EPI: 120 ML/MIN/1.73 M 2
GLUCOSE SERPL-MCNC: 99 MG/DL (ref 65–99)
GRAM STN SPEC: NORMAL
HCT VFR BLD AUTO: 29.7 % (ref 42–52)
HGB BLD-MCNC: 9.7 G/DL (ref 14–18)
INR PPP: 1.36 (ref 0.87–1.13)
LYMPHOCYTES # BLD AUTO: 1.15 K/UL (ref 1–4.8)
LYMPHOCYTES NFR BLD: 10.4 % (ref 22–41)
MANUAL DIFF BLD: NORMAL
MCH RBC QN AUTO: 29.5 PG (ref 27–33)
MCHC RBC AUTO-ENTMCNC: 32.7 G/DL (ref 32.3–36.5)
MCV RBC AUTO: 90.3 FL (ref 81.4–97.8)
MONOCYTES # BLD AUTO: 1.45 K/UL (ref 0–0.85)
MONOCYTES NFR BLD AUTO: 13.1 % (ref 0–13.4)
MORPHOLOGY BLD-IMP: NORMAL
NEUTROPHILS # BLD AUTO: 8.49 K/UL (ref 1.82–7.42)
NEUTROPHILS NFR BLD: 74.8 % (ref 44–72)
NEUTS BAND NFR BLD MANUAL: 1.7 % (ref 0–10)
NRBC # BLD AUTO: 0 K/UL
NRBC BLD-RTO: 0 /100 WBC (ref 0–0.2)
PLATELET # BLD AUTO: 516 K/UL (ref 164–446)
PLATELET BLD QL SMEAR: NORMAL
PMV BLD AUTO: 9.1 FL (ref 9–12.9)
POTASSIUM SERPL-SCNC: 3.6 MMOL/L (ref 3.6–5.5)
PROTHROMBIN TIME: 16.9 SEC (ref 12–14.6)
RBC # BLD AUTO: 3.29 M/UL (ref 4.7–6.1)
RBC BLD AUTO: NORMAL
SIGNIFICANT IND 70042: NORMAL
SITE SITE: NORMAL
SODIUM SERPL-SCNC: 134 MMOL/L (ref 135–145)
SOURCE SOURCE: NORMAL
WBC # BLD AUTO: 11.1 K/UL (ref 4.8–10.8)

## 2024-02-15 PROCEDURE — 87186 SC STD MICRODIL/AGAR DIL: CPT

## 2024-02-15 PROCEDURE — 82150 ASSAY OF AMYLASE: CPT

## 2024-02-15 PROCEDURE — 85027 COMPLETE CBC AUTOMATED: CPT

## 2024-02-15 PROCEDURE — 85007 BL SMEAR W/DIFF WBC COUNT: CPT

## 2024-02-15 PROCEDURE — 700102 HCHG RX REV CODE 250 W/ 637 OVERRIDE(OP): Performed by: STUDENT IN AN ORGANIZED HEALTH CARE EDUCATION/TRAINING PROGRAM

## 2024-02-15 PROCEDURE — 36415 COLL VENOUS BLD VENIPUNCTURE: CPT

## 2024-02-15 PROCEDURE — 87205 SMEAR GRAM STAIN: CPT

## 2024-02-15 PROCEDURE — A9270 NON-COVERED ITEM OR SERVICE: HCPCS | Performed by: STUDENT IN AN ORGANIZED HEALTH CARE EDUCATION/TRAINING PROGRAM

## 2024-02-15 PROCEDURE — 700111 HCHG RX REV CODE 636 W/ 250 OVERRIDE (IP): Mod: JZ | Performed by: STUDENT IN AN ORGANIZED HEALTH CARE EDUCATION/TRAINING PROGRAM

## 2024-02-15 PROCEDURE — 700111 HCHG RX REV CODE 636 W/ 250 OVERRIDE (IP)

## 2024-02-15 PROCEDURE — 87070 CULTURE OTHR SPECIMN AEROBIC: CPT

## 2024-02-15 PROCEDURE — 99233 SBSQ HOSP IP/OBS HIGH 50: CPT | Performed by: STUDENT IN AN ORGANIZED HEALTH CARE EDUCATION/TRAINING PROGRAM

## 2024-02-15 PROCEDURE — 87075 CULTR BACTERIA EXCEPT BLOOD: CPT

## 2024-02-15 PROCEDURE — 85610 PROTHROMBIN TIME: CPT

## 2024-02-15 PROCEDURE — 770006 HCHG ROOM/CARE - MED/SURG/GYN SEMI*

## 2024-02-15 PROCEDURE — 0W9G30Z DRAINAGE OF PERITONEAL CAVITY WITH DRAINAGE DEVICE, PERCUTANEOUS APPROACH: ICD-10-PCS | Performed by: STUDENT IN AN ORGANIZED HEALTH CARE EDUCATION/TRAINING PROGRAM

## 2024-02-15 PROCEDURE — 99152 MOD SED SAME PHYS/QHP 5/>YRS: CPT

## 2024-02-15 PROCEDURE — 87077 CULTURE AEROBIC IDENTIFY: CPT

## 2024-02-15 PROCEDURE — 80048 BASIC METABOLIC PNL TOTAL CA: CPT

## 2024-02-15 PROCEDURE — 700105 HCHG RX REV CODE 258: Performed by: STUDENT IN AN ORGANIZED HEALTH CARE EDUCATION/TRAINING PROGRAM

## 2024-02-15 RX ORDER — OXYCODONE HYDROCHLORIDE 10 MG/1
10 TABLET ORAL EVERY 4 HOURS PRN
Status: DISCONTINUED | OUTPATIENT
Start: 2024-02-15 | End: 2024-02-17

## 2024-02-15 RX ORDER — MIDAZOLAM HYDROCHLORIDE 1 MG/ML
INJECTION INTRAMUSCULAR; INTRAVENOUS
Status: COMPLETED
Start: 2024-02-15 | End: 2024-02-15

## 2024-02-15 RX ORDER — GAUZE BANDAGE 2" X 2"
100 BANDAGE TOPICAL DAILY
Status: DISCONTINUED | OUTPATIENT
Start: 2024-02-15 | End: 2024-02-21 | Stop reason: HOSPADM

## 2024-02-15 RX ORDER — ACETAMINOPHEN 500 MG
1000 TABLET ORAL EVERY 6 HOURS
Status: COMPLETED | OUTPATIENT
Start: 2024-02-15 | End: 2024-02-20

## 2024-02-15 RX ORDER — OXYCODONE HYDROCHLORIDE 5 MG/1
5 TABLET ORAL EVERY 4 HOURS PRN
Status: DISCONTINUED | OUTPATIENT
Start: 2024-02-15 | End: 2024-02-17

## 2024-02-15 RX ORDER — ACETAMINOPHEN 500 MG
1000 TABLET ORAL EVERY 6 HOURS PRN
Status: DISCONTINUED | OUTPATIENT
Start: 2024-02-20 | End: 2024-02-21 | Stop reason: HOSPADM

## 2024-02-15 RX ORDER — ONDANSETRON 2 MG/ML
4 INJECTION INTRAMUSCULAR; INTRAVENOUS PRN
Status: ACTIVE | OUTPATIENT
Start: 2024-02-15 | End: 2024-02-15

## 2024-02-15 RX ORDER — SODIUM CHLORIDE 9 MG/ML
500 INJECTION, SOLUTION INTRAVENOUS
Status: ACTIVE | OUTPATIENT
Start: 2024-02-15 | End: 2024-02-15

## 2024-02-15 RX ORDER — MIDAZOLAM HYDROCHLORIDE 1 MG/ML
.5-2 INJECTION INTRAMUSCULAR; INTRAVENOUS PRN
Status: ACTIVE | OUTPATIENT
Start: 2024-02-15 | End: 2024-02-15

## 2024-02-15 RX ORDER — HYDROMORPHONE HYDROCHLORIDE 1 MG/ML
0.5 INJECTION, SOLUTION INTRAMUSCULAR; INTRAVENOUS; SUBCUTANEOUS EVERY 4 HOURS PRN
Status: DISCONTINUED | OUTPATIENT
Start: 2024-02-15 | End: 2024-02-17

## 2024-02-15 RX ADMIN — MIDAZOLAM HYDROCHLORIDE 1 MG: 1 INJECTION, SOLUTION INTRAMUSCULAR; INTRAVENOUS at 13:28

## 2024-02-15 RX ADMIN — PIPERACILLIN AND TAZOBACTAM 3.38 G: 3; .375 INJECTION, POWDER, FOR SOLUTION INTRAVENOUS at 08:03

## 2024-02-15 RX ADMIN — HYDROMORPHONE HYDROCHLORIDE 1 MG: 1 INJECTION, SOLUTION INTRAMUSCULAR; INTRAVENOUS; SUBCUTANEOUS at 03:43

## 2024-02-15 RX ADMIN — FENTANYL CITRATE 50 MCG: 50 INJECTION, SOLUTION INTRAMUSCULAR; INTRAVENOUS at 13:23

## 2024-02-15 RX ADMIN — ACETAMINOPHEN 650 MG: 325 TABLET, FILM COATED ORAL at 00:27

## 2024-02-15 RX ADMIN — METOPROLOL TARTRATE 25 MG: 25 TABLET, FILM COATED ORAL at 05:28

## 2024-02-15 RX ADMIN — OXYCODONE HYDROCHLORIDE 10 MG: 10 TABLET ORAL at 16:49

## 2024-02-15 RX ADMIN — ACETAMINOPHEN 1000 MG: 500 TABLET ORAL at 23:39

## 2024-02-15 RX ADMIN — MIDAZOLAM HYDROCHLORIDE 1 MG: 1 INJECTION, SOLUTION INTRAMUSCULAR; INTRAVENOUS at 13:23

## 2024-02-15 RX ADMIN — PIPERACILLIN AND TAZOBACTAM 3.38 G: 3; .375 INJECTION, POWDER, FOR SOLUTION INTRAVENOUS at 12:45

## 2024-02-15 RX ADMIN — ACETAMINOPHEN 1000 MG: 500 TABLET ORAL at 16:49

## 2024-02-15 RX ADMIN — SODIUM CHLORIDE, POTASSIUM CHLORIDE, SODIUM LACTATE AND CALCIUM CHLORIDE: 600; 310; 30; 20 INJECTION, SOLUTION INTRAVENOUS at 06:06

## 2024-02-15 RX ADMIN — Medication 100 MG: at 21:45

## 2024-02-15 RX ADMIN — FENTANYL CITRATE 50 MCG: 50 INJECTION, SOLUTION INTRAMUSCULAR; INTRAVENOUS at 13:28

## 2024-02-15 RX ADMIN — OXYCODONE HYDROCHLORIDE 10 MG: 10 TABLET ORAL at 21:57

## 2024-02-15 RX ADMIN — METOPROLOL TARTRATE 25 MG: 25 TABLET, FILM COATED ORAL at 16:49

## 2024-02-15 RX ADMIN — OXYCODONE HYDROCHLORIDE 10 MG: 10 TABLET ORAL at 12:42

## 2024-02-15 RX ADMIN — HYDROMORPHONE HYDROCHLORIDE 1 MG: 1 INJECTION, SOLUTION INTRAMUSCULAR; INTRAVENOUS; SUBCUTANEOUS at 08:05

## 2024-02-15 RX ADMIN — PIPERACILLIN AND TAZOBACTAM 3.38 G: 3; .375 INJECTION, POWDER, FOR SOLUTION INTRAVENOUS at 22:00

## 2024-02-15 RX ADMIN — PIPERACILLIN AND TAZOBACTAM 3.38 G: 3; .375 INJECTION, POWDER, FOR SOLUTION INTRAVENOUS at 05:27

## 2024-02-15 RX ADMIN — ACETAMINOPHEN 1000 MG: 500 TABLET ORAL at 12:41

## 2024-02-15 ASSESSMENT — COGNITIVE AND FUNCTIONAL STATUS - GENERAL
DAILY ACTIVITIY SCORE: 24
SUGGESTED CMS G CODE MODIFIER DAILY ACTIVITY: CH
SUGGESTED CMS G CODE MODIFIER MOBILITY: CH
MOBILITY SCORE: 24

## 2024-02-15 ASSESSMENT — LIFESTYLE VARIABLES
CONSUMPTION TOTAL: NEGATIVE
SUBSTANCE_ABUSE: 0
ALCOHOL_USE: NO
HAVE YOU EVER FELT YOU SHOULD CUT DOWN ON YOUR DRINKING: NO
EVER HAD A DRINK FIRST THING IN THE MORNING TO STEADY YOUR NERVES TO GET RID OF A HANGOVER: NO
AVERAGE NUMBER OF DAYS PER WEEK YOU HAVE A DRINK CONTAINING ALCOHOL: 0
EVER FELT BAD OR GUILTY ABOUT YOUR DRINKING: NO
TOTAL SCORE: 0
HAVE PEOPLE ANNOYED YOU BY CRITICIZING YOUR DRINKING: NO
ON A TYPICAL DAY WHEN YOU DRINK ALCOHOL HOW MANY DRINKS DO YOU HAVE: 0
TOTAL SCORE: 0
HOW MANY TIMES IN THE PAST YEAR HAVE YOU HAD 5 OR MORE DRINKS IN A DAY: 0
TOTAL SCORE: 0
DOES PATIENT WANT TO STOP DRINKING: NO

## 2024-02-15 ASSESSMENT — PAIN DESCRIPTION - PAIN TYPE
TYPE: ACUTE PAIN
TYPE: ACUTE PAIN;SURGICAL PAIN
TYPE: ACUTE PAIN
TYPE: ACUTE PAIN

## 2024-02-15 ASSESSMENT — ENCOUNTER SYMPTOMS
SHORTNESS OF BREATH: 0
COUGH: 0
CHILLS: 1
CONSTIPATION: 0
VOMITING: 0
ABDOMINAL PAIN: 1
FEVER: 1
NAUSEA: 1
DIARRHEA: 0

## 2024-02-15 NOTE — PROGRESS NOTES
[facility]PITAL[facility] TRIAGE OFFICER DIRECT ADMISSION REPORT  Transferring facility: [facility]  Transferring physician: Dr. La  Transferring facility/physician contact number: [facility]  Chief complaint: Infected pancreatic cyst  Pertinent history & patient course: 38 male history of pancreatitis recently admitted on 1/17/2024 found to have pseudocyst scheduled to follow-up with GI presents to [facility] with abdominal pain.  CT abdomen pelvis repeated showing large multifocal peripancreatic fluid collections with gas suggestive of infected pancreatic pseudocyst.  Patient met criteria for sepsis given ceftriaxone, Flagyl and IV fluids.  GI was consulted recommended transfer to [facility] for IR drainage.  Pertinent imaging & lab results: See epic  Code Status: Full  Further work up or recommendations per triage officer prior to transfer: None  Consultants called prior to transfer and pertinent input from consultants: None  Patient accepted for transfer: Yes  Consultants to be called upon arrival: GI  Admission status: Inpatient.   Floor requested: Med/surg  If ICU transfer, name of intensivist case discussed with and pertinent input from critical care: None     Please inform the triage officer upon arrival of the patient to [facility] for assignment of a hospitalist to perform admission.      For any question or concerns regarding the care of this patient, please reach out to the assigned hospitalist.

## 2024-02-15 NOTE — PROGRESS NOTES
Med rec is complete per Patient at bedside.  Family/friend/caregiver present at time of interview with permission from Patient.    Allergies reviewed.    Any Anticoagulants (rivaroxaban, apixaban, edoxaban, dabigatran, warfarin, enoxaparin) taken in the last 14 days? N      Pharmacy/Pharmacies Pt utilizes : [facility] [phone]

## 2024-02-15 NOTE — ASSESSMENT & PLAN NOTE
CT AP demonstrating large multifocal peripancreatic fluid collections and new multifocal areas of gas within those fluid collections to suggest presence of infected pseudocyst.  Given ceftriaxone and Flagyl at outside facility  started on Zosyn, Cx with pan sensitive E.coli, de-escalated to Ancef   IR drainage 2/15 w/ 417 ml of tan fluid removed and sent for Cx. Drain placed. Will need repeat scan on Tuesday 2/20  Monitor ouput, copious brown/purulent drainage ~120 ml over last 24 hours  Supportive care with pain control   Full liquid diet, advance as tolerated     Repeat CT imaging on 2/20 noted slightly decreased now 13 x 10 cm, formally 18 x 16 x 12 cm - large pancreatic fluid collection/pseudocyst.      Pending further hepatobiliary surgery recommendations, anticipate patient will discharge home with drain in place, continue with oral antibiotics until surgical date, which will be arranged by the HBS team.    Cultures were pansensitive E. coli, few GPC's, light growth coagulase negative staph.  Continue Ancef, anticipate oral options on discharge.

## 2024-02-15 NOTE — PROGRESS NOTES
4 Eyes Skin Assessment Completed by OSCAR Bruce and OSCAR Meneses.    Head WDL  Ears WDL  Nose WDL  Mouth WDL  Neck WDL  Breast/Chest WDL  Shoulder Blades WDL  Spine WDL  (R) Arm/Elbow/Hand WDL  (L) Arm/Elbow/Hand WDL  Abdomen WDL  Groin WDL  Scrotum/Coccyx/Buttocks WDL  (R) Leg WDL  (L) Leg WDL  (R) Heel/Foot/Toe WDL  (L) Heel/Foot/Toe WDL    Devices In Places N/A    Interventions In Place N/A    Possible Skin Injury No    Pictures Uploaded Into Epic N/A  Wound Consult Placed N/A  RN Wound Prevention Protocol Ordered No

## 2024-02-15 NOTE — PROGRESS NOTES
Patient /65 & remains tachycardic at 119 - has scheduled Metoprolol due with orders to hold for SBP less than 120 or HR less than 60. Writer notified Demetrius MONTANO; re-checked /71.     Provider gave writer okay to administer Metoprolol at this time.

## 2024-02-15 NOTE — DIETARY
"Nutrition services: Day 1 of admit.  Abelardo Fenton is a 38 y.o. male with admitting DX of Infected pancreatitic pseudocyst.    Pt seen for wt loss per admit screen, malnutrition screening tool score of 3. Pt reports eating ~ 80% less than his usual intake(minimal intake per family) with severe wt loss suspected to be ~ 30# due to abd pain/swelling over past 6 weeks. Pt states UBW is ~ 185#. Reviewed low fat diet restrictions for pancreatitis for improved tolerance, pt reports he has had this education in past and declines handouts. Diet just started today.       Assessment:  Height: 175.3 cm (5' 9\")  Weight: 72.5 kg (159 lb 13.3 oz)- per stand up scale on 2/14.   Body mass index is 23.6 kg/m²., BMI classification: Normal.   Diet/Intake: Full Liquid, just advanced for lunch meal today.     Evaluation:   Pertinent labs: Na+ 134.   Pertinent Meds: Zofran PRN, Zosyn.   Per MD notes: pt with sepsis and infected pseudocyst of pancreas. He has had history of pancreatitis and pseudocyst in the past.    Pt had peritoneal drain placed today.   Nutrition Focused Physical Exam: Hollowing at temple, very little fat to buccal area, very little fat to upper arm, squared shoulders, very little roundness/firmness to calf, depression along inner thigh, very little muscle around patella.   Wt Readings from Last 4 Encounters:  02/14/24 72.5 kg (159 lb 13.3 oz)  02/14/24 72.5 kg (159 lb 13.3 oz)  01/19/24 76.5 kg (168 lb 10.4 oz)  01/01/24 81.3 kg (179 lb 3.7 oz)  Pt with 11% wt loss over past 1.5 months (severe) per chart review. Based on pt's stated UBW of 185#, pt with 14% wt loss (severe).     Malnutrition Risk: Pt meets ASPEN criteria for severe chronic disease related malnutrition related to  pancreatitis and pseudocyst as evidenced by minimal PO with severe wt loss over past 6 weeks per pt report as well as severe fat loss and severe muscle loss per physical exam.      Recommendations/Plan:  Advance diet as " tolerated, Recommend Low Fat diet for improved tolerance.  Will add Ensure Plus with all meals.   Monitor for refeeding: Order BMP w/ Mg and Phos x 7 days. Replete K, Phos and Mg prn. Supplement 100 mg Thiamine x 7 days to reduce risk of refeeding.   Encourage intake of >50% of meals and document meals/supplements consumed as % taken in ADL's to provide interdisciplinary communication across all shifts.   Monitor weight.  Nutrition rep will continue to see patient for ongoing meal and snack preferences.     RD following.

## 2024-02-15 NOTE — OR SURGEON
Immediate Post- Operative Note        Findings: Abdominal collection      Procedure(s): Drain placement      Estimated Blood Loss: Less than 5 ml        Complications: None            2/15/2024     1:33 PM     Jairo Pemberton M.D.

## 2024-02-15 NOTE — H&P
Hospital Medicine History & Physical Note    Date of Service  2/14/2024    Primary Care Physician  Sheeba Lloyd M.D.    Consultants  Interventional radiology    Code Status  Full Code    Chief Complaint  Sepsis    History of Presenting Illness  Abelardo Fenton is a 38 y.o. male who presented 2/14/2024 with epigastric pain abdominal/for the last few days.  He has had history of pancreatitis and pseudocyst in the past.  CT abdomen pelvis showing pancreatitis with large multifocal peripancreatic fluid collections within the largest component seen extending inferiorly from the pancreatic head into the mesentery into the upper pelvis measuring 18 x 16 x 12 cm in size, new multifocal areas of gas within those fluid collections with suggest presence of infective pseudocyst.  Patient transferred to [facility] for interventional radiology.    I discussed the plan of care with patient.    Review of Systems  Review of Systems   Constitutional:  Positive for malaise/fatigue.   HENT: Negative.     Eyes: Negative.    Respiratory: Negative.     Cardiovascular: Negative.    Gastrointestinal:  Positive for abdominal pain, nausea and vomiting.   Genitourinary: Negative.    Musculoskeletal: Negative.    Skin: Negative.    Neurological: Negative.    Endo/Heme/Allergies: Negative.    Psychiatric/Behavioral: Negative.         Past Medical History   has a past medical history of Hyperlipemia, Hypertension, and Pancreatitis.    Surgical History   has a past surgical history that includes no pertinent past surgical history.     Family History  family history is not on file.   Family history reviewed with patient. There is no family history that is pertinent to the chief complaint.     Social History   reports that he has never smoked. He has never used smokeless tobacco. He reports current alcohol use. He reports that he does not use drugs.    Allergies  No Known Allergies    Medications  Prior to Admission Medications    Prescriptions Last Dose Informant Patient Reported? Taking?   Calcium Carbonate Antacid 1000 MG Chew Tab  Patient No No   Sig: Chew 1 Tablet 2 times a day.   acetaminophen (TYLENOL) 325 MG Tab 2/14/2024 at 1800 Patient No Yes   Sig: Take 2 Tablets by mouth every four hours as needed for Fever or Moderate Pain.   amLODIPine (NORVASC) 5 MG Tab 2/14/2024 at 0900 Patient No Yes   Sig: Take 1 Tablet by mouth every day.   famotidine (PEPCID) 20 MG Tab  Patient No No   Sig: Take 1 Tablet by mouth 2 times a day.   metoprolol tartrate (LOPRESSOR) 25 MG Tab 2/14/2024 at 0900  No Yes   Sig: Take 1 Tablet by mouth 2 times a day.   oxyCODONE immediate-release (ROXICODONE) 5 MG Tab 2/14/2024 at 1030 Patient Yes Yes   Sig: Take 5 mg by mouth every 6 hours as needed for Severe Pain.      Facility-Administered Medications: None       Physical Exam  Temp:  [36.6 °C (97.9 °F)-39.6 °C (103.3 °F)] 36.6 °C (97.9 °F)  Pulse:  [101-115] 114  Resp:  [16-25] 18  BP: (123-141)/(68-91) 141/88  SpO2:  [93 %-100 %] 97 %  Blood Pressure: (!) 141/88 (RN notified)   Temperature: 36.6 °C (97.9 °F)   Pulse: (!) 114 (RN notified)   Respiration: 18   Pulse Oximetry: 97 %       Physical Exam  Constitutional:       Appearance: Normal appearance. He is normal weight.   HENT:      Head: Normocephalic.      Nose: Nose normal.      Mouth/Throat:      Mouth: Mucous membranes are moist.   Eyes:      Pupils: Pupils are equal, round, and reactive to light.   Cardiovascular:      Rate and Rhythm: Normal rate and regular rhythm.      Pulses: Normal pulses.   Pulmonary:      Effort: Pulmonary effort is normal.      Breath sounds: Normal breath sounds.   Abdominal:      General: Abdomen is flat.      Palpations: Abdomen is soft.      Comments: Slightly distended abdomen, small palpable mass in epigastric area  Endorses pain upon palpation of epigastric area   Musculoskeletal:         General: Normal range of motion.      Cervical back: Neck supple.   Skin:      "General: Skin is warm.   Neurological:      General: No focal deficit present.      Mental Status: He is alert and oriented to person, place, and time. Mental status is at baseline.   Psychiatric:         Mood and Affect: Mood normal.         Behavior: Behavior normal.         Thought Content: Thought content normal.         Judgment: Judgment normal.         Laboratory:  Recent Labs     02/14/24  1454   WBC 13.7*   RBC 3.62*   HEMOGLOBIN 11.1*   HEMATOCRIT 34.0*   MCV 93.9   MCH 30.7   MCHC 32.6   RDW 50.4*   PLATELETCT 582*   MPV 9.2     Recent Labs     02/14/24  1454   SODIUM 135   POTASSIUM 3.8   CHLORIDE 96   CO2 21   GLUCOSE 114*   BUN 16   CREATININE 0.86   CALCIUM 9.3     Recent Labs     02/14/24  1454   ALTSGPT 94*   ASTSGOT 16   ALKPHOSPHAT 830*   TBILIRUBIN 0.6   LIPASE 34   GLUCOSE 114*         No results for input(s): \"NTPROBNP\" in the last 72 hours.      No results for input(s): \"TROPONINT\" in the last 72 hours.    Imaging:  No orders to display       no X-Ray or EKG requiring interpretation    Assessment/Plan:  Justification for Admission Status  I anticipate this patient will require at least two midnights for appropriate medical management, necessitating inpatient admission because patient has sepsis    Patient will need a Med/Surg bed on MEDICAL service .  The need is secondary to sepsis.    * Sepsis (HCC)- (present on admission)  Assessment & Plan  This is Sepsis Present on admission  SIRS criteria identified on my evaluation include: Fever, with temperature greater than 100.9 deg F, Tachycardia, with heart rate greater than 90 BPM, and Leukocytosis, with WBC greater than 12,000  Clinical indicators of end organ dysfunction include Lactic Acid greater than 2  Source is pancreatic pseudocyst  Sepsis protocol initiated  Crystalloid Fluid Administration: Fluid resuscitation ordered per standard protocol - 30 mL/kg per current or ideal body weight  IV antibiotics as appropriate for source of " sepsis  Reassessment: I have reassessed the patient's hemodynamic status    Infected pseudocyst of pancreas  Assessment & Plan  CT AP demonstrating large multifocal peripancreatic fluid collections and new multifocal areas of gas within those fluid collections to suggest presence of infected pseudocyst.  Given ceftriaxone and Flagyl at outside facility  Will start patient on Zosyn  Spoke with diagnostic and interventional radiology, patient to go to the OR in a.m.  Fluids  Zofran  Morphine as needed      Patient is critically ill.   The patient continues to have: Sepsis, infected pseudocyst  The vital organ system that is affected is the: pancreas, cardiovascular system  If untreated there is a high chance of deterioration into: septic shock  And eventually death.   The critical care that I am providing today is: Starting IV Zosyn, morphine, speaking with diagnostic radiology, speaking with interventional radiology  The critical that has been undertaken is medically complex.   There has been no overlap in critical care time.   Critical Care Time not including procedures: 31      VTE prophylaxis: pharmacologic prophylaxis contraindicated due to OR in a.m.

## 2024-02-15 NOTE — ASSESSMENT & PLAN NOTE
This is Sepsis Present on admission  SIRS criteria identified on my evaluation include: Fever, with temperature greater than 100.9 deg F, Tachycardia, with heart rate greater than 90 BPM, and Leukocytosis, with WBC greater than 12,000  Clinical indicators of end organ dysfunction include Lactic Acid greater than 2  Source is pancreatic pseudocyst  Sepsis protocol initiated  Crystalloid Fluid Administration: Fluid resuscitation ordered per standard protocol - 30 mL/kg per current or ideal body weight  IV antibiotics as appropriate for source of sepsis  Reassessment: I have reassessed the patient's hemodynamic status    Zosyn transitioned to ancef (Cx with ecoli)   Resumed IVF, decreased oral intake, stop once improved

## 2024-02-15 NOTE — ED NOTES
Pt to be transferred to [facility] to [id number]  Report called to [facility] @ [id number]  Janis MOORE was given report  pt remains in NAD  IV LR infusing well at this time

## 2024-02-15 NOTE — CARE PLAN
The patient is Stable - Low risk of patient condition declining or worsening    Shift Goals  Clinical Goals: Pain management  Patient Goals: Rest; No more fever  Family Goals: GRANT    Progress made toward(s) clinical / shift goals: Patient reporting moderate pain control with PRN dilaudid. Up self. NPO diet initiated. Voiding without difficulty. Tylenol administered for fever. Denies further needs.    Patient is not progressing towards the following goals:

## 2024-02-15 NOTE — PROGRESS NOTES
IR RN note    Patient underwent a Peritoneal drain placement by Dr. Pemberton.  Consent was signed.   Procedure site was verified by MD using CT imaging guidance.  IR eleazar Bower and Michael assisted. Patient was placed in a supine position.  Vitals were taken every 5 minutes and remained stable during procedure (see doc flow sheet for results).  CO2 waveform capnography was monitored throughout procedure, see chart.   A stay fix, gauze and tape dressing was placed over surgical site. Report called to Dameon MOORE. Pt transported by laila with RN to room, with monitor.  Sedation orders reviewed with MD     Usentric  Flexima APDL  12 F X 25 cm  Locking Pigtail  Ref # [id number]  Lot # [id number]  Exp 11-    17 ml of brown fluid sent for aerobic and anaerobic fluid cultures.  Amylase fluid test also sent     Total fluid aspirated 417 ml of brown fluid

## 2024-02-15 NOTE — ED NOTES
Pt was evaluated by MD  orders rec'ed and noted  bld drawn, sent to lab  IV LR up and infusing well  IV anti-nausea and pain med given per order  pt to CT via gurney in NAD   VSS  pt and family aware of POC

## 2024-02-16 LAB
ALBUMIN SERPL BCP-MCNC: 3 G/DL (ref 3.2–4.9)
ALBUMIN/GLOB SERPL: 0.8 G/DL
ALP SERPL-CCNC: 477 U/L (ref 30–99)
ALT SERPL-CCNC: 48 U/L (ref 2–50)
ANION GAP SERPL CALC-SCNC: 14 MMOL/L (ref 7–16)
AST SERPL-CCNC: 10 U/L (ref 12–45)
BASOPHILS # BLD AUTO: 0 % (ref 0–1.8)
BASOPHILS # BLD: 0 K/UL (ref 0–0.12)
BILIRUB SERPL-MCNC: 0.5 MG/DL (ref 0.1–1.5)
BUN SERPL-MCNC: 8 MG/DL (ref 8–22)
CALCIUM ALBUM COR SERPL-MCNC: 9.3 MG/DL (ref 8.5–10.5)
CALCIUM SERPL-MCNC: 8.5 MG/DL (ref 8.5–10.5)
CHLORIDE SERPL-SCNC: 97 MMOL/L (ref 96–112)
CO2 SERPL-SCNC: 23 MMOL/L (ref 20–33)
CREAT SERPL-MCNC: 0.63 MG/DL (ref 0.5–1.4)
EOSINOPHIL # BLD AUTO: 0.12 K/UL (ref 0–0.51)
EOSINOPHIL NFR BLD: 1.7 % (ref 0–6.9)
ERYTHROCYTE [DISTWIDTH] IN BLOOD BY AUTOMATED COUNT: 49.1 FL (ref 35.9–50)
GFR SERPLBLD CREATININE-BSD FMLA CKD-EPI: 126 ML/MIN/1.73 M 2
GLOBULIN SER CALC-MCNC: 3.6 G/DL (ref 1.9–3.5)
GLUCOSE SERPL-MCNC: 104 MG/DL (ref 65–99)
HCT VFR BLD AUTO: 28.7 % (ref 42–52)
HGB BLD-MCNC: 9.3 G/DL (ref 14–18)
LYMPHOCYTES # BLD AUTO: 0.62 K/UL (ref 1–4.8)
LYMPHOCYTES NFR BLD: 8.8 % (ref 22–41)
MAGNESIUM SERPL-MCNC: 1.7 MG/DL (ref 1.5–2.5)
MANUAL DIFF BLD: NORMAL
MCH RBC QN AUTO: 29.7 PG (ref 27–33)
MCHC RBC AUTO-ENTMCNC: 32.4 G/DL (ref 32.3–36.5)
MCV RBC AUTO: 91.7 FL (ref 81.4–97.8)
MONOCYTES # BLD AUTO: 0.86 K/UL (ref 0–0.85)
MONOCYTES NFR BLD AUTO: 12.3 % (ref 0–13.4)
MORPHOLOGY BLD-IMP: NORMAL
NEUTROPHILS # BLD AUTO: 5.4 K/UL (ref 1.82–7.42)
NEUTROPHILS NFR BLD: 77.2 % (ref 44–72)
NRBC # BLD AUTO: 0 K/UL
NRBC BLD-RTO: 0 /100 WBC (ref 0–0.2)
PHOSPHATE SERPL-MCNC: 4.2 MG/DL (ref 2.5–4.5)
PLATELET # BLD AUTO: 543 K/UL (ref 164–446)
PLATELET BLD QL SMEAR: NORMAL
PMV BLD AUTO: 9.3 FL (ref 9–12.9)
POTASSIUM SERPL-SCNC: 3.4 MMOL/L (ref 3.6–5.5)
PROT SERPL-MCNC: 6.6 G/DL (ref 6–8.2)
RBC # BLD AUTO: 3.13 M/UL (ref 4.7–6.1)
RBC BLD AUTO: NORMAL
SODIUM SERPL-SCNC: 134 MMOL/L (ref 135–145)
WBC # BLD AUTO: 7 K/UL (ref 4.8–10.8)

## 2024-02-16 PROCEDURE — A9270 NON-COVERED ITEM OR SERVICE: HCPCS | Performed by: STUDENT IN AN ORGANIZED HEALTH CARE EDUCATION/TRAINING PROGRAM

## 2024-02-16 PROCEDURE — 700111 HCHG RX REV CODE 636 W/ 250 OVERRIDE (IP): Performed by: STUDENT IN AN ORGANIZED HEALTH CARE EDUCATION/TRAINING PROGRAM

## 2024-02-16 PROCEDURE — 83735 ASSAY OF MAGNESIUM: CPT

## 2024-02-16 PROCEDURE — 700102 HCHG RX REV CODE 250 W/ 637 OVERRIDE(OP): Performed by: STUDENT IN AN ORGANIZED HEALTH CARE EDUCATION/TRAINING PROGRAM

## 2024-02-16 PROCEDURE — 84100 ASSAY OF PHOSPHORUS: CPT

## 2024-02-16 PROCEDURE — 80053 COMPREHEN METABOLIC PANEL: CPT

## 2024-02-16 PROCEDURE — 36415 COLL VENOUS BLD VENIPUNCTURE: CPT

## 2024-02-16 PROCEDURE — 700105 HCHG RX REV CODE 258: Performed by: STUDENT IN AN ORGANIZED HEALTH CARE EDUCATION/TRAINING PROGRAM

## 2024-02-16 PROCEDURE — 99233 SBSQ HOSP IP/OBS HIGH 50: CPT | Performed by: STUDENT IN AN ORGANIZED HEALTH CARE EDUCATION/TRAINING PROGRAM

## 2024-02-16 PROCEDURE — 85027 COMPLETE CBC AUTOMATED: CPT

## 2024-02-16 PROCEDURE — 770006 HCHG ROOM/CARE - MED/SURG/GYN SEMI*

## 2024-02-16 PROCEDURE — 85007 BL SMEAR W/DIFF WBC COUNT: CPT

## 2024-02-16 PROCEDURE — 700111 HCHG RX REV CODE 636 W/ 250 OVERRIDE (IP): Mod: JZ | Performed by: STUDENT IN AN ORGANIZED HEALTH CARE EDUCATION/TRAINING PROGRAM

## 2024-02-16 RX ORDER — POTASSIUM CHLORIDE 20 MEQ/1
40 TABLET, EXTENDED RELEASE ORAL ONCE
Status: COMPLETED | OUTPATIENT
Start: 2024-02-16 | End: 2024-02-16

## 2024-02-16 RX ADMIN — ACETAMINOPHEN 1000 MG: 500 TABLET ORAL at 12:21

## 2024-02-16 RX ADMIN — HYDROMORPHONE HYDROCHLORIDE 0.5 MG: 1 INJECTION, SOLUTION INTRAMUSCULAR; INTRAVENOUS; SUBCUTANEOUS at 22:59

## 2024-02-16 RX ADMIN — AMLODIPINE BESYLATE 5 MG: 5 TABLET ORAL at 05:56

## 2024-02-16 RX ADMIN — METOPROLOL TARTRATE 25 MG: 25 TABLET, FILM COATED ORAL at 16:42

## 2024-02-16 RX ADMIN — PIPERACILLIN AND TAZOBACTAM 3.38 G: 3; .375 INJECTION, POWDER, FOR SOLUTION INTRAVENOUS at 06:01

## 2024-02-16 RX ADMIN — POTASSIUM CHLORIDE 40 MEQ: 1500 TABLET, EXTENDED RELEASE ORAL at 08:23

## 2024-02-16 RX ADMIN — ACETAMINOPHEN 1000 MG: 500 TABLET ORAL at 05:56

## 2024-02-16 RX ADMIN — PIPERACILLIN AND TAZOBACTAM 3.38 G: 3; .375 INJECTION, POWDER, FOR SOLUTION INTRAVENOUS at 12:26

## 2024-02-16 RX ADMIN — OXYCODONE HYDROCHLORIDE 10 MG: 10 TABLET ORAL at 03:44

## 2024-02-16 RX ADMIN — OXYCODONE HYDROCHLORIDE 10 MG: 10 TABLET ORAL at 08:31

## 2024-02-16 RX ADMIN — OXYCODONE HYDROCHLORIDE 10 MG: 10 TABLET ORAL at 20:56

## 2024-02-16 RX ADMIN — OXYCODONE HYDROCHLORIDE 10 MG: 10 TABLET ORAL at 12:22

## 2024-02-16 RX ADMIN — METOPROLOL TARTRATE 25 MG: 25 TABLET, FILM COATED ORAL at 05:57

## 2024-02-16 RX ADMIN — PIPERACILLIN AND TAZOBACTAM 3.38 G: 3; .375 INJECTION, POWDER, FOR SOLUTION INTRAVENOUS at 21:00

## 2024-02-16 RX ADMIN — OXYCODONE HYDROCHLORIDE 10 MG: 10 TABLET ORAL at 16:36

## 2024-02-16 RX ADMIN — ACETAMINOPHEN 1000 MG: 500 TABLET ORAL at 16:35

## 2024-02-16 RX ADMIN — Medication 100 MG: at 05:56

## 2024-02-16 ASSESSMENT — ENCOUNTER SYMPTOMS
HEADACHES: 0
SHORTNESS OF BREATH: 0
ABDOMINAL PAIN: 1
VOMITING: 0
FEVER: 0
DIARRHEA: 0
MYALGIAS: 0
CHILLS: 0
NAUSEA: 0
COUGH: 0
DIZZINESS: 0
CONSTIPATION: 0

## 2024-02-16 ASSESSMENT — PAIN DESCRIPTION - PAIN TYPE
TYPE: ACUTE PAIN;SURGICAL PAIN
TYPE: ACUTE PAIN

## 2024-02-16 ASSESSMENT — LIFESTYLE VARIABLES: SUBSTANCE_ABUSE: 0

## 2024-02-16 NOTE — HOSPITAL COURSE
This is a 38-year-old male with past medical history of pancreatitis with pseudocyst who presented to the ED on 3/14/2024 with worsening abdominal pain, significant for sepsis.    CT abdomen pelvis showing pancreatitis with large multifocal peripancreatic fluid collections within the largest component seen extending inferiorly from the pancreatic head into the mesentery into the upper pelvis measuring 18 x 16 x 12 cm in size, new multifocal areas of gas within those fluid collections with suggest presence of infective pseudocyst.  Patient underwent IR drain placement on 2/15.  Hepatobiliary surgery consulted, pseudocyst will require another 2 to 3 weeks to mature fully.  Repeat CT imaging on 2/20 noted slightly decreased now 13 x 10 cm, formally 18 x 16 x 12 cm - large pancreatic fluid collection/pseudocyst.  Patient to continue with drain in place, flushing it daily, continue with oral antibiotic therapy (augmentin, reviewed with ID), will follow-up with hepatobiliary surgery outpatient to arrange for surgery in the next 2 to 3 weeks.

## 2024-02-16 NOTE — CARE PLAN
Pt AO x4.  Pt c/o pain, medication intervention per MAR.  Call light and belongings within reach.  Bed locked and in lowest position.  Hourly rounding.  Needs met at this time.           The patient is Stable - Low risk of patient condition declining or worsening    Shift Goals  Clinical Goals: pain management  Patient Goals: stay ahead of pain  Family Goals: GRANT    Progress made toward(s) clinical / shift goals:    Pt pain was managed successfully throughout shift.     Patient is not progressing towards the following goals:

## 2024-02-16 NOTE — PROGRESS NOTES
"Radiology Progress Note   Author: NATHANAEL Rider Date & Time created: 2/16/2024  3:36 PM   Date of admission  2/14/2024  Note to reader: this note follows the APSO format rather than the historical SOAP format. Assessment and plan located at the top of the note for ease of use.    Chief Complaint  38 y.o. male admitted 2/14/2024 with abdominal pain      HPI  Abelardo Fenton is a 38-year-old male with a past medical history of pancreatitis and pseudocyst.  He presented to [facility] on 2/14/2024 complaining of epigastric/abdominal pain times \"a few days\".  CT abdomen pelvis was consistent with pancreatitis with large multifocal peripancreatic fluid collections, and multifocal areas of gas within the fluid collections consistent with infected pseudocyst.  Interventional radiology consulted and patient underwent CT-guided peritoneal fluid drainage with 12 Camb[place]ian catheter placement by Dr. Pemberton (IR) on 02/15/24.    Interval History:   02/16/24-Right lateral upper quadrant drain to bulb suction with 567 mL brownish red  output in the last 24 hours.  I flushed drain with 10 ccc of sterile NSS; I Orders placed for RNs to flush drain Q shift.  WBC 7.0 down from 11.;  Hgb 9.3; PLT S543; Cr 0.63 : Micro-pancreatic cyst cultures pending.  . Drain flushed with 10 mL NS.    Assessment/Plan     Principal Problem:    Sepsis (HCC)  Active Problems:    Hypokalemia    Hypertension    Infected pseudocyst of pancreas      Plan IR  -- Order placed to irrigate LUQ pancreatic drain with 10 ml of sterile saline each shift  - Fluid cultures pending   - Hospitalist following   - Recommend follow up CT scan i once output decreases to approximately 10-15 cc in 24 hours and approximately 5-7 days from time of drain placement  - Continue to monitor drains, VS, and labs     -Thank you for allowing Interventional Radiology team to participate in the patients care, if any additional care or requests are needed in the future please do " not hesitate to call or place IR order   [phone]           Review of Systems  Physical Exam   Review of Systems   Constitutional:  Negative for chills and fever.   HENT:  Negative for hearing loss.    Respiratory:  Negative for cough.    Cardiovascular:  Negative for chest pain.   Gastrointestinal:  Positive for abdominal pain.        Pain  with drain manipulation   Musculoskeletal:  Negative for myalgias.   Skin:  Negative for rash.   Neurological:  Negative for dizziness and headaches.      Vitals:    02/16/24 1532   BP: 118/71   Pulse: 95   Resp: 18   Temp: 36.5 °C (97.7 °F)   SpO2: 95%        Physical Exam  Vitals and nursing note reviewed.   HENT:      Head: Normocephalic and atraumatic.   Cardiovascular:      Rate and Rhythm: Normal rate and regular rhythm.   Pulmonary:      Effort: Pulmonary effort is normal. No respiratory distress.   Abdominal:      General: Bowel sounds are normal.   Skin:     General: Skin is warm.      Capillary Refill: Capillary refill takes less than 2 seconds.   Neurological:      Mental Status: He is alert and oriented to person, place, and time.      GCS: GCS eye subscore is 4. GCS verbal subscore is 5. GCS motor subscore is 6.   Psychiatric:         Attention and Perception: Attention normal.         Mood and Affect: Mood normal.         Behavior: Behavior normal. Behavior is cooperative.             Labs    Recent Labs     02/14/24  1454 02/15/24  1002 02/16/24  0421   WBC 13.7* 11.1* 7.0   RBC 3.62* 3.29* 3.13*   HEMOGLOBIN 11.1* 9.7* 9.3*   HEMATOCRIT 34.0* 29.7* 28.7*   MCV 93.9 90.3 91.7   MCH 30.7 29.5 29.7   MCHC 32.6 32.7 32.4   RDW 50.4* 48.3 49.1   PLATELETCT 582* 516* 543*   MPV 9.2 9.1 9.3     Recent Labs     02/14/24  1454 02/15/24  1002 02/16/24  0421   SODIUM 135 134* 134*   POTASSIUM 3.8 3.6 3.4*   CHLORIDE 96 97 97   CO2 21 22 23   GLUCOSE 114* 99 104*   BUN 16 9 8   CREATININE 0.86 0.74 0.63   CALCIUM 9.3 8.4* 8.5     Recent Labs     02/14/24  1454 02/15/24  1002  "02/16/24  0421   ALBUMIN 3.9  --  3.0*   TBILIRUBIN 0.6  --  0.5   ALKPHOSPHAT 830*  --  477*   TOTPROTEIN 8.1  --  6.6   ALTSGPT 94*  --  48   ASTSGOT 16  --  10*   CREATININE 0.86 0.74 0.63     CT-DRAIN-PERITONEAL   Final Result      1.  CT guided peritoneal fluid collection catheter drainage.   2.  The current plan is to obtain a follow-up CT in 5-7 days..        INR   Date Value Ref Range Status   02/15/2024 1.36 (H) 0.87 - 1.13 Final     Comment:     INR - Non-therapeutic Reference Range: 0.87-1.13  INR - Therapeutic Reference Range: 2.0-4.0       No results found for: \"POCINR\"     Intake/Output Summary (Last 24 hours) at 2/16/2024 1536  Last data filed at 2/16/2024 1532  Gross per 24 hour   Intake 570 ml   Output 200 ml   Net 370 ml      Labs not explicitly included in this progress note were reviewed by the author. Radiology/imaging not explicitly included in this progress note was reviewed by the author.     I have performed a physical exam and reviewed and updated ROS and Plan today (2/16/2024).     40 minutes in directly providing and coordinating care and extensive data review.  No time overlap and excludes procedures.    "

## 2024-02-16 NOTE — PROGRESS NOTES
Hospital Medicine Daily Progress Note    Date of Service  2/15/2024    Chief Complaint  Abelardo Fenton is a 38 y.o. male admitted 2/14/2024 with abdominal pain     Hospital Course  Abelardo Fenton is a 38 y.o. male who presented 2/14/2024 with epigastric pain abdominal/for the last few days.  He has had history of pancreatitis and pseudocyst in the past.  CT abdomen pelvis showing pancreatitis with large multifocal peripancreatic fluid collections within the largest component seen extending inferiorly from the pancreatic head into the mesentery into the upper pelvis measuring 18 x 16 x 12 cm in size, new multifocal areas of gas within those fluid collections with suggest presence of infective pseudocyst.  Patient transferred to [facility] for interventional radiology drainage.     Pt was febrile, tachycardic with elevated WBC. He was treated with fluids and started on antibiotics.     Interval Problem Update  Pt seen and examined, having abdominal pain but improved with pain meds.  Denies N/V   - vitals reviewed, elevated HR, febrile 101.2 overnight, tyelnol prn   - continue zosyn  - placed order for IR drain, drain placed today, follow up Cx results and blood cx  - supportive care with pain control     I have discussed this patient's plan of care and discharge plan at IDT rounds today with Case Management, Nursing, Nursing leadership, and other members of the IDT team.    Consultants/Specialty  IR    Code Status  Full Code    Disposition  The patient is not medically cleared for discharge to home or a post-acute facility.      I have placed the appropriate orders for post-discharge needs.    Review of Systems  Review of Systems   Constitutional:  Positive for chills, fever and malaise/fatigue.   Respiratory:  Negative for cough and shortness of breath.    Cardiovascular:  Negative for chest pain.   Gastrointestinal:  Positive for abdominal pain and nausea. Negative for constipation, diarrhea and  vomiting.   Genitourinary:  Negative for dysuria.   Psychiatric/Behavioral:  Negative for substance abuse.         Physical Exam  Temp:  [36.5 °C (97.7 °F)-39.6 °C (103.3 °F)] 36.6 °C (97.8 °F)  Pulse:  [] 100  Resp:  [16-25] 18  BP: ()/(52-88) 123/75  SpO2:  [92 %-98 %] 97 %    Physical Exam  Constitutional:       Appearance: He is normal weight. He is ill-appearing.   HENT:      Head: Normocephalic and atraumatic.      Mouth/Throat:      Mouth: Mucous membranes are dry.   Cardiovascular:      Rate and Rhythm: Regular rhythm. Tachycardia present.      Heart sounds: Normal heart sounds.   Pulmonary:      Breath sounds: Normal breath sounds.   Abdominal:      General: There is distension.      Tenderness: There is abdominal tenderness. There is no guarding or rebound.      Comments: Abdomen is firm and distended, epigastric fullness with palpable mass   Musculoskeletal:         General: Normal range of motion.   Skin:     General: Skin is warm.   Neurological:      Mental Status: He is alert and oriented to person, place, and time. Mental status is at baseline.   Psychiatric:         Mood and Affect: Mood normal.         Behavior: Behavior normal.         Fluids    Intake/Output Summary (Last 24 hours) at 2/15/2024 1720  Last data filed at 2/15/2024 1634  Gross per 24 hour   Intake --   Output 537 ml   Net -537 ml       Laboratory  Recent Labs     02/14/24  1454 02/15/24  1002   WBC 13.7* 11.1*   RBC 3.62* 3.29*   HEMOGLOBIN 11.1* 9.7*   HEMATOCRIT 34.0* 29.7*   MCV 93.9 90.3   MCH 30.7 29.5   MCHC 32.6 32.7   RDW 50.4* 48.3   PLATELETCT 582* 516*   MPV 9.2 9.1     Recent Labs     02/14/24  1454 02/15/24  1002   SODIUM 135 134*   POTASSIUM 3.8 3.6   CHLORIDE 96 97   CO2 21 22   GLUCOSE 114* 99   BUN 16 9   CREATININE 0.86 0.74   CALCIUM 9.3 8.4*     Recent Labs     02/15/24  1002   INR 1.36*               Imaging  CT-DRAIN-PERITONEAL   Final Result      1.  CT guided peritoneal fluid collection catheter  drainage.   2.  The current plan is to obtain a follow-up CT in 5-7 days..           Assessment/Plan  * Sepsis (HCC)- (present on admission)  Assessment & Plan  This is Sepsis Present on admission  SIRS criteria identified on my evaluation include: Fever, with temperature greater than 100.9 deg F, Tachycardia, with heart rate greater than 90 BPM, and Leukocytosis, with WBC greater than 12,000  Clinical indicators of end organ dysfunction include Lactic Acid greater than 2  Source is pancreatic pseudocyst  Sepsis protocol initiated  Crystalloid Fluid Administration: Fluid resuscitation ordered per standard protocol - 30 mL/kg per current or ideal body weight  IV antibiotics as appropriate for source of sepsis  Reassessment: I have reassessed the patient's hemodynamic status    Continue zosyn, IVF   De-escalate abx pending fluid cx results     Infected pseudocyst of pancreas  Assessment & Plan  CT AP demonstrating large multifocal peripancreatic fluid collections and new multifocal areas of gas within those fluid collections to suggest presence of infected pseudocyst.  Given ceftriaxone and Flagyl at outside facility  started on Zosyn, continue   IR drainage today, 417 ml of tan fluid removed and sent for Cx. Drain placed. Will need repeat scan in 5-7days   Spoke with diagnostic and interventional radiology, patient to   Supportive care with pain control   Diet as tolerated          VTE prophylaxis:    enoxaparin ppx      I have performed a physical exam and reviewed and updated ROS and Plan today (2/15/2024). In review of yesterday's note (2/14/2024), there are no changes except as documented above.    Greater than 51 minutes spent prepping to see patient (e.g. review of tests) obtaining and/or reviewing separately obtained history. Performing a medically appropriate examination and/ evaluation.  Counseling and educating the patient/family/caregiver.  Ordering medications, tests, or procedures.  Referring and  communicating with other health care professionals.  Documenting clinical information in EPIC.  Independently interpreting results and communicating results to patient/family/caregiver.  Care coordination.

## 2024-02-16 NOTE — PROGRESS NOTES
Hospital Medicine Daily Progress Note    Date of Service  2/16/2024    Chief Complaint  Abelardo Fenton is a 38 y.o. male admitted 2/14/2024 with abdominal pain     Hospital Course  Abelardo Fenton is a 38 y.o. male who presented 2/14/2024 with epigastric pain abdominal/for the last few days.  He has had history of pancreatitis and pseudocyst in the past.  CT abdomen pelvis showing pancreatitis with large multifocal peripancreatic fluid collections within the largest component seen extending inferiorly from the pancreatic head into the mesentery into the upper pelvis measuring 18 x 16 x 12 cm in size, new multifocal areas of gas within those fluid collections with suggest presence of infective pseudocyst.  Patient transferred to [facility] for interventional radiology drainage.     Pt was febrile, tachycardic with elevated WBC. He was treated with fluids and started on antibiotics.     Interval Problem Update  Pt seen and examined, having abdominal pain but improved with pain meds, does feel less distended s/p aspiration and drain placement. Tolerating diet without increasing pain   - vitals reviewed and stable. Has been afebrile x 24 hours.   - copious brown/purulent output, Cx thus far GPC and GNR, conitnue zosyn and de-escalate pending cx consults   - supportive care with pain control, encourage ambulation and IS   - bowel protocol  - potassium low, replaced     - monitor drain output, plan to re-scan in 5-7 days pending clinica course and drain output     I have discussed this patient's plan of care and discharge plan at IDT rounds today with Case Management, Nursing, Nursing leadership, and other members of the IDT team.    Consultants/Specialty  IR    Code Status  Full Code    Disposition  The patient is not medically cleared for discharge to home or a post-acute facility.      I have placed the appropriate orders for post-discharge needs.    Review of Systems  Review of Systems    Constitutional:  Positive for malaise/fatigue. Negative for chills and fever.   Respiratory:  Negative for cough and shortness of breath.    Cardiovascular:  Negative for chest pain.   Gastrointestinal:  Positive for abdominal pain. Negative for constipation, diarrhea, nausea and vomiting.   Genitourinary:  Negative for dysuria.   Psychiatric/Behavioral:  Negative for substance abuse.         Physical Exam  Temp:  [36.4 °C (97.5 °F)-36.7 °C (98 °F)] 36.7 °C (98 °F)  Pulse:  [] 88  Resp:  [17-20] 17  BP: (117-128)/(59-75) 117/71  SpO2:  [92 %-98 %] 97 %    Physical Exam  Constitutional:       Appearance: He is normal weight. He is not ill-appearing.   HENT:      Head: Normocephalic and atraumatic.      Mouth/Throat:      Mouth: Mucous membranes are moist.   Eyes:      General: No scleral icterus.     Conjunctiva/sclera: Conjunctivae normal.   Cardiovascular:      Rate and Rhythm: Normal rate and regular rhythm.      Heart sounds: Normal heart sounds.   Pulmonary:      Breath sounds: Normal breath sounds.   Abdominal:      General: There is distension.      Tenderness: There is abdominal tenderness. There is no guarding or rebound.      Comments: Abdomen is firm and distended, epigastric fullness with palpable mass present but does appears to be decreasing with drainage    Musculoskeletal:         General: Normal range of motion.   Skin:     General: Skin is warm.   Neurological:      Mental Status: He is alert and oriented to person, place, and time. Mental status is at baseline.   Psychiatric:         Mood and Affect: Mood normal.         Behavior: Behavior normal.         Fluids    Intake/Output Summary (Last 24 hours) at 2/16/2024 1402  Last data filed at 2/16/2024 1222  Gross per 24 hour   Intake 330 ml   Output 270 ml   Net 60 ml       Laboratory  Recent Labs     02/14/24  1454 02/15/24  1002 02/16/24  0421   WBC 13.7* 11.1* 7.0   RBC 3.62* 3.29* 3.13*   HEMOGLOBIN 11.1* 9.7* 9.3*   HEMATOCRIT 34.0*  29.7* 28.7*   MCV 93.9 90.3 91.7   MCH 30.7 29.5 29.7   MCHC 32.6 32.7 32.4   RDW 50.4* 48.3 49.1   PLATELETCT 582* 516* 543*   MPV 9.2 9.1 9.3     Recent Labs     02/14/24  1454 02/15/24  1002 02/16/24  0421   SODIUM 135 134* 134*   POTASSIUM 3.8 3.6 3.4*   CHLORIDE 96 97 97   CO2 21 22 23   GLUCOSE 114* 99 104*   BUN 16 9 8   CREATININE 0.86 0.74 0.63   CALCIUM 9.3 8.4* 8.5     Recent Labs     02/15/24  1002   INR 1.36*               Imaging  CT-DRAIN-PERITONEAL   Final Result      1.  CT guided peritoneal fluid collection catheter drainage.   2.  The current plan is to obtain a follow-up CT in 5-7 days..           Assessment/Plan  * Sepsis (HCC)- (present on admission)  Assessment & Plan  This is Sepsis Present on admission  SIRS criteria identified on my evaluation include: Fever, with temperature greater than 100.9 deg F, Tachycardia, with heart rate greater than 90 BPM, and Leukocytosis, with WBC greater than 12,000  Clinical indicators of end organ dysfunction include Lactic Acid greater than 2  Source is pancreatic pseudocyst  Sepsis protocol initiated  Crystalloid Fluid Administration: Fluid resuscitation ordered per standard protocol - 30 mL/kg per current or ideal body weight  IV antibiotics as appropriate for source of sepsis  Reassessment: I have reassessed the patient's hemodynamic status    Continue zosyn  I have stopped IVF   De-escalate abx pending fluid cx results, thus far GPC/GNR    Infected pseudocyst of pancreas  Assessment & Plan  CT AP demonstrating large multifocal peripancreatic fluid collections and new multifocal areas of gas within those fluid collections to suggest presence of infected pseudocyst.  Given ceftriaxone and Flagyl at outside facility  started on Zosyn, continue   IR drainage 2/15 417 ml of tan fluid removed and sent for Cx. Drain placed. Will need repeat scan in 5-7days   Monitor ouput, copious brown/purulent drainage ~570 overnight   Supportive care with pain control    Diet as tolerated     Hypertension- (present on admission)  Assessment & Plan  Stable, resume home blood pressure mediations   Monitor     Hypokalemia- (present on admission)  Assessment & Plan  Low K, replacement  Monitor and replete as needed         VTE prophylaxis:   SCDs/TEDs      I have performed a physical exam and reviewed and updated ROS and Plan today (2/16/2024). In review of yesterday's note (2/15/2024), there are no changes except as documented above.

## 2024-02-16 NOTE — PROGRESS NOTES
Pt alert/oriented x4, medicated for abdominal/back pain. Tolerating IV abx, no adverse rxn noted.Call light within reach, personal belongings available, bed in lowest position, treaded socks on, and hourly rounding in place.

## 2024-02-17 PROBLEM — K83.8 DILATION OF BILIARY TRACT: Status: ACTIVE | Noted: 2024-02-17

## 2024-02-17 PROBLEM — D64.9 ANEMIA: Status: ACTIVE | Noted: 2024-02-17

## 2024-02-17 LAB
ANION GAP SERPL CALC-SCNC: 15 MMOL/L (ref 7–16)
BACTERIA WND AEROBE CULT: ABNORMAL
BACTERIA WND AEROBE CULT: ABNORMAL
BUN SERPL-MCNC: 6 MG/DL (ref 8–22)
CALCIUM SERPL-MCNC: 8.6 MG/DL (ref 8.5–10.5)
CHLORIDE SERPL-SCNC: 97 MMOL/L (ref 96–112)
CO2 SERPL-SCNC: 22 MMOL/L (ref 20–33)
CREAT SERPL-MCNC: 0.74 MG/DL (ref 0.5–1.4)
GFR SERPLBLD CREATININE-BSD FMLA CKD-EPI: 120 ML/MIN/1.73 M 2
GLUCOSE SERPL-MCNC: 108 MG/DL (ref 65–99)
GRAM STN SPEC: ABNORMAL
MAGNESIUM SERPL-MCNC: 1.6 MG/DL (ref 1.5–2.5)
PHOSPHATE SERPL-MCNC: 2.3 MG/DL (ref 2.5–4.5)
POTASSIUM SERPL-SCNC: 3.7 MMOL/L (ref 3.6–5.5)
SIGNIFICANT IND 70042: ABNORMAL
SITE SITE: ABNORMAL
SODIUM SERPL-SCNC: 134 MMOL/L (ref 135–145)
SOURCE SOURCE: ABNORMAL

## 2024-02-17 PROCEDURE — 700111 HCHG RX REV CODE 636 W/ 250 OVERRIDE (IP): Performed by: STUDENT IN AN ORGANIZED HEALTH CARE EDUCATION/TRAINING PROGRAM

## 2024-02-17 PROCEDURE — 36415 COLL VENOUS BLD VENIPUNCTURE: CPT

## 2024-02-17 PROCEDURE — 84100 ASSAY OF PHOSPHORUS: CPT

## 2024-02-17 PROCEDURE — 700102 HCHG RX REV CODE 250 W/ 637 OVERRIDE(OP): Performed by: STUDENT IN AN ORGANIZED HEALTH CARE EDUCATION/TRAINING PROGRAM

## 2024-02-17 PROCEDURE — A9270 NON-COVERED ITEM OR SERVICE: HCPCS | Performed by: STUDENT IN AN ORGANIZED HEALTH CARE EDUCATION/TRAINING PROGRAM

## 2024-02-17 PROCEDURE — 51798 US URINE CAPACITY MEASURE: CPT

## 2024-02-17 PROCEDURE — 700105 HCHG RX REV CODE 258: Performed by: STUDENT IN AN ORGANIZED HEALTH CARE EDUCATION/TRAINING PROGRAM

## 2024-02-17 PROCEDURE — 770006 HCHG ROOM/CARE - MED/SURG/GYN SEMI*

## 2024-02-17 PROCEDURE — 700111 HCHG RX REV CODE 636 W/ 250 OVERRIDE (IP): Mod: JZ | Performed by: STUDENT IN AN ORGANIZED HEALTH CARE EDUCATION/TRAINING PROGRAM

## 2024-02-17 PROCEDURE — 99233 SBSQ HOSP IP/OBS HIGH 50: CPT | Performed by: STUDENT IN AN ORGANIZED HEALTH CARE EDUCATION/TRAINING PROGRAM

## 2024-02-17 PROCEDURE — 80048 BASIC METABOLIC PNL TOTAL CA: CPT

## 2024-02-17 PROCEDURE — 700101 HCHG RX REV CODE 250: Performed by: STUDENT IN AN ORGANIZED HEALTH CARE EDUCATION/TRAINING PROGRAM

## 2024-02-17 PROCEDURE — 83735 ASSAY OF MAGNESIUM: CPT

## 2024-02-17 RX ORDER — OXYCODONE HYDROCHLORIDE 10 MG/1
10 TABLET ORAL
Status: DISCONTINUED | OUTPATIENT
Start: 2024-02-17 | End: 2024-02-18

## 2024-02-17 RX ORDER — LIDOCAINE 4 G/G
1 PATCH TOPICAL DAILY
Status: DISCONTINUED | OUTPATIENT
Start: 2024-02-17 | End: 2024-02-21 | Stop reason: HOSPADM

## 2024-02-17 RX ORDER — HYDROMORPHONE HYDROCHLORIDE 1 MG/ML
1 INJECTION, SOLUTION INTRAMUSCULAR; INTRAVENOUS; SUBCUTANEOUS EVERY 4 HOURS PRN
Status: DISCONTINUED | OUTPATIENT
Start: 2024-02-17 | End: 2024-02-18

## 2024-02-17 RX ORDER — HYDROMORPHONE HYDROCHLORIDE 1 MG/ML
0.5 INJECTION, SOLUTION INTRAMUSCULAR; INTRAVENOUS; SUBCUTANEOUS ONCE
Status: DISPENSED | OUTPATIENT
Start: 2024-02-17 | End: 2024-02-18

## 2024-02-17 RX ORDER — OXYCODONE HYDROCHLORIDE 5 MG/1
5 TABLET ORAL
Status: DISCONTINUED | OUTPATIENT
Start: 2024-02-17 | End: 2024-02-18

## 2024-02-17 RX ORDER — OMEPRAZOLE 20 MG/1
20 CAPSULE, DELAYED RELEASE ORAL DAILY
Status: DISCONTINUED | OUTPATIENT
Start: 2024-02-17 | End: 2024-02-21 | Stop reason: HOSPADM

## 2024-02-17 RX ADMIN — HYDROMORPHONE HYDROCHLORIDE 1 MG: 1 INJECTION, SOLUTION INTRAMUSCULAR; INTRAVENOUS; SUBCUTANEOUS at 10:12

## 2024-02-17 RX ADMIN — AMLODIPINE BESYLATE 5 MG: 5 TABLET ORAL at 04:14

## 2024-02-17 RX ADMIN — ACETAMINOPHEN 1000 MG: 500 TABLET ORAL at 00:45

## 2024-02-17 RX ADMIN — METOPROLOL TARTRATE 25 MG: 25 TABLET, FILM COATED ORAL at 04:14

## 2024-02-17 RX ADMIN — OXYCODONE HYDROCHLORIDE 10 MG: 10 TABLET ORAL at 01:17

## 2024-02-17 RX ADMIN — OXYCODONE HYDROCHLORIDE 10 MG: 10 TABLET ORAL at 07:46

## 2024-02-17 RX ADMIN — ACETAMINOPHEN 1000 MG: 500 TABLET ORAL at 23:54

## 2024-02-17 RX ADMIN — OXYCODONE HYDROCHLORIDE 10 MG: 10 TABLET ORAL at 17:54

## 2024-02-17 RX ADMIN — PIPERACILLIN AND TAZOBACTAM 3.38 G: 3; .375 INJECTION, POWDER, FOR SOLUTION INTRAVENOUS at 04:21

## 2024-02-17 RX ADMIN — ACETAMINOPHEN 1000 MG: 500 TABLET ORAL at 17:54

## 2024-02-17 RX ADMIN — METOPROLOL TARTRATE 25 MG: 25 TABLET, FILM COATED ORAL at 17:54

## 2024-02-17 RX ADMIN — DIBASIC SODIUM PHOSPHATE, MONOBASIC POTASSIUM PHOSPHATE AND MONOBASIC SODIUM PHOSPHATE 250 MG: 852; 155; 130 TABLET ORAL at 17:55

## 2024-02-17 RX ADMIN — OMEPRAZOLE 20 MG: 20 CAPSULE, DELAYED RELEASE ORAL at 13:35

## 2024-02-17 RX ADMIN — HYDROMORPHONE HYDROCHLORIDE 0.5 MG: 1 INJECTION, SOLUTION INTRAMUSCULAR; INTRAVENOUS; SUBCUTANEOUS at 04:14

## 2024-02-17 RX ADMIN — LIDOCAINE 1 PATCH: 4 PATCH TOPICAL at 10:25

## 2024-02-17 RX ADMIN — HYDROMORPHONE HYDROCHLORIDE 1 MG: 1 INJECTION, SOLUTION INTRAMUSCULAR; INTRAVENOUS; SUBCUTANEOUS at 14:55

## 2024-02-17 RX ADMIN — ACETAMINOPHEN 1000 MG: 500 TABLET ORAL at 07:46

## 2024-02-17 RX ADMIN — DIBASIC SODIUM PHOSPHATE, MONOBASIC POTASSIUM PHOSPHATE AND MONOBASIC SODIUM PHOSPHATE 250 MG: 852; 155; 130 TABLET ORAL at 10:25

## 2024-02-17 RX ADMIN — Medication 100 MG: at 04:14

## 2024-02-17 RX ADMIN — CEFAZOLIN 2 G: 10 INJECTION, POWDER, FOR SOLUTION INTRAVENOUS at 13:58

## 2024-02-17 RX ADMIN — HYDROMORPHONE HYDROCHLORIDE 1 MG: 1 INJECTION, SOLUTION INTRAMUSCULAR; INTRAVENOUS; SUBCUTANEOUS at 19:16

## 2024-02-17 RX ADMIN — OXYCODONE HYDROCHLORIDE 10 MG: 10 TABLET ORAL at 13:35

## 2024-02-17 RX ADMIN — OXYCODONE HYDROCHLORIDE 10 MG: 10 TABLET ORAL at 21:41

## 2024-02-17 RX ADMIN — ACETAMINOPHEN 1000 MG: 500 TABLET ORAL at 13:35

## 2024-02-17 RX ADMIN — HYDROMORPHONE HYDROCHLORIDE 1 MG: 1 INJECTION, SOLUTION INTRAMUSCULAR; INTRAVENOUS; SUBCUTANEOUS at 23:54

## 2024-02-17 RX ADMIN — CEFAZOLIN 2 G: 10 INJECTION, POWDER, FOR SOLUTION INTRAVENOUS at 21:30

## 2024-02-17 ASSESSMENT — ENCOUNTER SYMPTOMS
ABDOMINAL PAIN: 1
CHILLS: 0
VOMITING: 0
COUGH: 0
NAUSEA: 0
CONSTIPATION: 0
DIARRHEA: 0
FEVER: 0
SHORTNESS OF BREATH: 0

## 2024-02-17 ASSESSMENT — PAIN DESCRIPTION - PAIN TYPE
TYPE: ACUTE PAIN

## 2024-02-17 ASSESSMENT — LIFESTYLE VARIABLES: SUBSTANCE_ABUSE: 0

## 2024-02-17 ASSESSMENT — FIBROSIS 4 INDEX: FIB4 SCORE: 0.1

## 2024-02-17 NOTE — PROGRESS NOTES
Pt alert/oriented x4, medicated for back/abdominal pain per MAR. IV abx administered, no adverse rxn noted. PALAK drain flushed per orders, brown drainage noted, emptied as needed. Call light within reach, personal belongings available, bed in lowest position, treaded socks on, and hourly rounding in place.

## 2024-02-17 NOTE — PROGRESS NOTES
Hospital Medicine Daily Progress Note    Date of Service  2/17/2024    Chief Complaint  Abelardo Fenton is a 38 y.o. male admitted 2/14/2024 with abdominal pain     Hospital Course  Abelardo Fenton is a 38 y.o. male who presented 2/14/2024 with epigastric pain abdominal/for the last few days.  He has had history of pancreatitis and pseudocyst in the past.  CT abdomen pelvis showing pancreatitis with large multifocal peripancreatic fluid collections within the largest component seen extending inferiorly from the pancreatic head into the mesentery into the upper pelvis measuring 18 x 16 x 12 cm in size, new multifocal areas of gas within those fluid collections with suggest presence of infective pseudocyst.  Patient transferred to [facility] for interventional radiology drainage.     Pt was febrile, tachycardic with elevated WBC. He was treated with fluids and started on antibiotics.     Interval Problem Update  Pt seen and examined, having increased abdominal pain today in area of drain and wrapping around the Rt upper quadrant and flank region. Abdomen remains distended but unchanged otherwise. Still with large output of PALAK drain output   - I have adjusted his pain regiment and topical lidocaine patch as well, labs and vitals stable. If any worsening or changes in exam/labs will repeat imaging   - I have discussed case with hepatobiliary surgeon Dr. KERNS , imaging reviewed, will evaluate the patient. Continue current plan of care   - vitals reviewed and stable. Has been afebrile x 48 hours.   -  Cx with pan-sensitive E.coli, I have stopped zosyn and started ancef.   - supportive care with pain control, encourage ambulation and IS   - bowel protocol  - phos low, replaced     - monitor drain output, plan to re-scan in 5-7 days pending clinica course and drain output     I have discussed this patient's plan of care and discharge plan at IDT rounds today with Case Management, Nursing, Nursing leadership,  and other members of the IDT team.    Consultants/Specialty  IR    Code Status  Full Code    Disposition  The patient is not medically cleared for discharge to home or a post-acute facility.      I have placed the appropriate orders for post-discharge needs.    Review of Systems  Review of Systems   Constitutional:  Positive for malaise/fatigue. Negative for chills and fever.   Respiratory:  Negative for cough and shortness of breath.    Cardiovascular:  Negative for chest pain.   Gastrointestinal:  Positive for abdominal pain. Negative for constipation, diarrhea, nausea and vomiting.   Genitourinary:  Negative for dysuria.   Psychiatric/Behavioral:  Negative for substance abuse.         Physical Exam  Temp:  [36.1 °C (97 °F)-37 °C (98.6 °F)] 36.2 °C (97.1 °F)  Pulse:  [] 80  Resp:  [16-20] 18  BP: (118-129)/(66-77) 125/74  SpO2:  [93 %-98 %] 97 %    Physical Exam  Constitutional:       Appearance: He is normal weight. He is not ill-appearing.   HENT:      Head: Normocephalic and atraumatic.      Mouth/Throat:      Mouth: Mucous membranes are moist.   Eyes:      General: No scleral icterus.     Conjunctiva/sclera: Conjunctivae normal.   Cardiovascular:      Rate and Rhythm: Normal rate and regular rhythm.      Heart sounds: Normal heart sounds.   Pulmonary:      Breath sounds: Normal breath sounds.   Abdominal:      General: There is distension.      Tenderness: There is abdominal tenderness. There is no guarding or rebound.      Comments: Abdomen is firm and distended, epigastric fullness with palpable mass present, PALAK drain with purulent brown drainage    Musculoskeletal:         General: Normal range of motion.   Skin:     General: Skin is warm.   Neurological:      Mental Status: He is alert and oriented to person, place, and time. Mental status is at baseline.   Psychiatric:         Mood and Affect: Mood normal.         Behavior: Behavior normal.         Fluids    Intake/Output Summary (Last 24 hours) at  2/17/2024 1306  Last data filed at 2/17/2024 1034  Gross per 24 hour   Intake 730 ml   Output 343 ml   Net 387 ml       Laboratory  Recent Labs     02/14/24  1454 02/15/24  1002 02/16/24  0421   WBC 13.7* 11.1* 7.0   RBC 3.62* 3.29* 3.13*   HEMOGLOBIN 11.1* 9.7* 9.3*   HEMATOCRIT 34.0* 29.7* 28.7*   MCV 93.9 90.3 91.7   MCH 30.7 29.5 29.7   MCHC 32.6 32.7 32.4   RDW 50.4* 48.3 49.1   PLATELETCT 582* 516* 543*   MPV 9.2 9.1 9.3     Recent Labs     02/15/24  1002 02/16/24  0421 02/17/24  0103   SODIUM 134* 134* 134*   POTASSIUM 3.6 3.4* 3.7   CHLORIDE 97 97 97   CO2 22 23 22   GLUCOSE 99 104* 108*   BUN 9 8 6*   CREATININE 0.74 0.63 0.74   CALCIUM 8.4* 8.5 8.6     Recent Labs     02/15/24  1002   INR 1.36*               Imaging  CT-DRAIN-PERITONEAL   Final Result      1.  CT guided peritoneal fluid collection catheter drainage.   2.  The current plan is to obtain a follow-up CT in 5-7 days..           Assessment/Plan  * Sepsis (HCC)- (present on admission)  Assessment & Plan  This is Sepsis Present on admission  SIRS criteria identified on my evaluation include: Fever, with temperature greater than 100.9 deg F, Tachycardia, with heart rate greater than 90 BPM, and Leukocytosis, with WBC greater than 12,000  Clinical indicators of end organ dysfunction include Lactic Acid greater than 2  Source is pancreatic pseudocyst  Sepsis protocol initiated  Crystalloid Fluid Administration: Fluid resuscitation ordered per standard protocol - 30 mL/kg per current or ideal body weight  IV antibiotics as appropriate for source of sepsis  Reassessment: I have reassessed the patient's hemodynamic status    Stop zosyn, start ancef (Cx with ecoli)   I have stopped IVF     Anemia  Assessment & Plan  Anemic since December, progressively worsening   Will start PPI therapy   Check Iron panel and B12  Monitor H/H, no signs of active bleeding     Dilation of biliary tract  Assessment & Plan  New dilation of biliary tract, possible obstruction  due to psudeocyst, pt does have increasing pain today however his labs and T.bili are stable and normal which argues against biliary obstruction  Discussed case with hepatobiliary surgeon Dr. KERNS, imaging reviewed, no new recs, will evaluate the patient   Supportive care with pain control     Infected pseudocyst of pancreas  Assessment & Plan  CT AP demonstrating large multifocal peripancreatic fluid collections and new multifocal areas of gas within those fluid collections to suggest presence of infected pseudocyst.  Given ceftriaxone and Flagyl at outside facility  started on Zosyn, Cx with pan sensitive E.coli, stop de-escalate to Ancef   IR drainage 2/15 417 ml of tan fluid removed and sent for Cx. Drain placed. Will need repeat scan in 5-7days   Monitor ouput, copious brown/purulent drainage ~570 overnight   Supportive care with pain control   Diet as tolerated     Hypertension- (present on admission)  Assessment & Plan  Stable, resume home blood pressure mediations   Monitor     Hypokalemia- (present on admission)  Assessment & Plan  Low K, replacement  Monitor and replete as needed         VTE prophylaxis:   SCDs/TEDs      I have performed a physical exam and reviewed and updated ROS and Plan today (2/17/2024). In review of yesterday's note (2/16/2024), there are no changes except as documented above.    Greater than 51 minutes spent prepping to see patient (e.g. review of tests) obtaining and/or reviewing separately obtained history. Performing a medically appropriate examination and/ evaluation.  Counseling and educating the patient/family/caregiver.  Ordering medications, tests, or procedures.  Referring and communicating with other health care professionals.  Documenting clinical information in EPIC.  Independently interpreting results and communicating results to patient/family/caregiver.  Care coordination.

## 2024-02-17 NOTE — ASSESSMENT & PLAN NOTE
New dilation of biliary tract, possible obstruction due to psudeocyst, pt does have RUQ and epigastric pain  however his labs and T.bili are stable and normal which argues against biliary obstruction  Discussed case with hepatobiliary surgeon Dr. KERNS, imaging reviewed, no new recs, diet okay, repeat scan on Tuesday, continue Abx  Supportive care with pain control   Repeat CT imaging on 2/20 noted slightly decreased now 13 x 10 cm, formally 18 x 16 x 12 cm - large pancreatic fluid collection/pseudocyst.

## 2024-02-17 NOTE — CARE PLAN
The patient is Watcher - Medium risk of patient condition declining or worsening    Shift Goals  Clinical Goals: pain management throughout shift  Patient Goals: stay ahead of pain  Family Goals: GRANT    Progress made toward(s) clinical / shift goals:        Patient is not progressing towards the following goals:      Problem: Pain - Standard  Goal: Alleviation of pain or a reduction in pain to the patient’s comfort goal  Outcome: Not Progressing     Pain continues to be 10/10 with no relief from oxy 10mg and IV dilaudid. Hospitalist contacted, x1 dose dilaudid 0.5mg order received. Monitor for abdominal firmness.

## 2024-02-17 NOTE — PROGRESS NOTES
Bedside report from night RN, pt care assumed. VSS on RA, pt assessment complete. Pt A&Ox4,  c/o 10/10 pain at this time PRN medication given. POC discussed with pt and verbalizes no questions. Pt denies any additional needs at this time. Bed locked and in lowest position. Pt educated on fall risk and verbalized understanding, call light within reach, hourly rounding initiated.     Patient stated he is having troubles urination due to pain, bladder scan completed 140mL and PRN medication given. Patient able to urinate and stand up after the PRN medication started to work he stated.

## 2024-02-17 NOTE — ASSESSMENT & PLAN NOTE
Anemic since December, progressively worsening   Started on PPI therapy   Iron panel with iron deficiency, elevated B12 and ferritin, hold on IV iron replacement for now   Monitor H/H, no signs of active bleeding

## 2024-02-17 NOTE — DOCUMENTATION QUERY
"                                                                         [facility]                                                                       Query Response Note      PATIENT:               TATI VASQUEZ  ACCT #:                  [id number]  MRN:                     [id number]  :                      1985  ADMIT DATE:       2024 9:46 PM  DISCH DATE:          RESPONDING  PROVIDER #:        [id number]           QUERY TEXT:    Based upon your judgment and the above clinical indicators, please select the most appropriate diagnosis for the findings.    Note: If you agree with a diagnosis listed above, please remember to include it in your concurrent daily documentation and onto the Discharge Summary.      The patient's clinical indicators include:  38 year old male admitted with a pancreatic pseudocyst.    2/15 RD Consult Ilir  \" Malnutrition Risk: Pt meets ASPEN criteria for severe chronic disease related malnutrition related to  pancreatitis and pseudocyst as evidenced by minimal PO with severe wt loss over past 6 weeks per pt report as well as severe fat loss and severe muscle  loss per physical exam.\"  \"Hollowing at temple, very little fat to buccal area, very little fat to upper arm, squared shoulders, very little roundness/firmness to calf, depression along inner thigh, very little muscle around patella.\"    Risk factors: pancreatic pseudocyst  Treatment: Labs, RD consult, Ensure Plus, monitor for refeeding, monitor weight, document intake    If you have any questions, please contact:  Meena Nunez RN CDI [facility]  [email or web address]  Meena Nunez [address]  Options provided:   -- Severe protein-calorie malnutrition is a valid diagnosis treated this admission   -- Severe malnutrition, ruled out   -- Moderate malnutrition   -- Mild malnutrition   -- Other explanation, please specify   -- Unable to determine      Query created by: Meena Nunez on 2024 2:41 " PM    RESPONSE TEXT:    Severe protein-calorie malnutrition is a valid diagnosis treated this admission          Electronically signed by:  BRYNN WYATT 2/16/2024 5:11 PM

## 2024-02-18 LAB
ALBUMIN SERPL BCP-MCNC: 3.2 G/DL (ref 3.2–4.9)
ALBUMIN/GLOB SERPL: 0.9 G/DL
ALP SERPL-CCNC: 407 U/L (ref 30–99)
ALT SERPL-CCNC: 33 U/L (ref 2–50)
ANION GAP SERPL CALC-SCNC: 12 MMOL/L (ref 7–16)
AST SERPL-CCNC: 17 U/L (ref 12–45)
BACTERIA SPEC ANAEROBE CULT: NORMAL
BASOPHILS # BLD AUTO: 0.8 % (ref 0–1.8)
BASOPHILS # BLD: 0.07 K/UL (ref 0–0.12)
BILIRUB SERPL-MCNC: 0.3 MG/DL (ref 0.1–1.5)
BUN SERPL-MCNC: 5 MG/DL (ref 8–22)
CALCIUM ALBUM COR SERPL-MCNC: 9.5 MG/DL (ref 8.5–10.5)
CALCIUM SERPL-MCNC: 8.9 MG/DL (ref 8.5–10.5)
CHLORIDE SERPL-SCNC: 98 MMOL/L (ref 96–112)
CO2 SERPL-SCNC: 24 MMOL/L (ref 20–33)
CREAT SERPL-MCNC: 0.75 MG/DL (ref 0.5–1.4)
EOSINOPHIL # BLD AUTO: 0.34 K/UL (ref 0–0.51)
EOSINOPHIL NFR BLD: 3.7 % (ref 0–6.9)
ERYTHROCYTE [DISTWIDTH] IN BLOOD BY AUTOMATED COUNT: 48.9 FL (ref 35.9–50)
FERRITIN SERPL-MCNC: 715 NG/ML (ref 22–322)
GFR SERPLBLD CREATININE-BSD FMLA CKD-EPI: 119 ML/MIN/1.73 M 2
GLOBULIN SER CALC-MCNC: 3.6 G/DL (ref 1.9–3.5)
GLUCOSE SERPL-MCNC: 108 MG/DL (ref 65–99)
HCT VFR BLD AUTO: 30.2 % (ref 42–52)
HGB BLD-MCNC: 9.9 G/DL (ref 14–18)
HGB RETIC QN AUTO: 27 PG/CELL (ref 29–35)
IMM GRANULOCYTES # BLD AUTO: 0.1 K/UL (ref 0–0.11)
IMM GRANULOCYTES NFR BLD AUTO: 1.1 % (ref 0–0.9)
IMM RETICS NFR: 19.8 % (ref 2.6–16.1)
IRON SATN MFR SERPL: 15 % (ref 15–55)
IRON SERPL-MCNC: 19 UG/DL (ref 50–180)
LYMPHOCYTES # BLD AUTO: 1.4 K/UL (ref 1–4.8)
LYMPHOCYTES NFR BLD: 15.1 % (ref 22–41)
MAGNESIUM SERPL-MCNC: 1.9 MG/DL (ref 1.5–2.5)
MCH RBC QN AUTO: 29.6 PG (ref 27–33)
MCHC RBC AUTO-ENTMCNC: 32.8 G/DL (ref 32.3–36.5)
MCV RBC AUTO: 90.4 FL (ref 81.4–97.8)
MONOCYTES # BLD AUTO: 1.46 K/UL (ref 0–0.85)
MONOCYTES NFR BLD AUTO: 15.7 % (ref 0–13.4)
NEUTROPHILS # BLD AUTO: 5.91 K/UL (ref 1.82–7.42)
NEUTROPHILS NFR BLD: 63.6 % (ref 44–72)
NRBC # BLD AUTO: 0 K/UL
NRBC BLD-RTO: 0 /100 WBC (ref 0–0.2)
PHOSPHATE SERPL-MCNC: 5.6 MG/DL (ref 2.5–4.5)
PLATELET # BLD AUTO: 693 K/UL (ref 164–446)
PMV BLD AUTO: 8.9 FL (ref 9–12.9)
POTASSIUM SERPL-SCNC: 3.9 MMOL/L (ref 3.6–5.5)
PROT SERPL-MCNC: 6.8 G/DL (ref 6–8.2)
RBC # BLD AUTO: 3.34 M/UL (ref 4.7–6.1)
RETICS # AUTO: 0.04 M/UL (ref 0.04–0.12)
RETICS/RBC NFR: 1.1 % (ref 0.8–2.6)
SIGNIFICANT IND 70042: NORMAL
SITE SITE: NORMAL
SODIUM SERPL-SCNC: 134 MMOL/L (ref 135–145)
SOURCE SOURCE: NORMAL
TIBC SERPL-MCNC: 127 UG/DL (ref 250–450)
UIBC SERPL-MCNC: 108 UG/DL (ref 110–370)
VIT B12 SERPL-MCNC: 1828 PG/ML (ref 211–911)
WBC # BLD AUTO: 9.3 K/UL (ref 4.8–10.8)

## 2024-02-18 PROCEDURE — 85025 COMPLETE CBC W/AUTO DIFF WBC: CPT

## 2024-02-18 PROCEDURE — 99233 SBSQ HOSP IP/OBS HIGH 50: CPT | Performed by: STUDENT IN AN ORGANIZED HEALTH CARE EDUCATION/TRAINING PROGRAM

## 2024-02-18 PROCEDURE — A9270 NON-COVERED ITEM OR SERVICE: HCPCS | Performed by: STUDENT IN AN ORGANIZED HEALTH CARE EDUCATION/TRAINING PROGRAM

## 2024-02-18 PROCEDURE — 700102 HCHG RX REV CODE 250 W/ 637 OVERRIDE(OP): Performed by: STUDENT IN AN ORGANIZED HEALTH CARE EDUCATION/TRAINING PROGRAM

## 2024-02-18 PROCEDURE — 82728 ASSAY OF FERRITIN: CPT

## 2024-02-18 PROCEDURE — 700105 HCHG RX REV CODE 258: Performed by: STUDENT IN AN ORGANIZED HEALTH CARE EDUCATION/TRAINING PROGRAM

## 2024-02-18 PROCEDURE — 83735 ASSAY OF MAGNESIUM: CPT

## 2024-02-18 PROCEDURE — 83550 IRON BINDING TEST: CPT

## 2024-02-18 PROCEDURE — 770006 HCHG ROOM/CARE - MED/SURG/GYN SEMI*

## 2024-02-18 PROCEDURE — 700111 HCHG RX REV CODE 636 W/ 250 OVERRIDE (IP): Performed by: STUDENT IN AN ORGANIZED HEALTH CARE EDUCATION/TRAINING PROGRAM

## 2024-02-18 PROCEDURE — 700101 HCHG RX REV CODE 250: Performed by: STUDENT IN AN ORGANIZED HEALTH CARE EDUCATION/TRAINING PROGRAM

## 2024-02-18 PROCEDURE — 83540 ASSAY OF IRON: CPT

## 2024-02-18 PROCEDURE — 84100 ASSAY OF PHOSPHORUS: CPT

## 2024-02-18 PROCEDURE — 82607 VITAMIN B-12: CPT

## 2024-02-18 PROCEDURE — 80053 COMPREHEN METABOLIC PANEL: CPT

## 2024-02-18 PROCEDURE — 99222 1ST HOSP IP/OBS MODERATE 55: CPT | Performed by: NURSE PRACTITIONER

## 2024-02-18 PROCEDURE — 85046 RETICYTE/HGB CONCENTRATE: CPT

## 2024-02-18 PROCEDURE — 36415 COLL VENOUS BLD VENIPUNCTURE: CPT

## 2024-02-18 RX ORDER — OXYCODONE HYDROCHLORIDE 15 MG/1
15 TABLET ORAL
Status: DISCONTINUED | OUTPATIENT
Start: 2024-02-18 | End: 2024-02-21 | Stop reason: HOSPADM

## 2024-02-18 RX ORDER — AMOXICILLIN 250 MG
2 CAPSULE ORAL EVERY EVENING
Status: DISCONTINUED | OUTPATIENT
Start: 2024-02-18 | End: 2024-02-21 | Stop reason: HOSPADM

## 2024-02-18 RX ORDER — POLYETHYLENE GLYCOL 3350 17 G/17G
1 POWDER, FOR SOLUTION ORAL
Status: DISCONTINUED | OUTPATIENT
Start: 2024-02-18 | End: 2024-02-21 | Stop reason: HOSPADM

## 2024-02-18 RX ORDER — HYDROMORPHONE HYDROCHLORIDE 1 MG/ML
1 INJECTION, SOLUTION INTRAMUSCULAR; INTRAVENOUS; SUBCUTANEOUS EVERY 4 HOURS PRN
Status: DISCONTINUED | OUTPATIENT
Start: 2024-02-18 | End: 2024-02-20

## 2024-02-18 RX ORDER — OXYCODONE HYDROCHLORIDE 10 MG/1
10 TABLET ORAL
Status: DISCONTINUED | OUTPATIENT
Start: 2024-02-18 | End: 2024-02-21 | Stop reason: HOSPADM

## 2024-02-18 RX ORDER — SODIUM CHLORIDE 9 MG/ML
INJECTION, SOLUTION INTRAVENOUS CONTINUOUS
Status: DISCONTINUED | OUTPATIENT
Start: 2024-02-18 | End: 2024-02-20

## 2024-02-18 RX ADMIN — OXYCODONE HYDROCHLORIDE 15 MG: 15 TABLET ORAL at 16:38

## 2024-02-18 RX ADMIN — METOPROLOL TARTRATE 25 MG: 25 TABLET, FILM COATED ORAL at 17:53

## 2024-02-18 RX ADMIN — CEFAZOLIN 2 G: 10 INJECTION, POWDER, FOR SOLUTION INTRAVENOUS at 22:06

## 2024-02-18 RX ADMIN — LIDOCAINE 1 PATCH: 4 PATCH TOPICAL at 05:03

## 2024-02-18 RX ADMIN — CEFAZOLIN 2 G: 10 INJECTION, POWDER, FOR SOLUTION INTRAVENOUS at 13:15

## 2024-02-18 RX ADMIN — OXYCODONE HYDROCHLORIDE 10 MG: 10 TABLET ORAL at 03:44

## 2024-02-18 RX ADMIN — OMEPRAZOLE 20 MG: 20 CAPSULE, DELAYED RELEASE ORAL at 05:02

## 2024-02-18 RX ADMIN — HYDROMORPHONE HYDROCHLORIDE 1 MG: 1 INJECTION, SOLUTION INTRAMUSCULAR; INTRAVENOUS; SUBCUTANEOUS at 22:04

## 2024-02-18 RX ADMIN — ACETAMINOPHEN 1000 MG: 500 TABLET ORAL at 20:23

## 2024-02-18 RX ADMIN — OXYCODONE HYDROCHLORIDE 10 MG: 10 TABLET ORAL at 20:23

## 2024-02-18 RX ADMIN — SODIUM CHLORIDE: 9 INJECTION, SOLUTION INTRAVENOUS at 22:13

## 2024-02-18 RX ADMIN — HYDROMORPHONE HYDROCHLORIDE 1 MG: 1 INJECTION, SOLUTION INTRAMUSCULAR; INTRAVENOUS; SUBCUTANEOUS at 15:12

## 2024-02-18 RX ADMIN — OXYCODONE HYDROCHLORIDE 10 MG: 10 TABLET ORAL at 08:35

## 2024-02-18 RX ADMIN — HYDROMORPHONE HYDROCHLORIDE 1 MG: 1 INJECTION, SOLUTION INTRAMUSCULAR; INTRAVENOUS; SUBCUTANEOUS at 11:06

## 2024-02-18 RX ADMIN — ACETAMINOPHEN 1000 MG: 500 TABLET ORAL at 13:15

## 2024-02-18 RX ADMIN — ACETAMINOPHEN 1000 MG: 500 TABLET ORAL at 08:35

## 2024-02-18 RX ADMIN — AMLODIPINE BESYLATE 5 MG: 5 TABLET ORAL at 05:02

## 2024-02-18 RX ADMIN — HYDROMORPHONE HYDROCHLORIDE 1 MG: 1 INJECTION, SOLUTION INTRAMUSCULAR; INTRAVENOUS; SUBCUTANEOUS at 05:01

## 2024-02-18 RX ADMIN — OXYCODONE HYDROCHLORIDE 10 MG: 10 TABLET ORAL at 13:15

## 2024-02-18 RX ADMIN — METOPROLOL TARTRATE 25 MG: 25 TABLET, FILM COATED ORAL at 05:02

## 2024-02-18 RX ADMIN — Medication 100 MG: at 05:02

## 2024-02-18 RX ADMIN — SODIUM CHLORIDE: 9 INJECTION, SOLUTION INTRAVENOUS at 08:36

## 2024-02-18 RX ADMIN — CEFAZOLIN 2 G: 10 INJECTION, POWDER, FOR SOLUTION INTRAVENOUS at 05:02

## 2024-02-18 ASSESSMENT — PAIN DESCRIPTION - PAIN TYPE
TYPE: ACUTE PAIN
TYPE: CHRONIC PAIN
TYPE: ACUTE PAIN
TYPE: ACUTE PAIN

## 2024-02-18 ASSESSMENT — ENCOUNTER SYMPTOMS
HEADACHES: 0
DIZZINESS: 0
SHORTNESS OF BREATH: 0
DIARRHEA: 0
CHILLS: 0
BLURRED VISION: 0
COUGH: 0
BLOOD IN STOOL: 0
ABDOMINAL PAIN: 1
FEVER: 0
CONSTIPATION: 0
BACK PAIN: 1
ROS GI COMMENTS: DISTENDED ABDOMEN
VOMITING: 0
NAUSEA: 0
MYALGIAS: 0
WEIGHT LOSS: 0

## 2024-02-18 ASSESSMENT — LIFESTYLE VARIABLES: SUBSTANCE_ABUSE: 0

## 2024-02-18 NOTE — PROGRESS NOTES
Hospital Medicine Daily Progress Note    Date of Service  2/18/2024    Chief Complaint  Abelardo Fenton is a 38 y.o. male admitted 2/14/2024 with abdominal pain     Hospital Course  Abelardo Fenton is a 38 y.o. male who presented 2/14/2024 with epigastric pain abdominal/for the last few days.  He has had history of pancreatitis and pseudocyst in the past.  CT abdomen pelvis showing pancreatitis with large multifocal peripancreatic fluid collections within the largest component seen extending inferiorly from the pancreatic head into the mesentery into the upper pelvis measuring 18 x 16 x 12 cm in size, new multifocal areas of gas within those fluid collections with suggest presence of infective pseudocyst.  Patient transferred to [facility] for interventional radiology drainage.     Pt was febrile, tachycardic with elevated WBC. He was treated with fluids and started on antibiotics.     Interval Problem Update  Pt seen and examined, still having ongoing abdominal pain which has been persistent, he did initially have improvement after drain placement, in which pain was controlled with oral oxy however does seem to have increased pain over the last 48 hours. Labs have remained stable, abdominal exam is unchanged. Pain is not increased with oral intake   - Still with good PALAK drainage, ~125 ml in the last 24 hours.   - discussed case with hepatobiliary surgeon aurora Armstrong for diet, pt on Full liquids. Repeat imaging on Tuesday, continue abx, likely will need to DC with drain in place   - I have adjusted pain regiment for improved pain control. Bowel protocol in place to avoid constipation with narcotic use.   - vitals stable, has has remained afebrile,  Cx with pan-sensitive E.coli, continue Ancef.   - supportive care with pain control, encourage ambulation and IS         I have discussed this patient's plan of care and discharge plan at IDT rounds today with Case Management, Nursing, Nursing  leadership, and other members of the IDT team.    Consultants/Specialty  IR    Code Status  Full Code    Disposition  The patient is not medically cleared for discharge to home or a post-acute facility.  Anticipate discharge to: home with close outpatient follow-up    I have placed the appropriate orders for post-discharge needs.    Review of Systems  Review of Systems   Constitutional:  Positive for malaise/fatigue. Negative for chills and fever.   Respiratory:  Negative for cough and shortness of breath.    Cardiovascular:  Negative for chest pain and leg swelling.   Gastrointestinal:  Positive for abdominal pain. Negative for blood in stool, constipation, diarrhea, melena, nausea and vomiting.   Genitourinary:  Negative for dysuria.   Musculoskeletal:  Positive for back pain.   Psychiatric/Behavioral:  Negative for substance abuse.         Physical Exam  Temp:  [36.1 °C (97 °F)-36.8 °C (98.2 °F)] 36.3 °C (97.3 °F)  Pulse:  [80-91] 80  Resp:  [15-18] 18  BP: (121-130)/(68-77) 127/72  SpO2:  [95 %-97 %] 96 %    Physical Exam  Constitutional:       Appearance: He is normal weight. He is ill-appearing.      Comments: Pt appears uncomfortable, mobility limited due to abdominal pain/distention   HENT:      Head: Normocephalic and atraumatic.      Mouth/Throat:      Mouth: Mucous membranes are moist.   Eyes:      General: No scleral icterus.     Conjunctiva/sclera: Conjunctivae normal.   Cardiovascular:      Rate and Rhythm: Normal rate and regular rhythm.      Heart sounds: Normal heart sounds.   Pulmonary:      Breath sounds: Normal breath sounds.   Abdominal:      General: There is distension.      Tenderness: There is abdominal tenderness. There is no guarding or rebound.      Comments: Abdomen is firm and distended, epigastric fullness with palpable mass present, PALAK drain with purulent brown drainage, tenderness in epigastrium, RUQ and around the lower right ribs and lateral/flank region   Musculoskeletal:          General: Normal range of motion.   Skin:     General: Skin is warm.      Coloration: Skin is pale.   Neurological:      Mental Status: He is alert and oriented to person, place, and time. Mental status is at baseline.   Psychiatric:         Mood and Affect: Mood normal.         Behavior: Behavior normal.         Fluids    Intake/Output Summary (Last 24 hours) at 2/18/2024 1425  Last data filed at 2/18/2024 1131  Gross per 24 hour   Intake 607.21 ml   Output 30 ml   Net 577.21 ml       Laboratory  Recent Labs     02/16/24  0421 02/18/24  0333   WBC 7.0 9.3   RBC 3.13* 3.34*   HEMOGLOBIN 9.3* 9.9*   HEMATOCRIT 28.7* 30.2*   MCV 91.7 90.4   MCH 29.7 29.6   MCHC 32.4 32.8   RDW 49.1 48.9   PLATELETCT 543* 693*   MPV 9.3 8.9*     Recent Labs     02/16/24  0421 02/17/24  0103 02/18/24  0333   SODIUM 134* 134* 134*   POTASSIUM 3.4* 3.7 3.9   CHLORIDE 97 97 98   CO2 23 22 24   GLUCOSE 104* 108* 108*   BUN 8 6* 5*   CREATININE 0.63 0.74 0.75   CALCIUM 8.5 8.6 8.9                     Imaging  CT-DRAIN-PERITONEAL   Final Result      1.  CT guided peritoneal fluid collection catheter drainage.   2.  The current plan is to obtain a follow-up CT in 5-7 days..           Assessment/Plan  * Sepsis (HCC)- (present on admission)  Assessment & Plan  This is Sepsis Present on admission  SIRS criteria identified on my evaluation include: Fever, with temperature greater than 100.9 deg F, Tachycardia, with heart rate greater than 90 BPM, and Leukocytosis, with WBC greater than 12,000  Clinical indicators of end organ dysfunction include Lactic Acid greater than 2  Source is pancreatic pseudocyst  Sepsis protocol initiated  Crystalloid Fluid Administration: Fluid resuscitation ordered per standard protocol - 30 mL/kg per current or ideal body weight  IV antibiotics as appropriate for source of sepsis  Reassessment: I have reassessed the patient's hemodynamic status    Zosyn transitioned to ancef (Cx with ecoli)   Resumed IVF, decreased oral  intake, stop once improved     Anemia  Assessment & Plan  Anemic since December, progressively worsening   Started on PPI therapy   Iron panel with iron deficiency, elevated B12 and ferritin, hold on IV iron replacement for now   Monitor H/H, no signs of active bleeding     Dilation of biliary tract  Assessment & Plan  New dilation of biliary tract, possible obstruction due to psudeocyst, pt does have RUQ and epigastric pain  however his labs and T.bili are stable and normal which argues against biliary obstruction  Discussed case with hepatobiliary surgeon Dr. KERNS, imaging reviewed, no new recs, diet okay, repeat scan on Tuesday, continue Abx  Supportive care with pain control which I have increased today due to ongoing poor pain control     Infected pseudocyst of pancreas  Assessment & Plan  CT AP demonstrating large multifocal peripancreatic fluid collections and new multifocal areas of gas within those fluid collections to suggest presence of infected pseudocyst.  Given ceftriaxone and Flagyl at outside facility  started on Zosyn, Cx with pan sensitive E.coli, de-escalated to Ancef   IR drainage 2/15 w/ 417 ml of tan fluid removed and sent for Cx. Drain placed. Will need repeat scan on Tuesday 2/20  Monitor ouput, copious brown/purulent drainage ~125 ml over last 24 hours  Supportive care with pain control   Full liquid diet, advance as tolerated     Hypertension- (present on admission)  Assessment & Plan  Stable, resume home blood pressure mediations   Monitor     Hypokalemia- (present on admission)  Assessment & Plan  Low K, replacement  Monitor and replete as needed         VTE prophylaxis:   SCDs/TEDs      I have performed a physical exam and reviewed and updated ROS and Plan today (2/18/2024). In review of yesterday's note (2/17/2024), there are no changes except as documented above.    Greater than 51 minutes spent prepping to see patient (e.g. review of tests) obtaining and/or reviewing separately obtained  history. Performing a medically appropriate examination and/ evaluation.  Counseling and educating the patient/family/caregiver.  Ordering medications, tests, or procedures.  Referring and communicating with other health care professionals.  Documenting clinical information in EPIC.  Independently interpreting results and communicating results to patient/family/caregiver.  Care coordination.

## 2024-02-18 NOTE — CONSULTS
Date of Service  February 18, 2024     Reason for Consult: Acute/chronic pancreatitis, infected pseudocyst    Requested by: Dr Renae    Location: [facility]-2    Chief Complaint  Abdominal pain      HPI: Abelardo Fenton is a 38 y.o. male who presented to ED 2/14/2024 with epigastric pain abdominal for several days.  He has had history of pancreatitis and pseudocyst in the past, with multiple prior hospitalizations.  CT abdomen pelvis showing pancreatitis with large multifocal peripancreatic fluid collections within the largest component seen extending inferiorly from the pancreatic head into the mesentery into the upper pelvis measuring 18 x 16 x 12 cm in size, new multifocal areas of gas within those fluid collections with suggest presence of infective pseudocyst.  Pt was febrile, tachycardic with elevated WBC. He was treated with fluids and started on antibiotics. Abdominal drain placed on 2/15/24 by Dr Pemberton.    This morning the patient is resting comfortable in bed. He is afebrile, and pain appears well controlled. His abdomen remains distended.    Per history provided by patient it appears the pseudocyst may have first appeared at end of 2023.     Past Medical History  Past Medical History:   Diagnosis Date    Hyperlipemia     Hypertension     Pancreatitis      Past Surgical History  Past Surgical History:   Procedure Laterality Date    NO PERTINENT PAST SURGICAL HISTORY       Allergies:   No Known Allergies    Problem List  Principal Problem:    Sepsis (HCC) (POA: Yes)  Active Problems:    Hypokalemia (POA: Yes)    Hypertension (POA: Yes)    Infected pseudocyst of pancreas (POA: Unknown)    Dilation of biliary tract (POA: Unknown)    Anemia (POA: Unknown)  Resolved Problems:    * No resolved hospital problems. *       Subjective  Review of Systems   Constitutional:  Negative for chills, fever, malaise/fatigue and weight loss.   Respiratory:  Negative for cough.    Cardiovascular:  Negative for  chest pain.   Gastrointestinal:  Positive for abdominal pain. Negative for diarrhea, nausea and vomiting.        Distended abdomen         Objective  Temp:  [36.1 °C (97 °F)-36.8 °C (98.2 °F)] 36.3 °C (97.3 °F)  Pulse:  [80-91] 80  Resp:  [15-18] 16  BP: (121-130)/(68-77) 127/72  SpO2:  [95 %-97 %] 96 %      Physical Exam  Vitals and nursing note reviewed.   Constitutional:       General: He is not in acute distress.     Appearance: He is normal weight.   HENT:      Head: Normocephalic and atraumatic.      Nose: Nose normal.      Mouth/Throat:      Pharynx: Oropharynx is clear.   Eyes:      Conjunctiva/sclera: Conjunctivae normal.   Cardiovascular:      Rate and Rhythm: Normal rate.   Pulmonary:      Effort: Pulmonary effort is normal. No respiratory distress.   Abdominal:      Comments: Abdominal drain with 105 mL output   Skin:     General: Skin is warm and dry.      Coloration: Skin is not jaundiced.   Neurological:      Mental Status: He is alert and oriented to person, place, and time.   Psychiatric:         Mood and Affect: Mood normal.         Behavior: Behavior normal.          Fluids    Intake/Output Summary (Last 24 hours) at 2/18/2024 0948  Last data filed at 2/18/2024 0600  Gross per 24 hour   Intake 610 ml   Output 74.5 ml   Net 535.5 ml         Labs  Lab Results   Component Value Date/Time    SODIUM 134 (L) 02/18/2024 03:33 AM    POTASSIUM 3.9 02/18/2024 03:33 AM    CHLORIDE 98 02/18/2024 03:33 AM    CO2 24 02/18/2024 03:33 AM    GLUCOSE 108 (H) 02/18/2024 03:33 AM    BUN 5 (L) 02/18/2024 03:33 AM    CREATININE 0.75 02/18/2024 03:33 AM         Lab Results   Component Value Date/Time    PROTHROMBTM 16.9 (H) 02/15/2024 10:02 AM    INR 1.36 (H) 02/15/2024 10:02 AM         Lab Results   Component Value Date/Time    WBC 9.3 02/18/2024 03:33 AM    RBC 3.34 (L) 02/18/2024 03:33 AM    HEMOGLOBIN 9.9 (L) 02/18/2024 03:33 AM    HEMATOCRIT 30.2 (L) 02/18/2024 03:33 AM    MCV 90.4 02/18/2024 03:33 AM    MCH 29.6  02/18/2024 03:33 AM    MCHC 32.8 02/18/2024 03:33 AM    MPV 8.9 (L) 02/18/2024 03:33 AM    NEUTSPOLYS 63.60 02/18/2024 03:33 AM    LYMPHOCYTES 15.10 (L) 02/18/2024 03:33 AM    MONOCYTES 15.70 (H) 02/18/2024 03:33 AM    EOSINOPHILS 3.70 02/18/2024 03:33 AM    BASOPHILS 0.80 02/18/2024 03:33 AM    ANISOCYTOSIS 1+ 12/31/2023 01:05 AM         Recent Labs     02/14/24  1454 02/15/24  1002 02/16/24  0421 02/18/24  0333   ASTSGOT 16  --  10* 17   ALTSGPT 94*  --  48 33   TBILIRUBIN 0.6  --  0.5 0.3   GLOBULIN 4.2*  --  3.6* 3.6*   INR  --  1.36*  --   --       Imaging  CT ABDOMEN (2/15/24)  IMPRESSION:     1.  Again seen pancreatitis with large large multifocal peripancreatic fluid collections with the largest component seen extending inferiorly from the pancreatic head into the mesentery into the upper pelvis measuring 18 x 16 x 12 cm in size. There are   new multifocal areas of gas within those fluid collections which suggest presence of infected pseudocyst.     2.  Fatty change of the liver.     3.  New intrahepatic biliary ductal dilatation which may represent a component of biliary obstruction by the pancreatic pseudocysts.     4.  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 Superior mesenteric vein is also not identified in that area.     5.  Gastroesophageal and splenic varices.    Cultures  Abdominal drain fluid on 2/15/24 positive for  E coli and more    Procedures  IR drain placement 2/15/24  IMPRESSION:     1.  CT guided peritoneal fluid collection catheter drainage.  2.  The current plan is to obtain a follow-up CT in 5-7 days..     Assessment and Plan  Patient is a 38M with history of pancreatitis and pseudocyst - up to 18 x 16 x 12 cm, new multifocal areas of gas and fluid collections suggest of infective pseudocyst.  Drain placed on 2/15/24 by Dr Pemberton.    Plan:   Continue current supportive care to allow for pseudocyst to further mature, likely  will need another 2-3 weeks.  Continue abx therapy, adjust as appropriate  Advance diet as tolerated  Plan for updated CT ABDOMEN on Tuesday to assess fluid collections    Likely will DC home with drain in place and on PO abx  Patient stated he might be able to stay with family near [place] to allow for follow up appointment with Hepatobiliary Surgery clinic in the coming week.

## 2024-02-18 NOTE — CARE PLAN
The patient is Stable - Low risk of patient condition declining or worsening    Shift Goals  Clinical Goals: Pt pain will be managed and remain below 7/10 throughout the night  Patient Goals: Pain managment  Family Goals: GRANT    Progress made toward(s) clinical / shift goals:  Pt complained of pain ranging from 7-9 out of 10. Pt states prn oxy and dilaudid help but not for an extended period of time. Pt PALAK drain was flushed with 10ml NS, draining with bulb suction. Pt having some discomfort going to the bathroom due to pain, urinal bedside. All medications taken and tolerated    Patient is not progressing towards the following goals:    Problem: Pain - Standard  Goal: Alleviation of pain or a reduction in pain to the patient’s comfort goal  Outcome: Not Progressing

## 2024-02-18 NOTE — CARE PLAN
The patient is Stable - Low risk of patient condition declining or worsening    Shift Goals  Clinical Goals: Patient will have decreased pain to 5/10 pain this shift  Patient Goals: pain  Family Goals: GRANT    Progress made toward(s) clinical / shift goals:    Problem: Knowledge Deficit - Standard  Goal: Patient and family/care givers will demonstrate understanding of plan of care, disease process/condition, diagnostic tests and medications  Description: Target End Date:  1-3 days or as soon as patient condition allows    Document in Patient Education    1.  Patient and family/caregiver oriented to unit, equipment, visitation policy and means for communicating concern  2.  Complete/review Learning Assessment  3.  Assess knowledge level of disease process/condition, treatment plan, diagnostic tests and medications  4.  Explain disease process/condition, treatment plan, diagnostic tests and medications  Outcome: Progressing       Patient is not progressing towards the following goals:      Problem: Pain - Standard  Goal: Alleviation of pain or a reduction in pain to the patient’s comfort goal  Description: Target End Date:  Prior to discharge or change in level of care    Document on Vitals flowsheet    1.  Document pain using the appropriate pain scale per order or unit policy  2.  Educate and implement non-pharmacologic comfort measures (i.e. relaxation, distraction, massage, cold/heat therapy, etc.)  3.  Pain management medications as ordered  4.  Reassess pain after pain med administration per policy  5.  If opiods administered assess patient's response to pain medication is appropriate per POSS sedation scale  6.  Follow pain management plan developed in collaboration with patient and interdisciplinary team (including palliative care or pain specialists if applicable)  Outcome: Not Progressing  Note: Pain between 6-9/10 this shift with prn medication given

## 2024-02-18 NOTE — PROGRESS NOTES
"Radiology Progress Note   Author: NATHANAEL Rider Date & Time created: 2/18/2024  9:12 AM   Date of admission  2/14/2024  Note to reader: this note follows the APSO format rather than the historical SOAP format. Assessment and plan located at the top of the note for ease of use.    Chief Complaint  38 y.o. male admitted 2/14/2024 with abdominal pain      HPI  Abelardo Fenton is a 38-year-old male with a past medical history of pancreatitis and pseudocyst.  He presented to [facility] on 2/14/2024 complaining of epigastric/abdominal pain times \"a few days\".  CT abdomen pelvis was consistent with pancreatitis with large multifocal peripancreatic fluid collections, and multifocal areas of gas within the fluid collections consistent with infected pseudocyst.  Interventional radiology consulted and patient underwent CT-guided peritoneal fluid drainage with 12 Cam[place]onian catheter placement by Dr. Pemberton (IR) on 02/15/24.    Interval History:   02/16/24-Right lateral upper quadrant drain to bulb suction with 567 mL brownish red  output in the last 24 hours.  I flushed drain with 10 ccc of sterile NSS; I Orders placed for RNs to flush drain Q shift.  WBC 7.0 down from 11.;  Hgb 9.3; PLT S543; Cr 0.63 : Micro-pancreatic cyst cultures pending.  . Drain flushed with 10 mL NS.    02/17/24- Right lateral upper quadrant drain to bulb suction with 125 mL light brown output in the last 24 hours.  I flushed drain with 10 ccc of sterile NSS; I Orders placed for RNs to flush drain Q shift.  WBC 9.3;  Hgb 9.9; ; Cr 0.75; INR 1.36: Micro-pancreatic cyst cultures + for E. coli. Drain flushed with 10 mL NS    Assessment/Plan     Principal Problem:    Sepsis (HCC)  Active Problems:    Hypokalemia    Hypertension    Infected pseudocyst of pancreas    Dilation of biliary tract    Anemia      Plan IR  -- Order placed to irrigate RUQ  drain with 10 ml of sterile saline each shift  - Fluid cultures positive for E. Coli-   - " Hospitalist following -ABX per hospitalist  - Surg/onc- hepatobiliary team consulted  - Recommend follow up CT scan i once output decreases to approximately 10-15 cc in 24 hours; approximately 5-7 days from time of drain placement (02/20)  - Continue to monitor drains, VS, and labs   - Ultimate plan will be for patient to DC home, continued on antibiotics with drain in place and follow-up with the hepatobiliary surgical clinic for drain management/follow-up care    -Thank you for allowing Interventional Radiology team to participate in the patients care, if any additional care or requests are needed in the future please do not hesitate to call or place IR order   [phone]           Review of Systems  Physical Exam   Review of Systems   Constitutional:  Negative for chills and fever.   HENT:  Negative for hearing loss.    Eyes:  Negative for blurred vision.   Respiratory:  Negative for cough.    Cardiovascular:  Negative for chest pain.   Gastrointestinal:  Positive for abdominal pain.        Pain at drain site, improved from 02/17   Musculoskeletal:  Negative for myalgias.   Skin:  Negative for rash.   Neurological:  Negative for dizziness and headaches.      Vitals:    02/18/24 0835   BP:    Pulse:    Resp: 16   Temp:    SpO2:         Physical Exam  Vitals and nursing note reviewed.   HENT:      Head: Normocephalic and atraumatic.   Cardiovascular:      Rate and Rhythm: Normal rate and regular rhythm.   Pulmonary:      Effort: Pulmonary effort is normal. No respiratory distress.   Abdominal:      General: Bowel sounds are normal.      Comments: Right quad with bulb suction drain-site CDI with lidocaine patch near area for comfort.    Drain output is  light brown   Skin:     General: Skin is warm.      Capillary Refill: Capillary refill takes less than 2 seconds.   Neurological:      Mental Status: He is alert and oriented to person, place, and time. Mental status is at baseline.   Psychiatric:         Attention and  "Perception: Attention and perception normal.         Mood and Affect: Mood normal.         Speech: Speech normal.         Behavior: Behavior normal. Behavior is cooperative.             Labs    Recent Labs     02/15/24  1002 02/16/24 0421 02/18/24 0333   WBC 11.1* 7.0 9.3   RBC 3.29* 3.13* 3.34*   HEMOGLOBIN 9.7* 9.3* 9.9*   HEMATOCRIT 29.7* 28.7* 30.2*   MCV 90.3 91.7 90.4   MCH 29.5 29.7 29.6   MCHC 32.7 32.4 32.8   RDW 48.3 49.1 48.9   PLATELETCT 516* 543* 693*   MPV 9.1 9.3 8.9*     Recent Labs     02/16/24  0421 02/17/24  0103 02/18/24  0333   SODIUM 134* 134* 134*   POTASSIUM 3.4* 3.7 3.9   CHLORIDE 97 97 98   CO2 23 22 24   GLUCOSE 104* 108* 108*   BUN 8 6* 5*   CREATININE 0.63 0.74 0.75   CALCIUM 8.5 8.6 8.9     Recent Labs     02/16/24  0421 02/17/24  0103 02/18/24  0333   ALBUMIN 3.0*  --  3.2   TBILIRUBIN 0.5  --  0.3   ALKPHOSPHAT 477*  --  407*   TOTPROTEIN 6.6  --  6.8   ALTSGPT 48  --  33   ASTSGOT 10*  --  17   CREATININE 0.63 0.74 0.75     CT-DRAIN-PERITONEAL   Final Result      1.  CT guided peritoneal fluid collection catheter drainage.   2.  The current plan is to obtain a follow-up CT in 5-7 days..        INR   Date Value Ref Range Status   02/15/2024 1.36 (H) 0.87 - 1.13 Final     Comment:     INR - Non-therapeutic Reference Range: 0.87-1.13  INR - Therapeutic Reference Range: 2.0-4.0       No results found for: \"POCINR\"     Intake/Output Summary (Last 24 hours) at 2/16/2024 1536  Last data filed at 2/16/2024 1532  Gross per 24 hour   Intake 570 ml   Output 200 ml   Net 370 ml      Labs not explicitly included in this progress note were reviewed by the author. Radiology/imaging not explicitly included in this progress note was reviewed by the author.     I have performed a physical exam and reviewed and updated ROS and Plan today (2/18/2024).     35  minutes in directly providing and coordinating care and extensive data review.  No time overlap and excludes procedures.    "

## 2024-02-18 NOTE — PROGRESS NOTES
Bedside report from night RN, pt care assumed. VSS on RA, pt assessment complete. Pt A&Ox4,  c/o 8-9/10 pain at this time. PRN medication given. POC discussed with pt and verbalizes no questions. Pt denies any additional needs at this time. Bed locked and in lowest position, bed alarm on. Pt educated on fall risk and verbalized understanding, call light within reach, hourly rounding initiated.     NPO and IV fluid given this shift

## 2024-02-18 NOTE — PROGRESS NOTES
Pt abdomen hard and distended, complaining of pain around PALAK drain insertion. Pain increases when moving and ambulating to bathroom. PRN oxy and dilaudid administered

## 2024-02-19 LAB
ALBUMIN SERPL BCP-MCNC: 3.1 G/DL (ref 3.2–4.9)
ALBUMIN/GLOB SERPL: 0.9 G/DL
ALP SERPL-CCNC: 384 U/L (ref 30–99)
ALT SERPL-CCNC: 24 U/L (ref 2–50)
ANION GAP SERPL CALC-SCNC: 13 MMOL/L (ref 7–16)
AST SERPL-CCNC: 15 U/L (ref 12–45)
BACTERIA BLD CULT: NORMAL
BACTERIA BLD CULT: NORMAL
BASOPHILS # BLD AUTO: 0.5 % (ref 0–1.8)
BASOPHILS # BLD: 0.05 K/UL (ref 0–0.12)
BILIRUB SERPL-MCNC: 0.2 MG/DL (ref 0.1–1.5)
BUN SERPL-MCNC: 5 MG/DL (ref 8–22)
CALCIUM ALBUM COR SERPL-MCNC: 9.2 MG/DL (ref 8.5–10.5)
CALCIUM SERPL-MCNC: 8.5 MG/DL (ref 8.5–10.5)
CHLORIDE SERPL-SCNC: 100 MMOL/L (ref 96–112)
CO2 SERPL-SCNC: 22 MMOL/L (ref 20–33)
CREAT SERPL-MCNC: 0.75 MG/DL (ref 0.5–1.4)
EOSINOPHIL # BLD AUTO: 0.41 K/UL (ref 0–0.51)
EOSINOPHIL NFR BLD: 4.4 % (ref 0–6.9)
ERYTHROCYTE [DISTWIDTH] IN BLOOD BY AUTOMATED COUNT: 49.2 FL (ref 35.9–50)
GFR SERPLBLD CREATININE-BSD FMLA CKD-EPI: 119 ML/MIN/1.73 M 2
GLOBULIN SER CALC-MCNC: 3.6 G/DL (ref 1.9–3.5)
GLUCOSE SERPL-MCNC: 106 MG/DL (ref 65–99)
HCT VFR BLD AUTO: 30 % (ref 42–52)
HGB BLD-MCNC: 9.7 G/DL (ref 14–18)
IMM GRANULOCYTES # BLD AUTO: 0.13 K/UL (ref 0–0.11)
IMM GRANULOCYTES NFR BLD AUTO: 1.4 % (ref 0–0.9)
LYMPHOCYTES # BLD AUTO: 1.65 K/UL (ref 1–4.8)
LYMPHOCYTES NFR BLD: 17.6 % (ref 22–41)
MCH RBC QN AUTO: 29.3 PG (ref 27–33)
MCHC RBC AUTO-ENTMCNC: 32.3 G/DL (ref 32.3–36.5)
MCV RBC AUTO: 90.6 FL (ref 81.4–97.8)
MONOCYTES # BLD AUTO: 1.29 K/UL (ref 0–0.85)
MONOCYTES NFR BLD AUTO: 13.8 % (ref 0–13.4)
NEUTROPHILS # BLD AUTO: 5.82 K/UL (ref 1.82–7.42)
NEUTROPHILS NFR BLD: 62.3 % (ref 44–72)
NRBC # BLD AUTO: 0 K/UL
NRBC BLD-RTO: 0 /100 WBC (ref 0–0.2)
PLATELET # BLD AUTO: 746 K/UL (ref 164–446)
PMV BLD AUTO: 9 FL (ref 9–12.9)
POTASSIUM SERPL-SCNC: 3.9 MMOL/L (ref 3.6–5.5)
PROT SERPL-MCNC: 6.7 G/DL (ref 6–8.2)
RBC # BLD AUTO: 3.31 M/UL (ref 4.7–6.1)
SIGNIFICANT IND 70042: NORMAL
SIGNIFICANT IND 70042: NORMAL
SITE SITE: NORMAL
SITE SITE: NORMAL
SODIUM SERPL-SCNC: 135 MMOL/L (ref 135–145)
SOURCE SOURCE: NORMAL
SOURCE SOURCE: NORMAL
WBC # BLD AUTO: 9.4 K/UL (ref 4.8–10.8)

## 2024-02-19 PROCEDURE — 700101 HCHG RX REV CODE 250: Performed by: STUDENT IN AN ORGANIZED HEALTH CARE EDUCATION/TRAINING PROGRAM

## 2024-02-19 PROCEDURE — 80053 COMPREHEN METABOLIC PANEL: CPT

## 2024-02-19 PROCEDURE — 700101 HCHG RX REV CODE 250: Performed by: NURSE PRACTITIONER

## 2024-02-19 PROCEDURE — 700111 HCHG RX REV CODE 636 W/ 250 OVERRIDE (IP): Performed by: STUDENT IN AN ORGANIZED HEALTH CARE EDUCATION/TRAINING PROGRAM

## 2024-02-19 PROCEDURE — 700105 HCHG RX REV CODE 258: Performed by: STUDENT IN AN ORGANIZED HEALTH CARE EDUCATION/TRAINING PROGRAM

## 2024-02-19 PROCEDURE — 85025 COMPLETE CBC W/AUTO DIFF WBC: CPT

## 2024-02-19 PROCEDURE — A9270 NON-COVERED ITEM OR SERVICE: HCPCS | Performed by: STUDENT IN AN ORGANIZED HEALTH CARE EDUCATION/TRAINING PROGRAM

## 2024-02-19 PROCEDURE — 700102 HCHG RX REV CODE 250 W/ 637 OVERRIDE(OP): Performed by: STUDENT IN AN ORGANIZED HEALTH CARE EDUCATION/TRAINING PROGRAM

## 2024-02-19 PROCEDURE — 36415 COLL VENOUS BLD VENIPUNCTURE: CPT

## 2024-02-19 PROCEDURE — 99233 SBSQ HOSP IP/OBS HIGH 50: CPT | Performed by: STUDENT IN AN ORGANIZED HEALTH CARE EDUCATION/TRAINING PROGRAM

## 2024-02-19 PROCEDURE — 770006 HCHG ROOM/CARE - MED/SURG/GYN SEMI*

## 2024-02-19 RX ORDER — SODIUM CHLORIDE 0.9 % (FLUSH) 0.9 %
10 SYRINGE (ML) INJECTION 2 TIMES DAILY
Status: DISCONTINUED | OUTPATIENT
Start: 2024-02-19 | End: 2024-02-21 | Stop reason: HOSPADM

## 2024-02-19 RX ADMIN — ACETAMINOPHEN 1000 MG: 500 TABLET ORAL at 02:15

## 2024-02-19 RX ADMIN — SODIUM CHLORIDE, PRESERVATIVE FREE 10 ML: 5 INJECTION INTRAVENOUS at 12:24

## 2024-02-19 RX ADMIN — CEFAZOLIN 2 G: 10 INJECTION, POWDER, FOR SOLUTION INTRAVENOUS at 13:59

## 2024-02-19 RX ADMIN — CEFAZOLIN 2 G: 10 INJECTION, POWDER, FOR SOLUTION INTRAVENOUS at 21:53

## 2024-02-19 RX ADMIN — OXYCODONE HYDROCHLORIDE 15 MG: 15 TABLET ORAL at 12:23

## 2024-02-19 RX ADMIN — Medication 100 MG: at 04:49

## 2024-02-19 RX ADMIN — OXYCODONE HYDROCHLORIDE 15 MG: 15 TABLET ORAL at 07:56

## 2024-02-19 RX ADMIN — HYDROMORPHONE HYDROCHLORIDE 1 MG: 1 INJECTION, SOLUTION INTRAMUSCULAR; INTRAVENOUS; SUBCUTANEOUS at 14:02

## 2024-02-19 RX ADMIN — OMEPRAZOLE 20 MG: 20 CAPSULE, DELAYED RELEASE ORAL at 04:49

## 2024-02-19 RX ADMIN — SODIUM CHLORIDE: 9 INJECTION, SOLUTION INTRAVENOUS at 07:57

## 2024-02-19 RX ADMIN — HYDROMORPHONE HYDROCHLORIDE 1 MG: 1 INJECTION, SOLUTION INTRAMUSCULAR; INTRAVENOUS; SUBCUTANEOUS at 18:29

## 2024-02-19 RX ADMIN — OXYCODONE HYDROCHLORIDE 15 MG: 15 TABLET ORAL at 04:49

## 2024-02-19 RX ADMIN — ACETAMINOPHEN 1000 MG: 500 TABLET ORAL at 20:19

## 2024-02-19 RX ADMIN — OXYCODONE HYDROCHLORIDE 15 MG: 15 TABLET ORAL at 20:19

## 2024-02-19 RX ADMIN — ACETAMINOPHEN 1000 MG: 500 TABLET ORAL at 13:59

## 2024-02-19 RX ADMIN — CEFAZOLIN 2 G: 10 INJECTION, POWDER, FOR SOLUTION INTRAVENOUS at 04:50

## 2024-02-19 RX ADMIN — METOPROLOL TARTRATE 25 MG: 25 TABLET, FILM COATED ORAL at 16:09

## 2024-02-19 RX ADMIN — OXYCODONE HYDROCHLORIDE 15 MG: 15 TABLET ORAL at 16:09

## 2024-02-19 RX ADMIN — SODIUM CHLORIDE: 9 INJECTION, SOLUTION INTRAVENOUS at 20:24

## 2024-02-19 RX ADMIN — OXYCODONE HYDROCHLORIDE 15 MG: 15 TABLET ORAL at 01:47

## 2024-02-19 RX ADMIN — METOPROLOL TARTRATE 25 MG: 25 TABLET, FILM COATED ORAL at 04:49

## 2024-02-19 RX ADMIN — DOCUSATE SODIUM 50 MG AND SENNOSIDES 8.6 MG 2 TABLET: 8.6; 5 TABLET, FILM COATED ORAL at 16:09

## 2024-02-19 RX ADMIN — ACETAMINOPHEN 1000 MG: 500 TABLET ORAL at 07:56

## 2024-02-19 RX ADMIN — SODIUM CHLORIDE, PRESERVATIVE FREE 10 ML: 5 INJECTION INTRAVENOUS at 20:00

## 2024-02-19 RX ADMIN — LIDOCAINE 1 PATCH: 4 PATCH TOPICAL at 04:50

## 2024-02-19 ASSESSMENT — ENCOUNTER SYMPTOMS
BACK PAIN: 1
BLOOD IN STOOL: 0
WEAKNESS: 0
COUGH: 0
PALPITATIONS: 0
CONSTIPATION: 0
SHORTNESS OF BREATH: 0
VOMITING: 0
ABDOMINAL PAIN: 1
FEVER: 0
HEADACHES: 0
NAUSEA: 0
DIARRHEA: 0
CHILLS: 0

## 2024-02-19 ASSESSMENT — PAIN DESCRIPTION - PAIN TYPE
TYPE: ACUTE PAIN

## 2024-02-19 ASSESSMENT — LIFESTYLE VARIABLES: SUBSTANCE_ABUSE: 0

## 2024-02-19 NOTE — PROGRESS NOTES
"Radiology Progress Note   Author: NATHANAEL Paul Date & Time created: 2/19/2024  9:04 AM   Date of admission  2/14/2024  Note to reader: this note follows the APSO format rather than the historical SOAP format. Assessment and plan located at the top of the note for ease of use.    Chief Complaint  38 y.o. male admitted 2/14/2024 with No chief complaint on file.        HPI  38-year-old male with a past medical history of pancreatitis and pseudocyst. He presented to [facility] [place]note 2[place] on 2/14/2024 complaining of epigastric/abdominal pain times \"a few days\". CT abdomen pelvis was consistent with pancreatitis with large multifocal peripancreatic fluid collections, and multifocal areas of gas within the fluid collections consistent with infected pseudocyst. Interventional radiology consulted and patient underwent CT-guided peritoneal fluid drainage with 12 Ukrainian catheter placement by Dr. Pemberton (IR) on 02/15/24.      Interval History:   02/16/24-Right lateral upper quadrant drain to bulb suction with 567 mL brownish red  output in the last 24 hours.  I flushed drain with 10 ccc of sterile NSS; I Orders placed for RNs to flush drain Q shift.  WBC 7.0 down from 11.;  Hgb 9.3; PLT S543; Cr 0.63 : Micro-pancreatic cyst cultures pending.  . Drain flushed with 10 mL NS.     02/17/24- Right lateral upper quadrant drain to bulb suction with 125 mL light brown output in the last 24 hours.  I flushed drain with 10 ccc of sterile NSS; I Orders placed for RNs to flush drain Q shift.  WBC 9.3;  Hgb 9.9; ; Cr 0.75; INR 1.36: Micro-pancreatic cyst cultures + for E. coli. Drain flushed with 10 mL NS    02/18/24 - RUQ 12 Ukrainian drain with 10 mL tan output in the last 24 hours.  Labs reviewed; WBC 9.4, H&H stable.  On ABX through hospitalist.  Abdominal pain improving with current pain medication.  Drain flushed with 10 mL NS.  Patient discussed with Dr. Renae    Assessment/Plan     Principal Problem:    Sepsis " (HCC)  Active Problems:    Hypokalemia    Hypertension    Infected pseudocyst of pancreas    Dilation of biliary tract    Anemia      Plan IR  - Continue irrigating RUQ drain with 10 ml of sterile saline each shift  - Fluid cultures positive for E. Coli; on ABX through hospitalist  - HPB following   - Recommend follow up CT scan in 5-7 days (02/20)  - Continue to monitor drains, VS, and labs    - Patient will ultimately be discharged with drain in place with hepatobiliary follow-up    -Thank you for allowing Interventional Radiology team to participate in the patients care, if any additional care or requests are needed in the future please do not hesitate to call or place IR order   [phone]           Review of Systems  Physical Exam   Review of Systems   Constitutional:  Positive for malaise/fatigue. Negative for chills and fever.   Respiratory:  Negative for shortness of breath.    Cardiovascular:  Negative for chest pain and palpitations.   Gastrointestinal:  Positive for abdominal pain. Negative for nausea and vomiting.   Neurological:  Negative for weakness and headaches.      Vitals:    02/19/24 0709   BP: 108/60   Pulse: 72   Resp: 18   Temp: 36.2 °C (97.2 °F)   SpO2: 93%        Physical Exam  Cardiovascular:      Rate and Rhythm: Normal rate.   Pulmonary:      Effort: Pulmonary effort is normal. No respiratory distress.   Abdominal:      General: There is distension.      Palpations: Abdomen is soft.      Tenderness: There is abdominal tenderness.      Comments: IR drain to right lateral upper quadrant   Skin:     General: Skin is warm and dry.   Neurological:      General: No focal deficit present.      Mental Status: He is alert and oriented to person, place, and time.   Psychiatric:         Mood and Affect: Mood normal.         Behavior: Behavior normal.             Labs    Recent Labs     02/18/24  0333 02/19/24  0015   WBC 9.3 9.4   RBC 3.34* 3.31*   HEMOGLOBIN 9.9* 9.7*   HEMATOCRIT 30.2* 30.0*   MCV  "90.4 90.6   MCH 29.6 29.3   MCHC 32.8 32.3   RDW 48.9 49.2   PLATELETCT 693* 746*   MPV 8.9* 9.0     Recent Labs     02/17/24  0103 02/18/24  0333 02/19/24  0015   SODIUM 134* 134* 135   POTASSIUM 3.7 3.9 3.9   CHLORIDE 97 98 100   CO2 22 24 22   GLUCOSE 108* 108* 106*   BUN 6* 5* 5*   CREATININE 0.74 0.75 0.75   CALCIUM 8.6 8.9 8.5     Recent Labs     02/17/24  0103 02/18/24  0333 02/19/24  0015   ALBUMIN  --  3.2 3.1*   TBILIRUBIN  --  0.3 0.2   ALKPHOSPHAT  --  407* 384*   TOTPROTEIN  --  6.8 6.7   ALTSGPT  --  33 24   ASTSGOT  --  17 15   CREATININE 0.74 0.75 0.75     CT-DRAIN-PERITONEAL   Final Result      1.  CT guided peritoneal fluid collection catheter drainage.   2.  The current plan is to obtain a follow-up CT in 5-7 days..        INR   Date Value Ref Range Status   02/15/2024 1.36 (H) 0.87 - 1.13 Final     Comment:     INR - Non-therapeutic Reference Range: 0.87-1.13  INR - Therapeutic Reference Range: 2.0-4.0       No results found for: \"POCINR\"     Intake/Output Summary (Last 24 hours) at 2/19/2024 0904  Last data filed at 2/19/2024 0450  Gross per 24 hour   Intake 487.21 ml   Output 105 ml   Net 382.21 ml      Labs not explicitly included in this progress note were reviewed by the author. Radiology/imaging not explicitly included in this progress note was reviewed by the author.     I have performed a physical exam and reviewed and updated ROS and Plan today (2/19/2024).     35 minutes in directly providing and coordinating care and extensive data review.  No time overlap and excludes procedures.  "

## 2024-02-19 NOTE — CARE PLAN
The patient is Stable - Low risk of patient condition declining or worsening    Shift Goals  Clinical Goals: Pt will have decreased pain to 6/10 throughout night  Patient Goals: Pain control  Family Goals: GRANT    Progress made toward(s) clinical / shift goals:  Pt pain was ranging from 6-10/10 throughout night. Oxy and dilaudid administers per MAR.     Patient is not progressing towards the following goals:    Problem: Pain - Standard  Goal: Alleviation of pain or a reduction in pain to the patient’s comfort goal  Outcome: Not Progressing

## 2024-02-19 NOTE — PROGRESS NOTES
Hospital Medicine Daily Progress Note    Date of Service  2/19/2024    Chief Complaint  Abelardo Fenton is a 38 y.o. male admitted 2/14/2024 with abdominal pain     Hospital Course  Abelardo Fenton is a 38 y.o. male who presented 2/14/2024 with epigastric pain abdominal/for the last few days.  He has had history of pancreatitis and pseudocyst in the past.  CT abdomen pelvis showing pancreatitis with large multifocal peripancreatic fluid collections within the largest component seen extending inferiorly from the pancreatic head into the mesentery into the upper pelvis measuring 18 x 16 x 12 cm in size, new multifocal areas of gas within those fluid collections with suggest presence of infective pseudocyst.  Patient transferred to [facility] for interventional radiology drainage.     Pt was febrile, tachycardic with elevated WBC. He was treated with fluids and started on antibiotics.     Interval Problem Update  Pt seen and examined, still having ongoing abdominal pain which has been persistent but is improved and appears more comfortable today with increased pain medication dosage  - Drain ~120 ml ouput in last 24 hours.   - plan to repeat CT A/P w/ ordered for tomorrow    - tolerating full liquid diet without increased pain  - continue  Bowel protocol in place to avoid constipation with narcotic use.   - vitals stable, has has remained afebrile,  Cx with pan-sensitive E.coli, continue Ancef.   - supportive care with pain control, encourage ambulation and IS     Further plan of care pending repeat CT results. Will likely need discharge with PALAK drain per Sx and IR.       I have discussed this patient's plan of care and discharge plan at IDT rounds today with Case Management, Nursing, Nursing leadership, and other members of the IDT team.    Consultants/Specialty  IR    Code Status  Full Code    Disposition  The patient is not medically cleared for discharge to home or a post-acute facility.      I  have placed the appropriate orders for post-discharge needs.    Review of Systems  Review of Systems   Constitutional:  Positive for malaise/fatigue. Negative for chills and fever.   Respiratory:  Negative for cough and shortness of breath.    Cardiovascular:  Negative for chest pain and leg swelling.   Gastrointestinal:  Positive for abdominal pain. Negative for blood in stool, constipation, diarrhea, melena, nausea and vomiting.   Genitourinary:  Negative for dysuria.   Musculoskeletal:  Positive for back pain.   Psychiatric/Behavioral:  Negative for substance abuse.         Physical Exam  Temp:  [36.2 °C (97.1 °F)-36.6 °C (97.8 °F)] 36.2 °C (97.2 °F)  Pulse:  [72-93] 72  Resp:  [16-18] 18  BP: (108-120)/(60-68) 108/60  SpO2:  [93 %-98 %] 93 %    Physical Exam  Constitutional:       Appearance: He is normal weight. He is ill-appearing.      Comments: Pt appears  more comfortable today but still with guarded mobility due to abdominal pain/distention   HENT:      Head: Normocephalic and atraumatic.      Mouth/Throat:      Mouth: Mucous membranes are moist.   Eyes:      General: No scleral icterus.     Conjunctiva/sclera: Conjunctivae normal.   Cardiovascular:      Rate and Rhythm: Normal rate and regular rhythm.      Heart sounds: Normal heart sounds.   Pulmonary:      Breath sounds: Normal breath sounds.   Abdominal:      General: There is distension.      Tenderness: There is abdominal tenderness. There is no guarding or rebound.      Comments: Abdomen is firm and distended, epigastric fullness with palpable mass present, PALAK drain with purulent brown drainage, tenderness in epigastrium, RUQ and around the lower right ribs and lateral/flank region. Slightly more distended today. Very tender in RUQ   Musculoskeletal:         General: Normal range of motion.   Skin:     General: Skin is warm.      Coloration: Skin is pale.   Neurological:      Mental Status: He is alert and oriented to person, place, and time. Mental  status is at baseline.   Psychiatric:         Mood and Affect: Mood normal.         Behavior: Behavior normal.         Fluids    Intake/Output Summary (Last 24 hours) at 2/19/2024 1205  Last data filed at 2/19/2024 0955  Gross per 24 hour   Intake 490 ml   Output 105 ml   Net 385 ml       Laboratory  Recent Labs     02/18/24  0333 02/19/24  0015   WBC 9.3 9.4   RBC 3.34* 3.31*   HEMOGLOBIN 9.9* 9.7*   HEMATOCRIT 30.2* 30.0*   MCV 90.4 90.6   MCH 29.6 29.3   MCHC 32.8 32.3   RDW 48.9 49.2   PLATELETCT 693* 746*   MPV 8.9* 9.0     Recent Labs     02/17/24  0103 02/18/24  0333 02/19/24  0015   SODIUM 134* 134* 135   POTASSIUM 3.7 3.9 3.9   CHLORIDE 97 98 100   CO2 22 24 22   GLUCOSE 108* 108* 106*   BUN 6* 5* 5*   CREATININE 0.74 0.75 0.75   CALCIUM 8.6 8.9 8.5                     Imaging  CT-DRAIN-PERITONEAL   Final Result      1.  CT guided peritoneal fluid collection catheter drainage.   2.  The current plan is to obtain a follow-up CT in 5-7 days..           Assessment/Plan  * Sepsis (HCC)- (present on admission)  Assessment & Plan  This is Sepsis Present on admission  SIRS criteria identified on my evaluation include: Fever, with temperature greater than 100.9 deg F, Tachycardia, with heart rate greater than 90 BPM, and Leukocytosis, with WBC greater than 12,000  Clinical indicators of end organ dysfunction include Lactic Acid greater than 2  Source is pancreatic pseudocyst  Sepsis protocol initiated  Crystalloid Fluid Administration: Fluid resuscitation ordered per standard protocol - 30 mL/kg per current or ideal body weight  IV antibiotics as appropriate for source of sepsis  Reassessment: I have reassessed the patient's hemodynamic status    Zosyn transitioned to ancef (Cx with ecoli)   Resumed IVF, decreased oral intake, stop once improved     Anemia  Assessment & Plan  Anemic since December, progressively worsening   Started on PPI therapy   Iron panel with iron deficiency, elevated B12 and ferritin, hold on  IV iron replacement for now   Monitor H/H, no signs of active bleeding     Dilation of biliary tract  Assessment & Plan  New dilation of biliary tract, possible obstruction due to psudeocyst, pt does have RUQ and epigastric pain  however his labs and T.bili are stable and normal which argues against biliary obstruction  Discussed case with hepatobiliary surgeon Dr. KERNS, imaging reviewed, no new recs, diet okay, repeat scan on Tuesday, continue Abx  Supportive care with pain control   Repeat scan tomorrow     Infected pseudocyst of pancreas  Assessment & Plan  CT AP demonstrating large multifocal peripancreatic fluid collections and new multifocal areas of gas within those fluid collections to suggest presence of infected pseudocyst.  Given ceftriaxone and Flagyl at outside facility  started on Zosyn, Cx with pan sensitive E.coli, de-escalated to Ancef   IR drainage 2/15 w/ 417 ml of tan fluid removed and sent for Cx. Drain placed. Will need repeat scan on Tuesday 2/20  Monitor ouput, copious brown/purulent drainage ~120 ml over last 24 hours  Supportive care with pain control   Full liquid diet, advance as tolerated     Repeat smalls ordered for tomorrow. Plan of care pending results. Per sx and IR will likely need to discharge with Drain     Hypertension- (present on admission)  Assessment & Plan  Stable, resume home blood pressure mediations   Monitor     Hypokalemia- (present on admission)  Assessment & Plan  Low K, replacement  Monitor and replete as needed         VTE prophylaxis:   SCDs/TEDs      I have performed a physical exam and reviewed and updated ROS and Plan today (2/19/2024). In review of yesterday's note (2/18/2024), there are no changes except as documented above.    Greater than 51 minutes spent prepping to see patient (e.g. review of tests) obtaining and/or reviewing separately obtained history. Performing a medically appropriate examination and/ evaluation.  Counseling and educating the  patient/family/caregiver.  Ordering medications, tests, or procedures.  Referring and communicating with other health care professionals.  Documenting clinical information in EPIC.  Independently interpreting results and communicating results to patient/family/caregiver.  Care coordination.    Lab: [id number] Lab: [id number]

## 2024-02-20 ENCOUNTER — APPOINTMENT (OUTPATIENT)
Dept: RADIOLOGY | Facility: MEDICAL CENTER | Age: 39
DRG: 871 | End: 2024-02-20
Attending: STUDENT IN AN ORGANIZED HEALTH CARE EDUCATION/TRAINING PROGRAM
Payer: COMMERCIAL

## 2024-02-20 PROBLEM — R10.9 INTRACTABLE ABDOMINAL PAIN: Status: ACTIVE | Noted: 2024-02-20

## 2024-02-20 LAB
ALBUMIN SERPL BCP-MCNC: 3.3 G/DL (ref 3.2–4.9)
ALBUMIN/GLOB SERPL: 0.9 G/DL
ALP SERPL-CCNC: 481 U/L (ref 30–99)
ALT SERPL-CCNC: 18 U/L (ref 2–50)
ANION GAP SERPL CALC-SCNC: 12 MMOL/L (ref 7–16)
AST SERPL-CCNC: 18 U/L (ref 12–45)
BASOPHILS # BLD AUTO: 0.6 % (ref 0–1.8)
BASOPHILS # BLD: 0.06 K/UL (ref 0–0.12)
BILIRUB SERPL-MCNC: 0.2 MG/DL (ref 0.1–1.5)
BUN SERPL-MCNC: 5 MG/DL (ref 8–22)
CALCIUM ALBUM COR SERPL-MCNC: 9.4 MG/DL (ref 8.5–10.5)
CALCIUM SERPL-MCNC: 8.8 MG/DL (ref 8.5–10.5)
CHLORIDE SERPL-SCNC: 99 MMOL/L (ref 96–112)
CO2 SERPL-SCNC: 23 MMOL/L (ref 20–33)
CREAT SERPL-MCNC: 0.79 MG/DL (ref 0.5–1.4)
EOSINOPHIL # BLD AUTO: 0.42 K/UL (ref 0–0.51)
EOSINOPHIL NFR BLD: 4 % (ref 0–6.9)
ERYTHROCYTE [DISTWIDTH] IN BLOOD BY AUTOMATED COUNT: 49.8 FL (ref 35.9–50)
GFR SERPLBLD CREATININE-BSD FMLA CKD-EPI: 118 ML/MIN/1.73 M 2
GLOBULIN SER CALC-MCNC: 3.7 G/DL (ref 1.9–3.5)
GLUCOSE SERPL-MCNC: 112 MG/DL (ref 65–99)
HCT VFR BLD AUTO: 30.9 % (ref 42–52)
HGB BLD-MCNC: 10.1 G/DL (ref 14–18)
IMM GRANULOCYTES # BLD AUTO: 0.09 K/UL (ref 0–0.11)
IMM GRANULOCYTES NFR BLD AUTO: 0.8 % (ref 0–0.9)
LYMPHOCYTES # BLD AUTO: 1.43 K/UL (ref 1–4.8)
LYMPHOCYTES NFR BLD: 13.5 % (ref 22–41)
MAGNESIUM SERPL-MCNC: 1.9 MG/DL (ref 1.5–2.5)
MCH RBC QN AUTO: 29.7 PG (ref 27–33)
MCHC RBC AUTO-ENTMCNC: 32.7 G/DL (ref 32.3–36.5)
MCV RBC AUTO: 90.9 FL (ref 81.4–97.8)
MONOCYTES # BLD AUTO: 1.21 K/UL (ref 0–0.85)
MONOCYTES NFR BLD AUTO: 11.4 % (ref 0–13.4)
NEUTROPHILS # BLD AUTO: 7.38 K/UL (ref 1.82–7.42)
NEUTROPHILS NFR BLD: 69.7 % (ref 44–72)
NRBC # BLD AUTO: 0 K/UL
NRBC BLD-RTO: 0 /100 WBC (ref 0–0.2)
PLATELET # BLD AUTO: 815 K/UL (ref 164–446)
PMV BLD AUTO: 9 FL (ref 9–12.9)
POTASSIUM SERPL-SCNC: 3.8 MMOL/L (ref 3.6–5.5)
PROT SERPL-MCNC: 7 G/DL (ref 6–8.2)
RBC # BLD AUTO: 3.4 M/UL (ref 4.7–6.1)
SODIUM SERPL-SCNC: 134 MMOL/L (ref 135–145)
WBC # BLD AUTO: 10.6 K/UL (ref 4.8–10.8)

## 2024-02-20 PROCEDURE — 700111 HCHG RX REV CODE 636 W/ 250 OVERRIDE (IP): Performed by: STUDENT IN AN ORGANIZED HEALTH CARE EDUCATION/TRAINING PROGRAM

## 2024-02-20 PROCEDURE — 770006 HCHG ROOM/CARE - MED/SURG/GYN SEMI*

## 2024-02-20 PROCEDURE — 85025 COMPLETE CBC W/AUTO DIFF WBC: CPT

## 2024-02-20 PROCEDURE — A9270 NON-COVERED ITEM OR SERVICE: HCPCS | Performed by: GENERAL PRACTICE

## 2024-02-20 PROCEDURE — A9270 NON-COVERED ITEM OR SERVICE: HCPCS | Performed by: STUDENT IN AN ORGANIZED HEALTH CARE EDUCATION/TRAINING PROGRAM

## 2024-02-20 PROCEDURE — 74177 CT ABD & PELVIS W/CONTRAST: CPT

## 2024-02-20 PROCEDURE — 80053 COMPREHEN METABOLIC PANEL: CPT

## 2024-02-20 PROCEDURE — RXMED WILLOW AMBULATORY MEDICATION CHARGE: Performed by: GENERAL PRACTICE

## 2024-02-20 PROCEDURE — 700101 HCHG RX REV CODE 250: Performed by: NURSE PRACTITIONER

## 2024-02-20 PROCEDURE — 83735 ASSAY OF MAGNESIUM: CPT

## 2024-02-20 PROCEDURE — 700102 HCHG RX REV CODE 250 W/ 637 OVERRIDE(OP): Performed by: STUDENT IN AN ORGANIZED HEALTH CARE EDUCATION/TRAINING PROGRAM

## 2024-02-20 PROCEDURE — 99233 SBSQ HOSP IP/OBS HIGH 50: CPT | Performed by: GENERAL PRACTICE

## 2024-02-20 PROCEDURE — 700102 HCHG RX REV CODE 250 W/ 637 OVERRIDE(OP): Performed by: GENERAL PRACTICE

## 2024-02-20 PROCEDURE — 700117 HCHG RX CONTRAST REV CODE 255: Performed by: STUDENT IN AN ORGANIZED HEALTH CARE EDUCATION/TRAINING PROGRAM

## 2024-02-20 PROCEDURE — 700101 HCHG RX REV CODE 250: Performed by: STUDENT IN AN ORGANIZED HEALTH CARE EDUCATION/TRAINING PROGRAM

## 2024-02-20 RX ORDER — AMOXICILLIN 250 MG
2 CAPSULE ORAL EVERY EVENING
Qty: 30 TABLET | Refills: 0 | Status: SHIPPED | OUTPATIENT
Start: 2024-02-20 | End: 2024-03-25

## 2024-02-20 RX ORDER — GABAPENTIN 100 MG/1
100 CAPSULE ORAL 3 TIMES DAILY
Status: DISCONTINUED | OUTPATIENT
Start: 2024-02-20 | End: 2024-02-21 | Stop reason: HOSPADM

## 2024-02-20 RX ORDER — SODIUM CHLORIDE 0.9 % (FLUSH) 0.9 %
10 SYRINGE (ML) INJECTION 2 TIMES DAILY
Qty: 600 ML | Refills: 0 | Status: SHIPPED | OUTPATIENT
Start: 2024-02-20 | End: 2024-03-22

## 2024-02-20 RX ORDER — AMOXICILLIN AND CLAVULANATE POTASSIUM 875; 125 MG/1; MG/1
1 TABLET, FILM COATED ORAL 2 TIMES DAILY
Qty: 60 TABLET | Refills: 0 | Status: ACTIVE | OUTPATIENT
Start: 2024-02-21 | End: 2024-03-22

## 2024-02-20 RX ORDER — METHOCARBAMOL 500 MG/1
500 TABLET, FILM COATED ORAL 4 TIMES DAILY
Status: DISCONTINUED | OUTPATIENT
Start: 2024-02-20 | End: 2024-02-21 | Stop reason: HOSPADM

## 2024-02-20 RX ORDER — METHOCARBAMOL 500 MG/1
500 TABLET, FILM COATED ORAL 4 TIMES DAILY
Qty: 120 TABLET | Refills: 0 | Status: SHIPPED | OUTPATIENT
Start: 2024-02-20 | End: 2024-03-22

## 2024-02-20 RX ORDER — GABAPENTIN 100 MG/1
100 CAPSULE ORAL 3 TIMES DAILY
Qty: 90 CAPSULE | Refills: 0 | Status: SHIPPED | OUTPATIENT
Start: 2024-02-20 | End: 2024-03-22

## 2024-02-20 RX ORDER — OMEPRAZOLE 20 MG/1
20 CAPSULE, DELAYED RELEASE ORAL DAILY
Qty: 30 CAPSULE | Refills: 0 | Status: SHIPPED | OUTPATIENT
Start: 2024-02-21 | End: 2024-03-22

## 2024-02-20 RX ADMIN — OXYCODONE HYDROCHLORIDE 15 MG: 15 TABLET ORAL at 23:22

## 2024-02-20 RX ADMIN — CEFAZOLIN 2 G: 10 INJECTION, POWDER, FOR SOLUTION INTRAVENOUS at 21:13

## 2024-02-20 RX ADMIN — METHOCARBAMOL 500 MG: 500 TABLET ORAL at 21:13

## 2024-02-20 RX ADMIN — Medication 100 MG: at 05:57

## 2024-02-20 RX ADMIN — ACETAMINOPHEN 1000 MG: 500 TABLET ORAL at 08:09

## 2024-02-20 RX ADMIN — GABAPENTIN 100 MG: 100 CAPSULE ORAL at 13:09

## 2024-02-20 RX ADMIN — CEFAZOLIN 2 G: 10 INJECTION, POWDER, FOR SOLUTION INTRAVENOUS at 14:54

## 2024-02-20 RX ADMIN — SODIUM CHLORIDE, PRESERVATIVE FREE 10 ML: 5 INJECTION INTRAVENOUS at 20:00

## 2024-02-20 RX ADMIN — METHOCARBAMOL 500 MG: 500 TABLET ORAL at 08:09

## 2024-02-20 RX ADMIN — OMEPRAZOLE 20 MG: 20 CAPSULE, DELAYED RELEASE ORAL at 05:57

## 2024-02-20 RX ADMIN — LIDOCAINE 1 PATCH: 4 PATCH TOPICAL at 05:56

## 2024-02-20 RX ADMIN — OXYCODONE HYDROCHLORIDE 15 MG: 15 TABLET ORAL at 08:44

## 2024-02-20 RX ADMIN — OXYCODONE HYDROCHLORIDE 15 MG: 15 TABLET ORAL at 17:04

## 2024-02-20 RX ADMIN — METHOCARBAMOL 500 MG: 500 TABLET ORAL at 17:04

## 2024-02-20 RX ADMIN — METOPROLOL TARTRATE 25 MG: 25 TABLET, FILM COATED ORAL at 05:57

## 2024-02-20 RX ADMIN — OXYCODONE HYDROCHLORIDE 15 MG: 15 TABLET ORAL at 04:29

## 2024-02-20 RX ADMIN — CEFAZOLIN 2 G: 10 INJECTION, POWDER, FOR SOLUTION INTRAVENOUS at 05:57

## 2024-02-20 RX ADMIN — GABAPENTIN 100 MG: 100 CAPSULE ORAL at 08:08

## 2024-02-20 RX ADMIN — METOPROLOL TARTRATE 25 MG: 25 TABLET, FILM COATED ORAL at 17:04

## 2024-02-20 RX ADMIN — ACETAMINOPHEN 1000 MG: 500 TABLET ORAL at 02:18

## 2024-02-20 RX ADMIN — METHOCARBAMOL 500 MG: 500 TABLET ORAL at 13:09

## 2024-02-20 RX ADMIN — DOCUSATE SODIUM 50 MG AND SENNOSIDES 8.6 MG 2 TABLET: 8.6; 5 TABLET, FILM COATED ORAL at 17:03

## 2024-02-20 RX ADMIN — GABAPENTIN 100 MG: 100 CAPSULE ORAL at 17:03

## 2024-02-20 RX ADMIN — OXYCODONE HYDROCHLORIDE 15 MG: 15 TABLET ORAL at 00:23

## 2024-02-20 RX ADMIN — IOHEXOL 100 ML: 350 INJECTION, SOLUTION INTRAVENOUS at 04:07

## 2024-02-20 RX ADMIN — SODIUM CHLORIDE, PRESERVATIVE FREE 10 ML: 5 INJECTION INTRAVENOUS at 08:14

## 2024-02-20 ASSESSMENT — ENCOUNTER SYMPTOMS
NAUSEA: 0
FEVER: 0
PALPITATIONS: 0
CHILLS: 0
HEADACHES: 0
SHORTNESS OF BREATH: 0
WEAKNESS: 0
VOMITING: 0
ABDOMINAL PAIN: 1

## 2024-02-20 ASSESSMENT — PAIN DESCRIPTION - PAIN TYPE
TYPE: ACUTE PAIN

## 2024-02-20 NOTE — CARE PLAN
The patient is Stable - Low risk of patient condition declining or worsening    Shift Goals  Clinical Goals: Pain will have pain less then 6 throughout night.  Patient Goals: Pain management  Family Goals: GRANT    Progress made toward(s) clinical / shift goals:  Pt pain was managed with PRN medications per MAR.     Patient is not progressing towards the following goals:

## 2024-02-20 NOTE — PROGRESS NOTES
Received report from off-going nurse.   Assumed pt care at change of shift.   Assessment completed.   Pt is A&Ox 4, vital signs are stable on room air.   7/10 pain, per pt due to getting up to the bathroom. pt would like to take scheduled medications gabapentin and robaxin prior to taking PRN pain medications. Educated to let this RN know if pain is still uncomfortable and prn medication can be given.   Bed is in low and locked position, call light and belongings in reach, reminded to use call light.   No needs at this time.

## 2024-02-20 NOTE — CARE PLAN
The patient is Stable - Low risk of patient condition declining or worsening    Shift Goals  Clinical Goals: pain will be managed and to a tolerable 4 for this shift. pt will tolerate GI soft diet  Patient Goals: pain management  Family Goals: eulalia    Progress made toward(s) clinical / shift goals:  Patient updated on and agreeable to plan of care. Diet advanced and tolerated, teaching for drain care and flushing provided, patient's pain medicated and monitored throughout shift.    Patient is not progressing towards the following goals:

## 2024-02-20 NOTE — PROGRESS NOTES
Hospital Medicine Daily Progress Note    Date of Service  2/20/2024    Chief Complaint  Abelardo Fenton is a 38 y.o. male admitted 2/14/2024 with abdominal pain    Hospital Course  This is a 38-year-old male with past medical history of pancreatitis with pseudocyst who presented to the ED on 3/14/2024 with worsening abdominal pain, significant for sepsis.    CT abdomen pelvis showing pancreatitis with large multifocal peripancreatic fluid collections within the largest component seen extending inferiorly from the pancreatic head into the mesentery into the upper pelvis measuring 18 x 16 x 12 cm in size, new multifocal areas of gas within those fluid collections with suggest presence of infective pseudocyst.  Patient underwent IR drain placement on 2/15.  Hepatobiliary surgery consulted, pseudocyst will require another 2 to 3 weeks to mature fully.  Repeat CT imaging on 2/20 noted slightly decreased now 13 x 10 cm, formally 18 x 16 x 12 cm - large pancreatic fluid collection/pseudocyst.  Patient to continue with drain in place, flushing it daily, continue with oral antibiotic therapy, will follow-up with hepatobiliary surgery outpatient to arrange for surgery in the next 2 to 3 weeks.    Interval Problem Update  Patient reports pain is improving, discontinued IV pain medication, added gabapentin and muscle relaxers as needed, with oral opioids as needed.     Pending further hepatobiliary surgery recommendations, anticipate patient will discharge home with drain in place, continue with oral antibiotics until surgical date, which will be arranged by the HBS team.    Cultures were pansensitive E. coli, few GPC's, light growth coagulase negative staph.  Continue Ancef, anticipate oral options on discharge.    If pain controlled, anticipate patient can discharge home in the next 24 hours.  Meds to beds will be sent for all his medications.     I have discussed this patient's plan of care and discharge plan at  IDT rounds today with Case Management, Nursing, Nursing leadership, and other members of the IDT team.    Consultants/Specialty  Hepatobiliary surgery    Code Status  Full Code    Disposition  The patient is not medically cleared for discharge to home or a post-acute facility.  Anticipate discharge to: home with close outpatient follow-up    I have placed the appropriate orders for post-discharge needs.    Review of Systems  Review of Systems   Gastrointestinal:  Positive for abdominal pain.   All other systems reviewed and are negative.       Physical Exam  Temp:  [36.2 °C (97.1 °F)-36.5 °C (97.7 °F)] 36.2 °C (97.2 °F)  Pulse:  [77-87] 79  Resp:  [16-18] 16  BP: (113-127)/(65-78) 118/73  SpO2:  [94 %-98 %] 95 %    Physical Exam  Vitals and nursing note reviewed.   Constitutional:       General: He is not in acute distress.     Appearance: Normal appearance.   HENT:      Head: Normocephalic and atraumatic.   Eyes:      Extraocular Movements: Extraocular movements intact.      Conjunctiva/sclera: Conjunctivae normal.      Pupils: Pupils are equal, round, and reactive to light.   Cardiovascular:      Rate and Rhythm: Normal rate and regular rhythm.      Pulses: Normal pulses.      Heart sounds: No murmur heard.     No friction rub. No gallop.   Pulmonary:      Effort: Pulmonary effort is normal. No respiratory distress.      Breath sounds: Normal breath sounds. No wheezing, rhonchi or rales.   Abdominal:      General: Bowel sounds are normal. There is no distension.      Palpations: Abdomen is soft.      Tenderness: There is no abdominal tenderness.      Comments: PALAK drain in place with output, no evidence of erythema or cellulitis surrounding insertion site   Musculoskeletal:         General: No swelling or tenderness. Normal range of motion.      Cervical back: Normal range of motion and neck supple. No muscular tenderness.      Right lower leg: No edema.      Left lower leg: No edema.   Skin:     General: Skin is  warm and dry.      Capillary Refill: Capillary refill takes less than 2 seconds.      Findings: No bruising, erythema or rash.   Neurological:      General: No focal deficit present.      Mental Status: He is alert and oriented to person, place, and time.         Fluids    Intake/Output Summary (Last 24 hours) at 2/20/2024 1321  Last data filed at 2/20/2024 0600  Gross per 24 hour   Intake 370 ml   Output 120 ml   Net 250 ml       Laboratory  Recent Labs     02/18/24 0333 02/19/24  0015 02/20/24  0106   WBC 9.3 9.4 10.6   RBC 3.34* 3.31* 3.40*   HEMOGLOBIN 9.9* 9.7* 10.1*   HEMATOCRIT 30.2* 30.0* 30.9*   MCV 90.4 90.6 90.9   MCH 29.6 29.3 29.7   MCHC 32.8 32.3 32.7   RDW 48.9 49.2 49.8   PLATELETCT 693* 746* 815*   MPV 8.9* 9.0 9.0     Recent Labs     02/18/24 0333 02/19/24  0015 02/20/24  0106   SODIUM 134* 135 134*   POTASSIUM 3.9 3.9 3.8   CHLORIDE 98 100 99   CO2 24 22 23   GLUCOSE 108* 106* 112*   BUN 5* 5* 5*   CREATININE 0.75 0.75 0.79   CALCIUM 8.9 8.5 8.8                   Imaging  CT-ABDOMEN-PELVIS WITH   Final Result         1. Slightly decreased large pancreatic fluid collection/pseudocyst. Air within the collection raise the possibility of superimposed infection.   2. Minimal ascites within the pelvis.   3. New tiny simple left pleural effusion.      CT-DRAIN-PERITONEAL   Final Result      1.  CT guided peritoneal fluid collection catheter drainage.   2.  The current plan is to obtain a follow-up CT in 5-7 days..           Assessment/Plan  * Infected pseudocyst of pancreas  Assessment & Plan  CT AP demonstrating large multifocal peripancreatic fluid collections and new multifocal areas of gas within those fluid collections to suggest presence of infected pseudocyst.  Given ceftriaxone and Flagyl at outside facility  started on Zosyn, Cx with pan sensitive E.coli, de-escalated to Ancef   IR drainage 2/15 w/ 417 ml of tan fluid removed and sent for Cx. Drain placed. Will need repeat scan on Tuesday  2/20  Monitor ouput, copious brown/purulent drainage ~120 ml over last 24 hours  Supportive care with pain control   Full liquid diet, advance as tolerated     Repeat CT imaging on 2/20 noted slightly decreased now 13 x 10 cm, formally 18 x 16 x 12 cm - large pancreatic fluid collection/pseudocyst.      Pending further hepatobiliary surgery recommendations, anticipate patient will discharge home with drain in place, continue with oral antibiotics until surgical date, which will be arranged by the HBS team.    Cultures were pansensitive E. coli, few GPC's, light growth coagulase negative staph.  Continue Ancef, anticipate oral options on discharge.    Intractable abdominal pain  Assessment & Plan  Secondary to infected pseudocyst  Started patient on scheduled Tylenol, muscle relaxers, gabapentin, topical agents and as needed oral opioids, discontinued IV opioids    Anemia  Assessment & Plan  Anemic since December, progressively worsening   Started on PPI therapy   Iron panel with iron deficiency, elevated B12 and ferritin, hold on IV iron replacement for now   Monitor H/H, no signs of active bleeding     Dilation of biliary tract  Assessment & Plan  New dilation of biliary tract, possible obstruction due to psudeocyst, pt does have RUQ and epigastric pain  however his labs and T.bili are stable and normal which argues against biliary obstruction  Discussed case with hepatobiliary surgeon Dr. KERNS, imaging reviewed, no new recs, diet okay, repeat scan on Tuesday, continue Abx  Supportive care with pain control   Repeat CT imaging on 2/20 noted slightly decreased now 13 x 10 cm, formally 18 x 16 x 12 cm - large pancreatic fluid collection/pseudocyst.      Sepsis (HCC)- (present on admission)  Assessment & Plan  This is Sepsis Present on admission  SIRS criteria identified on my evaluation include: Fever, with temperature greater than 100.9 deg F, Tachycardia, with heart rate greater than 90 BPM, and Leukocytosis, with WBC  greater than 12,000  Clinical indicators of end organ dysfunction include Lactic Acid greater than 2  Source is pancreatic pseudocyst  Sepsis protocol initiated  Crystalloid Fluid Administration: Fluid resuscitation ordered per standard protocol - 30 mL/kg per current or ideal body weight  IV antibiotics as appropriate for source of sepsis  Reassessment: I have reassessed the patient's hemodynamic status    Zosyn transitioned to ancef (Cx with ecoli)   Resumed IVF, decreased oral intake, stop once improved     Hypertension- (present on admission)  Assessment & Plan  Stable, resume home blood pressure mediations   Monitor     Hypokalemia- (present on admission)  Assessment & Plan  Low K, replacement  Monitor and replete as needed         VTE prophylaxis: SCDs    I have performed a physical exam and reviewed and updated ROS and Plan today (2/20/2024). In review of yesterday's note (2/19/2024), there are no changes except as documented above.    Greater than 51 minutes spent prepping to see patient (e.g. reviewing EMR) obtaining and/or reviewing separately obtained history. Performing a medically appropriate examination and evaluation. Independently interpreting results and communicating results to patient/family/caregiver. Counseling and educating the patient/family/caregiver. Ordering medications, tests, and/or procedures. Referring and communicating with other health care professionals.  Documenting clinical information in EPIC. Care coordination.

## 2024-02-20 NOTE — ASSESSMENT & PLAN NOTE
Secondary to infected pseudocyst  Started patient on scheduled Tylenol, muscle relaxers, gabapentin, topical agents and as needed oral opioids, discontinued IV opioids

## 2024-02-20 NOTE — PROGRESS NOTES
Pt education completed for home care and flushing of drain. This RN demonstrated flushing the drain with a 10ml syringe, pigtail tubing, and alcohol wipes. Pt did not have any questions at this time. Pt supplied with alcohol wipes, 10ml flushes, and a specimen cup.

## 2024-02-20 NOTE — CARE PLAN
The patient is Stable - Low risk of patient condition declining or worsening    Shift Goals  Clinical Goals: Patient will have 5/10 pain this shift  Patient Goals: pain  Family Goals: GRANT    Progress made toward(s) clinical / shift goals:    Problem: Knowledge Deficit - Standard  Goal: Patient and family/care givers will demonstrate understanding of plan of care, disease process/condition, diagnostic tests and medications  Description: Target End Date:  1-3 days or as soon as patient condition allows    Document in Patient Education    1.  Patient and family/caregiver oriented to unit, equipment, visitation policy and means for communicating concern  2.  Complete/review Learning Assessment  3.  Assess knowledge level of disease process/condition, treatment plan, diagnostic tests and medications  4.  Explain disease process/condition, treatment plan, diagnostic tests and medications  Outcome: Progressing     Problem: Pain - Standard  Goal: Alleviation of pain or a reduction in pain to the patient’s comfort goal  Description: Target End Date:  Prior to discharge or change in level of care    Document on Vitals flowsheet    1.  Document pain using the appropriate pain scale per order or unit policy  2.  Educate and implement non-pharmacologic comfort measures (i.e. relaxation, distraction, massage, cold/heat therapy, etc.)  3.  Pain management medications as ordered  4.  Reassess pain after pain med administration per policy  5.  If opiods administered assess patient's response to pain medication is appropriate per POSS sedation scale  6.  Follow pain management plan developed in collaboration with patient and interdisciplinary team (including palliative care or pain specialists if applicable)  Outcome: Progressing  Note: Pain improved and only need PRN medication IV       Patient is not progressing towards the following goals:

## 2024-02-21 ENCOUNTER — PHARMACY VISIT (OUTPATIENT)
Dept: PHARMACY | Facility: MEDICAL CENTER | Age: 39
End: 2024-02-21
Payer: COMMERCIAL

## 2024-02-21 VITALS
RESPIRATION RATE: 18 BRPM | TEMPERATURE: 97.7 F | DIASTOLIC BLOOD PRESSURE: 71 MMHG | OXYGEN SATURATION: 97 % | SYSTOLIC BLOOD PRESSURE: 117 MMHG | BODY MASS INDEX: 23.15 KG/M2 | WEIGHT: 156.31 LBS | HEART RATE: 82 BPM | HEIGHT: 69 IN

## 2024-02-21 LAB
ALBUMIN SERPL BCP-MCNC: 3.3 G/DL (ref 3.2–4.9)
ALBUMIN/GLOB SERPL: 0.9 G/DL
ALP SERPL-CCNC: 689 U/L (ref 30–99)
ALT SERPL-CCNC: 20 U/L (ref 2–50)
ANION GAP SERPL CALC-SCNC: 11 MMOL/L (ref 7–16)
AST SERPL-CCNC: 24 U/L (ref 12–45)
BILIRUB SERPL-MCNC: 0.2 MG/DL (ref 0.1–1.5)
BUN SERPL-MCNC: 7 MG/DL (ref 8–22)
CALCIUM ALBUM COR SERPL-MCNC: 9.2 MG/DL (ref 8.5–10.5)
CALCIUM SERPL-MCNC: 8.6 MG/DL (ref 8.5–10.5)
CHLORIDE SERPL-SCNC: 99 MMOL/L (ref 96–112)
CO2 SERPL-SCNC: 25 MMOL/L (ref 20–33)
CREAT SERPL-MCNC: 0.98 MG/DL (ref 0.5–1.4)
ERYTHROCYTE [DISTWIDTH] IN BLOOD BY AUTOMATED COUNT: 50.2 FL (ref 35.9–50)
GFR SERPLBLD CREATININE-BSD FMLA CKD-EPI: 102 ML/MIN/1.73 M 2
GLOBULIN SER CALC-MCNC: 3.6 G/DL (ref 1.9–3.5)
GLUCOSE SERPL-MCNC: 118 MG/DL (ref 65–99)
HCT VFR BLD AUTO: 30.7 % (ref 42–52)
HGB BLD-MCNC: 10 G/DL (ref 14–18)
MAGNESIUM SERPL-MCNC: 2 MG/DL (ref 1.5–2.5)
MCH RBC QN AUTO: 29.4 PG (ref 27–33)
MCHC RBC AUTO-ENTMCNC: 32.6 G/DL (ref 32.3–36.5)
MCV RBC AUTO: 90.3 FL (ref 81.4–97.8)
PHOSPHATE SERPL-MCNC: 3.8 MG/DL (ref 2.5–4.5)
PLATELET # BLD AUTO: 847 K/UL (ref 164–446)
PMV BLD AUTO: 8.9 FL (ref 9–12.9)
POTASSIUM SERPL-SCNC: 3.7 MMOL/L (ref 3.6–5.5)
PROT SERPL-MCNC: 6.9 G/DL (ref 6–8.2)
RBC # BLD AUTO: 3.4 M/UL (ref 4.7–6.1)
SODIUM SERPL-SCNC: 135 MMOL/L (ref 135–145)
WBC # BLD AUTO: 10.4 K/UL (ref 4.8–10.8)

## 2024-02-21 PROCEDURE — 85027 COMPLETE CBC AUTOMATED: CPT

## 2024-02-21 PROCEDURE — 700101 HCHG RX REV CODE 250: Performed by: NURSE PRACTITIONER

## 2024-02-21 PROCEDURE — 700101 HCHG RX REV CODE 250: Performed by: STUDENT IN AN ORGANIZED HEALTH CARE EDUCATION/TRAINING PROGRAM

## 2024-02-21 PROCEDURE — 80053 COMPREHEN METABOLIC PANEL: CPT

## 2024-02-21 PROCEDURE — A9270 NON-COVERED ITEM OR SERVICE: HCPCS | Performed by: STUDENT IN AN ORGANIZED HEALTH CARE EDUCATION/TRAINING PROGRAM

## 2024-02-21 PROCEDURE — 99239 HOSP IP/OBS DSCHRG MGMT >30: CPT | Performed by: GENERAL PRACTICE

## 2024-02-21 PROCEDURE — 83735 ASSAY OF MAGNESIUM: CPT

## 2024-02-21 PROCEDURE — 700102 HCHG RX REV CODE 250 W/ 637 OVERRIDE(OP): Performed by: STUDENT IN AN ORGANIZED HEALTH CARE EDUCATION/TRAINING PROGRAM

## 2024-02-21 PROCEDURE — 700102 HCHG RX REV CODE 250 W/ 637 OVERRIDE(OP): Performed by: GENERAL PRACTICE

## 2024-02-21 PROCEDURE — 84100 ASSAY OF PHOSPHORUS: CPT

## 2024-02-21 PROCEDURE — 700111 HCHG RX REV CODE 636 W/ 250 OVERRIDE (IP): Performed by: STUDENT IN AN ORGANIZED HEALTH CARE EDUCATION/TRAINING PROGRAM

## 2024-02-21 PROCEDURE — A9270 NON-COVERED ITEM OR SERVICE: HCPCS | Performed by: GENERAL PRACTICE

## 2024-02-21 RX ADMIN — OMEPRAZOLE 20 MG: 20 CAPSULE, DELAYED RELEASE ORAL at 05:39

## 2024-02-21 RX ADMIN — OXYCODONE HYDROCHLORIDE 15 MG: 15 TABLET ORAL at 05:39

## 2024-02-21 RX ADMIN — GABAPENTIN 100 MG: 100 CAPSULE ORAL at 05:39

## 2024-02-21 RX ADMIN — METHOCARBAMOL 500 MG: 500 TABLET ORAL at 08:08

## 2024-02-21 RX ADMIN — METOPROLOL TARTRATE 25 MG: 25 TABLET, FILM COATED ORAL at 05:39

## 2024-02-21 RX ADMIN — LIDOCAINE 1 PATCH: 4 PATCH TOPICAL at 05:40

## 2024-02-21 RX ADMIN — SODIUM CHLORIDE, PRESERVATIVE FREE 10 ML: 5 INJECTION INTRAVENOUS at 05:39

## 2024-02-21 RX ADMIN — CEFAZOLIN 2 G: 10 INJECTION, POWDER, FOR SOLUTION INTRAVENOUS at 05:40

## 2024-02-21 RX ADMIN — Medication 100 MG: at 05:40

## 2024-02-21 ASSESSMENT — PATIENT HEALTH QUESTIONNAIRE - PHQ9
2. FEELING DOWN, DEPRESSED, IRRITABLE, OR HOPELESS: NOT AT ALL
1. LITTLE INTEREST OR PLEASURE IN DOING THINGS: NOT AT ALL
SUM OF ALL RESPONSES TO PHQ9 QUESTIONS 1 AND 2: 0

## 2024-02-21 ASSESSMENT — ENCOUNTER SYMPTOMS
VOMITING: 0
FEVER: 0
ABDOMINAL PAIN: 1
WEAKNESS: 0
CHILLS: 0
PALPITATIONS: 0
NAUSEA: 0
HEADACHES: 0
SHORTNESS OF BREATH: 0

## 2024-02-21 ASSESSMENT — PAIN DESCRIPTION - PAIN TYPE
TYPE: ACUTE PAIN

## 2024-02-21 NOTE — PROGRESS NOTES
"Radiology Progress Note   Author: NATHANAEL Paul Date & Time created: 2/21/2024  8:49 AM   Date of admission  2/14/2024  Note to reader: this note follows the APSO format rather than the historical SOAP format. Assessment and plan located at the top of the note for ease of use.    Chief Complaint  38 y.o. male admitted 2/14/2024 with abdominal pain      HPI  38-year-old male with a past medical history of pancreatitis and pseudocyst. He presented to [facility] on 2/14/2024 complaining of epigastric/abdominal pain times \"a few days\". CT abdomen pelvis was consistent with pancreatitis with large multifocal peripancreatic fluid collections, and multifocal areas of gas within the fluid collections consistent with infected pseudocyst. Interventional radiology consulted and patient underwent CT-guided peritoneal fluid drainage with 12 Bhutanese catheter placement by Dr. Pemberton (IR) on 02/15/24.      Interval History:   02/16/24-Right lateral upper quadrant drain to bulb suction with 567 mL brownish red  output in the last 24 hours.  I flushed drain with 10 ccc of sterile NSS; I Orders placed for RNs to flush drain Q shift.  WBC 7.0 down from 11.;  Hgb 9.3; PLT S543; Cr 0.63 : Micro-pancreatic cyst cultures pending.  . Drain flushed with 10 mL NS.     02/17/24- Right lateral upper quadrant drain to bulb suction with 125 mL light brown output in the last 24 hours.  I flushed drain with 10 ccc of sterile NSS; I Orders placed for RNs to flush drain Q shift.  WBC 9.3;  Hgb 9.9; ; Cr 0.75; INR 1.36: Micro-pancreatic cyst cultures + for E. coli. Drain flushed with 10 mL NS    02/19/24 - RUQ 12 Bhutanese drain with 105 mL tan output in the last 24 hours.  Labs reviewed; WBC 9.4, H&H stable.  On ABX through hospitalist.  Abdominal pain improving with current pain medication.  Drain flushed with 10 mL NS.  Patient discussed with Dr. Renae    02/20/24 - RUQ 12 Fr drain with 120 mL tan output in the last " 24 hours.  Labs reviewed; WBC 10.6, H&H stable.  On ABX through hospitalist.  Abdominal pain improving with current pain medication.  Repeat CT shows small interval decrease in pancreatic pseudocyst/fluid collection.  Drain flushed with 10 mL NS.  Patient discussed with hospitalist.    02/21/24 - RUQ 12 Fr drain with 110 mL tan output in the last 24 hours.  Labs reviewed; WBC 10.4, H&H stable.  On ABX through hospitalist.  Abdominal pain improving.  Patient discussed with hospitalist; discharging today with HPB follow up. Supplies at bedside.     Assessment/Plan     Principal Problem:    Infected pseudocyst of pancreas  Active Problems:    Hypokalemia    Hypertension    Sepsis (HCC)    Dilation of biliary tract    Anemia    Intractable abdominal pain      Plan IR  - Pt discharging today with HPB follow up.   - Verified pt has flushes and alcohol swabs for drain care at home.   - IR signing off.     -Thank you for allowing Interventional Radiology team to participate in the patients care, if any additional care or requests are needed in the future please do not hesitate to call or place IR order   [phone]           Review of Systems  Physical Exam   Review of Systems   Constitutional:  Positive for malaise/fatigue. Negative for chills and fever.   Respiratory:  Negative for shortness of breath.    Cardiovascular:  Negative for chest pain and palpitations.   Gastrointestinal:  Positive for abdominal pain. Negative for nausea and vomiting.   Neurological:  Negative for weakness and headaches.      Vitals:    02/21/24 0701   BP: 117/71   Pulse: 82   Resp: 18   Temp: 36.5 °C (97.7 °F)   SpO2: 97%        Physical Exam  Cardiovascular:      Rate and Rhythm: Normal rate.   Pulmonary:      Effort: Pulmonary effort is normal. No respiratory distress.   Abdominal:      Palpations: Abdomen is soft.      Tenderness: There is abdominal tenderness.      Comments: IR drain to right lateral upper quadrant   Skin:     General: Skin  "is warm and dry.   Neurological:      General: No focal deficit present.      Mental Status: He is alert and oriented to person, place, and time.   Psychiatric:         Mood and Affect: Mood normal.         Behavior: Behavior normal.             Labs    Recent Labs     02/19/24  0015 02/20/24  0106 02/21/24  0014   WBC 9.4 10.6 10.4   RBC 3.31* 3.40* 3.40*   HEMOGLOBIN 9.7* 10.1* 10.0*   HEMATOCRIT 30.0* 30.9* 30.7*   MCV 90.6 90.9 90.3   MCH 29.3 29.7 29.4   MCHC 32.3 32.7 32.6   RDW 49.2 49.8 50.2*   PLATELETCT 746* 815* 847*   MPV 9.0 9.0 8.9*     Recent Labs     02/19/24  0015 02/20/24  0106 02/21/24  0014   SODIUM 135 134* 135   POTASSIUM 3.9 3.8 3.7   CHLORIDE 100 99 99   CO2 22 23 25   GLUCOSE 106* 112* 118*   BUN 5* 5* 7*   CREATININE 0.75 0.79 0.98   CALCIUM 8.5 8.8 8.6     Recent Labs     02/19/24  0015 02/20/24  0106 02/21/24  0014   ALBUMIN 3.1* 3.3 3.3   TBILIRUBIN 0.2 0.2 0.2   ALKPHOSPHAT 384* 481* 689*   TOTPROTEIN 6.7 7.0 6.9   ALTSGPT 24 18 20   ASTSGOT 15 18 24   CREATININE 0.75 0.79 0.98     CT-ABDOMEN-PELVIS WITH   Final Result         1. Slightly decreased large pancreatic fluid collection/pseudocyst. Air within the collection raise the possibility of superimposed infection.   2. Minimal ascites within the pelvis.   3. New tiny simple left pleural effusion.      CT-DRAIN-PERITONEAL   Final Result      1.  CT guided peritoneal fluid collection catheter drainage.   2.  The current plan is to obtain a follow-up CT in 5-7 days..        INR   Date Value Ref Range Status   02/15/2024 1.36 (H) 0.87 - 1.13 Final     Comment:     INR - Non-therapeutic Reference Range: 0.87-1.13  INR - Therapeutic Reference Range: 2.0-4.0       No results found for: \"POCINR\"     Intake/Output Summary (Last 24 hours) at 2/19/2024 0904  Last data filed at 2/19/2024 0450  Gross per 24 hour   Intake 487.21 ml   Output 105 ml   Net 382.21 ml      Labs not explicitly included in this progress note were reviewed by the author. " Radiology/imaging not explicitly included in this progress note was reviewed by the author.     I have performed a physical exam and reviewed and updated ROS and Plan today (2/21/2024).     30 minutes in directly providing and coordinating care and extensive data review.  No time overlap and excludes procedures.

## 2024-02-21 NOTE — PROGRESS NOTES
Received report from night shift RN, pt care assumed. Pt is A&Ox4, VSS on RA. Denies any pain at this time.Reinforced the use of call light when assistance needed, safety measures maintained.

## 2024-02-21 NOTE — PROGRESS NOTES
Reviewed D/C instructions with pt, and educated on PALAK drain care at home. Pt verbalized understanding.

## 2024-02-21 NOTE — CARE PLAN
The patient is Stable - Low risk of patient condition declining or worsening    Shift Goals  Clinical Goals: Pain managment and tolerance with a range of 4 out of 10 throughout night  Patient Goals: Pain control  Family Goals: GRANT    Progress made toward(s) clinical / shift goals:  Pts pain was tolerated with PRN medications per MAR. Bed is in low and locked position, call light and belogings in reach, and reminded to use call light.     Patient is not progressing towards the following goals:

## 2024-02-21 NOTE — PROGRESS NOTES
IV removed. Discharge paperwork discussed. All questions answered. Follow up appointment discussed. Patient discharged home via car with family. Patient has all personal belongings and medications from the pharmacy.

## 2024-02-21 NOTE — DISCHARGE SUMMARY
Discharge Summary    CHIEF COMPLAINT ON ADMISSION  No chief complaint on file.      Reason for Admission  infected pancreatic pseudocyst     Admission Date  2/14/2024    CODE STATUS  Prior    HPI & HOSPITAL COURSE  This is a 38-year-old male with past medical history of pancreatitis with pseudocyst who presented to the ED on 3/14/2024 with worsening abdominal pain, significant for sepsis.    CT abdomen pelvis showing pancreatitis with large multifocal peripancreatic fluid collections within the largest component seen extending inferiorly from the pancreatic head into the mesentery into the upper pelvis measuring 18 x 16 x 12 cm in size, new multifocal areas of gas within those fluid collections with suggest presence of infective pseudocyst.  Patient underwent IR drain placement on 2/15.  Hepatobiliary surgery consulted, pseudocyst will require another 2 to 3 weeks to mature fully.  Repeat CT imaging on 2/20 noted slightly decreased now 13 x 10 cm, formally 18 x 16 x 12 cm - large pancreatic fluid collection/pseudocyst.  Patient to continue with drain in place, flushing it daily, continue with oral antibiotic therapy (augmentin, reviewed with ID), will follow-up with hepatobiliary surgery outpatient to arrange for surgery in the next 2 to 3 weeks.    Therefore, he is discharged in good and stable condition to home with close outpatient follow-up.    The patient met 2-midnight criteria for an inpatient stay at the time of discharge.    Discharge Date  2/21/2024    FOLLOW UP ITEMS POST DISCHARGE  Primary care physician  Hepatobiliary surgery    DISCHARGE DIAGNOSES  Principal Problem:    Infected pseudocyst of pancreas (POA: Unknown)  Active Problems:    Intractable abdominal pain (POA: Unknown)    Hypokalemia (POA: Yes)    Hypertension (POA: Yes)    Sepsis (HCC) (POA: Yes)    Dilation of biliary tract (POA: Unknown)    Anemia (POA: Unknown)  Resolved Problems:    * No resolved hospital problems. *      FOLLOW UP  Stefanie  SAMUEL Burns  [address] [facility] [place] [address]  [phone]    Follow up      Blair Sainz M.D.  [address] [facility] [address]  [place] [id number] [phone]    Follow up        MEDICATIONS ON DISCHARGE     Medication List        START taking these medications        Instructions   amoxicillin-clavulanate 875-125 MG Tabs  Commonly known as: Augmentin   Doctor's comments: Plan for surgery in the next 2-4 weeks, will need coverage until then  Take 1 Tablet by mouth 2 times a day for 30 days.  Dose: 1 Tablet     BD PosiFlush 0.9 % Soln  Generic drug: normal saline flush   10 mL by Tube route 2 times a day for 30 days.  Dose: 10 mL     gabapentin 100 MG Caps  Commonly known as: Neurontin   Take 1 Capsule by mouth 3 times a day for 30 days.  Dose: 100 mg     methocarbamol 500 MG Tabs  Commonly known as: Robaxin   Take 1 Tablet by mouth 4 times a day for 30 days.  Dose: 500 mg     omeprazole 20 MG delayed-release capsule  Commonly known as: PriLOSEC   Take 1 Capsule by mouth every day for 30 days.  Dose: 20 mg     Stimulant Laxative 8.6-50 MG Tabs  Generic drug: senna-docusate   Take 2 Tablets by mouth every evening.  Dose: 2 Tablet            CONTINUE taking these medications        Instructions   acetaminophen 325 MG Tabs  Commonly known as: Tylenol   Take 2 Tablets by mouth every four hours as needed for Fever or Moderate Pain.  Dose: 650 mg     amLODIPine 5 MG Tabs  Commonly known as: Norvasc   Take 1 Tablet by mouth every day.  Dose: 5 mg     metoprolol tartrate 25 MG Tabs  Commonly known as: Lopressor   Take 1 Tablet by mouth 2 times a day.  Dose: 25 mg     oxyCODONE immediate-release 5 MG Tabs  Commonly known as: Roxicodone   Take 5 mg by mouth every 6 hours as needed for Severe Pain.  Dose: 5 mg              Allergies  No Known Allergies    DIET  No orders of the defined types were placed in this encounter.      ACTIVITY  As tolerated.  Weight bearing as  tolerated    CONSULTATIONS  HBS    PROCEDURES  IR DRAIN    LABORATORY  Lab Results   Component Value Date    SODIUM 135 02/21/2024    POTASSIUM 3.7 02/21/2024    CHLORIDE 99 02/21/2024    CO2 25 02/21/2024    GLUCOSE 118 (H) 02/21/2024    BUN 7 (L) 02/21/2024    CREATININE 0.98 02/21/2024        Lab Results   Component Value Date    WBC 10.4 02/21/2024    HEMOGLOBIN 10.0 (L) 02/21/2024    HEMATOCRIT 30.7 (L) 02/21/2024    PLATELETCT 847 (H) 02/21/2024      CT-ABDOMEN-PELVIS WITH   Final Result         1. Slightly decreased large pancreatic fluid collection/pseudocyst. Air within the collection raise the possibility of superimposed infection.   2. Minimal ascites within the pelvis.   3. New tiny simple left pleural effusion.      CT-DRAIN-PERITONEAL   Final Result      1.  CT guided peritoneal fluid collection catheter drainage.   2.  The current plan is to obtain a follow-up CT in 5-7 days..         Total time of the discharge process exceeds 45 minutes.

## 2024-02-21 NOTE — PROGRESS NOTES
"Radiology Progress Note   Author: NATHANAEL Paul Date & Time created: 2/20/2024  4:28 PM   Date of admission  2/14/2024  Note to reader: this note follows the APSO format rather than the historical SOAP format. Assessment and plan located at the top of the note for ease of use.    Chief Complaint  38 y.o. male admitted 2/14/2024 with       HPI  38-year-old male with a past medical history of pancreatitis and pseudocyst. He presented to [facility] on 2/14/2024 complaining of epigastric/abdominal pain times \"a few days\". CT abdomen pelvis was consistent with pancreatitis with large multifocal peripancreatic fluid collections, and multifocal areas of gas within the fluid collections consistent with infected pseudocyst. Interventional radiology consulted and patient underwent CT-guided peritoneal fluid drainage with 12 Slovenian catheter placement by Dr. Pemberton (IR) on 02/15/24.      Interval History:   02/16/24-Right lateral upper quadrant drain to bulb suction with 567 mL brownish red  output in the last 24 hours.  I flushed drain with 10 ccc of sterile NSS; I Orders placed for RNs to flush drain Q shift.  WBC 7.0 down from 11.;  Hgb 9.3; PLT S543; Cr 0.63 : Micro-pancreatic cyst cultures pending.  . Drain flushed with 10 mL NS.     02/17/24- Right lateral upper quadrant drain to bulb suction with 125 mL light brown output in the last 24 hours.  I flushed drain with 10 ccc of sterile NSS; I Orders placed for RNs to flush drain Q shift.  WBC 9.3;  Hgb 9.9; ; Cr 0.75; INR 1.36: Micro-pancreatic cyst cultures + for E. coli. Drain flushed with 10 mL NS    02/19/24 - RUQ 12 Slovenian drain with 105 mL tan output in the last 24 hours.  Labs reviewed; WBC 9.4, H&H stable.  On ABX through hospitalist.  Abdominal pain improving with current pain medication.  Drain flushed with 10 mL NS.  Patient discussed with Dr. Renae    02/20/24 - RUQ 12 Fr drain with 120 mL tan output in the last 24 hours.  " Labs reviewed; WBC 10.6, H&H stable.  On ABX through hospitalist.  Abdominal pain improving with current pain medication.  Repeat CT shows small interval decrease in pancreatic pseudocyst/fluid collection.  Drain flushed with 10 mL NS.  Patient discussed with hospitalist.    Assessment/Plan     Principal Problem:    Infected pseudocyst of pancreas  Active Problems:    Hypokalemia    Hypertension    Sepsis (HCC)    Dilation of biliary tract    Anemia    Intractable abdominal pain      Plan IR  - Continue irrigating RUQ drain with 10 ml of sterile saline each shift  - Fluid cultures positive for E. Coli; on ABX through hospitalist  - HPB following   - Continue to monitor drains, VS, and labs    - Plan to discharged tomorrow with drain in place with hepatobiliary follow-up  - Patient already received education on drain maintenance/flushing by bedside RN.  Flushing supplies provided    -Thank you for allowing Interventional Radiology team to participate in the patients care, if any additional care or requests are needed in the future please do not hesitate to call or place IR order   [phone]           Review of Systems  Physical Exam   Review of Systems   Constitutional:  Positive for malaise/fatigue. Negative for chills and fever.   Respiratory:  Negative for shortness of breath.    Cardiovascular:  Negative for chest pain and palpitations.   Gastrointestinal:  Positive for abdominal pain. Negative for nausea and vomiting.   Neurological:  Negative for weakness and headaches.      Vitals:    02/20/24 1457   BP: 126/63   Pulse: 90   Resp: (!) 186   Temp: 36.5 °C (97.7 °F)   SpO2: 95%        Physical Exam  Cardiovascular:      Rate and Rhythm: Normal rate.   Pulmonary:      Effort: Pulmonary effort is normal. No respiratory distress.   Abdominal:      Palpations: Abdomen is soft.      Tenderness: There is abdominal tenderness.      Comments: IR drain to right lateral upper quadrant   Skin:     General: Skin is warm and  "dry.   Neurological:      General: No focal deficit present.      Mental Status: He is alert and oriented to person, place, and time.   Psychiatric:         Mood and Affect: Mood normal.         Behavior: Behavior normal.             Labs    Recent Labs     02/18/24 0333 02/19/24  0015 02/20/24  0106   WBC 9.3 9.4 10.6   RBC 3.34* 3.31* 3.40*   HEMOGLOBIN 9.9* 9.7* 10.1*   HEMATOCRIT 30.2* 30.0* 30.9*   MCV 90.4 90.6 90.9   MCH 29.6 29.3 29.7   MCHC 32.8 32.3 32.7   RDW 48.9 49.2 49.8   PLATELETCT 693* 746* 815*   MPV 8.9* 9.0 9.0     Recent Labs     02/18/24 0333 02/19/24  0015 02/20/24  0106   SODIUM 134* 135 134*   POTASSIUM 3.9 3.9 3.8   CHLORIDE 98 100 99   CO2 24 22 23   GLUCOSE 108* 106* 112*   BUN 5* 5* 5*   CREATININE 0.75 0.75 0.79   CALCIUM 8.9 8.5 8.8     Recent Labs     02/18/24 0333 02/19/24  0015 02/20/24  0106   ALBUMIN 3.2 3.1* 3.3   TBILIRUBIN 0.3 0.2 0.2   ALKPHOSPHAT 407* 384* 481*   TOTPROTEIN 6.8 6.7 7.0   ALTSGPT 33 24 18   ASTSGOT 17 15 18   CREATININE 0.75 0.75 0.79     CT-ABDOMEN-PELVIS WITH   Final Result         1. Slightly decreased large pancreatic fluid collection/pseudocyst. Air within the collection raise the possibility of superimposed infection.   2. Minimal ascites within the pelvis.   3. New tiny simple left pleural effusion.      CT-DRAIN-PERITONEAL   Final Result      1.  CT guided peritoneal fluid collection catheter drainage.   2.  The current plan is to obtain a follow-up CT in 5-7 days..        INR   Date Value Ref Range Status   02/15/2024 1.36 (H) 0.87 - 1.13 Final     Comment:     INR - Non-therapeutic Reference Range: 0.87-1.13  INR - Therapeutic Reference Range: 2.0-4.0       No results found for: \"POCINR\"     Intake/Output Summary (Last 24 hours) at 2/19/2024 0904  Last data filed at 2/19/2024 0450  Gross per 24 hour   Intake 487.21 ml   Output 105 ml   Net 382.21 ml      Labs not explicitly included in this progress note were reviewed by the author. " Radiology/imaging not explicitly included in this progress note was reviewed by the author.     I have performed a physical exam and reviewed and updated ROS and Plan today (2/20/2024).     35 minutes in directly providing and coordinating care and extensive data review.  No time overlap and excludes procedures.

## 2024-02-21 NOTE — DISCHARGE PLANNING
Care Transition Team Assessment    LMSW met with pt at bedside to complete assessment. Pt A&Ox4 and able to verify the information on the face sheet. Pt lives with self in a single-story house that has one step to enter with a son is who is in the home off and on. Prior to this hospitalization pt was independent at home with ADLs and most IADLs. Pt does not use any DME at baseline. Pt reported that his mother is good support for him. Pt works at [employer]. Pt denies any SA or MH concerns. Pt does not have an advance directive.     Pt to DC today with Meds to Beds.     Information Source  Orientation Level: Oriented X4  Information Given By: Patient  Who is responsible for making decisions for patient? : Patient    Readmission Evaluation  Is this a readmission?: Yes - unplanned readmission    Elopement Risk  Legal Hold: No  Ambulatory or Self Mobile in Wheelchair: Yes  Disoriented: No  Psychiatric Symptoms: None  History of Wandering: No  Elopement this Admit: No  Vocalizing Wanting to Leave: No  Displays Behaviors, Body Language Wanting to Leave: No-Not at Risk for Elopement  Elopement Risk: Not at Risk for Elopement    Interdisciplinary Discharge Planning  Lives with - Patient's Self Care Capacity: Alone and Able to Care For Self  Patient or legal guardian wants to designate a caregiver: No  Support Systems: Family Member(s)  Housing / Facility: 1 Story House  Durable Medical Equipment: Not Applicable    Discharge Preparedness  What is your plan after discharge?: Home with help  What are your discharge supports?: Parent  Prior Functional Level: Independent with Activities of Daily Living  Difficulity with ADLs: None  Difficulity with IADLs: None    Functional Assesment  Prior Functional Level: Independent with Activities of Daily Living    Finances  Financial Barriers to Discharge: No  Prescription Coverage: Yes    Advance Directive  Advance Directive?: None    Domestic Abuse  Have you ever been the victim of  abuse or violence?: No  Physical Abuse or Sexual Abuse: No  Verbal Abuse or Emotional Abuse: No  Possible Abuse/Neglect Reported to:: Not Applicable    Psychological Assessment  History of Substance Abuse: None  History of Psychiatric Problems: No  Non-compliant with Treatment: No  Newly Diagnosed Illness: No    Discharge Risks or Barriers  Discharge risks or barriers?: No    Anticipated Discharge Information  Discharge Disposition: Discharged to home/self care (01)

## 2024-02-28 NOTE — PROGRESS NOTES
Subjective:   3/5/2024  8:20 AM  Primary care physician: SAMUEL Mckinney  Referring Provider: Hospital consult    Chief Complaint:   Chief Complaint   Patient presents with    Follow-Up     HOSP CONSULT  PANCREATITIS  PSEUDOCYST  DRAIN        Diagnosis:   1. Severe acute pancreatitis  CT-ABDOMEN WITH & W/O      2. Infected pseudocyst of pancreas  CT-ABDOMEN WITH & W/O      3. Dilation of biliary tract  CT-ABDOMEN WITH & W/O      4. Intractable abdominal pain  CT-ABDOMEN WITH & W/O        History of presenting illness:    Abelardo Fenton is a pleasant 38 y.o. male with past medical history of pancreatitis with pseudocyst who presented to the ED on 3/14/2024 with worsening abdominal pain, significant for sepsis.     CT abdomen pelvis showing pancreatitis with large multifocal peripancreatic fluid collections within the largest component seen extending inferiorly from the pancreatic head into the mesentery into the upper pelvis measuring 18 x 16 x 12 cm in size, new multifocal areas of gas within those fluid collections with suggest presence of infective pseudocyst.      Patient underwent IR drain placement on 2/15/24. Repeat CT imaging on 2/20/24 noted slightly decreased now 13 x 10 cm, formerly 18 x 16 x 12 cm - large pancreatic fluid collection/pseudocyst.  Patient to continue with drain in place, flushing it daily, continue with oral antibiotic therapy (augmentin, reviewed with ID), was discharged with plans for follow up with hepatobiliary surgery.    Update 3/5/24  He is here today for follow up from recent hospitalization.  The patient was admitted to the hospital with what appears to be necrotizing pancreatitis which then became infected.  IR drains have been placed in order to get him over the septic.  And then we will consult centimeters.  The patient had a large pseudocyst on imaging that extended from the head of the pancreas along the body.  The  bulk of the pseudocyst and infected collection with the air pockets was located in the right upper quadrant towards the gallbladder.  Patient did do well in the hospital was discharged home with his drain and on long-term antibiotics.  He now returns with his parents.  He denies any nausea or vomiting but did have a low-grade fever just the other day at 100.2.  The drain itself has been putting out about 10 to 20 cc a day and it does appear to be purulent.  Unfortunate his last set of imaging which I reviewed was from February 20 we do not have any new imaging to see where or how large the remnant cyst is.  Patient is being active but cannot return to work because of his fatigue and his intermittent pain.  Patient has not had any recent drinking.  He does appear to look much better than he was in the hospital.  He is here with his parents to discuss neck steps.  We reviewed the patient's cultures as well.  Long with his labs and imaging.        Past Medical History:   Diagnosis Date    Hyperlipemia     Hypertension     Pancreatitis      Past Surgical History:   Procedure Laterality Date    NO PERTINENT PAST SURGICAL HISTORY       No Known Allergies  Outpatient Encounter Medications as of 3/5/2024   Medication Sig Dispense Refill    senna-docusate (PERICOLACE OR SENOKOT S) 8.6-50 MG Tab Take 2 Tablets by mouth every evening. 30 Tablet 0    gabapentin (NEURONTIN) 100 MG Cap Take 1 Capsule by mouth 3 times a day for 30 days. 90 Capsule 0    methocarbamol (ROBAXIN) 500 MG Tab Take 1 Tablet by mouth 4 times a day for 30 days. 120 Tablet 0    omeprazole (PRILOSEC) 20 MG delayed-release capsule Take 1 Capsule by mouth every day for 30 days. 30 Capsule 0    normal saline flush 0.9 % Solution 10 mL by Tube route 2 times a day for 30 days. 600 mL 0    amoxicillin-clavulanate (AUGMENTIN) 875-125 MG Tab Take 1 Tablet by mouth 2 times a day for 30 days. 60 Tablet 0    metoprolol tartrate (LOPRESSOR) 25 MG Tab Take 1 Tablet by  "mouth 2 times a day. 60 Tablet 0    oxyCODONE immediate-release (ROXICODONE) 5 MG Tab Take 5 mg by mouth every 6 hours as needed for Severe Pain.      acetaminophen (TYLENOL) 325 MG Tab Take 2 Tablets by mouth every four hours as needed for Fever or Moderate Pain. 30 Tablet 0    amLODIPine (NORVASC) 5 MG Tab Take 1 Tablet by mouth every day. 30 Tablet 2     No facility-administered encounter medications on file as of 3/5/2024.     Social History     Socioeconomic History    Marital status: Single     Spouse name: Not on file    Number of children: Not on file    Years of education: Not on file    Highest education level: Not on file   Occupational History    Not on file   Tobacco Use    Smoking status: Never    Smokeless tobacco: Never   Vaping Use    Vaping Use: Never used   Substance and Sexual Activity    Alcohol use: Yes     Comment: No alcohol since 12/25/23    Drug use: Never    Sexual activity: Yes   Other Topics Concern    Not on file   Social History Narrative    Not on file     Social Determinants of Health     Financial Resource Strain: Not on file   Food Insecurity: Not on file   Transportation Needs: Not on file   Physical Activity: Not on file   Stress: Not on file   Social Connections: Not on file   Intimate Partner Violence: Not on file   Housing Stability: Not on file      Social History     Tobacco Use   Smoking Status Never   Smokeless Tobacco Never     Social History     Substance and Sexual Activity   Alcohol Use Yes    Comment: No alcohol since 12/25/23     Social History     Substance and Sexual Activity   Drug Use Never      History reviewed. No pertinent family history.    Review of Systems   Constitutional:  Positive for malaise/fatigue.   Gastrointestinal:  Positive for abdominal pain.   All other systems reviewed and are negative.       Objective:   /62 (BP Location: Right arm, Patient Position: Sitting)   Pulse 88   Temp 36.2 °C (97.2 °F) (Esophageal)   Ht 1.727 m (5' 8\")   Wt " 68.9 kg (152 lb)   SpO2 95%   BMI 23.11 kg/m²     Physical Exam  Vitals and nursing note reviewed.   Constitutional:       Appearance: Normal appearance.   HENT:      Head: Normocephalic.      Mouth/Throat:      Mouth: Mucous membranes are moist.      Pharynx: Oropharynx is clear.   Eyes:      Extraocular Movements: Extraocular movements intact.      Conjunctiva/sclera: Conjunctivae normal.      Pupils: Pupils are equal, round, and reactive to light.   Cardiovascular:      Rate and Rhythm: Normal rate and regular rhythm.      Pulses: Normal pulses.      Heart sounds: Normal heart sounds.   Pulmonary:      Effort: Pulmonary effort is normal.      Breath sounds: Normal breath sounds.   Abdominal:      General: Abdomen is flat. Bowel sounds are normal.      Palpations: Abdomen is soft.      Comments: Abdominal discomfort around the drain   Musculoskeletal:         General: Normal range of motion.      Cervical back: Normal range of motion and neck supple.   Skin:     General: Skin is warm.   Neurological:      General: No focal deficit present.      Mental Status: He is alert and oriented to person, place, and time. Mental status is at baseline.   Psychiatric:         Mood and Affect: Mood normal.         Behavior: Behavior normal.         Thought Content: Thought content normal.         Judgment: Judgment normal.         Labs   Latest Reference Range & Units 02/21/24 00:14   WBC 4.8 - 10.8 K/uL 10.4   RBC 4.70 - 6.10 M/uL 3.40 (L)   Hemoglobin 14.0 - 18.0 g/dL 10.0 (L)   Hematocrit 42.0 - 52.0 % 30.7 (L)   MCV 81.4 - 97.8 fL 90.3   MCH 27.0 - 33.0 pg 29.4   MCHC 32.3 - 36.5 g/dL 32.6   RDW 35.9 - 50.0 fL 50.2 (H)   Platelet Count 164 - 446 K/uL 847 (H)   MPV 9.0 - 12.9 fL 8.9 (L)   (L): Data is abnormally low  (H): Data is abnormally high     Latest Reference Range & Units 02/21/24 00:14   Sodium 135 - 145 mmol/L 135   Potassium 3.6 - 5.5 mmol/L 3.7   Chloride 96 - 112 mmol/L 99   Co2 20 - 33 mmol/L 25   Anion Gap  7.0 - 16.0  11.0   Glucose 65 - 99 mg/dL 118 (H)   Bun 8 - 22 mg/dL 7 (L)   Creatinine 0.50 - 1.40 mg/dL 0.98   GFR (CKD-EPI) >60 mL/min/1.73 m 2 102   Calcium 8.5 - 10.5 mg/dL 8.6   Correct Calcium 8.5 - 10.5 mg/dL 9.2   AST(SGOT) 12 - 45 U/L 24   ALT(SGPT) 2 - 50 U/L 20   Alkaline Phosphatase 30 - 99 U/L 689 (H)   Total Bilirubin 0.1 - 1.5 mg/dL 0.2   Albumin 3.2 - 4.9 g/dL 3.3   Total Protein 6.0 - 8.2 g/dL 6.9   Globulin 1.9 - 3.5 g/dL 3.6 (H)   A-G Ratio g/dL 0.9   Phosphorus 2.5 - 4.5 mg/dL 3.8   Magnesium 1.5 - 2.5 mg/dL 2.0   (H): Data is abnormally high  (L): Data is abnormally low     Imaging  CT A/P (2/20/23)  FINDINGS:  Lung: Linear density in the right lower lobe again noted, consistent with scarring or subsegmental atelectasis. Tiny simple left pleural effusion has developed. Heart is normal in size.  Liver: No focal liver lesion  Spleen: Normal in size, without focal lesion  Adrenal glands: Normal  Pancreas: When measuring at the same location as the previous exam, there is a slightly decreased 13 x 10 cm pancreatic fluid and air collection. Drainage catheter is present within the collection. Additional fluid extends along the pancreas to the level   of the spleen.  Gallbladder: No radiodense stone visualized.  Biliary: No biliary duct dilation visualized.  Kidneys: No stone or hydronephrosis. No cystic or solid renal lesion.  Vasculature: Nonaneurysmal  Lymph nodes: No adenopathy  Bowel: No evidence of obstruction or acute inflammation  Appendix:  Not visualized  Peritoneum: Mild ascites within the pelvis  Pelvis: Urinary bladder is grossly normal. No pelvic mass or adenopathy.  Musculoskeletal: No acute abnormality or concerning osseous lesion     IMPRESSION:  1. Slightly decreased large pancreatic fluid collection/pseudocyst. Air within the collection raise the possibility of superimposed infection.  2. Minimal ascites within the pelvis.  3. New tiny simple left pleural  effusion.    Pathology  N/A    Procedures  Peritoneal drain placed on 2/15/24    Diagnosis:     1. Severe acute pancreatitis  CT-ABDOMEN WITH & W/O      2. Infected pseudocyst of pancreas  CT-ABDOMEN WITH & W/O      3. Dilation of biliary tract  CT-ABDOMEN WITH & W/O      4. Intractable abdominal pain  CT-ABDOMEN WITH & W/O          Medical Decision Making:  Today's Assessment / Status / Plan:     In light of the present findings, the patient will be sent for a stat pancreas protocol CT to assess whether the drain is actually working since it is leaking around the drain and there is new pain.  This has been scheduled today after we ordered it stat.  I will reach out to the patient after review the CT for possible repeat drain placement or removal depending on how the cavity looks.    I, Dr. Sainz have entered, reviewed and confirmed the above diagnoses related to this patient on this date of service, 3/5/2024  8:20 AM.    He agreed and verbalized his agreement and understanding with the current plan. I answered all questions and concerns he has at this time and advised him to call at any time in the interim with questions or concerns in regards to his care.    Thank you for allowing me to participate in his care, I will continue to follow closely.       Please note that this dictation was created using voice recognition software. I have made every reasonable attempt to correct obvious errors, but I expect that there are errors of grammar and possibly content that I did not discover before finalizing the note.     Thank you for this consultation and allowing me to participate in your patient's care. If I can be of further service please contact my office.      I, Dr Sainz, have entered, reviewed and confirmed the above diagnoses related to this patient on this date of service, as per the time and date noted at top of this note.

## 2024-02-29 ENCOUNTER — HOSPITAL ENCOUNTER (EMERGENCY)
Facility: MEDICAL CENTER | Age: 39
End: 2024-02-29
Attending: EMERGENCY MEDICINE
Payer: COMMERCIAL

## 2024-02-29 VITALS
SYSTOLIC BLOOD PRESSURE: 118 MMHG | DIASTOLIC BLOOD PRESSURE: 69 MMHG | RESPIRATION RATE: 16 BRPM | OXYGEN SATURATION: 99 % | TEMPERATURE: 97.4 F | HEIGHT: 69 IN | BODY MASS INDEX: 22.5 KG/M2 | HEART RATE: 84 BPM | WEIGHT: 151.9 LBS

## 2024-02-29 DIAGNOSIS — Z51.89 ENCOUNTER FOR WOUND RE-CHECK: ICD-10-CM

## 2024-02-29 PROCEDURE — 99282 EMERGENCY DEPT VISIT SF MDM: CPT

## 2024-02-29 ASSESSMENT — FIBROSIS 4 INDEX: FIB4 SCORE: 0.23

## 2024-02-29 NOTE — ED PROVIDER NOTES
ED Provider Note    CHIEF COMPLAINT  Chief Complaint   Patient presents with    Wound Check     The pt reports getting a pancreatic drain placed on 2/15. The pt reports brown drainage coming from drain site. The pt denies pus       EXTERNAL RECORDS REVIEWED  Inpatient Notes patient was discharged February 21, 2024 after he was admitted for infected pancreatic pseudocyst he had IR drain placement continued on oral antibiotics flushing his drain daily    HPI/ROS  LIMITATION TO HISTORY   Select: : None  OUTSIDE HISTORIAN(S):  none    Abelardo Fenton is a 38 y.o. male who presents with some brown drainage from around his pancreatic drain.  Patient had recent admission as above with drain placement, he states that he had not noticed much drainage although the bandage had been getting wet and loosening, and he began to notice slight drainage around the dressing over the last few days.  He reports the internal dressing that was quite adherent to his body became loose and this is when he began to notice more drainage.  He reports no abdominal pain, he reports no fevers or chills.  He reports no nausea or vomiting or diarrhea.  He reports he has been flushing the drain as directed and has had no issues with flushing the drain.  He reports the output from the drain has been the same as it usually is over the last week    PAST MEDICAL HISTORY   has a past medical history of Hyperlipemia, Hypertension, and Pancreatitis.    SURGICAL HISTORY   has a past surgical history that includes no pertinent past surgical history.    FAMILY HISTORY  No family history on file.    SOCIAL HISTORY  Social History     Tobacco Use    Smoking status: Never    Smokeless tobacco: Never   Vaping Use    Vaping Use: Never used   Substance and Sexual Activity    Alcohol use: Yes     Comment: No alcohol since 12/25/23    Drug use: Never    Sexual activity: Yes       CURRENT MEDICATIONS  Home Medications       Reviewed by Tony Morgan R.N.  "(Registered Nurse) on 02/29/24 at 1312  Med List Status: Partial     Medication Last Dose Status   acetaminophen (TYLENOL) 325 MG Tab  Active   amLODIPine (NORVASC) 5 MG Tab  Active   amoxicillin-clavulanate (AUGMENTIN) 875-125 MG Tab  Active   gabapentin (NEURONTIN) 100 MG Cap  Active   methocarbamol (ROBAXIN) 500 MG Tab  Active   metoprolol tartrate (LOPRESSOR) 25 MG Tab  Active   normal saline flush 0.9 % Solution  Active   omeprazole (PRILOSEC) 20 MG delayed-release capsule  Active   oxyCODONE immediate-release (ROXICODONE) 5 MG Tab  Active   senna-docusate (PERICOLACE OR SENOKOT S) 8.6-50 MG Tab  Active                    ALLERGIES  No Known Allergies    PHYSICAL EXAM  VITAL SIGNS: /69   Pulse 84   Temp 36.3 °C (97.4 °F) (Temporal)   Resp 16   Ht 1.753 m (5' 9\")   Wt 68.9 kg (151 lb 14.4 oz)   SpO2 99%   BMI 22.43 kg/m²      Pulse ox interpretation: I interpret this pulse ox as normal.  Constitutional: Alert in no apparent distress.  HENT: No signs of trauma, Bilateral external ears normal, Nose normal.   Eyes: Pupils are equal and reactive, Conjunctiva normal, Non-icteric.   Cardiovascular: Regular rate and rhythm, no murmurs.   Thorax & Lungs: Normal breath sounds, No respiratory distress, No wheezing, No chest tenderness.   Abdomen: There is a drain in place over the right upper quadrant, surrounding the drain there is no erythema, no tenderness, no fluctuance or induration, otherwise throughout the abdomen it is soft and there is no tenderness, on the internal dressing there is some brown drainage as well as on the external dressing, no purulence no masses, No pulsatile masses. No peritoneal signs.  Skin: Warm, Dry, No erythema, No rash.   Back: No bony tenderness, No CVA tenderness.   Extremities: Intact distal pulses, No edema,   Musculoskeletal:No major deformities noted.   Neurologic: Alert , Normal motor function, Normal sensory function, No focal deficits noted.   Psychiatric: Affect " normal, Judgment normal, Mood normal.               DIAGNOSTIC STUDIES / PROCEDURES      COURSE & MEDICAL DECISION MAKING      INITIAL ASSESSMENT, COURSE AND PLAN  Care Narrative:   Patient is evaluated the bedside and his chart is reviewed.  At this point I do not see any complications from his drain although multiple diagnoses were considered.  He is afebrile and reports he feels well so I feel this is unlikely recurrence of intra-abdominal infection although this was considered, he has a benign abdominal exam as well without any tenderness and reports no abdominal pain to suggest recurrence of significant abdominal infection or acute surgical pathology.  The drain itself has been flushing well and draining suggesting it is in place, there is no erythema, no purulence or other findings of soft tissue infection either although these are all considered.  We will plan to replace all of his dressings      IR has come, they have replaced his dressing with the appropriate initial dressing as well as absorbent sponge as would be typical for this type of drain, patient is tolerated well, plan for discharge      PROBLEM LIST  # Wound care.  Patient provided with additional and new dressing as above.  No findings of complication follow-up in the surgery office on Tuesday  DISPOSITION AND DISCUSSIONS    Barriers to care at this time, including but not limited to:  none .     Decision tools and prescription drugs considered including, but not limited to: Medication modification reviewed and no medication changes are indicated .     The patient will return for new or worsening symptoms and is stable at the time of discharge.    The patient is referred to a primary physician for blood pressure management, diabetic screening, and for all other preventative health concerns.        DISPOSITION:  Patient will be discharged home in stable condition.    FOLLOW UP:  At your surgical appointment on Tuesday            OUTPATIENT  MEDICATIONS:  Discharge Medication List as of 2/29/2024  3:30 PM            FINAL DIAGNOSIS  1. Encounter for wound re-check           Electronically signed by: Xander Banasl M.D., 2/29/2024 2:07 PM

## 2024-02-29 NOTE — ED TRIAGE NOTES
"Chief Complaint   Patient presents with    Wound Check     The pt reports getting a pancreatic drain placed on 2/15. The pt reports brown drainage coming from drain site. The pt denies pus       Pt ambulatory to triage. Pt A&Ox4, for the above complaint.     Pt to lobby . Pt educated on alerting staff in changes to condition. Pt verbalized understanding.     /74   Pulse 81   Temp 37.1 °C (98.7 °F) (Temporal)   Resp 16   Ht 1.753 m (5' 9\")   Wt 68.9 kg (151 lb 14.4 oz)   SpO2 97%   BMI 22.43 kg/m²     "

## 2024-02-29 NOTE — ED NOTES
.Patient discharged in stable condition per orders.  Wristband removed per protocol. Patient verbalized understanding of all discharge instructions. All belongings accounted for. Pt sent home with extra 4x4's for dressing changes

## 2024-03-05 ENCOUNTER — HOSPITAL ENCOUNTER (OUTPATIENT)
Dept: RADIOLOGY | Facility: MEDICAL CENTER | Age: 39
End: 2024-03-05
Attending: SURGERY
Payer: COMMERCIAL

## 2024-03-05 ENCOUNTER — APPOINTMENT (OUTPATIENT)
Dept: ADMISSIONS | Facility: MEDICAL CENTER | Age: 39
End: 2024-03-05
Attending: SURGERY
Payer: COMMERCIAL

## 2024-03-05 ENCOUNTER — OFFICE VISIT (OUTPATIENT)
Dept: SURGICAL ONCOLOGY | Facility: MEDICAL CENTER | Age: 39
End: 2024-03-05
Payer: COMMERCIAL

## 2024-03-05 VITALS
DIASTOLIC BLOOD PRESSURE: 62 MMHG | BODY MASS INDEX: 23.04 KG/M2 | TEMPERATURE: 97.2 F | HEIGHT: 68 IN | WEIGHT: 152 LBS | HEART RATE: 88 BPM | SYSTOLIC BLOOD PRESSURE: 100 MMHG | OXYGEN SATURATION: 95 %

## 2024-03-05 DIAGNOSIS — K86.3 INFECTED PSEUDOCYST OF PANCREAS: ICD-10-CM

## 2024-03-05 DIAGNOSIS — K83.8 DILATION OF BILIARY TRACT: ICD-10-CM

## 2024-03-05 DIAGNOSIS — R10.9 INTRACTABLE ABDOMINAL PAIN: ICD-10-CM

## 2024-03-05 DIAGNOSIS — K85.90 SEVERE ACUTE PANCREATITIS: ICD-10-CM

## 2024-03-05 PROCEDURE — 700117 HCHG RX CONTRAST REV CODE 255: Performed by: SURGERY

## 2024-03-05 PROCEDURE — 74170 CT ABD WO CNTRST FLWD CNTRST: CPT

## 2024-03-05 PROCEDURE — 3074F SYST BP LT 130 MM HG: CPT | Performed by: SURGERY

## 2024-03-05 PROCEDURE — 99214 OFFICE O/P EST MOD 30 MIN: CPT | Performed by: SURGERY

## 2024-03-05 PROCEDURE — 3078F DIAST BP <80 MM HG: CPT | Performed by: SURGERY

## 2024-03-05 RX ADMIN — IOHEXOL 100 ML: 350 INJECTION, SOLUTION INTRAVENOUS at 09:36

## 2024-03-05 ASSESSMENT — FIBROSIS 4 INDEX: FIB4 SCORE: 0.23

## 2024-03-05 ASSESSMENT — ENCOUNTER SYMPTOMS: ABDOMINAL PAIN: 1

## 2024-03-08 ENCOUNTER — PRE-ADMISSION TESTING (OUTPATIENT)
Dept: ADMISSIONS | Facility: MEDICAL CENTER | Age: 39
End: 2024-03-08
Attending: SURGERY
Payer: COMMERCIAL

## 2024-03-12 ENCOUNTER — HOSPITAL ENCOUNTER (OUTPATIENT)
Facility: MEDICAL CENTER | Age: 39
End: 2024-03-12
Attending: SURGERY | Admitting: SURGERY
Payer: COMMERCIAL

## 2024-03-12 ENCOUNTER — APPOINTMENT (OUTPATIENT)
Dept: RADIOLOGY | Facility: MEDICAL CENTER | Age: 39
End: 2024-03-12
Attending: RADIOLOGY
Payer: COMMERCIAL

## 2024-03-12 ENCOUNTER — APPOINTMENT (OUTPATIENT)
Dept: RADIOLOGY | Facility: MEDICAL CENTER | Age: 39
End: 2024-03-12
Attending: SURGERY
Payer: COMMERCIAL

## 2024-03-12 VITALS
OXYGEN SATURATION: 100 % | TEMPERATURE: 98 F | SYSTOLIC BLOOD PRESSURE: 125 MMHG | HEIGHT: 69 IN | RESPIRATION RATE: 18 BRPM | WEIGHT: 149.69 LBS | HEART RATE: 92 BPM | BODY MASS INDEX: 22.17 KG/M2 | DIASTOLIC BLOOD PRESSURE: 71 MMHG

## 2024-03-12 DIAGNOSIS — K83.8 DILATION OF BILIARY TRACT: ICD-10-CM

## 2024-03-12 DIAGNOSIS — R10.9 INTRACTABLE ABDOMINAL PAIN: ICD-10-CM

## 2024-03-12 DIAGNOSIS — K85.90 SEVERE ACUTE PANCREATITIS: ICD-10-CM

## 2024-03-12 DIAGNOSIS — K86.3 INFECTED PSEUDOCYST OF PANCREAS: ICD-10-CM

## 2024-03-12 LAB
BASOPHILS # BLD AUTO: 0.4 % (ref 0–1.8)
BASOPHILS # BLD: 0.05 K/UL (ref 0–0.12)
EOSINOPHIL # BLD AUTO: 0.14 K/UL (ref 0–0.51)
EOSINOPHIL NFR BLD: 1.2 % (ref 0–6.9)
ERYTHROCYTE [DISTWIDTH] IN BLOOD BY AUTOMATED COUNT: 45.8 FL (ref 35.9–50)
HCT VFR BLD AUTO: 37.1 % (ref 42–52)
HGB BLD-MCNC: 12.1 G/DL (ref 14–18)
IMM GRANULOCYTES # BLD AUTO: 0.06 K/UL (ref 0–0.11)
IMM GRANULOCYTES NFR BLD AUTO: 0.5 % (ref 0–0.9)
INR PPP: 1.14 (ref 0.87–1.13)
LYMPHOCYTES # BLD AUTO: 0.95 K/UL (ref 1–4.8)
LYMPHOCYTES NFR BLD: 8.2 % (ref 22–41)
MCH RBC QN AUTO: 28.5 PG (ref 27–33)
MCHC RBC AUTO-ENTMCNC: 32.6 G/DL (ref 32.3–36.5)
MCV RBC AUTO: 87.3 FL (ref 81.4–97.8)
MONOCYTES # BLD AUTO: 1.02 K/UL (ref 0–0.85)
MONOCYTES NFR BLD AUTO: 8.8 % (ref 0–13.4)
NEUTROPHILS # BLD AUTO: 9.42 K/UL (ref 1.82–7.42)
NEUTROPHILS NFR BLD: 80.9 % (ref 44–72)
NRBC # BLD AUTO: 0 K/UL
NRBC BLD-RTO: 0 /100 WBC (ref 0–0.2)
PLATELET # BLD AUTO: 441 K/UL (ref 164–446)
PMV BLD AUTO: 9.3 FL (ref 9–12.9)
PROTHROMBIN TIME: 14.8 SEC (ref 12–14.6)
RBC # BLD AUTO: 4.25 M/UL (ref 4.7–6.1)
WBC # BLD AUTO: 11.6 K/UL (ref 4.8–10.8)

## 2024-03-12 PROCEDURE — 74150 CT ABDOMEN W/O CONTRAST: CPT

## 2024-03-12 PROCEDURE — 87205 SMEAR GRAM STAIN: CPT

## 2024-03-12 PROCEDURE — 700111 HCHG RX REV CODE 636 W/ 250 OVERRIDE (IP): Mod: JZ | Performed by: RADIOLOGY

## 2024-03-12 PROCEDURE — 87070 CULTURE OTHR SPECIMN AEROBIC: CPT

## 2024-03-12 PROCEDURE — 99152 MOD SED SAME PHYS/QHP 5/>YRS: CPT

## 2024-03-12 PROCEDURE — 700101 HCHG RX REV CODE 250

## 2024-03-12 PROCEDURE — 87077 CULTURE AEROBIC IDENTIFY: CPT

## 2024-03-12 PROCEDURE — 160002 HCHG RECOVERY MINUTES (STAT)

## 2024-03-12 PROCEDURE — 87186 SC STD MICRODIL/AGAR DIL: CPT

## 2024-03-12 PROCEDURE — 85610 PROTHROMBIN TIME: CPT

## 2024-03-12 PROCEDURE — 160035 HCHG PACU - 1ST 60 MINS PHASE I

## 2024-03-12 PROCEDURE — 75984 XRAY CONTROL CATHETER CHANGE: CPT

## 2024-03-12 PROCEDURE — 700105 HCHG RX REV CODE 258: Performed by: RADIOLOGY

## 2024-03-12 PROCEDURE — 700111 HCHG RX REV CODE 636 W/ 250 OVERRIDE (IP)

## 2024-03-12 PROCEDURE — 85025 COMPLETE CBC W/AUTO DIFF WBC: CPT

## 2024-03-12 PROCEDURE — 700117 HCHG RX CONTRAST REV CODE 255: Performed by: RADIOLOGY

## 2024-03-12 PROCEDURE — 160046 HCHG PACU - 1ST 60 MINS PHASE II

## 2024-03-12 RX ORDER — MIDAZOLAM HYDROCHLORIDE 1 MG/ML
INJECTION INTRAMUSCULAR; INTRAVENOUS
Status: COMPLETED
Start: 2024-03-12 | End: 2024-03-12

## 2024-03-12 RX ORDER — ONDANSETRON 2 MG/ML
4 INJECTION INTRAMUSCULAR; INTRAVENOUS EVERY 8 HOURS PRN
Status: DISCONTINUED | OUTPATIENT
Start: 2024-03-12 | End: 2024-03-12 | Stop reason: HOSPADM

## 2024-03-12 RX ORDER — MIDAZOLAM HYDROCHLORIDE 1 MG/ML
INJECTION INTRAMUSCULAR; INTRAVENOUS
Status: DISCONTINUED
Start: 2024-03-12 | End: 2024-03-12 | Stop reason: HOSPADM

## 2024-03-12 RX ORDER — LIDOCAINE HYDROCHLORIDE 10 MG/ML
INJECTION, SOLUTION INFILTRATION; PERINEURAL
Status: COMPLETED
Start: 2024-03-12 | End: 2024-03-12

## 2024-03-12 RX ORDER — OXYCODONE HYDROCHLORIDE 5 MG/1
2.5 TABLET ORAL
Status: DISCONTINUED | OUTPATIENT
Start: 2024-03-12 | End: 2024-03-12 | Stop reason: HOSPADM

## 2024-03-12 RX ORDER — SODIUM CHLORIDE 9 MG/ML
500 INJECTION, SOLUTION INTRAVENOUS
Status: DISCONTINUED | OUTPATIENT
Start: 2024-03-12 | End: 2024-03-12 | Stop reason: HOSPADM

## 2024-03-12 RX ORDER — MIDAZOLAM HYDROCHLORIDE 1 MG/ML
.5-2 INJECTION INTRAMUSCULAR; INTRAVENOUS PRN
Status: DISCONTINUED | OUTPATIENT
Start: 2024-03-12 | End: 2024-03-12 | Stop reason: HOSPADM

## 2024-03-12 RX ORDER — ONDANSETRON 2 MG/ML
4 INJECTION INTRAMUSCULAR; INTRAVENOUS PRN
Status: DISCONTINUED | OUTPATIENT
Start: 2024-03-12 | End: 2024-03-12 | Stop reason: HOSPADM

## 2024-03-12 RX ORDER — SODIUM CHLORIDE 9 MG/ML
INJECTION, SOLUTION INTRAVENOUS CONTINUOUS
Status: DISCONTINUED | OUTPATIENT
Start: 2024-03-12 | End: 2024-03-12 | Stop reason: HOSPADM

## 2024-03-12 RX ORDER — HYDROMORPHONE HYDROCHLORIDE 1 MG/ML
0.25 INJECTION, SOLUTION INTRAMUSCULAR; INTRAVENOUS; SUBCUTANEOUS
Status: DISCONTINUED | OUTPATIENT
Start: 2024-03-12 | End: 2024-03-12 | Stop reason: HOSPADM

## 2024-03-12 RX ADMIN — MIDAZOLAM HYDROCHLORIDE 1 MG: 2 INJECTION, SOLUTION INTRAMUSCULAR; INTRAVENOUS at 11:27

## 2024-03-12 RX ADMIN — MIDAZOLAM HYDROCHLORIDE 1 MG: 1 INJECTION, SOLUTION INTRAMUSCULAR; INTRAVENOUS at 11:30

## 2024-03-12 RX ADMIN — MIDAZOLAM HYDROCHLORIDE 1 MG: 1 INJECTION, SOLUTION INTRAMUSCULAR; INTRAVENOUS at 11:27

## 2024-03-12 RX ADMIN — FENTANYL CITRATE 50 MCG: 50 INJECTION, SOLUTION INTRAMUSCULAR; INTRAVENOUS at 11:36

## 2024-03-12 RX ADMIN — FENTANYL CITRATE 50 MCG: 50 INJECTION, SOLUTION INTRAMUSCULAR; INTRAVENOUS at 11:27

## 2024-03-12 RX ADMIN — FENTANYL CITRATE 50 MCG: 50 INJECTION, SOLUTION INTRAMUSCULAR; INTRAVENOUS at 11:30

## 2024-03-12 RX ADMIN — Medication 0.5 ML: at 11:30

## 2024-03-12 RX ADMIN — MIDAZOLAM HYDROCHLORIDE 1 MG: 1 INJECTION, SOLUTION INTRAMUSCULAR; INTRAVENOUS at 11:45

## 2024-03-12 RX ADMIN — FENTANYL CITRATE 50 MCG: 50 INJECTION, SOLUTION INTRAMUSCULAR; INTRAVENOUS at 11:45

## 2024-03-12 RX ADMIN — IOHEXOL 25 ML: 300 INJECTION, SOLUTION INTRAVENOUS at 12:30

## 2024-03-12 RX ADMIN — MIDAZOLAM HYDROCHLORIDE 1 MG: 1 INJECTION, SOLUTION INTRAMUSCULAR; INTRAVENOUS at 11:36

## 2024-03-12 RX ADMIN — LIDOCAINE HYDROCHLORIDE 0.5 ML: 10 INJECTION, SOLUTION INFILTRATION; PERINEURAL at 11:30

## 2024-03-12 ASSESSMENT — FIBROSIS 4 INDEX
FIB4 SCORE: 0.23
FIB4 SCORE: 0.23

## 2024-03-12 ASSESSMENT — PAIN DESCRIPTION - PAIN TYPE
TYPE: SURGICAL PAIN

## 2024-03-12 NOTE — OR NURSING
1206 Arrived from Cath lab ID verified. Report received. Surgical site clean. Vss.     1326 PALAK drain care instructions given to patient and family both verbalize understanding.     1331 discharge instructions given to patient and family verbalize understanding copy of instructions given to patient.     1359 Escorted via w/c to responsible adult with all personal belongings.     1403 Escorted via w/c to responsible adult with all personal belongings.

## 2024-03-12 NOTE — OR SURGEON
Immediate Post- Operative Note        Findings: CATHETER IN GOOD POSITION IN PSEUDOCYST COLLECTION    ENTERIC FISTULA INTO TRV COLON- SPLENIC FLEXURE NOTED AND CONFIRMED ON POST PROCEDURE CT    CATHETER DOES NOT CROSS BOWEL AND IS REMOTE FROM COLON      Procedure(s): PSEUDOCYST ABSCESSOGRAM AND EXCHANGE UPSIZE FROM 12F TO 14F PIGTAIL      Estimated Blood Loss: 0 ml        Complications: None            3/12/2024     12:14 PM     Oh Howard M.D.

## 2024-03-12 NOTE — DISCHARGE INSTRUCTIONS
Surgical Drain Home Care  Surgical drains are used to remove extra fluid that normally builds up in a surgical wound after surgery. A surgical drain helps to heal a surgical wound. Different kinds of surgical drains include:  Active drains. These drains use suction to pull drainage away from the surgical wound. Drainage flows through a tube to a container outside of the body. With these drains, you need to keep the bulb or the drainage container flat (compressed) at all times, except while you empty it. Flattening the bulb or container creates suction.  Passive drains. These drains allow fluid to drain naturally, by gravity. Drainage flows through a tube to a bandage (dressing) or a container outside of the body. Passive drains do not need to be emptied.  A drain is placed during surgery. Right after surgery, drainage is usually bright red and a little thicker than water. The drainage may gradually turn yellow or pink and become thinner. It is likely that your health care provider will remove the drain when the drainage stops or when the amount decreases to 1-2 Tbsp (15-30 mL) during a 24-hour period.  Supplies needed:  Tape.  Germ-free cleaning solution (sterile saline).  Cotton swabs.  Split gauze drain sponge: 4 x 4 inches (10 x 10 cm).  Gauze square: 4 x 4 inches (10 x 10 cm).  How to care for your surgical drain  Care for your drain as told by your health care provider. This is important to help prevent infection. If your drain is placed at your back, or any other hard-to-reach area, ask another person to assist you in performing the following tasks:  General care  Keep the skin around the drain dry and covered with a dressing at all times.  Check your drain area every day for signs of infection. Check for:  Redness, swelling, or pain.  Pus or a bad smell.  Cloudy drainage.  Tenderness or pressure at the drain exit site.  Changing the dressing  Follow instructions from your health care provider about how to  change your dressing. Change your dressing at least once a day. Change it more often if needed to keep the dressing dry. Make sure you:  Gather your supplies.  Wash your hands with soap and water before you change your dressing. If soap and water are not available, use hand .  Remove the old dressing. Avoid using scissors to do that.  Wash your hands with soap and water again after removing the old dressing.  Use sterile saline to clean your skin around the drain. You may need to use a cotton swab to clean the skin.  Place the tube through the slit in a drain sponge. Place the drain sponge so that it covers your wound.  Place the gauze square or another drain sponge on top of the drain sponge that is on the wound. Make sure the tube is between those layers.  Tape the dressing to your skin.  Tape the drainage tube to your skin 1-2 inches (2.5-5 cm) below the place where the tube enters your body. Taping keeps the tube from pulling on any stitches (sutures) that you have.  Wash your hands with soap and water.  Write down the color of your drainage and how often you change your dressing.  How to empty your active drain  Make sure that you have a measuring cup that you can empty your drainage into.  Wash your hands with soap and water. If soap and water are not available, use hand .  Loosen any pins or clips that hold the tube in place.  If your health care provider tells you to strip the tube to prevent clots and tube blockages:  Hold the tube at the skin with one hand. Use your other hand to pinch the tubing with your thumb and first finger.  Gently move your fingers down the tube while squeezing very lightly. This clears any drainage, clots, or tissue from the tube.  You may need to do this several times each day to keep the tube clear. Do not pull on the tube.  Open the bulb cap or the drain plug. Do not touch the inside of the cap or the bottom of the plug.  Turn the device upside down and gently  squeeze.  Empty all of the drainage into the measuring cup.  Compress the bulb or the container and replace the cap or the plug. To compress the bulb or the container, squeeze it firmly in the middle while you close the cap or plug the container.  Write down the amount of drainage that you have in each 24-hour period. If you have less than 2 Tbsp (30 mL) of drainage during 24 hours, contact your health care provider.  Flush the drainage down the toilet.  Wash your hands with soap and water.  Contact a health care provider if:  You have redness, swelling, or pain around your drain area.  You have pus or a bad smell coming from your drain area.  You have a fever or chills.  The skin around your drain is warm to the touch.  The amount of drainage that you have is increasing instead of decreasing.  You have drainage that is cloudy.  There is a sudden stop or a sudden decrease in the amount of drainage that you have.  Your drain tube falls out.  Your active drain does not stay compressed after you empty it.  Summary  Surgical drains are used to remove extra fluid that normally builds up in a surgical wound after surgery.  Different kinds of surgical drains include active drains and passive drains. Active drains use suction to pull drainage away from the surgical wound, and passive drains allow fluid to drain naturally.  It is important to care for your drain to prevent infection. If your drain is placed at your back, or any other hard-to-reach area, ask another person to assist you.  Contact your health care provider if you have redness, swelling, or pain around your drain area.  This information is not intended to replace advice given to you by your health care provider. Make sure you discuss any questions you have with your health care provider.  Document Revised: 02/22/2023 Document Reviewed: 01/22/2020  [employer] Patient Education © 2023 [employer]

## 2024-03-13 LAB
GRAM STN SPEC: NORMAL
SIGNIFICANT IND 70042: NORMAL
SITE SITE: NORMAL
SOURCE SOURCE: NORMAL

## 2024-03-13 NOTE — PROGRESS NOTES
Subjective:      Primary care physician: SAMUEL Mckinney  Referring Provider: Hospital consult     Chief Complaint: No chief complaint on file.    Diagnosis:   1. Severe acute pancreatitis        2. Infected pseudocyst of pancreas        3. Dilation of biliary tract        4. Intractable abdominal pain          History of presenting illness:    Abelardo Fenton is a pleasant 38 y.o. male with past medical history of pancreatitis with pseudocyst who presented to the ED on 3/14/2024 with worsening abdominal pain, significant for sepsis.     CT abdomen pelvis showing pancreatitis with large multifocal peripancreatic fluid collections within the largest component seen extending inferiorly from the pancreatic head into the mesentery into the upper pelvis measuring 18 x 16 x 12 cm in size, new multifocal areas of gas within those fluid collections with suggest presence of infective pseudocyst.       Patient underwent IR drain placement on 2/15/24. Repeat CT imaging on 2/20/24 noted slightly decreased now 13 x 10 cm, formerly 18 x 16 x 12 cm - large pancreatic fluid collection/pseudocyst. Patient to continue with drain in place, flushing it daily, continue with oral antibiotic therapy (augmentin, reviewed with ID), was discharged with plans for follow up with hepatobiliary surgery.     Update 3/5/24  He is here today for follow up from recent hospitalization.  The patient was admitted to the hospital with what appears to be necrotizing pancreatitis which then became infected.  IR drains have been placed in order to get him over the septic.  And then we will consult centimeters.  The patient had a large pseudocyst on imaging that extended from the head of the pancreas along the body.  The bulk of the pseudocyst and infected collection with the air pockets was located in the right upper quadrant towards the gallbladder.  Patient did do well in the hospital was  discharged home with his drain and on long-term antibiotics.  He now returns with his parents.  He denies any nausea or vomiting but did have a low-grade fever just the other day at 100.2.  The drain itself has been putting out about 10 to 20 cc a day and it does appear to be purulent.  Unfortunate his last set of imaging which I reviewed was from February 20 we do not have any new imaging to see where or how large the remnant cyst is.  Patient is being active but cannot return to work because of his fatigue and his intermittent pain.  Patient has not had any recent drinking.  He does appear to look much better than he was in the hospital.  He is here with his parents to discuss neck steps.  We reviewed the patient's cultures as well.  Long with his labs and imaging.    Update 4/2/24  CT ABDOMEN W/O on 3/12/24 noted large complex pancreatic pseudocyst with suspended microbubbles consistent with semisolid material and residual contrast from the preceding abscessogram. Most notably, there is contrast opacification confirmed into the colon at the mid transverse colon and splenic flexure of the colon concordant with findings on the abscessogram. One suspects a fistulous communication at the inferior medial aspect of the pseudocyst which directly abuts and compresses the transverse colon.    On 3/12/24 he had procedure by Dr Howard for pseudocyst abscessogram and exchange drain from 12F to 14F pigtail. Heavy growth of Ecoli. Drain was exchanged/upsized on 3/20/24.    CT ABDOMEN/PELVIS on 3/25/24 noted known large pancreatic pseudocyst which contains multiple areas of gas which contains a large bore percutaneous drain. This is slightly smaller in size than comparison. There are again seen components extending into the pancreatic tail as well as the splenic hilum. Again seen attenuation of the portal vein, superior mesenteric vein and splenic vein without complete occlusion. Small of free fluid dependently within the pelvis.  Again seen is intrahepatic biliary ductal prominence.    Patient was seen in ED on 3/25/24 for concern with drain problems. Drain catheter was exchanged on 3/26/24 by Dr Haley    He is here today for ***    Past Medical History:   Diagnosis Date    High cholesterol     Hyperlipemia     Hypertension     Pancreatitis      Past Surgical History:   Procedure Laterality Date    NO PERTINENT PAST SURGICAL HISTORY       No Known Allergies  Outpatient Encounter Medications as of 4/2/2024   Medication Sig Dispense Refill    senna-docusate (PERICOLACE OR SENOKOT S) 8.6-50 MG Tab Take 2 Tablets by mouth every evening. 30 Tablet 0    gabapentin (NEURONTIN) 100 MG Cap Take 1 Capsule by mouth 3 times a day for 30 days. 90 Capsule 0    methocarbamol (ROBAXIN) 500 MG Tab Take 1 Tablet by mouth 4 times a day for 30 days. 120 Tablet 0    omeprazole (PRILOSEC) 20 MG delayed-release capsule Take 1 Capsule by mouth every day for 30 days. 30 Capsule 0    normal saline flush 0.9 % Solution 10 mL by Tube route 2 times a day for 30 days. 600 mL 0    amoxicillin-clavulanate (AUGMENTIN) 875-125 MG Tab Take 1 Tablet by mouth 2 times a day for 30 days. 60 Tablet 0    metoprolol tartrate (LOPRESSOR) 25 MG Tab Take 1 Tablet by mouth 2 times a day. 60 Tablet 0    oxyCODONE immediate-release (ROXICODONE) 5 MG Tab Take 5 mg by mouth every 6 hours as needed for Severe Pain.      acetaminophen (TYLENOL) 325 MG Tab Take 2 Tablets by mouth every four hours as needed for Fever or Moderate Pain. 30 Tablet 0    amLODIPine (NORVASC) 5 MG Tab Take 1 Tablet by mouth every day. 30 Tablet 2     No facility-administered encounter medications on file as of 4/2/2024.     Social History     Socioeconomic History    Marital status: Single     Spouse name: Not on file    Number of children: Not on file    Years of education: Not on file    Highest education level: Not on file   Occupational History    Not on file   Tobacco Use    Smoking status: Never    Smokeless  tobacco: Never   Vaping Use    Vaping Use: Never used   Substance and Sexual Activity    Alcohol use: Yes     Comment: No alcohol since 12/25/23    Drug use: Never    Sexual activity: Yes   Other Topics Concern    Not on file   Social History Narrative    Not on file     Social Determinants of Health     Financial Resource Strain: Not on file   Food Insecurity: Not on file   Transportation Needs: Not on file   Physical Activity: Not on file   Stress: Not on file   Social Connections: Not on file   Intimate Partner Violence: Not on file   Housing Stability: Not on file      Social History     Tobacco Use   Smoking Status Never   Smokeless Tobacco Never     Social History     Substance and Sexual Activity   Alcohol Use Yes    Comment: No alcohol since 12/25/23     Social History     Substance and Sexual Activity   Drug Use Never      No family history on file.    ROS     Objective:   There were no vitals taken for this visit.    Physical Exam    Labs   Latest Reference Range & Units 03/12/24 08:40   WBC 4.8 - 10.8 K/uL 11.6 (H)   RBC 4.70 - 6.10 M/uL 4.25 (L)   Hemoglobin 14.0 - 18.0 g/dL 12.1 (L)   Hematocrit 42.0 - 52.0 % 37.1 (L)   MCV 81.4 - 97.8 fL 87.3   MCH 27.0 - 33.0 pg 28.5   MCHC 32.3 - 36.5 g/dL 32.6   RDW 35.9 - 50.0 fL 45.8   Platelet Count 164 - 446 K/uL 441   MPV 9.0 - 12.9 fL 9.3   (H): Data is abnormally high  (L): Data is abnormally low       Latest Reference Range & Units 02/21/24 00:14   Sodium 135 - 145 mmol/L 135   Potassium 3.6 - 5.5 mmol/L 3.7   Chloride 96 - 112 mmol/L 99   Co2 20 - 33 mmol/L 25   Anion Gap 7.0 - 16.0  11.0   Glucose 65 - 99 mg/dL 118 (H)   Bun 8 - 22 mg/dL 7 (L)   Creatinine 0.50 - 1.40 mg/dL 0.98   GFR (CKD-EPI) >60 mL/min/1.73 m 2 102   Calcium 8.5 - 10.5 mg/dL 8.6   Correct Calcium 8.5 - 10.5 mg/dL 9.2   AST(SGOT) 12 - 45 U/L 24   ALT(SGPT) 2 - 50 U/L 20   Alkaline Phosphatase 30 - 99 U/L 689 (H)   Total Bilirubin 0.1 - 1.5 mg/dL 0.2   Albumin 3.2 - 4.9 g/dL 3.3   Total  Protein 6.0 - 8.2 g/dL 6.9   Globulin 1.9 - 3.5 g/dL 3.6 (H)   A-G Ratio g/dL 0.9   Phosphorus 2.5 - 4.5 mg/dL 3.8   Magnesium 1.5 - 2.5 mg/dL 2.0   (H): Data is abnormally high  (L): Data is abnormally low      Imaging  CT A/P (3/25/24)  IMPRESSION:     1.  Again seen large pancreatic pseudocyst which contains multiple areas of gas which contains a large bore percutaneous drain. This is slightly smaller in size than comparison. There are again seen components extending into the pancreatic tail as well   as the splenic hilum.     2.  Again seen attenuation of the portal vein, superior mesenteric vein and splenic vein without complete occlusion.     3.  Small of free fluid dependently within the pelvis.     4.  Again seen is intrahepatic biliary ductal prominence.    CT ABDOMEN W/O (3/12/24)  IMPRESSION:  1.  Large complex pancreatic pseudocyst with suspended microbubbles consistent with semisolid material and residual contrast from the preceding abscessogram.  2.  Most notably, there is contrast opacification confirmed into the colon at the mid transverse colon and splenic flexure of the colon concordant with findings on the abscessogram. One suspects a fistulous communication at the inferior medial aspect of   the pseudocyst which directly abuts and compresses the transverse colon.  3.  The case was discussed (call report) with DR AGUAYO immediately following the scan. Plan is for the patient to schedule an outpatient appointment with Dr. KERNS at the soonest convenience.    CT A/P (2/20/23)  FINDINGS:  Lung: Linear density in the right lower lobe again noted, consistent with scarring or subsegmental atelectasis. Tiny simple left pleural effusion has developed. Heart is normal in size.  Liver: No focal liver lesion  Spleen: Normal in size, without focal lesion  Adrenal glands: Normal  Pancreas: When measuring at the same location as the previous exam, there is a slightly decreased 13 x 10 cm pancreatic fluid and  air collection. Drainage catheter is present within the collection. Additional fluid extends along the pancreas to the level   of the spleen.  Gallbladder: No radiodense stone visualized.  Biliary: No biliary duct dilation visualized.  Kidneys: No stone or hydronephrosis. No cystic or solid renal lesion.  Vasculature: Nonaneurysmal  Lymph nodes: No adenopathy  Bowel: No evidence of obstruction or acute inflammation  Appendix:  Not visualized  Peritoneum: Mild ascites within the pelvis  Pelvis: Urinary bladder is grossly normal. No pelvic mass or adenopathy.  Musculoskeletal: No acute abnormality or concerning osseous lesion     IMPRESSION:  1. Slightly decreased large pancreatic fluid collection/pseudocyst. Air within the collection raise the possibility of superimposed infection.  2. Minimal ascites within the pelvis.  3. New tiny simple left pleural effusion.     Pathology  PATH (3/12/24)  Ecoli - heavy growth     Procedures  IR catheter exchange (3/26/24)    PROCEDURE (3/20/24)  IMPRESSION:     1.  Over-the-wire exchange of a 14 [place] locking loop pigtail catheter within a large right midabdomen complex pseudocyst for an upsized 18 [place] locking loop pigtail catheter.  2.  Abscessogram demonstrating large residual collection.    PROCEDURE (3/12/24)  Procedure(s): PSEUDOCYST ABSCESSOGRAM AND EXCHANGE UPSIZE FROM 12F TO 14F PIGTAIL     Peritoneal drain placed on 2/15/24    Diagnosis:     1. Severe acute pancreatitis        2. Infected pseudocyst of pancreas        3. Dilation of biliary tract        4. Intractable abdominal pain            Medical Decision Making:  Today's Assessment / Status / Plan:     ***    I, Dr Sainz, have entered, reviewed and confirmed the above diagnoses related to this patient on this date of service, as per the time and date noted at top of this note.    superior mesenteric vein and splenic vein without complete occlusion.     3.  Small of free fluid dependently within the pelvis.     4.  Again seen is intrahepatic biliary ductal prominence.    CT ABDOMEN W/O (3/12/24)  IMPRESSION:  1.  Large complex pancreatic pseudocyst with suspended microbubbles consistent with semisolid material and residual contrast from the preceding abscessogram.  2.  Most notably, there is contrast opacification confirmed into the colon at the mid transverse colon and splenic flexure of the colon concordant with findings on the abscessogram. One suspects a fistulous communication at the inferior medial aspect of   the pseudocyst which directly abuts and compresses the transverse colon.  3.  The case was discussed (call report) with DR AGUAYO immediately following the scan. Plan is for the patient to schedule an outpatient appointment with Dr. KERNS at the soonest convenience.    CT A/P (2/20/23)  FINDINGS:  Lung: Linear density in the right lower lobe again noted, consistent with scarring or subsegmental atelectasis. Tiny simple left pleural effusion has developed. Heart is normal in size.  Liver: No focal liver lesion  Spleen: Normal in size, without focal lesion  Adrenal glands: Normal  Pancreas: When measuring at the same location as the previous exam, there is a slightly decreased 13 x 10 cm pancreatic fluid and air collection. Drainage catheter is present within the collection. Additional fluid extends along the pancreas to the level   of the spleen.  Gallbladder: No radiodense stone visualized.  Biliary: No biliary duct dilation visualized.  Kidneys: No stone or hydronephrosis. No cystic or solid renal lesion.  Vasculature: Nonaneurysmal  Lymph nodes: No adenopathy  Bowel: No evidence of obstruction or acute inflammation  Appendix:  Not visualized  Peritoneum: Mild ascites within the pelvis  Pelvis: Urinary bladder is grossly normal. No pelvic mass or adenopathy.  Musculoskeletal:  No acute abnormality or concerning osseous lesion     IMPRESSION:  1. Slightly decreased large pancreatic fluid collection/pseudocyst. Air within the collection raise the possibility of superimposed infection.  2. Minimal ascites within the pelvis.  3. New tiny simple left pleural effusion.     Pathology  PATH (3/12/24)  Ecoli - heavy growth     Procedures  IR catheter exchange (3/26/24)    PROCEDURE (3/20/24)  IMPRESSION:     1.  Over-the-wire exchange of a 14 [place] locking loop pigtail catheter within a large right midabdomen complex pseudocyst for an upsized 18 [place] locking loop pigtail catheter.  2.  Abscessogram demonstrating large residual collection.    PROCEDURE (3/12/24)  Procedure(s): PSEUDOCYST ABSCESSOGRAM AND EXCHANGE UPSIZE FROM 12F TO 14F PIGTAIL     Peritoneal drain placed on 2/15/24    Diagnosis:     1. Infected pseudocyst of pancreas        2. Dilation of biliary tract        3. Intractable abdominal pain        4. Necrotizing pancreatitis            Medical Decision Making:  Today's Assessment / Status / Plan:     In light of the present findings, the cyst has shrunk slightly and new drains have been placed over the last several weeks.  My recommendation get 1 more CT scan of the pancreas on Monday and then see the patient on Tuesday.  If there is not significant shrinkage then we will plan on doing a cyst gastrostomy the following week.  As for the colonic fistula, I am unclear whether it is 100% accurate but hopefully with either IR drain or a cyst gastrostomy of the fistula will close on its own with decompression of the pseudocyst.  They understood this and agrees above plan.  We will see the patient on Tuesday but have the CT scan on Monday.    I, Dr. Sainz have entered, reviewed and confirmed the above diagnoses related to this patient on this date of service, 4/2/2024 12:50 PM.    He agreed and verbalized his agreement and understanding with the current plan. I answered all  questions and concerns he has at this time and advised him to call at any time in the interim with questions or concerns in regards to his care.    Thank you for allowing me to participate in his care, I will continue to follow closely.       Please note that this dictation was created using voice recognition software. I have made every reasonable attempt to correct obvious errors, but I expect that there are errors of grammar and possibly content that I did not discover before finalizing the note.     Thank you for this consultation and allowing me to participate in your patient's care. If I can be of further service please contact my office.      I, Dr Sainz, have entered, reviewed and confirmed the above diagnoses related to this patient on this date of service, as per the time and date noted at top of this note.

## 2024-03-14 LAB
BACTERIA WND AEROBE CULT: ABNORMAL
BACTERIA WND AEROBE CULT: ABNORMAL
GRAM STN SPEC: ABNORMAL
SIGNIFICANT IND 70042: ABNORMAL
SITE SITE: ABNORMAL
SOURCE SOURCE: ABNORMAL

## 2024-03-18 ENCOUNTER — TELEPHONE (OUTPATIENT)
Dept: SURGICAL ONCOLOGY | Facility: MEDICAL CENTER | Age: 39
End: 2024-03-18
Payer: COMMERCIAL

## 2024-03-18 DIAGNOSIS — K85.90 SEVERE ACUTE PANCREATITIS: ICD-10-CM

## 2024-03-18 DIAGNOSIS — K86.3 INFECTED PSEUDOCYST OF PANCREAS: ICD-10-CM

## 2024-03-18 NOTE — TELEPHONE ENCOUNTER
Spoke with pt and no output and flushing causes the dressing and area around the insertion to saturate

## 2024-03-20 ENCOUNTER — HOSPITAL ENCOUNTER (OUTPATIENT)
Facility: MEDICAL CENTER | Age: 39
End: 2024-03-20
Attending: RADIOLOGY | Admitting: RADIOLOGY
Payer: COMMERCIAL

## 2024-03-20 ENCOUNTER — APPOINTMENT (OUTPATIENT)
Dept: RADIOLOGY | Facility: MEDICAL CENTER | Age: 39
End: 2024-03-20
Attending: SPECIALIST
Payer: COMMERCIAL

## 2024-03-20 VITALS
HEART RATE: 107 BPM | WEIGHT: 149.91 LBS | DIASTOLIC BLOOD PRESSURE: 84 MMHG | SYSTOLIC BLOOD PRESSURE: 138 MMHG | OXYGEN SATURATION: 95 % | RESPIRATION RATE: 18 BRPM | TEMPERATURE: 99.2 F | BODY MASS INDEX: 22.2 KG/M2 | HEIGHT: 69 IN

## 2024-03-20 DIAGNOSIS — K86.3 INFECTED PSEUDOCYST OF PANCREAS: ICD-10-CM

## 2024-03-20 DIAGNOSIS — K85.90 SEVERE ACUTE PANCREATITIS: ICD-10-CM

## 2024-03-20 LAB
ALBUMIN SERPL BCP-MCNC: 3.8 G/DL (ref 3.2–4.9)
ALBUMIN/GLOB SERPL: 0.9 G/DL
ALP SERPL-CCNC: 182 U/L (ref 30–99)
ALT SERPL-CCNC: 39 U/L (ref 2–50)
ANION GAP SERPL CALC-SCNC: 14 MMOL/L (ref 7–16)
AST SERPL-CCNC: 31 U/L (ref 12–45)
BASOPHILS # BLD AUTO: 0.3 % (ref 0–1.8)
BASOPHILS # BLD: 0.05 K/UL (ref 0–0.12)
BILIRUB SERPL-MCNC: 0.3 MG/DL (ref 0.1–1.5)
BUN SERPL-MCNC: 9 MG/DL (ref 8–22)
CALCIUM ALBUM COR SERPL-MCNC: 9.8 MG/DL (ref 8.5–10.5)
CALCIUM SERPL-MCNC: 9.6 MG/DL (ref 8.5–10.5)
CHLORIDE SERPL-SCNC: 98 MMOL/L (ref 96–112)
CO2 SERPL-SCNC: 22 MMOL/L (ref 20–33)
CREAT SERPL-MCNC: 0.64 MG/DL (ref 0.5–1.4)
EOSINOPHIL # BLD AUTO: 0.16 K/UL (ref 0–0.51)
EOSINOPHIL NFR BLD: 1.1 % (ref 0–6.9)
ERYTHROCYTE [DISTWIDTH] IN BLOOD BY AUTOMATED COUNT: 44.5 FL (ref 35.9–50)
GFR SERPLBLD CREATININE-BSD FMLA CKD-EPI: 125 ML/MIN/1.73 M 2
GLOBULIN SER CALC-MCNC: 4.3 G/DL (ref 1.9–3.5)
GLUCOSE SERPL-MCNC: 104 MG/DL (ref 65–99)
HCT VFR BLD AUTO: 34.1 % (ref 42–52)
HGB BLD-MCNC: 11.2 G/DL (ref 14–18)
IMM GRANULOCYTES # BLD AUTO: 0.09 K/UL (ref 0–0.11)
IMM GRANULOCYTES NFR BLD AUTO: 0.6 % (ref 0–0.9)
INR PPP: 1.16 (ref 0.87–1.13)
LYMPHOCYTES # BLD AUTO: 1.29 K/UL (ref 1–4.8)
LYMPHOCYTES NFR BLD: 8.8 % (ref 22–41)
MCH RBC QN AUTO: 28 PG (ref 27–33)
MCHC RBC AUTO-ENTMCNC: 32.8 G/DL (ref 32.3–36.5)
MCV RBC AUTO: 85.3 FL (ref 81.4–97.8)
MONOCYTES # BLD AUTO: 1.43 K/UL (ref 0–0.85)
MONOCYTES NFR BLD AUTO: 9.7 % (ref 0–13.4)
NEUTROPHILS # BLD AUTO: 11.66 K/UL (ref 1.82–7.42)
NEUTROPHILS NFR BLD: 79.5 % (ref 44–72)
NRBC # BLD AUTO: 0 K/UL
NRBC BLD-RTO: 0 /100 WBC (ref 0–0.2)
PLATELET # BLD AUTO: 584 K/UL (ref 164–446)
PMV BLD AUTO: 8.9 FL (ref 9–12.9)
POTASSIUM SERPL-SCNC: 3.9 MMOL/L (ref 3.6–5.5)
PROT SERPL-MCNC: 8.1 G/DL (ref 6–8.2)
PROTHROMBIN TIME: 14.9 SEC (ref 12–14.6)
RBC # BLD AUTO: 4 M/UL (ref 4.7–6.1)
SODIUM SERPL-SCNC: 134 MMOL/L (ref 135–145)
WBC # BLD AUTO: 14.7 K/UL (ref 4.8–10.8)

## 2024-03-20 PROCEDURE — 700111 HCHG RX REV CODE 636 W/ 250 OVERRIDE (IP): Mod: JZ | Performed by: RADIOLOGY

## 2024-03-20 PROCEDURE — 99152 MOD SED SAME PHYS/QHP 5/>YRS: CPT

## 2024-03-20 PROCEDURE — 700102 HCHG RX REV CODE 250 W/ 637 OVERRIDE(OP): Performed by: RADIOLOGY

## 2024-03-20 PROCEDURE — 700117 HCHG RX CONTRAST REV CODE 255: Performed by: RADIOLOGY

## 2024-03-20 PROCEDURE — 85025 COMPLETE CBC W/AUTO DIFF WBC: CPT

## 2024-03-20 PROCEDURE — 80053 COMPREHEN METABOLIC PANEL: CPT

## 2024-03-20 PROCEDURE — 75984 XRAY CONTROL CATHETER CHANGE: CPT

## 2024-03-20 PROCEDURE — 700111 HCHG RX REV CODE 636 W/ 250 OVERRIDE (IP): Mod: JZ

## 2024-03-20 PROCEDURE — 160002 HCHG RECOVERY MINUTES (STAT)

## 2024-03-20 PROCEDURE — 160046 HCHG PACU - 1ST 60 MINS PHASE II

## 2024-03-20 PROCEDURE — A9270 NON-COVERED ITEM OR SERVICE: HCPCS | Performed by: RADIOLOGY

## 2024-03-20 PROCEDURE — 160035 HCHG PACU - 1ST 60 MINS PHASE I

## 2024-03-20 PROCEDURE — 85610 PROTHROMBIN TIME: CPT

## 2024-03-20 PROCEDURE — 700105 HCHG RX REV CODE 258: Performed by: RADIOLOGY

## 2024-03-20 RX ORDER — MIDAZOLAM HYDROCHLORIDE 1 MG/ML
INJECTION INTRAMUSCULAR; INTRAVENOUS
Status: COMPLETED
Start: 2024-03-20 | End: 2024-03-20

## 2024-03-20 RX ORDER — SODIUM CHLORIDE 9 MG/ML
INJECTION, SOLUTION INTRAVENOUS CONTINUOUS
Status: DISCONTINUED | OUTPATIENT
Start: 2024-03-20 | End: 2024-03-20 | Stop reason: HOSPADM

## 2024-03-20 RX ORDER — MIDAZOLAM HYDROCHLORIDE 1 MG/ML
.5-2 INJECTION INTRAMUSCULAR; INTRAVENOUS PRN
Status: DISCONTINUED | OUTPATIENT
Start: 2024-03-20 | End: 2024-03-20 | Stop reason: HOSPADM

## 2024-03-20 RX ORDER — OXYCODONE HYDROCHLORIDE 5 MG/1
5 TABLET ORAL
Status: DISCONTINUED | OUTPATIENT
Start: 2024-03-20 | End: 2024-03-20 | Stop reason: HOSPADM

## 2024-03-20 RX ORDER — SODIUM CHLORIDE 9 MG/ML
500 INJECTION, SOLUTION INTRAVENOUS
Status: DISCONTINUED | OUTPATIENT
Start: 2024-03-20 | End: 2024-03-20 | Stop reason: HOSPADM

## 2024-03-20 RX ORDER — ONDANSETRON 2 MG/ML
4 INJECTION INTRAMUSCULAR; INTRAVENOUS PRN
Status: DISCONTINUED | OUTPATIENT
Start: 2024-03-20 | End: 2024-03-20 | Stop reason: HOSPADM

## 2024-03-20 RX ORDER — OXYCODONE HYDROCHLORIDE 10 MG/1
10 TABLET ORAL
Status: DISCONTINUED | OUTPATIENT
Start: 2024-03-20 | End: 2024-03-20 | Stop reason: HOSPADM

## 2024-03-20 RX ORDER — ONDANSETRON 2 MG/ML
4 INJECTION INTRAMUSCULAR; INTRAVENOUS EVERY 8 HOURS PRN
Status: DISCONTINUED | OUTPATIENT
Start: 2024-03-20 | End: 2024-03-20 | Stop reason: HOSPADM

## 2024-03-20 RX ORDER — HYDROMORPHONE HYDROCHLORIDE 1 MG/ML
0.5 INJECTION, SOLUTION INTRAMUSCULAR; INTRAVENOUS; SUBCUTANEOUS
Status: DISCONTINUED | OUTPATIENT
Start: 2024-03-20 | End: 2024-03-20 | Stop reason: HOSPADM

## 2024-03-20 RX ADMIN — FENTANYL CITRATE 50 MCG: 50 INJECTION, SOLUTION INTRAMUSCULAR; INTRAVENOUS at 08:52

## 2024-03-20 RX ADMIN — OXYCODONE HYDROCHLORIDE 10 MG: 10 TABLET ORAL at 09:24

## 2024-03-20 RX ADMIN — MIDAZOLAM HYDROCHLORIDE 1 MG: 1 INJECTION, SOLUTION INTRAMUSCULAR; INTRAVENOUS at 08:52

## 2024-03-20 RX ADMIN — FENTANYL CITRATE 50 MCG: 50 INJECTION, SOLUTION INTRAMUSCULAR; INTRAVENOUS at 08:56

## 2024-03-20 RX ADMIN — MIDAZOLAM HYDROCHLORIDE 1 MG: 1 INJECTION, SOLUTION INTRAMUSCULAR; INTRAVENOUS at 08:56

## 2024-03-20 RX ADMIN — HYDROMORPHONE HYDROCHLORIDE 0.5 MG: 1 INJECTION, SOLUTION INTRAMUSCULAR; INTRAVENOUS; SUBCUTANEOUS at 09:44

## 2024-03-20 RX ADMIN — SODIUM CHLORIDE: 9 INJECTION, SOLUTION INTRAVENOUS at 07:22

## 2024-03-20 RX ADMIN — HYDROMORPHONE HYDROCHLORIDE 0.5 MG: 1 INJECTION, SOLUTION INTRAMUSCULAR; INTRAVENOUS; SUBCUTANEOUS at 10:05

## 2024-03-20 RX ADMIN — IOHEXOL 10 ML: 300 INJECTION, SOLUTION INTRAVENOUS at 09:45

## 2024-03-20 ASSESSMENT — PAIN DESCRIPTION - PAIN TYPE
TYPE: SURGICAL PAIN
TYPE: CHRONIC PAIN
TYPE: SURGICAL PAIN

## 2024-03-20 ASSESSMENT — FIBROSIS 4 INDEX: FIB4 SCORE: 0.44

## 2024-03-20 NOTE — DISCHARGE INSTRUCTIONS
What to Expect Post Anesthesia    If any questions arise, call your provider.  If your provider is not available, please feel free to call the [facility] at [phone].    MEDICATIONS: Resume taking daily medication.  Take prescribed pain medication with food.  If no medication is prescribed, you may take non-aspirin pain medication if needed.  PAIN MEDICATION CAN BE VERY CONSTIPATING.  Take a stool softener or laxative such as senokot, pericolace, or milk of magnesia if needed.    Last pain medication given at     Percutaneous Abscess Drain Placement, Care After  This sheet gives you information about how to care for yourself after your procedure. Your health care provider may also give you more specific instructions. If you have problems or questions, contact your health care provider.  What can I expect after the procedure?  After the procedure, it is common to have:  A small amount of bruising and discomfort in the area where the drainage tube (catheter) was placed.  Sleepiness and fatigue. This should go away after the medicines you were given have worn off.  Follow these instructions at home:  Incision care    Follow instructions from your health care provider about how to take care of your incision. Make sure you:  Wash your hands with soap and water for at least 20 seconds before and after you change your bandage (dressing). If soap and water are not available, use hand .  Change your dressing as told by your health care provider.  Leave stitches (sutures), skin glue, or adhesive strips in place. These skin closures may need to stay in place for 2 weeks or longer. If adhesive strip edges start to loosen and curl up, you may trim the loose edges. Do not remove adhesive strips completely unless your health care provider tells you to do that.  Check your incision area every day for signs of infection. Check for:  More redness, swelling, or pain.  More fluid or blood.  Warmth.  Pus or a bad  smell.  Catheter care  Follow instructions from your health care provider about emptying and cleaning your catheter and collection bag or drainage bulb. You may need to clean the catheter every day so it does not clog.  If directed, write down the following information every time you empty your bag:  The date and time.  The amount of drainage.  Check for fluid leaking from around your catheter (instead of fluid draining through your catheter). This may be a sign that the drain is no longer working correctly.  Activity  Rest at home for 1-2 days after your procedure.  Return to your normal activities as told by your health care provider. Ask your health care provider what activities are safe for you.  If you were given a sedative during the procedure, it can affect you for several hours. Do not drive or operate machinery until your health care provider says that it is safe.  General instructions  Take over-the-counter and prescription medicines only as told by your health care provider.  If you were prescribed an antibiotic medicine, take it as told by your health care provider. Do not stop using the antibiotic even if you start to feel better.  Do not take showers, take baths, swim, or use a hot tub for 24 hours after your procedure or until your health care provider says that this is okay.  Do not use any products that contain nicotine or tobacco, such as cigarettes, e-cigarettes, and chewing tobacco. If you need help quitting, ask your health care provider.  Keep all follow-up visits as told by your health care provider. This is important.  Contact a health care provider if:  You have less than 10 mL of drainage a day for 2-3 days in a row, or as directed by your health care provider.  You have any of these signs of infection:  More redness, swelling, or pain around your incision area.  More fluid or blood coming from your incision area.  Warmth coming from your incision area.  Pus or a bad smell coming from your  incision area.  You have fluid leaking from around your catheter (instead of through your catheter).  You have a fever or chills.  You have pain that does not get better with medicine.  Get help right away if:  Your catheter comes out.  You suddenly stop having drainage from your catheter.  You suddenly have blood in the fluid that is draining from your catheter.  You become dizzy or you faint.  You develop a rash.  You have nausea or vomiting.  You have difficulty breathing or you feel short of breath.  You develop chest pain.  You have problems with your speech or vision.  You have trouble balancing or moving your arms or legs.  Summary  It is common to have a small amount of bruising and discomfort in the area where the drainage tube (catheter) was placed.  Follow instructions from your health care provider about emptying and cleaning your catheter and collection bag or drainage bulb.  You may be directed to record the amount of drainage from the bag every time you empty it.  Contact a health care provider if you have more redness, swelling, or pain around your incision area or if you have pain that does not get better with medicine.  This information is not intended to replace advice given to you by your health care provider. Make sure you discuss any questions you have with your health care provider.  Document Revised: 03/23/2023 Document Reviewed: 12/12/2020  Elsevier Patient Education © 2023 [employer]

## 2024-03-20 NOTE — PROGRESS NOTES
History and Physical    Date: 3/20/2024    PCP: SAMUEL Mckinney      CC: PAncreatic pseudocyst.     HPI: This is a 38 y.o. male who is presenting for panc drain dysfunction.     Past Medical History:   Diagnosis Date    High cholesterol     Hyperlipemia     Hypertension     Pancreatitis        Past Surgical History:   Procedure Laterality Date    NO PERTINENT PAST SURGICAL HISTORY         Current Facility-Administered Medications   Medication Dose Route Frequency Provider Last Rate Last Admin    NS infusion   Intravenous Continuous Alba Galicia M.D. 125 mL/hr at 03/20/24 0722 New Bag at 03/20/24 0722        Social History     Socioeconomic History    Marital status: Single     Spouse name: Not on file    Number of children: Not on file    Years of education: Not on file    Highest education level: Not on file   Occupational History    Not on file   Tobacco Use    Smoking status: Never    Smokeless tobacco: Never   Vaping Use    Vaping Use: Never used   Substance and Sexual Activity    Alcohol use: Yes     Comment: No alcohol since 12/25/23    Drug use: Never    Sexual activity: Yes   Other Topics Concern    Not on file   Social History Narrative    Not on file     Social Determinants of Health     Financial Resource Strain: Not on file   Food Insecurity: Not on file   Transportation Needs: Not on file   Physical Activity: Not on file   Stress: Not on file   Social Connections: Not on file   Intimate Partner Violence: Not on file   Housing Stability: Not on file       History reviewed. No pertinent family history.    Allergies:  Patient has no known allergies.    Review of Systems:  Negative except for fever    Physical Exam    Vital Signs  Blood Pressure: 124/78   Temperature: (!) 38.4 °C (101.2 °F)   Pulse: (!) 110   Respiration: 16   Pulse Oximetry: 98 %        Labs:                    Radiology:  No orders to display             Assessment and Plan: This is a 38 y.o. with hx of pancreatic  pseudocyst s/p drain placement with leaking drain. Plan for rain exchange and upsize.

## 2024-03-20 NOTE — OR NURSING
0916 Pt arrived to PACU from IR via rbre. Report received from IR RN. Monitor applied. Pt awake, abdominal drain present with fluid in line but none in compressed collection bulb. Drain dressing CDI. Pt c/o pain at drain site 8/10, denies nausea    0925 pain meds given per MAR    0940 Pain still 8/10, Dr May notified, Okay to give ordered dilaudid    0944 dilaudid given    1005 pain slightly improved but still present, dilaudid given, pt tolerating water    1031 DC instructions reviewed with parents and pt, verbal understanding expressed, all questions answered    1035 VSS, pain much improved, pt up and dressed, PIV DC'd    1042 Pt DC'd home with family via  escort out.

## 2024-03-20 NOTE — PROGRESS NOTES
Pt presents to IR 3. Patient was consented by MD at bedside, confirmed by this RN and consent at bedside. Pt transferred to IR 3 table in Supine position. Patient underwent a Abscess-o-gram with           by Dr. May. Procedure site was marked by MD and verified using imaging guidance. Pt placed on monitor, prepped and draped in a sterile fashion. Vitals were taken every 5 minutes and remained stable during procedure (see doc flow sheet for results). CO2 waveform capnography was monitored and remained WNL throughout procedure. Report called to Jacob MOORE. Pt transported by cody with RN to PPU.     [employer] Jesus Large-Bore Curved Drainage Catheter Ultrathane    REF: [id number]  REF: [id number]  LOT: [id number]  EXP: 02-

## 2024-03-20 NOTE — OR SURGEON
Immediate Post- Operative Note        Findings: Perc drain dysfunction.       Procedure(s): Drain exchange and upsize to 18 fr.       Estimated Blood Loss: Less than 5 ml        Complications: None            3/20/2024     0905 AM     Rusty May M.D.

## 2024-03-25 ENCOUNTER — HOSPITAL ENCOUNTER (EMERGENCY)
Facility: MEDICAL CENTER | Age: 39
End: 2024-03-25
Attending: STUDENT IN AN ORGANIZED HEALTH CARE EDUCATION/TRAINING PROGRAM
Payer: COMMERCIAL

## 2024-03-25 ENCOUNTER — APPOINTMENT (OUTPATIENT)
Dept: RADIOLOGY | Facility: MEDICAL CENTER | Age: 39
End: 2024-03-25
Attending: STUDENT IN AN ORGANIZED HEALTH CARE EDUCATION/TRAINING PROGRAM
Payer: COMMERCIAL

## 2024-03-25 VITALS
TEMPERATURE: 98.1 F | BODY MASS INDEX: 21.32 KG/M2 | RESPIRATION RATE: 16 BRPM | HEIGHT: 69 IN | DIASTOLIC BLOOD PRESSURE: 82 MMHG | OXYGEN SATURATION: 99 % | HEART RATE: 94 BPM | SYSTOLIC BLOOD PRESSURE: 124 MMHG | WEIGHT: 143.96 LBS

## 2024-03-25 DIAGNOSIS — L24.A9 WOUND DRAINAGE: ICD-10-CM

## 2024-03-25 LAB
ALBUMIN SERPL BCP-MCNC: 3.8 G/DL (ref 3.2–4.9)
ALBUMIN/GLOB SERPL: 0.8 G/DL
ALP SERPL-CCNC: 280 U/L (ref 30–99)
ALT SERPL-CCNC: 57 U/L (ref 2–50)
ANION GAP SERPL CALC-SCNC: 16 MMOL/L (ref 7–16)
AST SERPL-CCNC: 51 U/L (ref 12–45)
BASOPHILS # BLD AUTO: 0.4 % (ref 0–1.8)
BASOPHILS # BLD: 0.05 K/UL (ref 0–0.12)
BILIRUB SERPL-MCNC: 0.2 MG/DL (ref 0.1–1.5)
BUN SERPL-MCNC: 11 MG/DL (ref 8–22)
CALCIUM ALBUM COR SERPL-MCNC: 9.8 MG/DL (ref 8.5–10.5)
CALCIUM SERPL-MCNC: 9.6 MG/DL (ref 8.4–10.2)
CHLORIDE SERPL-SCNC: 97 MMOL/L (ref 96–112)
CO2 SERPL-SCNC: 22 MMOL/L (ref 20–33)
CREAT SERPL-MCNC: 0.68 MG/DL (ref 0.5–1.4)
EOSINOPHIL # BLD AUTO: 0.19 K/UL (ref 0–0.51)
EOSINOPHIL NFR BLD: 1.4 % (ref 0–6.9)
ERYTHROCYTE [DISTWIDTH] IN BLOOD BY AUTOMATED COUNT: 44.9 FL (ref 35.9–50)
GFR SERPLBLD CREATININE-BSD FMLA CKD-EPI: 123 ML/MIN/1.73 M 2
GLOBULIN SER CALC-MCNC: 4.5 G/DL (ref 1.9–3.5)
GLUCOSE SERPL-MCNC: 98 MG/DL (ref 65–99)
HCT VFR BLD AUTO: 35.2 % (ref 42–52)
HGB BLD-MCNC: 11.3 G/DL (ref 14–18)
IMM GRANULOCYTES # BLD AUTO: 0.04 K/UL (ref 0–0.11)
IMM GRANULOCYTES NFR BLD AUTO: 0.3 % (ref 0–0.9)
LIPASE SERPL-CCNC: 22 U/L (ref 11–82)
LYMPHOCYTES # BLD AUTO: 1.69 K/UL (ref 1–4.8)
LYMPHOCYTES NFR BLD: 12.2 % (ref 22–41)
MCH RBC QN AUTO: 27.5 PG (ref 27–33)
MCHC RBC AUTO-ENTMCNC: 32.1 G/DL (ref 32.3–36.5)
MCV RBC AUTO: 85.6 FL (ref 81.4–97.8)
MONOCYTES # BLD AUTO: 1.03 K/UL (ref 0–0.85)
MONOCYTES NFR BLD AUTO: 7.4 % (ref 0–13.4)
NEUTROPHILS # BLD AUTO: 10.85 K/UL (ref 1.82–7.42)
NEUTROPHILS NFR BLD: 78.3 % (ref 44–72)
NRBC # BLD AUTO: 0 K/UL
NRBC BLD-RTO: 0 /100 WBC (ref 0–0.2)
PLATELET # BLD AUTO: 605 K/UL (ref 164–446)
PMV BLD AUTO: 8.9 FL (ref 9–12.9)
POTASSIUM SERPL-SCNC: 3.8 MMOL/L (ref 3.6–5.5)
PROT SERPL-MCNC: 8.3 G/DL (ref 6–8.2)
RBC # BLD AUTO: 4.11 M/UL (ref 4.7–6.1)
SODIUM SERPL-SCNC: 135 MMOL/L (ref 135–145)
WBC # BLD AUTO: 13.9 K/UL (ref 4.8–10.8)

## 2024-03-25 PROCEDURE — 83690 ASSAY OF LIPASE: CPT

## 2024-03-25 PROCEDURE — 85025 COMPLETE CBC W/AUTO DIFF WBC: CPT

## 2024-03-25 PROCEDURE — 700117 HCHG RX CONTRAST REV CODE 255: Performed by: STUDENT IN AN ORGANIZED HEALTH CARE EDUCATION/TRAINING PROGRAM

## 2024-03-25 PROCEDURE — 36415 COLL VENOUS BLD VENIPUNCTURE: CPT

## 2024-03-25 PROCEDURE — 99283 EMERGENCY DEPT VISIT LOW MDM: CPT

## 2024-03-25 PROCEDURE — 74177 CT ABD & PELVIS W/CONTRAST: CPT

## 2024-03-25 PROCEDURE — 80053 COMPREHEN METABOLIC PANEL: CPT

## 2024-03-25 RX ORDER — AMOXICILLIN 250 MG
1 CAPSULE ORAL PRN
COMMUNITY

## 2024-03-25 RX ORDER — AMOXICILLIN AND CLAVULANATE POTASSIUM 875; 125 MG/1; MG/1
1 TABLET, FILM COATED ORAL 2 TIMES DAILY
COMMUNITY

## 2024-03-25 RX ORDER — OMEPRAZOLE 20 MG/1
20 CAPSULE, DELAYED RELEASE ORAL DAILY
COMMUNITY

## 2024-03-25 RX ORDER — GABAPENTIN 100 MG/1
100 CAPSULE ORAL 3 TIMES DAILY
COMMUNITY

## 2024-03-25 RX ORDER — ACETAMINOPHEN 500 MG
1000 TABLET ORAL EVERY 6 HOURS PRN
COMMUNITY

## 2024-03-25 RX ORDER — METHOCARBAMOL 500 MG/1
1000 TABLET, FILM COATED ORAL 2 TIMES DAILY
COMMUNITY

## 2024-03-25 RX ADMIN — IOHEXOL 100 ML: 350 INJECTION, SOLUTION INTRAVENOUS at 15:44

## 2024-03-25 ASSESSMENT — FIBROSIS 4 INDEX: FIB4 SCORE: 0.31

## 2024-03-25 NOTE — DISCHARGE PLANNING
ER CM Met with pt and parents at bedside. Lives in [facility]. Dressing has been difficult to manage. Home health 1) [facility] 2) [facility]  PCP Yareli Du[facility] Count includes the [facility] Transition Team Assessment    Information Source  Orientation Level: Oriented X4  Information Given By: Patient  Informant's Name: Abelardo  Who is responsible for making decisions for patient? : Patient         Elopement Risk  Legal Hold: No  Ambulatory or Self Mobile in Wheelchair: No-Not an Elopement Risk    Interdisciplinary Discharge Planning  Primary Care Physician: yareli Galvan  Lives with - Patient's Self Care Capacity: Parents  Support Systems: Parent  Housing / Facility: [address]  Do You Take your Prescribed Medications Regularly: Yes  Durable Medical Equipment: Not Applicable    Discharge Preparedness  What is your plan after discharge?: Home health care  What are your discharge supports?: Parent  Prior Functional Level: Ambulatory, Independent with Activities of Daily Living, Independent with Medication Management    Functional Assesment  Prior Functional Level: Ambulatory, Independent with Activities of Daily Living, Independent with Medication Management    Finances  Prescription Coverage: Yes                   Domestic Abuse  Have you ever been the victim of abuse or violence?: No              Anticipated Discharge Information  Discharge Disposition: Discharged to home/self care (01)

## 2024-03-25 NOTE — DISCHARGE PLANNING
Received Choice form at 1534  Agency/Facility Name: [facility]  Referral scanned per Choice form in Media   Will send out when orders are written.    YUNIOR RN Notified

## 2024-03-25 NOTE — ED TRIAGE NOTES
"38 yr old male to triage  Chief Complaint   Patient presents with    Wound Check     Patient report had a stent placement with a drain tube for a pancreatic pseudocyst on 03/20/2025 and patient concern the drain is not working or drainage.  Brownish discoloration on the dressing.  Patient has not changed the dressing since 03/20/2025 and was never instructed to change the dressing.      /88   Pulse 96   Temp 36.2 °C (97.2 °F) (Temporal)   Resp 16   Ht 1.753 m (5' 9\")   Wt 65.3 kg (143 lb 15.4 oz)   SpO2 99%   BMI 21.26 kg/m²     "

## 2024-03-25 NOTE — ED NOTES
Medication history reviewed with pt. Med rec is complete.  Allergies reviewed, per pt  Interviewed pt with family at bedside with permission from pt.    Patient has had any outpatient antibiotics in the last 30 days, pt started AUGMENTIN 875-125MG on 2/21/2024 for 30 day course, per pt reports that he finished this course of antibiotic.    Pt is not on any anticoagulants

## 2024-03-25 NOTE — ED PROVIDER NOTES
ED Provider Note    CHIEF COMPLAINT  Chief Complaint   Patient presents with    Wound Check     Patient report had a stent placement with a drain tube for a pancreatic pseudocyst on 03/20/2025 and patient concern the drain is not working or drainage.  Brownish discoloration on the dressing.  Patient has not changed the dressing since 03/20/2025 and was never instructed to change the dressing.        EXTERNAL RECORDS REVIEWED  Other op report from 3/20/2024 noted.  Drain dysfunction drain was exchanged upsized to 18 [place] with Dr. Lalo ELAINE/EUGENIO  LIMITATION TO HISTORY     OUTSIDE HISTORIAN(S):      Abelardo Fenton is a 38 y.o. male who presents with drainage from pancreatic drain.  Patient says he had pancreatic drain replaced on 3/20 and since then has been having brownish drainage from around the drain site.  Patient says he also has been getting minimal return when flushing the drain.  Patient says she flushes the drain 3 times a day with 10 mL of sterile saline.  Patient patient says that today when he flushed the drain today he had 3 to 4 mL return of saline.  Patient says that area around the drain has been more painful in the past 2 days.  Patient says he has been having intermittent fevers for months.  Patient denies recent nausea, vomiting.  Patient states has been having normal bowel movements and urination.    PAST MEDICAL HISTORY   has a past medical history of High cholesterol, Hyperlipemia, Hypertension, and Pancreatitis.    SURGICAL HISTORY   has a past surgical history that includes stent placement (Right).    FAMILY HISTORY  History reviewed. No pertinent family history.    SOCIAL HISTORY  Social History     Tobacco Use    Smoking status: Never    Smokeless tobacco: Never   Vaping Use    Vaping Use: Never used   Substance and Sexual Activity    Alcohol use: Yes     Comment: No alcohol since 12/25/23    Drug use: Never    Sexual activity: Yes       CURRENT MEDICATIONS  Home Medications   "     Reviewed by Vandana Miller PhT (Pharmacy Tech) on 03/25/24 at 1520  Med List Status: Complete     Medication Last Dose Status   acetaminophen (TYLENOL) 500 MG Tab 3/25/2024 Active   amLODIPine (NORVASC) 5 MG Tab 3/25/2024 Active   amoxicillin-clavulanate (AUGMENTIN) 875-125 MG Tab 3/21/2024 Active   gabapentin (NEURONTIN) 100 MG Cap > 1 week Active   methocarbamol (ROBAXIN) 500 MG Tab 3/25/2024 Active   metoprolol tartrate (LOPRESSOR) 25 MG Tab 3/25/2024 Active   omeprazole (PRILOSEC) 20 MG delayed-release capsule 3/25/2024 Active   oxyCODONE immediate-release (ROXICODONE) 5 MG Tab 3/25/2024 Active   senna-docusate (PERICOLACE OR SENOKOT S) 8.6-50 MG Tab > 2 weeks Active                    ALLERGIES  No Known Allergies    PHYSICAL EXAM  VITAL SIGNS: /82   Pulse 94   Temp 36.7 °C (98.1 °F) (Temporal)   Resp 16   Ht 1.753 m (5' 9\")   Wt 65.3 kg (143 lb 15.4 oz)   SpO2 99%   BMI 21.26 kg/m²    Constitutional: Alert in no apparent distress.  HENT: No signs of trauma, Bilateral external ears normal, Nose normal.   Eyes: Pupils are equal and reactive, Conjunctiva normal, Non-icteric.   Neck: Normal range of motion, No tenderness, Supple, No stridor.   Cardiovascular: Regular rate and rhythm, no murmurs.   Thorax & Lungs: Normal breath sounds, No respiratory distress, No wheezing, No chest tenderness.   Abdomen: Bowel sounds normal, distended, slight erythema adjacent to  dressing, brownish malodorous discharge around drain site, saturated wound dressings, tenderness adjacent to drain site  Skin: Warm, Dry, No erythema, No rash.   Back: No bony tenderness, No CVA tenderness.   Extremities: Intact distal pulses, No edema, No tenderness, No cyanosis  Musculoskeletal: Good range of motion in all major joints. No tenderness to palpation or major deformities noted.   Neurologic: Alert , Normal motor function, Normal sensory function, No focal deficits noted.       DIAGNOSTIC STUDIES / " PROCEDURES  EKG      LABS  Labs Reviewed   CBC WITH DIFFERENTIAL - Abnormal; Notable for the following components:       Result Value    WBC 13.9 (*)     RBC 4.11 (*)     Hemoglobin 11.3 (*)     Hematocrit 35.2 (*)     MCHC 32.1 (*)     Platelet Count 605 (*)     MPV 8.9 (*)     Neutrophils-Polys 78.30 (*)     Lymphocytes 12.20 (*)     Neutrophils (Absolute) 10.85 (*)     Monos (Absolute) 1.03 (*)     All other components within normal limits   COMP METABOLIC PANEL - Abnormal; Notable for the following components:    AST(SGOT) 51 (*)     ALT(SGPT) 57 (*)     Alkaline Phosphatase 280 (*)     Total Protein 8.3 (*)     Globulin 4.5 (*)     All other components within normal limits   LIPASE   ESTIMATED GFR         RADIOLOGY  I have independently interpreted the diagnostic imaging associated with this visit and am waiting the final reading from the radiologist.   My preliminary interpretation is as follows: Intra-abdominal fluid collection  Radiologist interpretation:   CT-ABDOMEN-PELVIS WITH   Final Result      1.  Again seen large pancreatic pseudocyst which contains multiple areas of gas which contains a large bore percutaneous drain. This is slightly smaller in size than comparison. There are again seen components extending into the pancreatic tail as well    as the splenic hilum.      2.  Again seen attenuation of the portal vein, superior mesenteric vein and splenic vein without complete occlusion.      3.  Small of free fluid dependently within the pelvis.      4.  Again seen is intrahepatic biliary ductal prominence.      IR-DRAINAGE-CATH EXCHANGE    (Results Pending)         COURSE & MEDICAL DECISION MAKING        INITIAL ASSESSMENT, COURSE AND PLAN  Care Narrative: On arrival vital signs within normal limits.  Patient is brown malodorous discharge show saturated wound dressing as well as what appears to be contact dermatitis adjacent to the drain.  Patient does have tenderness swelling around drain site.  Will  obtain CT to assess for intra-abdominal infection.  Will reach out to IR.  Will obtain blood work to assess for hematologic metabolic derangements.  Patient pain currently well-controlled.    CT imaging with stable appearance of large pancreatic pseudocyst no acute findings.  Patient has not had vital sign abnormalities able to tolerate p.o.  Discussed today's workup with Dr. Haley from interventional radiology who reviewed imaging and helped coordinate outpatient appointment tomorrow for drain check and possible exchange.  Patient does have slight elevation white count which is stable over the past month.  Discussed with patient plan for IR evaluation tomorrow, return precautions which he and his family are comfortable with.  Given patient has reasonable pain control no vomiting is afebrile without vital sign abnormalities and stable appearance of fluid collection on CT imaging believe outpatient follow-up is reasonable.        ADDITIONAL PROBLEM LIST    DISPOSITION AND DISCUSSIONS  I have discussed management of the patient with the following physicians and FRANC's: Dr. Haley from interventional radiology      FINAL DIAGNOSIS  1. Wound drainage           Electronically signed by: Abelardo Palomares D.O., 3/25/2024 2:54 PM

## 2024-03-25 NOTE — DISCHARGE INSTRUCTIONS
We spoke with the interventional radiology department and you should be scheduled for a drain check and possible replacement tomorrow afternoon.  You should reach out to them in the morning via [phone].  If you have uncontrolled pain, vomiting you should return to the emergency room.  You should not eat 6 hours before your procedure.

## 2024-03-26 ENCOUNTER — HOSPITAL ENCOUNTER (OUTPATIENT)
Dept: RADIOLOGY | Facility: MEDICAL CENTER | Age: 39
End: 2024-03-26
Attending: STUDENT IN AN ORGANIZED HEALTH CARE EDUCATION/TRAINING PROGRAM
Payer: COMMERCIAL

## 2024-03-26 VITALS
HEIGHT: 69 IN | DIASTOLIC BLOOD PRESSURE: 72 MMHG | BODY MASS INDEX: 22.07 KG/M2 | TEMPERATURE: 97.9 F | HEART RATE: 80 BPM | OXYGEN SATURATION: 99 % | SYSTOLIC BLOOD PRESSURE: 118 MMHG | WEIGHT: 149 LBS | RESPIRATION RATE: 18 BRPM

## 2024-03-26 DIAGNOSIS — L24.A9 WOUND DRAINAGE: ICD-10-CM

## 2024-03-26 PROCEDURE — 99153 MOD SED SAME PHYS/QHP EA: CPT

## 2024-03-26 PROCEDURE — 700117 HCHG RX CONTRAST REV CODE 255: Performed by: STUDENT IN AN ORGANIZED HEALTH CARE EDUCATION/TRAINING PROGRAM

## 2024-03-26 PROCEDURE — 700111 HCHG RX REV CODE 636 W/ 250 OVERRIDE (IP): Mod: JZ

## 2024-03-26 PROCEDURE — 700111 HCHG RX REV CODE 636 W/ 250 OVERRIDE (IP): Mod: JZ | Performed by: STUDENT IN AN ORGANIZED HEALTH CARE EDUCATION/TRAINING PROGRAM

## 2024-03-26 RX ORDER — ONDANSETRON 2 MG/ML
4 INJECTION INTRAMUSCULAR; INTRAVENOUS PRN
Status: ACTIVE | OUTPATIENT
Start: 2024-03-26 | End: 2024-03-26

## 2024-03-26 RX ORDER — SODIUM CHLORIDE 9 MG/ML
500 INJECTION, SOLUTION INTRAVENOUS
Status: ACTIVE | OUTPATIENT
Start: 2024-03-26 | End: 2024-03-26

## 2024-03-26 RX ORDER — LIDOCAINE HYDROCHLORIDE 10 MG/ML
INJECTION, SOLUTION EPIDURAL; INFILTRATION; INTRACAUDAL; PERINEURAL
Status: DISPENSED
Start: 2024-03-26 | End: 2024-03-26

## 2024-03-26 RX ADMIN — FENTANYL CITRATE 50 MCG: 50 INJECTION, SOLUTION INTRAMUSCULAR; INTRAVENOUS at 12:00

## 2024-03-26 RX ADMIN — FENTANYL CITRATE 50 MCG: 50 INJECTION, SOLUTION INTRAMUSCULAR; INTRAVENOUS at 12:08

## 2024-03-26 RX ADMIN — IOHEXOL 300 ML: 300 INJECTION, SOLUTION INTRAVENOUS at 13:15

## 2024-03-26 RX ADMIN — FENTANYL CITRATE 50 MCG: 50 INJECTION, SOLUTION INTRAMUSCULAR; INTRAVENOUS at 12:04

## 2024-03-26 ASSESSMENT — PAIN DESCRIPTION - PAIN TYPE
TYPE: ACUTE PAIN
TYPE: ACUTE PAIN

## 2024-03-26 ASSESSMENT — LIFESTYLE VARIABLES: EVER_SMOKED: NEVER

## 2024-03-26 ASSESSMENT — FIBROSIS 4 INDEX: FIB4 SCORE: 0.4

## 2024-03-26 NOTE — PROGRESS NOTES
Pt presents to OPIR. Pt was consented by MD at bedside, confirmed by this RN and consent at bedside. Pt transferred to OPIR table in supine position.  Single agent sedation fentanyl used for case. Patient underwent an image guided abscess-o-gram with tube exchange by Dr. Pemberton. Procedure site was marked by MD and verified using imaging guidance. Pt placed on monitor, prepped and draped in a sterile fashion. Vitals were taken every 5 minutes and remained stable during procedure (see doc flow sheet for results). CO2 waveform capnography was monitored and remained WNL throughout procedure. Pt DC to home with s/o, education provided on drain home care and signs and symptoms to be aware of and go to the hospital for. IV removed and pt ambulated out of hospital.     [facility]  18fr placed at 1205  REF: [id number]  LOT: [id number]  EXP: 08/26/2025

## 2024-03-26 NOTE — DISCHARGE PLANNING
Anticipated Discharge Disposition: Home    Action: ER CM spoke with pt. No Home Health order found.  MD may have decided against it. He is referred to IR devikao. ER CM called pt reviewed no orders placed, discussed he can reach out to pcp for orders. He is given the ER CM direct contact line number and he has a copy of the choice form with the numbers for HH in the area that go to [place]. He is to follow with IR today. He is aware.  Encouraged to keep dressing clean. Return precautions restressed    Barriers to Discharge: None    Plan: Will cont to follow

## 2024-03-26 NOTE — ED NOTES
Discharge instructions given and discussed. Pt educated to come back to ER for new or worsening symptoms and follow up with IR tomorrow morning  as instructed. Pt verbalized understanding. VSS. Pt  Discharged in stable condition.

## 2024-04-02 ENCOUNTER — APPOINTMENT (OUTPATIENT)
Dept: SURGICAL ONCOLOGY | Facility: MEDICAL CENTER | Age: 39
End: 2024-04-02
Payer: COMMERCIAL

## 2024-04-02 VITALS
HEIGHT: 69 IN | TEMPERATURE: 97 F | OXYGEN SATURATION: 95 % | WEIGHT: 142 LBS | DIASTOLIC BLOOD PRESSURE: 60 MMHG | HEART RATE: 84 BPM | SYSTOLIC BLOOD PRESSURE: 112 MMHG | BODY MASS INDEX: 21.03 KG/M2

## 2024-04-02 DIAGNOSIS — K86.3 INFECTED PSEUDOCYST OF PANCREAS: ICD-10-CM

## 2024-04-02 DIAGNOSIS — K83.8 DILATION OF BILIARY TRACT: ICD-10-CM

## 2024-04-02 DIAGNOSIS — K85.90 SEVERE ACUTE PANCREATITIS: ICD-10-CM

## 2024-04-02 DIAGNOSIS — R10.9 INTRACTABLE ABDOMINAL PAIN: ICD-10-CM

## 2024-04-02 DIAGNOSIS — K85.91 NECROTIZING PANCREATITIS: ICD-10-CM

## 2024-04-02 PROCEDURE — 3078F DIAST BP <80 MM HG: CPT | Performed by: SURGERY

## 2024-04-02 PROCEDURE — 3074F SYST BP LT 130 MM HG: CPT | Performed by: SURGERY

## 2024-04-02 PROCEDURE — 99214 OFFICE O/P EST MOD 30 MIN: CPT | Performed by: SURGERY

## 2024-04-02 RX ORDER — KETOROLAC TROMETHAMINE 10 MG/1
10 TABLET, FILM COATED ORAL EVERY 4 HOURS PRN
Qty: 20 TABLET | Refills: 0 | Status: ON HOLD | OUTPATIENT
Start: 2024-04-02 | End: 2024-04-22

## 2024-04-02 ASSESSMENT — ENCOUNTER SYMPTOMS
ABDOMINAL PAIN: 1
WEIGHT LOSS: 1

## 2024-04-02 ASSESSMENT — FIBROSIS 4 INDEX: FIB4 SCORE: 0.4

## 2024-04-03 NOTE — PROGRESS NOTES
Subjective:   4/9/2024  7:55 AM  Primary care physician: SAMUEL Mckinney  Referring Provider: Hospital consult     Chief Complaint:   Chief Complaint   Patient presents with    Follow-Up     PANCREATITIS  PSEUDOCYST  DRAIN EXCHANGED  COLON FISTULA?  CT PANC: 4/8        Diagnosis:   1. Infected pseudocyst of pancreas        2. Necrotizing pancreatitis        3. Dilation of biliary tract        4. Severe acute pancreatitis        5. Intractable abdominal pain        6. Peritoneal abscess (HCC)          History of presenting illness:    Abelardo Fenton is a pleasant 38 y.o. male with past medical history of pancreatitis with pseudocyst who presented to the ED on 3/14/2024 with worsening abdominal pain, significant for sepsis.     CT abdomen pelvis showing pancreatitis with large multifocal peripancreatic fluid collections within the largest component seen extending inferiorly from the pancreatic head into the mesentery into the upper pelvis measuring 18 x 16 x 12 cm in size, new multifocal areas of gas within those fluid collections with suggest presence of infective pseudocyst.       Patient underwent IR drain placement on 2/15/24. Repeat CT imaging on 2/20/24 noted slightly decreased now 13 x 10 cm, formerly 18 x 16 x 12 cm - large pancreatic fluid collection/pseudocyst. Patient to continue with drain in place, flushing it daily, continue with oral antibiotic therapy (augmentin, reviewed with ID), was discharged with plans for follow up with hepatobiliary surgery.     Update 3/5/24  He is here today for follow up from recent hospitalization.  The patient was admitted to the hospital with what appears to be necrotizing pancreatitis which then became infected.  IR drains have been placed in order to get him over the septic.  And then we will consult centimeters.  The patient had a large pseudocyst on imaging that extended from the head of the pancreas along the  body.  The bulk of the pseudocyst and infected collection with the air pockets was located in the right upper quadrant towards the gallbladder.  Patient did do well in the hospital was discharged home with his drain and on long-term antibiotics.  He now returns with his parents.  He denies any nausea or vomiting but did have a low-grade fever just the other day at 100.2.  The drain itself has been putting out about 10 to 20 cc a day and it does appear to be purulent.  Unfortunate his last set of imaging which I reviewed was from February 20 we do not have any new imaging to see where or how large the remnant cyst is.  Patient is being active but cannot return to work because of his fatigue and his intermittent pain.  Patient has not had any recent drinking.  He does appear to look much better than he was in the hospital.  He is here with his parents to discuss neck steps.  We reviewed the patient's cultures as well.  Long with his labs and imaging.     Update 4/2/24  CT ABDOMEN W/O on 3/12/24 noted large complex pancreatic pseudocyst with suspended microbubbles consistent with semisolid material and residual contrast from the preceding abscessogram. Most notably, there is contrast opacification confirmed into the colon at the mid transverse colon and splenic flexure of the colon concordant with findings on the abscessogram. One suspects a fistulous communication at the inferior medial aspect of the pseudocyst which directly abuts and compresses the transverse colon.     On 3/12/24 he had procedure by Dr Howard for pseudocyst abscessogram and exchange drain from 12F to 14F pigtail. Heavy growth of Ecoli. Drain was exchanged/upsized on 3/20/24.     CT ABDOMEN/PELVIS on 3/25/24 noted known large pancreatic pseudocyst which contains multiple areas of gas which contains a large bore percutaneous drain. This is slightly smaller in size than comparison. There are again seen components extending into the pancreatic tail as  well as the splenic hilum. Again seen attenuation of the portal vein, superior mesenteric vein and splenic vein without complete occlusion. Small of free fluid dependently within the pelvis. Again seen is intrahepatic biliary ductal prominence.     Patient was seen in ED on 3/25/24 for concern with drain problems. Drain catheter was exchanged on 3/26/24 by Dr Haley     He is here today for follow-up visit status post repeat CT scan which was done on March 25, 2024.  It shows that the cyst actually has shrunk slightly after the drain has been changed over several times.  Patient continues to complain of discomfort and that the drainage fluid is still foul-smelling but is very low volume.  Possibly 20 to 30 cc a day.  It is purulent with no sign of bile or fecal material.  There was a question of a colonic fistula but that is somewhat difficult to assess on fistulogram due to the the unusual nature of a pseudocyst.  It would be unlikely but could happen when he develops a fistula to the colon but has appears to be extremely small.  He is here with his parents to discuss neck steps.  I personally reviewed the imaging studies several weeks intact since March 12 to March 25.    Update 4/9/24  CT PANCREAS on 4/8/24 noted     He is here today for follow-up status post repeat CT scan.  Patient had multiple drains placed over the last 2 weeks with upsizing him and it appears as though he had slight shrinkage by several centimeters in 1 dimension but the rest of the cyst appears to be intact.  There is large amount of air and debris in the cyst consistent with necrotizing pancreatitis.  It is definitely infected and is foul-smelling drainage.  The patient is here with his family discuss neck steps.  I personally reviewed the patient's CT scan from April 8, 2024 as well as a 1 week ago.  Sent show there is minimal to no change.  Continues to lose weight and have abdominal discomfort.    Past Medical History:   Diagnosis Date     High cholesterol     Hyperlipemia     Hypertension     Pancreatitis      Past Surgical History:   Procedure Laterality Date    STENT PLACEMENT Right     for pancreatic pseudocyst     No Known Allergies  Outpatient Encounter Medications as of 4/9/2024   Medication Sig Dispense Refill    ketorolac (TORADOL) 10 MG Tab Take 1 Tablet by mouth every four hours as needed for Severe Pain for up to 20 doses. 20 Tablet 0    acetaminophen (TYLENOL) 500 MG Tab Take 1,000 mg by mouth every 6 hours as needed. Indications: Pain      senna-docusate (PERICOLACE OR SENOKOT S) 8.6-50 MG Tab Take 1 Tablet by mouth as needed for Constipation.      omeprazole (PRILOSEC) 20 MG delayed-release capsule Take 20 mg by mouth every day.      gabapentin (NEURONTIN) 100 MG Cap Take 100 mg by mouth 3 times a day.      methocarbamol (ROBAXIN) 500 MG Tab Take 1,000 mg by mouth 2 times a day.      amoxicillin-clavulanate (AUGMENTIN) 875-125 MG Tab Take 1 Tablet by mouth 2 times a day. Pt started on 2/21/2024 for 30 day course      metoprolol tartrate (LOPRESSOR) 25 MG Tab Take 1 Tablet by mouth 2 times a day. 60 Tablet 0    oxyCODONE immediate-release (ROXICODONE) 5 MG Tab Take 5 mg by mouth every 6 hours as needed for Severe Pain.      amLODIPine (NORVASC) 5 MG Tab Take 1 Tablet by mouth every day. 30 Tablet 2     No facility-administered encounter medications on file as of 4/9/2024.     Social History     Socioeconomic History    Marital status: Single     Spouse name: Not on file    Number of children: Not on file    Years of education: Not on file    Highest education level: Not on file   Occupational History    Not on file   Tobacco Use    Smoking status: Never    Smokeless tobacco: Never   Vaping Use    Vaping Use: Never used   Substance and Sexual Activity    Alcohol use: Yes     Comment: No alcohol since 12/25/23    Drug use: Never    Sexual activity: Yes   Other Topics Concern    Not on file   Social History Narrative    Not on file  "    Social Determinants of Health     Financial Resource Strain: Not on file   Food Insecurity: Not on file   Transportation Needs: Not on file   Physical Activity: Not on file   Stress: Not on file   Social Connections: Not on file   Intimate Partner Violence: Not on file   Housing Stability: Not on file      Social History     Tobacco Use   Smoking Status Never   Smokeless Tobacco Never     Social History     Substance and Sexual Activity   Alcohol Use Yes    Comment: No alcohol since 12/25/23     Social History     Substance and Sexual Activity   Drug Use Never      History reviewed. No pertinent family history.    Review of Systems   Constitutional:  Positive for malaise/fatigue and weight loss.   Gastrointestinal:  Positive for abdominal pain.   All other systems reviewed and are negative.       Objective:   /70 (BP Location: Right arm, Patient Position: Sitting, BP Cuff Size: Small adult)   Pulse (!) 101   Temp 37.1 °C (98.8 °F) (Temporal)   Ht 1.753 m (5' 9\")   Wt 65.3 kg (144 lb)   SpO2 95%   BMI 21.27 kg/m²     Physical Exam  Vitals and nursing note reviewed.   Constitutional:       Appearance: He is ill-appearing.   HENT:      Mouth/Throat:      Mouth: Mucous membranes are moist.      Pharynx: Oropharynx is clear.   Eyes:      Extraocular Movements: Extraocular movements intact.      Conjunctiva/sclera: Conjunctivae normal.      Pupils: Pupils are equal, round, and reactive to light.   Cardiovascular:      Rate and Rhythm: Normal rate and regular rhythm.      Pulses: Normal pulses.      Heart sounds: Normal heart sounds.   Pulmonary:      Effort: Pulmonary effort is normal.      Breath sounds: Normal breath sounds.   Abdominal:      General: Abdomen is flat. Bowel sounds are normal.      Palpations: Abdomen is soft.      Tenderness: There is abdominal tenderness.      Comments: Still discomfort around the drain site as well as the epigastric region.  IR drain is putting out purulent fluid.  It " is foul-smelling.   Musculoskeletal:         General: Normal range of motion.      Cervical back: Normal range of motion and neck supple.   Skin:     General: Skin is warm.   Neurological:      General: No focal deficit present.      Mental Status: He is alert and oriented to person, place, and time. Mental status is at baseline.   Psychiatric:         Mood and Affect: Mood normal.         Behavior: Behavior normal.         Thought Content: Thought content normal.         Judgment: Judgment normal.         Labs    Latest Reference Range & Units 03/12/24 08:40   WBC 4.8 - 10.8 K/uL 11.6 (H)   RBC 4.70 - 6.10 M/uL 4.25 (L)   Hemoglobin 14.0 - 18.0 g/dL 12.1 (L)   Hematocrit 42.0 - 52.0 % 37.1 (L)   MCV 81.4 - 97.8 fL 87.3   MCH 27.0 - 33.0 pg 28.5   MCHC 32.3 - 36.5 g/dL 32.6   RDW 35.9 - 50.0 fL 45.8   Platelet Count 164 - 446 K/uL 441   MPV 9.0 - 12.9 fL 9.3   (H): Data is abnormally high  (L): Data is abnormally low       Latest Reference Range & Units 02/21/24 00:14   Sodium 135 - 145 mmol/L 135   Potassium 3.6 - 5.5 mmol/L 3.7   Chloride 96 - 112 mmol/L 99   Co2 20 - 33 mmol/L 25   Anion Gap 7.0 - 16.0  11.0   Glucose 65 - 99 mg/dL 118 (H)   Bun 8 - 22 mg/dL 7 (L)   Creatinine 0.50 - 1.40 mg/dL 0.98   GFR (CKD-EPI) >60 mL/min/1.73 m 2 102   Calcium 8.5 - 10.5 mg/dL 8.6   Correct Calcium 8.5 - 10.5 mg/dL 9.2   AST(SGOT) 12 - 45 U/L 24   ALT(SGPT) 2 - 50 U/L 20   Alkaline Phosphatase 30 - 99 U/L 689 (H)   Total Bilirubin 0.1 - 1.5 mg/dL 0.2   Albumin 3.2 - 4.9 g/dL 3.3   Total Protein 6.0 - 8.2 g/dL 6.9   Globulin 1.9 - 3.5 g/dL 3.6 (H)   A-G Ratio g/dL 0.9   Phosphorus 2.5 - 4.5 mg/dL 3.8   Magnesium 1.5 - 2.5 mg/dL 2.0   (H): Data is abnormally high  (L): Data is abnormally low      Imaging  CT PANCREAS (4/8/24)  Shows that there was slight shrinkage in 1 direction by 2 cm.  Cavity still there with lots amount of air and debris.  No real significant change.    CT A/P (3/25/24)  IMPRESSION:     1.  Again seen  large pancreatic pseudocyst which contains multiple areas of gas which contains a large bore percutaneous drain. This is slightly smaller in size than comparison. There are again seen components extending into the pancreatic tail as well   as the splenic hilum.     2.  Again seen attenuation of the portal vein, superior mesenteric vein and splenic vein without complete occlusion.     3.  Small of free fluid dependently within the pelvis.     4.  Again seen is intrahepatic biliary ductal prominence.     CT ABDOMEN W/O (3/12/24)  IMPRESSION:  1.  Large complex pancreatic pseudocyst with suspended microbubbles consistent with semisolid material and residual contrast from the preceding abscessogram.  2.  Most notably, there is contrast opacification confirmed into the colon at the mid transverse colon and splenic flexure of the colon concordant with findings on the abscessogram. One suspects a fistulous communication at the inferior medial aspect of   the pseudocyst which directly abuts and compresses the transverse colon.  3.  The case was discussed (call report) with DR AGUAYO immediately following the scan. Plan is for the patient to schedule an outpatient appointment with Dr. KERNS at the soonest convenience.     CT A/P (2/20/23)  FINDINGS:  Lung: Linear density in the right lower lobe again noted, consistent with scarring or subsegmental atelectasis. Tiny simple left pleural effusion has developed. Heart is normal in size.  Liver: No focal liver lesion  Spleen: Normal in size, without focal lesion  Adrenal glands: Normal  Pancreas: When measuring at the same location as the previous exam, there is a slightly decreased 13 x 10 cm pancreatic fluid and air collection. Drainage catheter is present within the collection. Additional fluid extends along the pancreas to the level   of the spleen.  Gallbladder: No radiodense stone visualized.  Biliary: No biliary duct dilation visualized.  Kidneys: No stone or  hydronephrosis. No cystic or solid renal lesion.  Vasculature: Nonaneurysmal  Lymph nodes: No adenopathy  Bowel: No evidence of obstruction or acute inflammation  Appendix:  Not visualized  Peritoneum: Mild ascites within the pelvis  Pelvis: Urinary bladder is grossly normal. No pelvic mass or adenopathy.  Musculoskeletal: No acute abnormality or concerning osseous lesion     IMPRESSION:  1. Slightly decreased large pancreatic fluid collection/pseudocyst. Air within the collection raise the possibility of superimposed infection.  2. Minimal ascites within the pelvis.  3. New tiny simple left pleural effusion.     Pathology  PATH (3/12/24)  Ecoli - heavy growth     Procedures  IR catheter exchange (3/26/24)     PROCEDURE (3/20/24)  IMPRESSION:     1.  Over-the-wire exchange of a 14 Algerian locking loop pigtail catheter within a large right midabdomen complex pseudocyst for an upsized 18 Algerian locking loop pigtail catheter.  2.  Abscessogram demonstrating large residual collection.     PROCEDURE (3/12/24)  Procedure(s): PSEUDOCYST ABSCESSOGRAM AND EXCHANGE UPSIZE FROM 12F TO 14F PIGTAIL      Peritoneal drain placed on 2/15/24    Diagnosis:     1. Infected pseudocyst of pancreas        2. Necrotizing pancreatitis        3. Dilation of biliary tract        4. Severe acute pancreatitis        5. Intractable abdominal pain        6. Peritoneal abscess (HCC)            Medical Decision Making:  Today's Assessment / Status / Plan:     In light of the present findings, my recommendation is to go forward with a possible internal drainage whether it is a cyst gastrostomy or a Kiesha-en-Y cyst jejunostomy with a pancreatic necrosectomy.  We discussed the risks and benefits including bleeding, infection, recurrent abscess, hospital stay between 3 to 5 days, use of an epidural, long-term antibiotics and the use of another drain.  He understood this and is agrees above plan.  He will be scheduled for April 15, 2024.    Dr. KALEB  Emeli have entered, reviewed and confirmed the above diagnoses related to this patient on this date of service, 4/9/2024  7:55 AM.    He agreed and verbalized his agreement and understanding with the current plan. I answered all questions and concerns he has at this time and advised him to call at any time in the interim with questions or concerns in regards to his care.    Thank you for allowing me to participate in his care, I will continue to follow closely.       Please note that this dictation was created using voice recognition software. I have made every reasonable attempt to correct obvious errors, but I expect that there are errors of grammar and possibly content that I did not discover before finalizing the note.     Thank you for this consultation and allowing me to participate in your patient's care. If I can be of further service please contact my office.      I, Dr Sainz, have entered, reviewed and confirmed the above diagnoses related to this patient on this date of service, as per the time and date noted at top of this note.

## 2024-04-08 ENCOUNTER — APPOINTMENT (OUTPATIENT)
Dept: RADIOLOGY | Facility: MEDICAL CENTER | Age: 39
End: 2024-04-08
Attending: NURSE PRACTITIONER
Payer: COMMERCIAL

## 2024-04-08 DIAGNOSIS — K86.3 INFECTED PSEUDOCYST OF PANCREAS: ICD-10-CM

## 2024-04-08 DIAGNOSIS — K85.91 NECROTIZING PANCREATITIS: ICD-10-CM

## 2024-04-08 DIAGNOSIS — K83.8 DILATION OF BILIARY TRACT: ICD-10-CM

## 2024-04-08 DIAGNOSIS — K85.90 SEVERE ACUTE PANCREATITIS: ICD-10-CM

## 2024-04-08 PROCEDURE — 700117 HCHG RX CONTRAST REV CODE 255: Performed by: NURSE PRACTITIONER

## 2024-04-08 PROCEDURE — 74170 CT ABD WO CNTRST FLWD CNTRST: CPT

## 2024-04-08 RX ADMIN — IOHEXOL 95 ML: 350 INJECTION, SOLUTION INTRAVENOUS at 17:45

## 2024-04-09 ENCOUNTER — OFFICE VISIT (OUTPATIENT)
Dept: SURGICAL ONCOLOGY | Facility: MEDICAL CENTER | Age: 39
End: 2024-04-09
Payer: COMMERCIAL

## 2024-04-09 ENCOUNTER — APPOINTMENT (OUTPATIENT)
Dept: ADMISSIONS | Facility: MEDICAL CENTER | Age: 39
DRG: 405 | End: 2024-04-09
Attending: SURGERY
Payer: COMMERCIAL

## 2024-04-09 VITALS
HEART RATE: 101 BPM | SYSTOLIC BLOOD PRESSURE: 114 MMHG | OXYGEN SATURATION: 95 % | BODY MASS INDEX: 21.33 KG/M2 | TEMPERATURE: 98.8 F | WEIGHT: 144 LBS | HEIGHT: 69 IN | DIASTOLIC BLOOD PRESSURE: 70 MMHG

## 2024-04-09 DIAGNOSIS — K65.1 PERITONEAL ABSCESS (HCC): ICD-10-CM

## 2024-04-09 DIAGNOSIS — K85.91 NECROTIZING PANCREATITIS: ICD-10-CM

## 2024-04-09 DIAGNOSIS — K86.3 INFECTED PSEUDOCYST OF PANCREAS: ICD-10-CM

## 2024-04-09 DIAGNOSIS — K85.90 SEVERE ACUTE PANCREATITIS: ICD-10-CM

## 2024-04-09 DIAGNOSIS — K83.8 DILATION OF BILIARY TRACT: ICD-10-CM

## 2024-04-09 DIAGNOSIS — R10.9 INTRACTABLE ABDOMINAL PAIN: ICD-10-CM

## 2024-04-09 PROCEDURE — 3074F SYST BP LT 130 MM HG: CPT | Performed by: SURGERY

## 2024-04-09 PROCEDURE — 99214 OFFICE O/P EST MOD 30 MIN: CPT | Performed by: SURGERY

## 2024-04-09 PROCEDURE — 3078F DIAST BP <80 MM HG: CPT | Performed by: SURGERY

## 2024-04-09 ASSESSMENT — FIBROSIS 4 INDEX: FIB4 SCORE: 0.4

## 2024-04-09 ASSESSMENT — ENCOUNTER SYMPTOMS
WEIGHT LOSS: 1
ABDOMINAL PAIN: 1

## 2024-04-11 ENCOUNTER — PRE-ADMISSION TESTING (OUTPATIENT)
Dept: ADMISSIONS | Facility: MEDICAL CENTER | Age: 39
DRG: 405 | End: 2024-04-11
Attending: SURGERY
Payer: COMMERCIAL

## 2024-04-11 RX ORDER — OXYCODONE HYDROCHLORIDE 10 MG/1
10 TABLET ORAL EVERY 6 HOURS PRN
Status: ON HOLD | COMMUNITY
Start: 2024-04-04 | End: 2024-04-22

## 2024-04-11 RX ORDER — AMOXICILLIN 500 MG/1
500 CAPSULE ORAL 3 TIMES DAILY
Status: ON HOLD | COMMUNITY
Start: 2024-04-08 | End: 2024-04-22

## 2024-04-11 NOTE — OR NURSING
@[id number] call made to Dr. Sainz' office and spoke with Cher, his surgery scheduler to update that preop testing not completing and patient lives out of town.  Okay to complete preop testing day of surgery.

## 2024-04-12 ENCOUNTER — ANESTHESIA EVENT (OUTPATIENT)
Dept: SURGERY | Facility: MEDICAL CENTER | Age: 39
DRG: 405 | End: 2024-04-12
Payer: COMMERCIAL

## 2024-04-15 ENCOUNTER — ANESTHESIA (OUTPATIENT)
Dept: SURGERY | Facility: MEDICAL CENTER | Age: 39
DRG: 405 | End: 2024-04-15
Payer: COMMERCIAL

## 2024-04-15 ENCOUNTER — HOSPITAL ENCOUNTER (INPATIENT)
Facility: MEDICAL CENTER | Age: 39
LOS: 7 days | DRG: 405 | End: 2024-04-22
Attending: SURGERY | Admitting: SURGERY
Payer: COMMERCIAL

## 2024-04-15 DIAGNOSIS — Z98.890 S/P EXPLORATORY LAPAROTOMY: ICD-10-CM

## 2024-04-15 DIAGNOSIS — K86.3 INFECTED PSEUDOCYST OF PANCREAS: ICD-10-CM

## 2024-04-15 DIAGNOSIS — R10.9 INTRACTABLE ABDOMINAL PAIN: ICD-10-CM

## 2024-04-15 DIAGNOSIS — K65.1 PERITONEAL ABSCESS (HCC): ICD-10-CM

## 2024-04-15 LAB
ABO + RH BLD: NORMAL
ABO GROUP BLD: NORMAL
BLD GP AB SCN SERPL QL: NORMAL
EKG IMPRESSION: NORMAL
GRAM STN SPEC: NORMAL
PATHOLOGY CONSULT NOTE: NORMAL
RH BLD: NORMAL
SIGNIFICANT IND 70042: NORMAL
SITE SITE: NORMAL
SOURCE SOURCE: NORMAL

## 2024-04-15 PROCEDURE — 700111 HCHG RX REV CODE 636 W/ 250 OVERRIDE (IP): Performed by: ANESTHESIOLOGY

## 2024-04-15 PROCEDURE — 87070 CULTURE OTHR SPECIMN AEROBIC: CPT

## 2024-04-15 PROCEDURE — 76998 US GUIDE INTRAOP: CPT | Mod: 26 | Performed by: SURGERY

## 2024-04-15 PROCEDURE — 700101 HCHG RX REV CODE 250: Performed by: ANESTHESIOLOGY

## 2024-04-15 PROCEDURE — 160002 HCHG RECOVERY MINUTES (STAT): Performed by: SURGERY

## 2024-04-15 PROCEDURE — 62320 NJX INTERLAMINAR CRV/THRC: CPT | Performed by: SURGERY

## 2024-04-15 PROCEDURE — 87205 SMEAR GRAM STAIN: CPT

## 2024-04-15 PROCEDURE — 0FCG0ZZ EXTIRPATION OF MATTER FROM PANCREAS, OPEN APPROACH: ICD-10-PCS | Performed by: SURGERY

## 2024-04-15 PROCEDURE — A9270 NON-COVERED ITEM OR SERVICE: HCPCS | Performed by: SURGERY

## 2024-04-15 PROCEDURE — 48105 RESECT/DEBRIDE PANCREAS: CPT | Performed by: SURGERY

## 2024-04-15 PROCEDURE — 160036 HCHG PACU - EA ADDL 30 MINS PHASE I: Performed by: SURGERY

## 2024-04-15 PROCEDURE — 700111 HCHG RX REV CODE 636 W/ 250 OVERRIDE (IP): Mod: JZ | Performed by: ANESTHESIOLOGY

## 2024-04-15 PROCEDURE — 700105 HCHG RX REV CODE 258: Performed by: SURGERY

## 2024-04-15 PROCEDURE — 88304 TISSUE EXAM BY PATHOLOGIST: CPT

## 2024-04-15 PROCEDURE — 160009 HCHG ANES TIME/MIN: Performed by: SURGERY

## 2024-04-15 PROCEDURE — 87186 SC STD MICRODIL/AGAR DIL: CPT

## 2024-04-15 PROCEDURE — 87077 CULTURE AEROBIC IDENTIFY: CPT

## 2024-04-15 PROCEDURE — 770001 HCHG ROOM/CARE - MED/SURG/GYN PRIV*

## 2024-04-15 PROCEDURE — 700102 HCHG RX REV CODE 250 W/ 637 OVERRIDE(OP): Performed by: ANESTHESIOLOGY

## 2024-04-15 PROCEDURE — 48105 RESECT/DEBRIDE PANCREAS: CPT | Mod: 80 | Performed by: SURGERY

## 2024-04-15 PROCEDURE — 160048 HCHG OR STATISTICAL LEVEL 1-5: Performed by: SURGERY

## 2024-04-15 PROCEDURE — 93005 ELECTROCARDIOGRAM TRACING: CPT | Performed by: SURGERY

## 2024-04-15 PROCEDURE — 700105 HCHG RX REV CODE 258: Performed by: ANESTHESIOLOGY

## 2024-04-15 PROCEDURE — 160035 HCHG PACU - 1ST 60 MINS PHASE I: Performed by: SURGERY

## 2024-04-15 PROCEDURE — 110371 HCHG SHELL REV 272: Performed by: SURGERY

## 2024-04-15 PROCEDURE — 160042 HCHG SURGERY MINUTES - EA ADDL 1 MIN LEVEL 5: Performed by: SURGERY

## 2024-04-15 PROCEDURE — 86901 BLOOD TYPING SEROLOGIC RH(D): CPT

## 2024-04-15 PROCEDURE — C1729 CATH, DRAINAGE: HCPCS | Performed by: SURGERY

## 2024-04-15 PROCEDURE — 3E0T3BZ INTRODUCTION OF ANESTHETIC AGENT INTO PERIPHERAL NERVES AND PLEXI, PERCUTANEOUS APPROACH: ICD-10-PCS | Performed by: ANESTHESIOLOGY

## 2024-04-15 PROCEDURE — 700101 HCHG RX REV CODE 250: Performed by: SURGERY

## 2024-04-15 PROCEDURE — 87076 CULTURE ANAEROBE IDENT EACH: CPT

## 2024-04-15 PROCEDURE — 76998 US GUIDE INTRAOP: CPT | Mod: 26,80 | Performed by: SURGERY

## 2024-04-15 PROCEDURE — 160031 HCHG SURGERY MINUTES - 1ST 30 MINS LEVEL 5: Performed by: SURGERY

## 2024-04-15 PROCEDURE — 93010 ELECTROCARDIOGRAM REPORT: CPT | Performed by: INTERNAL MEDICINE

## 2024-04-15 PROCEDURE — 86850 RBC ANTIBODY SCREEN: CPT

## 2024-04-15 PROCEDURE — 700102 HCHG RX REV CODE 250 W/ 637 OVERRIDE(OP): Performed by: SURGERY

## 2024-04-15 PROCEDURE — 49020 DRAINAGE ABDOM ABSCESS OPEN: CPT | Performed by: SURGERY

## 2024-04-15 PROCEDURE — 36415 COLL VENOUS BLD VENIPUNCTURE: CPT

## 2024-04-15 PROCEDURE — 86900 BLOOD TYPING SEROLOGIC ABO: CPT

## 2024-04-15 PROCEDURE — 87075 CULTR BACTERIA EXCEPT BLOOD: CPT

## 2024-04-15 PROCEDURE — A9270 NON-COVERED ITEM OR SERVICE: HCPCS | Performed by: ANESTHESIOLOGY

## 2024-04-15 PROCEDURE — 49020 DRAINAGE ABDOM ABSCESS OPEN: CPT | Mod: 80 | Performed by: SURGERY

## 2024-04-15 PROCEDURE — 88305 TISSUE EXAM BY PATHOLOGIST: CPT

## 2024-04-15 RX ORDER — HYDRALAZINE HYDROCHLORIDE 20 MG/ML
5 INJECTION INTRAMUSCULAR; INTRAVENOUS
Status: DISCONTINUED | OUTPATIENT
Start: 2024-04-15 | End: 2024-04-15 | Stop reason: HOSPADM

## 2024-04-15 RX ORDER — HALOPERIDOL 5 MG/ML
1 INJECTION INTRAMUSCULAR
Status: DISCONTINUED | OUTPATIENT
Start: 2024-04-15 | End: 2024-04-15

## 2024-04-15 RX ORDER — ACETAMINOPHEN 500 MG
1000 TABLET ORAL ONCE
Status: COMPLETED | OUTPATIENT
Start: 2024-04-15 | End: 2024-04-15

## 2024-04-15 RX ORDER — ROCURONIUM BROMIDE 10 MG/ML
INJECTION, SOLUTION INTRAVENOUS PRN
Status: DISCONTINUED | OUTPATIENT
Start: 2024-04-15 | End: 2024-04-15 | Stop reason: SURG

## 2024-04-15 RX ORDER — HYDROMORPHONE HYDROCHLORIDE 1 MG/ML
0.4 INJECTION, SOLUTION INTRAMUSCULAR; INTRAVENOUS; SUBCUTANEOUS
Status: DISCONTINUED | OUTPATIENT
Start: 2024-04-15 | End: 2024-04-15 | Stop reason: HOSPADM

## 2024-04-15 RX ORDER — ACETAMINOPHEN 500 MG
1000 TABLET ORAL EVERY 6 HOURS PRN
Status: DISCONTINUED | OUTPATIENT
Start: 2024-04-20 | End: 2024-04-21

## 2024-04-15 RX ORDER — DEXAMETHASONE SODIUM PHOSPHATE 4 MG/ML
INJECTION, SOLUTION INTRA-ARTICULAR; INTRALESIONAL; INTRAMUSCULAR; INTRAVENOUS; SOFT TISSUE PRN
Status: DISCONTINUED | OUTPATIENT
Start: 2024-04-15 | End: 2024-04-15 | Stop reason: SURG

## 2024-04-15 RX ORDER — SODIUM CHLORIDE, SODIUM LACTATE, POTASSIUM CHLORIDE, AND CALCIUM CHLORIDE .6; .31; .03; .02 G/100ML; G/100ML; G/100ML; G/100ML
250 INJECTION, SOLUTION INTRAVENOUS PRN
Status: DISCONTINUED | OUTPATIENT
Start: 2024-04-15 | End: 2024-04-21

## 2024-04-15 RX ORDER — EPHEDRINE SULFATE 50 MG/ML
5 INJECTION, SOLUTION INTRAVENOUS
Status: COMPLETED | OUTPATIENT
Start: 2024-04-15 | End: 2024-04-15

## 2024-04-15 RX ORDER — EPHEDRINE SULFATE 50 MG/ML
10 INJECTION, SOLUTION INTRAVENOUS
Status: DISCONTINUED | OUTPATIENT
Start: 2024-04-15 | End: 2024-04-21

## 2024-04-15 RX ORDER — ACETAMINOPHEN 500 MG
1000 TABLET ORAL EVERY 6 HOURS
Status: DISCONTINUED | OUTPATIENT
Start: 2024-04-15 | End: 2024-04-15

## 2024-04-15 RX ORDER — HALOPERIDOL 5 MG/ML
1 INJECTION INTRAMUSCULAR EVERY 6 HOURS PRN
Status: DISCONTINUED | OUTPATIENT
Start: 2024-04-15 | End: 2024-04-15

## 2024-04-15 RX ORDER — CELECOXIB 200 MG/1
200 CAPSULE ORAL ONCE
Status: COMPLETED | OUTPATIENT
Start: 2024-04-15 | End: 2024-04-15

## 2024-04-15 RX ORDER — CELECOXIB 200 MG/1
200 CAPSULE ORAL 2 TIMES DAILY
Status: COMPLETED | OUTPATIENT
Start: 2024-04-15 | End: 2024-04-20

## 2024-04-15 RX ORDER — PHENYLEPHRINE HYDROCHLORIDE 10 MG/ML
INJECTION, SOLUTION INTRAMUSCULAR; INTRAVENOUS; SUBCUTANEOUS PRN
Status: DISCONTINUED | OUTPATIENT
Start: 2024-04-15 | End: 2024-04-15 | Stop reason: SURG

## 2024-04-15 RX ORDER — ONDANSETRON 2 MG/ML
INJECTION INTRAMUSCULAR; INTRAVENOUS PRN
Status: DISCONTINUED | OUTPATIENT
Start: 2024-04-15 | End: 2024-04-15 | Stop reason: SURG

## 2024-04-15 RX ORDER — CEFOTETAN DISODIUM 2 G/20ML
INJECTION, POWDER, FOR SOLUTION INTRAMUSCULAR; INTRAVENOUS PRN
Status: DISCONTINUED | OUTPATIENT
Start: 2024-04-15 | End: 2024-04-15 | Stop reason: SURG

## 2024-04-15 RX ORDER — DIPHENHYDRAMINE HYDROCHLORIDE 50 MG/ML
25 INJECTION INTRAMUSCULAR; INTRAVENOUS EVERY 6 HOURS PRN
Status: DISCONTINUED | OUTPATIENT
Start: 2024-04-15 | End: 2024-04-21

## 2024-04-15 RX ORDER — EPHEDRINE SULFATE 50 MG/ML
5 INJECTION, SOLUTION INTRAVENOUS
Status: DISCONTINUED | OUTPATIENT
Start: 2024-04-15 | End: 2024-04-21

## 2024-04-15 RX ORDER — DEXAMETHASONE SODIUM PHOSPHATE 4 MG/ML
4 INJECTION, SOLUTION INTRA-ARTICULAR; INTRALESIONAL; INTRAMUSCULAR; INTRAVENOUS; SOFT TISSUE
Status: DISCONTINUED | OUTPATIENT
Start: 2024-04-15 | End: 2024-04-20

## 2024-04-15 RX ORDER — HYDROMORPHONE HYDROCHLORIDE 1 MG/ML
0.2 INJECTION, SOLUTION INTRAMUSCULAR; INTRAVENOUS; SUBCUTANEOUS
Status: DISCONTINUED | OUTPATIENT
Start: 2024-04-15 | End: 2024-04-15 | Stop reason: HOSPADM

## 2024-04-15 RX ORDER — DIPHENHYDRAMINE HCL 25 MG
12.5 TABLET ORAL EVERY 6 HOURS PRN
Status: DISCONTINUED | OUTPATIENT
Start: 2024-04-15 | End: 2024-04-21

## 2024-04-15 RX ORDER — MEPERIDINE HYDROCHLORIDE 25 MG/ML
6.25 INJECTION INTRAMUSCULAR; INTRAVENOUS; SUBCUTANEOUS
Status: DISCONTINUED | OUTPATIENT
Start: 2024-04-15 | End: 2024-04-15 | Stop reason: HOSPADM

## 2024-04-15 RX ORDER — LABETALOL HYDROCHLORIDE 5 MG/ML
5 INJECTION, SOLUTION INTRAVENOUS
Status: DISCONTINUED | OUTPATIENT
Start: 2024-04-15 | End: 2024-04-15 | Stop reason: HOSPADM

## 2024-04-15 RX ORDER — SODIUM CHLORIDE, SODIUM LACTATE, POTASSIUM CHLORIDE, CALCIUM CHLORIDE 600; 310; 30; 20 MG/100ML; MG/100ML; MG/100ML; MG/100ML
INJECTION, SOLUTION INTRAVENOUS CONTINUOUS
Status: ACTIVE | OUTPATIENT
Start: 2024-04-15 | End: 2024-04-15

## 2024-04-15 RX ORDER — ONDANSETRON 2 MG/ML
4 INJECTION INTRAMUSCULAR; INTRAVENOUS EVERY 4 HOURS PRN
Status: DISCONTINUED | OUTPATIENT
Start: 2024-04-15 | End: 2024-04-22 | Stop reason: HOSPADM

## 2024-04-15 RX ORDER — HYDROMORPHONE HYDROCHLORIDE 1 MG/ML
0.5 INJECTION, SOLUTION INTRAMUSCULAR; INTRAVENOUS; SUBCUTANEOUS
Status: DISCONTINUED | OUTPATIENT
Start: 2024-04-15 | End: 2024-04-16

## 2024-04-15 RX ORDER — HYDROMORPHONE HYDROCHLORIDE 1 MG/ML
0.1 INJECTION, SOLUTION INTRAMUSCULAR; INTRAVENOUS; SUBCUTANEOUS
Status: DISCONTINUED | OUTPATIENT
Start: 2024-04-15 | End: 2024-04-15 | Stop reason: HOSPADM

## 2024-04-15 RX ORDER — ACETAMINOPHEN 650 MG/1
975 SUPPOSITORY RECTAL EVERY 6 HOURS
Status: DISCONTINUED | OUTPATIENT
Start: 2024-04-15 | End: 2024-04-15

## 2024-04-15 RX ORDER — LIDOCAINE HYDROCHLORIDE 20 MG/ML
INJECTION, SOLUTION EPIDURAL; INFILTRATION; INTRACAUDAL; PERINEURAL PRN
Status: DISCONTINUED | OUTPATIENT
Start: 2024-04-15 | End: 2024-04-15 | Stop reason: SURG

## 2024-04-15 RX ORDER — OXYCODONE HCL 5 MG/5 ML
10 SOLUTION, ORAL ORAL
Status: DISCONTINUED | OUTPATIENT
Start: 2024-04-15 | End: 2024-04-15 | Stop reason: HOSPADM

## 2024-04-15 RX ORDER — SODIUM CHLORIDE, SODIUM LACTATE, POTASSIUM CHLORIDE, CALCIUM CHLORIDE 600; 310; 30; 20 MG/100ML; MG/100ML; MG/100ML; MG/100ML
INJECTION, SOLUTION INTRAVENOUS CONTINUOUS
Status: DISCONTINUED | OUTPATIENT
Start: 2024-04-15 | End: 2024-04-19

## 2024-04-15 RX ORDER — OXYCODONE HCL 5 MG/5 ML
5 SOLUTION, ORAL ORAL
Status: DISCONTINUED | OUTPATIENT
Start: 2024-04-15 | End: 2024-04-15 | Stop reason: HOSPADM

## 2024-04-15 RX ORDER — MIDAZOLAM HYDROCHLORIDE 1 MG/ML
INJECTION INTRAMUSCULAR; INTRAVENOUS PRN
Status: DISCONTINUED | OUTPATIENT
Start: 2024-04-15 | End: 2024-04-15 | Stop reason: SURG

## 2024-04-15 RX ORDER — ENOXAPARIN SODIUM 100 MG/ML
40 INJECTION SUBCUTANEOUS DAILY
Status: DISCONTINUED | OUTPATIENT
Start: 2024-04-16 | End: 2024-04-22 | Stop reason: HOSPADM

## 2024-04-15 RX ORDER — OMEPRAZOLE 20 MG/1
20 CAPSULE, DELAYED RELEASE ORAL DAILY
Status: DISCONTINUED | OUTPATIENT
Start: 2024-04-15 | End: 2024-04-22 | Stop reason: HOSPADM

## 2024-04-15 RX ORDER — ONDANSETRON 2 MG/ML
4 INJECTION INTRAMUSCULAR; INTRAVENOUS
Status: DISCONTINUED | OUTPATIENT
Start: 2024-04-15 | End: 2024-04-15 | Stop reason: HOSPADM

## 2024-04-15 RX ORDER — LIDOCAINE HYDROCHLORIDE AND EPINEPHRINE 15; 5 MG/ML; UG/ML
INJECTION, SOLUTION EPIDURAL
Status: COMPLETED | OUTPATIENT
Start: 2024-04-15 | End: 2024-04-15

## 2024-04-15 RX ORDER — OXYCODONE HYDROCHLORIDE 10 MG/1
10 TABLET ORAL EVERY 4 HOURS PRN
Status: DISCONTINUED | OUTPATIENT
Start: 2024-04-15 | End: 2024-04-19

## 2024-04-15 RX ORDER — DIPHENHYDRAMINE HYDROCHLORIDE 50 MG/ML
12.5 INJECTION INTRAMUSCULAR; INTRAVENOUS
Status: DISCONTINUED | OUTPATIENT
Start: 2024-04-15 | End: 2024-04-15 | Stop reason: HOSPADM

## 2024-04-15 RX ORDER — MIDAZOLAM HYDROCHLORIDE 1 MG/ML
1 INJECTION INTRAMUSCULAR; INTRAVENOUS
Status: DISCONTINUED | OUTPATIENT
Start: 2024-04-15 | End: 2024-04-15 | Stop reason: HOSPADM

## 2024-04-15 RX ORDER — CELECOXIB 200 MG/1
200 CAPSULE ORAL 2 TIMES DAILY PRN
Status: DISCONTINUED | OUTPATIENT
Start: 2024-04-20 | End: 2024-04-21

## 2024-04-15 RX ORDER — DIPHENHYDRAMINE HYDROCHLORIDE 50 MG/ML
12.5 INJECTION INTRAMUSCULAR; INTRAVENOUS EVERY 6 HOURS PRN
Status: DISCONTINUED | OUTPATIENT
Start: 2024-04-15 | End: 2024-04-21

## 2024-04-15 RX ORDER — SCOLOPAMINE TRANSDERMAL SYSTEM 1 MG/1
1 PATCH, EXTENDED RELEASE TRANSDERMAL
Status: DISCONTINUED | OUTPATIENT
Start: 2024-04-15 | End: 2024-04-15

## 2024-04-15 RX ORDER — ACETAMINOPHEN 500 MG
1000 TABLET ORAL EVERY 6 HOURS
Status: DISPENSED | OUTPATIENT
Start: 2024-04-15 | End: 2024-04-20

## 2024-04-15 RX ADMIN — HYDROMORPHONE HYDROCHLORIDE 0.2 MG: 1 INJECTION, SOLUTION INTRAMUSCULAR; INTRAVENOUS; SUBCUTANEOUS at 16:52

## 2024-04-15 RX ADMIN — HYDROMORPHONE HYDROCHLORIDE 0.4 MG: 1 INJECTION, SOLUTION INTRAMUSCULAR; INTRAVENOUS; SUBCUTANEOUS at 14:00

## 2024-04-15 RX ADMIN — SODIUM CHLORIDE, POTASSIUM CHLORIDE, SODIUM LACTATE AND CALCIUM CHLORIDE: 600; 310; 30; 20 INJECTION, SOLUTION INTRAVENOUS at 20:18

## 2024-04-15 RX ADMIN — FENTANYL CITRATE 50 MCG: 50 INJECTION, SOLUTION INTRAMUSCULAR; INTRAVENOUS at 14:40

## 2024-04-15 RX ADMIN — BUPIVACAINE HYDROCHLORIDE 5 ML: 2.5 INJECTION, SOLUTION EPIDURAL; INFILTRATION; INTRACAUDAL; PERINEURAL at 13:22

## 2024-04-15 RX ADMIN — PHENYLEPHRINE HYDROCHLORIDE 100 MCG: 10 INJECTION INTRAVENOUS at 12:13

## 2024-04-15 RX ADMIN — HYDROMORPHONE HYDROCHLORIDE 0.4 MG: 1 INJECTION, SOLUTION INTRAMUSCULAR; INTRAVENOUS; SUBCUTANEOUS at 16:33

## 2024-04-15 RX ADMIN — SODIUM CHLORIDE, POTASSIUM CHLORIDE, SODIUM LACTATE AND CALCIUM CHLORIDE: 600; 310; 30; 20 INJECTION, SOLUTION INTRAVENOUS at 17:37

## 2024-04-15 RX ADMIN — LIDOCAINE HYDROCHLORIDE,EPINEPHRINE BITARTRATE 2 ML: 15; .005 INJECTION, SOLUTION EPIDURAL; INFILTRATION; INTRACAUDAL; PERINEURAL at 12:06

## 2024-04-15 RX ADMIN — SUGAMMADEX 200 MG: 100 INJECTION, SOLUTION INTRAVENOUS at 13:26

## 2024-04-15 RX ADMIN — LIDOCAINE HYDROCHLORIDE,EPINEPHRINE BITARTRATE 3 ML: 15; .005 INJECTION, SOLUTION EPIDURAL; INFILTRATION; INTRACAUDAL; PERINEURAL at 12:04

## 2024-04-15 RX ADMIN — EPHEDRINE SULFATE 5 MG: 50 INJECTION, SOLUTION INTRAVENOUS at 15:35

## 2024-04-15 RX ADMIN — PHENYLEPHRINE HYDROCHLORIDE 100 MCG: 10 INJECTION INTRAVENOUS at 12:54

## 2024-04-15 RX ADMIN — HYDROMORPHONE HYDROCHLORIDE 0.2 MG: 1 INJECTION, SOLUTION INTRAMUSCULAR; INTRAVENOUS; SUBCUTANEOUS at 14:16

## 2024-04-15 RX ADMIN — OMEPRAZOLE 20 MG: 20 CAPSULE, DELAYED RELEASE ORAL at 17:37

## 2024-04-15 RX ADMIN — LIDOCAINE HYDROCHLORIDE 40 MG: 20 INJECTION, SOLUTION EPIDURAL; INFILTRATION; INTRACAUDAL at 12:09

## 2024-04-15 RX ADMIN — HYDROMORPHONE HYDROCHLORIDE: 1 INJECTION, SOLUTION INTRAMUSCULAR; INTRAVENOUS; SUBCUTANEOUS at 14:10

## 2024-04-15 RX ADMIN — FENTANYL CITRATE 50 MCG: 50 INJECTION, SOLUTION INTRAMUSCULAR; INTRAVENOUS at 13:52

## 2024-04-15 RX ADMIN — FENTANYL CITRATE 50 MCG: 50 INJECTION, SOLUTION INTRAMUSCULAR; INTRAVENOUS at 14:27

## 2024-04-15 RX ADMIN — ROCURONIUM BROMIDE 50 MG: 50 INJECTION, SOLUTION INTRAVENOUS at 12:09

## 2024-04-15 RX ADMIN — PHENYLEPHRINE HYDROCHLORIDE 100 MCG: 10 INJECTION INTRAVENOUS at 12:38

## 2024-04-15 RX ADMIN — HYDROMORPHONE HYDROCHLORIDE 0.4 MG: 1 INJECTION, SOLUTION INTRAMUSCULAR; INTRAVENOUS; SUBCUTANEOUS at 15:02

## 2024-04-15 RX ADMIN — MIDAZOLAM HYDROCHLORIDE 2 MG: 1 INJECTION, SOLUTION INTRAMUSCULAR; INTRAVENOUS at 11:49

## 2024-04-15 RX ADMIN — CELECOXIB 200 MG: 200 CAPSULE ORAL at 17:37

## 2024-04-15 RX ADMIN — FENTANYL CITRATE 50 MCG: 50 INJECTION, SOLUTION INTRAMUSCULAR; INTRAVENOUS at 13:16

## 2024-04-15 RX ADMIN — FENTANYL CITRATE 50 MCG: 50 INJECTION, SOLUTION INTRAMUSCULAR; INTRAVENOUS at 11:49

## 2024-04-15 RX ADMIN — ACETAMINOPHEN 1000 MG: 500 TABLET, FILM COATED ORAL at 17:38

## 2024-04-15 RX ADMIN — HYDROMORPHONE HYDROCHLORIDE 0.4 MG: 1 INJECTION, SOLUTION INTRAMUSCULAR; INTRAVENOUS; SUBCUTANEOUS at 13:52

## 2024-04-15 RX ADMIN — HYDROMORPHONE HYDROCHLORIDE 0.2 MG: 1 INJECTION, SOLUTION INTRAMUSCULAR; INTRAVENOUS; SUBCUTANEOUS at 16:08

## 2024-04-15 RX ADMIN — PHENYLEPHRINE HYDROCHLORIDE 100 MCG: 10 INJECTION INTRAVENOUS at 12:25

## 2024-04-15 RX ADMIN — MIDAZOLAM HYDROCHLORIDE 1 MG: 2 INJECTION, SOLUTION INTRAMUSCULAR; INTRAVENOUS at 14:51

## 2024-04-15 RX ADMIN — PHENYLEPHRINE HYDROCHLORIDE 100 MCG: 10 INJECTION INTRAVENOUS at 12:48

## 2024-04-15 RX ADMIN — PHENYLEPHRINE HYDROCHLORIDE 100 MCG: 10 INJECTION INTRAVENOUS at 12:22

## 2024-04-15 RX ADMIN — CELECOXIB 200 MG: 200 CAPSULE ORAL at 09:53

## 2024-04-15 RX ADMIN — CEFOTETAN DISODIUM 2 G: 2 INJECTION, POWDER, FOR SOLUTION INTRAMUSCULAR; INTRAVENOUS at 12:12

## 2024-04-15 RX ADMIN — HYDROMORPHONE HYDROCHLORIDE 0.4 MG: 1 INJECTION, SOLUTION INTRAMUSCULAR; INTRAVENOUS; SUBCUTANEOUS at 15:10

## 2024-04-15 RX ADMIN — ACETAMINOPHEN 1000 MG: 500 TABLET, FILM COATED ORAL at 09:53

## 2024-04-15 RX ADMIN — FENTANYL CITRATE 50 MCG: 50 INJECTION, SOLUTION INTRAMUSCULAR; INTRAVENOUS at 13:48

## 2024-04-15 RX ADMIN — PHENYLEPHRINE HYDROCHLORIDE 100 MCG: 10 INJECTION INTRAVENOUS at 12:30

## 2024-04-15 RX ADMIN — ONDANSETRON 4 MG: 2 INJECTION INTRAMUSCULAR; INTRAVENOUS at 13:22

## 2024-04-15 RX ADMIN — MIDAZOLAM HYDROCHLORIDE 1 MG: 2 INJECTION, SOLUTION INTRAMUSCULAR; INTRAVENOUS at 14:22

## 2024-04-15 RX ADMIN — HYDROMORPHONE HYDROCHLORIDE 0.4 MG: 1 INJECTION, SOLUTION INTRAMUSCULAR; INTRAVENOUS; SUBCUTANEOUS at 16:47

## 2024-04-15 RX ADMIN — DEXAMETHASONE SODIUM PHOSPHATE 6 MG: 4 INJECTION INTRA-ARTICULAR; INTRALESIONAL; INTRAMUSCULAR; INTRAVENOUS; SOFT TISSUE at 12:13

## 2024-04-15 RX ADMIN — PHENYLEPHRINE HYDROCHLORIDE 100 MCG: 10 INJECTION INTRAVENOUS at 12:17

## 2024-04-15 RX ADMIN — PROPOFOL 150 MG: 10 INJECTION, EMULSION INTRAVENOUS at 12:09

## 2024-04-15 RX ADMIN — SODIUM CHLORIDE, POTASSIUM CHLORIDE, SODIUM LACTATE AND CALCIUM CHLORIDE: 600; 310; 30; 20 INJECTION, SOLUTION INTRAVENOUS at 11:49

## 2024-04-15 RX ADMIN — PHENYLEPHRINE HYDROCHLORIDE 100 MCG: 10 INJECTION INTRAVENOUS at 13:03

## 2024-04-15 RX ADMIN — EPHEDRINE SULFATE 5 MG: 50 INJECTION, SOLUTION INTRAVENOUS at 15:34

## 2024-04-15 ASSESSMENT — PAIN DESCRIPTION - PAIN TYPE
TYPE: SURGICAL PAIN
TYPE: SURGICAL PAIN
TYPE: ACUTE PAIN
TYPE: SURGICAL PAIN
TYPE: SURGICAL PAIN
TYPE: ACUTE PAIN
TYPE: SURGICAL PAIN
TYPE: ACUTE PAIN

## 2024-04-15 ASSESSMENT — FIBROSIS 4 INDEX: FIB4 SCORE: 0.4

## 2024-04-15 NOTE — ANESTHESIA PREPROCEDURE EVALUATION
Case: [id number] Date/Time: 04/15/24 1100    Procedures:       PANCREATIC NECROSECTOMY, CAITLYN EN Y CYSTOJEJUNOSTOMY, CYSTOGASTROSTOMY, OPEN CHOLECYSTECTOMY, INTRAOPERATIVE ULTRASOUND, OMENTAL FLAP AND ANY OTHER INDICATED PROCEDURES      CREATION, FLAP, OMENTUM    Pre-op diagnosis: NECROTIZING PANCREATITIS    Location: [facility] OR 10 / SURGERY [facility]    Surgeons: Blair Sainz M.D.            Relevant Problems   CARDIAC   (positive) Hypertension       Physical Exam    Airway   Mallampati: II  TM distance: >3 FB  Neck ROM: full       Cardiovascular - normal exam     Dental - normal exam           Pulmonary   Breath sounds clear to auscultation     Abdominal    Neurological - normal exam                 Anesthesia Plan    ASA 2       Plan - general and epidural   Neuraxial block will be post-op pain control    Airway plan will be ETT          Induction: intravenous    Postoperative Plan: Postoperative administration of opioids is intended.    Pertinent diagnostic labs and testing reviewed    Informed Consent:    Anesthetic plan and risks discussed with patient.

## 2024-04-15 NOTE — OP REPORT
DATE OF SERVICE:  04/15/2024     INDICATIONS:  The patient is a 38-year-old male patient known to me who is   status post necrotizing pancreatitis, infected necrotizing pancreatitis,   multiple drains being placed on successful.  A peritoneal abscess, sepsis,   septic pictures anemia and poor p.o. intake, who was scheduled for a   necrosectomy with multiple possibilities.  After a long discussion with the   patient and being consented, he elected to go forward.     SURGEON:  Blair Sainz MD     ASSISTANT:  Pete Cai MD: The indication for a surgical assistant in this surgery were indicated due to the complexity of the procedure.  Their role included aiding in the incision, retraction, holding of devices including cameras for laparoscopic procedures and closure of wounds.     ANESTHESIOLOGIST:  Dr. Florian.     ANESTHESIA:  General and epidural for postoperative pain management.     ESTIMATED BLOOD LOSS:  Approximately 100 mL.     COMPLICATIONS:  No obvious complications.     FINDINGS:  The patient had a large necrotizing pancreatitis peritoneal abscess   that extended from the whole right upper quadrant, lateral quadrant fused to   the anterior abdominal wall internally containing the gallbladder and   transverse colon as part of the wall and duodenum extending along the anterior   surface of the pancreas with large amounts of infected, foul smelling   necrotizing tissue and pancreas     PROCEDURES:  1.  Exploratory laparotomy.  2.  Intraoperative ultrasound survey of the pancreatic pseudocyst, stomach and   biliary system.  3.  Open pancreatic necrosectomy with large amounts of necrotic material, foul   smelling, infected  4.  Peritoneal abscess drainage with massive washout using the Pulsavac.     FINDINGS:  The patient did have gallstones, but we were unable to successfully   do a cholecystectomy due to the massive inflammatory changes in the mp   that composed a portion of this  patient's pseudocyst wall that was inflamed   and infected.  Other finding was large amounts of necrotic tissue that were   debrided consistent with necrotizing pancreatitis that was infected.  The rest   of his abdomen appeared somewhat clean.  No obvious colonic fistula that was   questioned during abscessogram also, that the colon, gallbladder, duodenal and   mp, all medial portions of the pseudocyst wall.     CASE CLASS:  Contaminated.     DESCRIPTION OF PROCEDURE:  The patient was brought to OR, placed under general   anesthetic after having epidural placed by Dr. Florian for postoperative pain   management.  With both arms out, shaved, prepped with chlorhexidine prep,   drain removed, Howard catheter placed, covered with sterile drapes.  Timeout   was done, which was adequate.  At that point, he had an incision made from the   xiphoid down through to the umbilicus, but immediately noted that this   pseudocyst/abscess actually encompassed the midline of the abdomen as well, so   we entered through the cephalad portion of his incision near the liver and we   were able to placed a finger and get to the falciform ligament, go to the   left side, left side of the abdomen, incising the fascia slightly and   peritoneum that was thickened pushing this mass away.  We were able to then   open based on entering the left side of the abdomen and worked our way both   superficially and inferiorly around until we entered this large collection   cephalad, where that appeared much safer to enter.  Upon entering large amount   of purulent fluid and necrotic tissue, we continued opening this cavity all   the way through anteriorly along the midline  up to the level of the   transverse colon.  The transverse colon ended up being fused to the inferior   margin wall of the pseudocyst, extending into the midline and fused to the   midline.  So care was to avoid actually injuring or entering the colon.  Upon   entering  the pseudocyst, we then did a massive necrosectomy using a ring   forceps as well as a Pulsavac, taking out large amounts of necrotic tissue   along with purulent fluid and necrotic and pancreas debridement.  Once that   was completed, we then used the Pulsavac, irrigated with approximately 3   liters of warm saline and then we continued this necrosectomy all the way   medially towards the tail of the pancreas and body of the pancreas where the   drain originally was sitting because we knew there was a cavity there and   completely debrided the area.  Once it appeared fairly clean and pink, we then   proceeded to drain and irrigate what appeared to be a peritoneal abscess that   extended from the anterior abdominal wall laterally.  Once that was   completed, then irrigated with copious was fluid.  As of note, cultures were   taken.  We then intraoperative ultrasound for a second time and noted that it   felt very unsafe to proceed with cholecystectomy because we could not get a   good read on where the duodenum was posteriorly and where the mp was due to   its comprising part of the pseudocyst wall inflammation to the cyst wall was   extremely thickened by about a centimeter and a half throughout the patient.    At that point, after irrigated with copious amounts of fluid and had clean   areas and there was no bleeding.  We then placed a Davol drain through his   previous anterior abdominal wall site into the abdomen, through that into his   pseudocyst cavity and overlying the pancreatic tail and body.  This was then   secured with 2-0 nylon on the skin.  We also placed a 19-Uzbek round Too,   exiting the right lower quadrant and extending into the right lateral aspect   along where the abscess was extending up to the suprahepatic space in the   subdiaphragmatic space.  This was secured with 2-0 nylon.  At that point, all   needle, sponge count and instrument counts were correct.  Final sweep that   showed no  necrotic tissue pus, purulence or bleeding.  So when the operation   was completed, we then closed the abdomen with interrupted #1 Vicryl CTX   pop-offs starting lower, entering active bleeding at the top.  Once that was   completed, we irrigated the soft tissue with large amounts of saline, brought   together with skin staples, covered with a large Prevena and he was extubated   and transferred to recovery room.        ______________________________  MD CHRISTINA Abdullahi/MELIA    DD:  04/15/2024 13:47  DT:  04/15/2024 14:43    Job#:  [id number]    CC:JUSTO Mclean

## 2024-04-15 NOTE — ANESTHESIA PROCEDURE NOTES
Airway    Date/Time: 4/15/2024 12:10 PM    Performed by: Janie Florian M.D.  Authorized by: Janie Florian M.D.    Location:  OR  Urgency:  Elective  Indications for Airway Management:  Anesthesia      Spontaneous Ventilation: absent    Sedation Level:  Deep  Preoxygenated: Yes    Patient Position:  Sniffing  Mask Difficulty Assessment:  1 - vent by mask  Final Airway Type:  Endotracheal airway  Final Endotracheal Airway:  ETT  Cuffed: Yes    Technique Used for Successful ETT Placement:  Direct laryngoscopy    Insertion Site:  Oral  Blade Type:  [place]  Laryngoscope Blade/Videolaryngoscope Blade Size:  3  ETT Size (mm):  7.0  Measured from:  Teeth  ETT to Teeth (cm):  22  Placement Verified by: auscultation and capnometry    Cormack-Lehane Classification:  Grade I - full view of glottis  Number of Attempts at Approach:  1

## 2024-04-15 NOTE — ANESTHESIA PROCEDURE NOTES
Epidural Block    Date/Time: 4/15/2024 12:04 PM    Performed by: Janie Florian M.D.  Authorized by: Janie Florian M.D.    Patient Location:  OR  Start Time:  4/15/2024 12:04 PM  Reason for Block: at surgeon's request and post-op pain management    patient identified, IV checked, site marked, risks and benefits discussed, surgical consent, monitors and equipment checked, pre-op evaluation and timeout performed    Patient Position:  Sitting  Prep: ChloraPrep, patient draped and sterile technique    Monitoring:  Blood pressure, continuous pulse oximetry and heart rate  Location:  T8-T9  Injection Technique:  GER air  Needle Type:  Tuohy  Needle Gauge:  17 G  Needle Length:  3.5 in  Loss of resistance::  5  Catheter Size:  19 G  Catheter at Skin Depth:  11  Test Dose Result:  Negative

## 2024-04-15 NOTE — OR NURSING
Pt sat EOB. Transient vertigo. Attempted to stand at bedside.  Very weak. Became more dizzy. Pt return to supine in [place]. After a few minutes pt was transferred to bed via slide board.

## 2024-04-15 NOTE — OR NURSING
1444 contacted Dr Florian.  Nurse medicating per anesthesia orders. Pt reporting minimal relief. Epidural rate increase per order. Dr Dong states she'll be at bedside after present case.    1454 mother updated. Pt doing well. Working on a room.

## 2024-04-16 ENCOUNTER — ANESTHESIA EVENT (OUTPATIENT)
Dept: ANESTHESIOLOGY | Facility: MEDICAL CENTER | Age: 39
DRG: 405 | End: 2024-04-16
Payer: COMMERCIAL

## 2024-04-16 LAB
ALBUMIN SERPL BCP-MCNC: 3.2 G/DL (ref 3.2–4.9)
ALBUMIN/GLOB SERPL: 0.8 G/DL
ALP SERPL-CCNC: 100 U/L (ref 30–99)
ALT SERPL-CCNC: 17 U/L (ref 2–50)
ANION GAP SERPL CALC-SCNC: 13 MMOL/L (ref 7–16)
AST SERPL-CCNC: 16 U/L (ref 12–45)
BILIRUB SERPL-MCNC: 0.2 MG/DL (ref 0.1–1.5)
BUN SERPL-MCNC: 10 MG/DL (ref 8–22)
CALCIUM ALBUM COR SERPL-MCNC: 9.5 MG/DL (ref 8.5–10.5)
CALCIUM SERPL-MCNC: 8.9 MG/DL (ref 8.5–10.5)
CHLORIDE SERPL-SCNC: 98 MMOL/L (ref 96–112)
CO2 SERPL-SCNC: 24 MMOL/L (ref 20–33)
CREAT SERPL-MCNC: 0.73 MG/DL (ref 0.5–1.4)
ERYTHROCYTE [DISTWIDTH] IN BLOOD BY AUTOMATED COUNT: 44.6 FL (ref 35.9–50)
GFR SERPLBLD CREATININE-BSD FMLA CKD-EPI: 120 ML/MIN/1.73 M 2
GLOBULIN SER CALC-MCNC: 3.8 G/DL (ref 1.9–3.5)
GLUCOSE SERPL-MCNC: 173 MG/DL (ref 65–99)
HCT VFR BLD AUTO: 31.3 % (ref 42–52)
HGB BLD-MCNC: 10.1 G/DL (ref 14–18)
MCH RBC QN AUTO: 26.8 PG (ref 27–33)
MCHC RBC AUTO-ENTMCNC: 32.3 G/DL (ref 32.3–36.5)
MCV RBC AUTO: 83 FL (ref 81.4–97.8)
PLATELET # BLD AUTO: 540 K/UL (ref 164–446)
PMV BLD AUTO: 8.2 FL (ref 9–12.9)
POTASSIUM SERPL-SCNC: 4.2 MMOL/L (ref 3.6–5.5)
PROT SERPL-MCNC: 7 G/DL (ref 6–8.2)
RBC # BLD AUTO: 3.77 M/UL (ref 4.7–6.1)
SODIUM SERPL-SCNC: 135 MMOL/L (ref 135–145)
WBC # BLD AUTO: 24.1 K/UL (ref 4.8–10.8)

## 2024-04-16 PROCEDURE — 700105 HCHG RX REV CODE 258: Performed by: ANESTHESIOLOGY

## 2024-04-16 PROCEDURE — 700102 HCHG RX REV CODE 250 W/ 637 OVERRIDE(OP): Performed by: SURGERY

## 2024-04-16 PROCEDURE — 770001 HCHG ROOM/CARE - MED/SURG/GYN PRIV*

## 2024-04-16 PROCEDURE — A9270 NON-COVERED ITEM OR SERVICE: HCPCS | Performed by: SURGERY

## 2024-04-16 PROCEDURE — 97535 SELF CARE MNGMENT TRAINING: CPT

## 2024-04-16 PROCEDURE — 76998 US GUIDE INTRAOP: CPT | Mod: 80,DUPCHRG | Performed by: SURGERY

## 2024-04-16 PROCEDURE — 700105 HCHG RX REV CODE 258: Performed by: SURGERY

## 2024-04-16 PROCEDURE — 80053 COMPREHEN METABOLIC PANEL: CPT

## 2024-04-16 PROCEDURE — 302108 TAPE SECURITY OSTOMY (PINK): Performed by: SURGERY

## 2024-04-16 PROCEDURE — 97162 PT EVAL MOD COMPLEX 30 MIN: CPT

## 2024-04-16 PROCEDURE — 700102 HCHG RX REV CODE 250 W/ 637 OVERRIDE(OP): Performed by: ANESTHESIOLOGY

## 2024-04-16 PROCEDURE — 36415 COLL VENOUS BLD VENIPUNCTURE: CPT

## 2024-04-16 PROCEDURE — 85027 COMPLETE CBC AUTOMATED: CPT

## 2024-04-16 PROCEDURE — 48105 RESECT/DEBRIDE PANCREAS: CPT | Mod: 80,DUPCHRG | Performed by: SURGERY

## 2024-04-16 PROCEDURE — 700111 HCHG RX REV CODE 636 W/ 250 OVERRIDE (IP): Performed by: ANESTHESIOLOGY

## 2024-04-16 PROCEDURE — 49020 DRAINAGE ABDOM ABSCESS OPEN: CPT | Mod: 80,DUPCHRG | Performed by: SURGERY

## 2024-04-16 PROCEDURE — 700111 HCHG RX REV CODE 636 W/ 250 OVERRIDE (IP): Performed by: NURSE PRACTITIONER

## 2024-04-16 PROCEDURE — A9270 NON-COVERED ITEM OR SERVICE: HCPCS | Performed by: ANESTHESIOLOGY

## 2024-04-16 PROCEDURE — 700111 HCHG RX REV CODE 636 W/ 250 OVERRIDE (IP): Mod: JZ | Performed by: SURGERY

## 2024-04-16 RX ADMIN — CELECOXIB 200 MG: 200 CAPSULE ORAL at 04:58

## 2024-04-16 RX ADMIN — HYDROMORPHONE HYDROCHLORIDE 0.5 MG: 1 INJECTION, SOLUTION INTRAMUSCULAR; INTRAVENOUS; SUBCUTANEOUS at 07:06

## 2024-04-16 RX ADMIN — OXYCODONE HYDROCHLORIDE 10 MG: 10 TABLET ORAL at 09:08

## 2024-04-16 RX ADMIN — PIPERACILLIN AND TAZOBACTAM 4.5 G: 4; .5 INJECTION, POWDER, FOR SOLUTION INTRAVENOUS at 11:55

## 2024-04-16 RX ADMIN — PIPERACILLIN AND TAZOBACTAM 4.5 G: 4; .5 INJECTION, POWDER, FOR SOLUTION INTRAVENOUS at 07:52

## 2024-04-16 RX ADMIN — PIPERACILLIN AND TAZOBACTAM 4.5 G: 4; .5 INJECTION, POWDER, FOR SOLUTION INTRAVENOUS at 21:30

## 2024-04-16 RX ADMIN — Medication: at 19:41

## 2024-04-16 RX ADMIN — HYDROMORPHONE HYDROCHLORIDE 0.5 MG: 1 INJECTION, SOLUTION INTRAMUSCULAR; INTRAVENOUS; SUBCUTANEOUS at 03:01

## 2024-04-16 RX ADMIN — CELECOXIB 200 MG: 200 CAPSULE ORAL at 17:11

## 2024-04-16 RX ADMIN — HYDROMORPHONE HYDROCHLORIDE 0.5 MG: 1 INJECTION, SOLUTION INTRAMUSCULAR; INTRAVENOUS; SUBCUTANEOUS at 00:50

## 2024-04-16 RX ADMIN — ACETAMINOPHEN 1000 MG: 500 TABLET, FILM COATED ORAL at 00:47

## 2024-04-16 RX ADMIN — METHOCARBAMOL 1000 MG: 100 INJECTION, SOLUTION INTRAMUSCULAR; INTRAVENOUS at 01:13

## 2024-04-16 RX ADMIN — Medication: at 09:57

## 2024-04-16 RX ADMIN — ACETAMINOPHEN 1000 MG: 500 TABLET, FILM COATED ORAL at 04:59

## 2024-04-16 RX ADMIN — OXYCODONE HYDROCHLORIDE 10 MG: 10 TABLET ORAL at 04:58

## 2024-04-16 RX ADMIN — ACETAMINOPHEN 1000 MG: 500 TABLET, FILM COATED ORAL at 23:23

## 2024-04-16 RX ADMIN — OMEPRAZOLE 20 MG: 20 CAPSULE, DELAYED RELEASE ORAL at 04:59

## 2024-04-16 RX ADMIN — HYDROMORPHONE HYDROCHLORIDE 0.5 MG: 1 INJECTION, SOLUTION INTRAMUSCULAR; INTRAVENOUS; SUBCUTANEOUS at 04:59

## 2024-04-16 RX ADMIN — OXYCODONE HYDROCHLORIDE 10 MG: 10 TABLET ORAL at 17:11

## 2024-04-16 RX ADMIN — OXYCODONE HYDROCHLORIDE 10 MG: 10 TABLET ORAL at 13:08

## 2024-04-16 RX ADMIN — OXYCODONE HYDROCHLORIDE 10 MG: 10 TABLET ORAL at 21:35

## 2024-04-16 RX ADMIN — OXYCODONE HYDROCHLORIDE 10 MG: 10 TABLET ORAL at 00:46

## 2024-04-16 RX ADMIN — SODIUM CHLORIDE, POTASSIUM CHLORIDE, SODIUM LACTATE AND CALCIUM CHLORIDE: 600; 310; 30; 20 INJECTION, SOLUTION INTRAVENOUS at 04:19

## 2024-04-16 ASSESSMENT — PAIN DESCRIPTION - PAIN TYPE
TYPE: ACUTE PAIN

## 2024-04-16 ASSESSMENT — COPD QUESTIONNAIRES
DURING THE PAST 4 WEEKS HOW MUCH DID YOU FEEL SHORT OF BREATH: NONE/LITTLE OF THE TIME
COPD SCREENING SCORE: 0
HAVE YOU SMOKED AT LEAST 100 CIGARETTES IN YOUR ENTIRE LIFE: NO/DON'T KNOW
DO YOU EVER COUGH UP ANY MUCUS OR PHLEGM?: NO/ONLY WITH OCCASIONAL COLDS OR INFECTIONS

## 2024-04-16 ASSESSMENT — ENCOUNTER SYMPTOMS
COUGH: 0
WEIGHT LOSS: 0
CHILLS: 0
NAUSEA: 0
ABDOMINAL PAIN: 1
VOMITING: 0
FEVER: 0

## 2024-04-16 ASSESSMENT — COGNITIVE AND FUNCTIONAL STATUS - GENERAL
MOBILITY SCORE: 16
MOVING TO AND FROM BED TO CHAIR: A LITTLE
MOVING FROM LYING ON BACK TO SITTING ON SIDE OF FLAT BED: A LOT
SUGGESTED CMS G CODE MODIFIER MOBILITY: CK
TURNING FROM BACK TO SIDE WHILE IN FLAT BAD: A LITTLE
STANDING UP FROM CHAIR USING ARMS: A LITTLE
WALKING IN HOSPITAL ROOM: A LITTLE
CLIMB 3 TO 5 STEPS WITH RAILING: A LOT

## 2024-04-16 ASSESSMENT — PAIN SCALES - GENERAL: PAIN_LEVEL: 6

## 2024-04-16 ASSESSMENT — GAIT ASSESSMENTS
DISTANCE (FEET): 15
DEVIATION: BRADYKINETIC
ASSISTIVE DEVICE: FRONT WHEEL WALKER
GAIT LEVEL OF ASSIST: CONTACT GUARD ASSIST

## 2024-04-16 NOTE — ANESTHESIA POST-OP FOLLOW-UP NOTE
"Anesthesia Pain Service Note    Type:  Epidural catheter    Patient: Abelardo Fenton    Patient seen and examined. and Patient chart reviewed.     /69   Pulse 84   Temp 36.7 °C (98.1 °F) (Temporal)   Resp 18   Ht 1.753 m (5' 9\")   Wt 62.2 kg (137 lb 2 oz)   SpO2 96%   BMI 20.25 kg/m²     Complaints:  Pain    Pain:   8/10    LOC:   Awake    Rate:  N/a    Exam:  Vital signs stable, Afebrile, and Site clean, dry, intact without tenderness or erythema    Impression:  Inadequate analgesia    Assessment & Plan:  Acute post-procedural pain (G89.18)     D/C by RN  Inadequate pain relief, patient switched to PCA.    Sergio Bateman M.D.  4/16/2024  10:17 AM  "

## 2024-04-16 NOTE — ANESTHESIA TIME REPORT
Anesthesia Start and Stop Event Times       Date Time Event    4/15/2024 1143 Ready for Procedure     1149 Anesthesia Start     1349 Anesthesia Stop          Responsible Staff  04/15/24      Name Role Begin End    Janie Florian M.D. Anesth 1149 1349          Overtime Reason:  no overtime (within assigned shift)    Comments:

## 2024-04-16 NOTE — PROGRESS NOTES
Patient report received from Katherine MOORE. Patient arrived to unit, assumed care.  Vitals initiated per protocol

## 2024-04-16 NOTE — PROGRESS NOTES
Assumed care of patient at 1845. Bedside report received from Melony MOORE. Assessment complete.  AA&Ox4. Denies CP/SOB.  Reporting 8/10 pain. Epidural in place, assessments per protocol.  Educated patient regarding pharmacologic and non pharmacologic modalities for pain management.  Skin per flowsheets  Tolerating Fulls diet. Denies N/V.  + Howard output.  Last BM PTA  Pt refusing ambulation due to pain.  All needs met at this time. Call light within reach. Pt calls appropriately. Bed low and locked, non skid socks in place. Hourly rounding in place.

## 2024-04-16 NOTE — PROGRESS NOTES
Received report from previous shift RN  Assessment complete.  A&O x 4. Patient calls appropriately.  Patient ambulates with x1 assist.Patient has 4-7/10 pain. Pain managed with prescribed medications.  Denies N&V. Tolerating diet.  Skin per flowsheets.  + void via grier, - flatus, - BM.  Patient denies SOB.  SCD's refused.  Patient pleasant and cooperative with plan of care.  Review plan with of care with patient. Call light and personal belongings with in reach. Hourly rounding in place. All needs met at this time.

## 2024-04-16 NOTE — PROGRESS NOTES
I was asked to evaluate this patient for uncontrolled pain. He is POD #0 s/p ex-lap with Dr. KERNS for a history of necrotizing pancreatic infection. He had an epidural placed for post-op pain control. The nurses have been increasing the epidural infusion which has resulted in some minimal increases in pain relief. The patient reports pain in all parts of his abdomen. I did a cold test to check epidural level, but the patient mostly felt cold in all areas I checked. The catheter does not appear to be dislodged. Of note, the patient has been taking 10mg oxycodone every 4-6 hours at home very regularly for a long period of time. The patient is on a full liquid diet and allowed oral medication.    I made the following changes to his pain regimen for tonight:    Add oxycodone q4hrs prn breakthrough pain  Add hydromorphone 0.5mg IV q2hrs prn breakthrough pain  1000mg IV robaxin x1 now  Change epidural to PCEA with 12ml/hr infusion and 4ml boluses [id number] for max hourly infusion of 24ml/hr    The surgical team can consider making additional adjustments to his pain regimen tomorrow during rounding. I also told the nurse to hold the AM lovenox in case they want to remove the epidural as it may not be working to cover his pain. I offered to replace the epidural tonight, but the patient did not want to undergo epidural replacement at this time.    Mati Avalos MD  [facility]

## 2024-04-16 NOTE — PROGRESS NOTES
Pt at basal rate of 15ml/hr with pain remaining at 8/10.  This RN contacted Dr. Avalos, who came to bedside to assess.  Provider updated orders for better pain management.

## 2024-04-16 NOTE — PROGRESS NOTES
Date of Service  April 16, 2024     Chief Complaint  Status post necrotizing pancreatitis, infected necrotizing pancreatitis, multiple drains      Surgery Completed  1.  Exploratory laparotomy.  2.  Intraoperative ultrasound survey of the pancreatic pseudocyst, stomach and   biliary system.  3.  Open pancreatic necrosectomy with large amounts of necrotic material, foul   smelling, infected  4.  Peritoneal abscess drainage with massive washout using the Pulsavac.    Hospital Course  POD # 1     Interval Problem Update  No acute events  Patient experiencing significant pain following surgery  Was seen by Anesthesia, pain meds adjusted, concern epidural not working  Leukocytosis WBC 24.1, started on Zosyn  Denies nausea/vomiting  Abdominal incision intact    Problem List  Principal Problem:    Necrotizing pancreatitis (POA: Yes)  Active Problems:    SIRS (systemic inflammatory response syndrome) (HCC) (POA: Yes)    Infected pseudocyst of pancreas (POA: Yes)    Peritoneal abscess (HCC) (POA: Yes)  Resolved Problems:    * No resolved hospital problems. *       Subjective  Review of Systems   Constitutional:  Negative for chills, fever, malaise/fatigue and weight loss.   Respiratory:  Negative for cough.    Cardiovascular:  Negative for chest pain.   Gastrointestinal:  Positive for abdominal pain. Negative for nausea and vomiting.   Genitourinary:  Negative for dysuria.         Objective  Temp:  [35.9 °C (96.7 °F)-36.8 °C (98.2 °F)] 36.7 °C (98.1 °F)  Pulse:  [] 84  Resp:  [15-41] 18  BP: ()/(48-89) 113/69  SpO2:  [94 %-100 %] 96 %      Physical Exam  Vitals and nursing note reviewed.   Constitutional:       General: He is not in acute distress.     Appearance: Normal appearance.   HENT:      Head: Normocephalic and atraumatic.      Nose: Nose normal.      Mouth/Throat:      Pharynx: Oropharynx is clear.   Eyes:      Conjunctiva/sclera: Conjunctivae normal.   Cardiovascular:      Rate and Rhythm: Normal  rate.   Pulmonary:      Effort: Pulmonary effort is normal. No respiratory distress.   Abdominal:      General: There is no distension.      Palpations: Abdomen is soft.      Tenderness: There is abdominal tenderness. There is no guarding.      Comments: Abdominal incision with Prevena intact  Irrigation drain at right side of abdomen, with 80 mL output  PALAK drain at RLQ, with 80 mL output   Genitourinary:     Comments: Howard  Skin:     General: Skin is warm and dry.      Coloration: Skin is not jaundiced.   Neurological:      Mental Status: He is alert and oriented to person, place, and time.   Psychiatric:         Mood and Affect: Mood normal.         Behavior: Behavior normal.        Fluids    Intake/Output Summary (Last 24 hours) at 4/16/2024 0842  Last data filed at 4/16/2024 0427  Gross per 24 hour   Intake 1700 ml   Output 930 ml   Net 770 ml         Labs  Lab Results   Component Value Date/Time    SODIUM 135 04/16/2024 01:29 AM    POTASSIUM 4.2 04/16/2024 01:29 AM    CHLORIDE 98 04/16/2024 01:29 AM    CO2 24 04/16/2024 01:29 AM    GLUCOSE 173 (H) 04/16/2024 01:29 AM    BUN 10 04/16/2024 01:29 AM    CREATININE 0.73 04/16/2024 01:29 AM         Lab Results   Component Value Date/Time    PROTHROMBTM 14.9 (H) 03/20/2024 07:16 AM    INR 1.16 (H) 03/20/2024 07:16 AM         Lab Results   Component Value Date/Time    WBC 24.1 (H) 04/16/2024 01:29 AM    RBC 3.77 (L) 04/16/2024 01:29 AM    HEMOGLOBIN 10.1 (L) 04/16/2024 01:29 AM    HEMATOCRIT 31.3 (L) 04/16/2024 01:29 AM    MCV 83.0 04/16/2024 01:29 AM    MCH 26.8 (L) 04/16/2024 01:29 AM    MCHC 32.3 04/16/2024 01:29 AM    MPV 8.2 (L) 04/16/2024 01:29 AM    NEUTSPOLYS 78.30 (H) 03/25/2024 03:34 PM    LYMPHOCYTES 12.20 (L) 03/25/2024 03:34 PM    MONOCYTES 7.40 03/25/2024 03:34 PM    EOSINOPHILS 1.40 03/25/2024 03:34 PM    BASOPHILS 0.40 03/25/2024 03:34 PM    ANISOCYTOSIS 1+ 12/31/2023 01:05 AM         Recent Labs     04/16/24  0129   ASTSGOT 16   ALTSGPT 17    TBILIRUBIN 0.2   GLOBULIN 3.8*      VTE Prophylaxis: Lovenox (held)     Assessment/Plan  Patient is a 38M with history of necrotizing pancreatitis, s/p exploratory laparotomy and pancreatic necrosectomy.    Exploratory laparotomy, pancreatic necrosectomy, POD #1  - DC Epidural  - Start PCA - high dose long time opioid user  - DC Howard per protocol  - Advance to Regular diet  - Start regular irrigation of large abdominal drain - 20 mL/hr NS, and suction  - PT/OT  - OOB and ambulate as tolerated    2. Leukocytosis, likely following surgery, WBC 24.1, afebrile  - Continue Zosyn    3. DVT prophylaxis  - Hold Lovenox today

## 2024-04-16 NOTE — PROGRESS NOTES
This RN contacted Dr. Florian to request and increase in Pt's epidural d/t uncontrolled pain at 8/10 at max ordered rate of 12 mL/hr.  Dr. Florian authorized increasing rate to 14ml/hr with an option to increase to 15ml/hr if pain still unresolved.  Order received via text on Voalte.

## 2024-04-16 NOTE — OR NURSING
3132  transported pt to T418 w/ assist by [facility] CCT.. Melony RN  at bedside. 2 RN check of drains and incisions. All were intact. Updated Melony RN pt was having pain. Epidural increased to 12 cc/hr and nurse gave dilaudid. Pt reports pain manageable. AXOX4.  SMITH.  Mother updated on transfer and room number.    All questions answered.

## 2024-04-16 NOTE — OR NURSING
1516 Dr Florian and parents at bedside. Discussed pain options.    1540 Dr Florian had left and returned to bedside to check on pt. Pt reporting pain decreasing.

## 2024-04-17 LAB
ALBUMIN SERPL BCP-MCNC: 3 G/DL (ref 3.2–4.9)
ALBUMIN/GLOB SERPL: 0.8 G/DL
ALP SERPL-CCNC: 85 U/L (ref 30–99)
ALT SERPL-CCNC: 11 U/L (ref 2–50)
ANION GAP SERPL CALC-SCNC: 12 MMOL/L (ref 7–16)
AST SERPL-CCNC: 13 U/L (ref 12–45)
BACTERIA WND AEROBE CULT: ABNORMAL
BACTERIA WND AEROBE CULT: ABNORMAL
BILIRUB SERPL-MCNC: 0.2 MG/DL (ref 0.1–1.5)
BUN SERPL-MCNC: 10 MG/DL (ref 8–22)
CALCIUM ALBUM COR SERPL-MCNC: 9.3 MG/DL (ref 8.5–10.5)
CALCIUM SERPL-MCNC: 8.5 MG/DL (ref 8.5–10.5)
CHLORIDE SERPL-SCNC: 100 MMOL/L (ref 96–112)
CO2 SERPL-SCNC: 25 MMOL/L (ref 20–33)
CREAT SERPL-MCNC: 0.72 MG/DL (ref 0.5–1.4)
ERYTHROCYTE [DISTWIDTH] IN BLOOD BY AUTOMATED COUNT: 44.4 FL (ref 35.9–50)
GFR SERPLBLD CREATININE-BSD FMLA CKD-EPI: 121 ML/MIN/1.73 M 2
GLOBULIN SER CALC-MCNC: 3.6 G/DL (ref 1.9–3.5)
GLUCOSE SERPL-MCNC: 103 MG/DL (ref 65–99)
GRAM STN SPEC: ABNORMAL
HCT VFR BLD AUTO: 26.8 % (ref 42–52)
HGB BLD-MCNC: 8.7 G/DL (ref 14–18)
MCH RBC QN AUTO: 26.7 PG (ref 27–33)
MCHC RBC AUTO-ENTMCNC: 32.5 G/DL (ref 32.3–36.5)
MCV RBC AUTO: 82.2 FL (ref 81.4–97.8)
PLATELET # BLD AUTO: 428 K/UL (ref 164–446)
PMV BLD AUTO: 8.2 FL (ref 9–12.9)
POTASSIUM SERPL-SCNC: 4 MMOL/L (ref 3.6–5.5)
PROT SERPL-MCNC: 6.6 G/DL (ref 6–8.2)
RBC # BLD AUTO: 3.26 M/UL (ref 4.7–6.1)
SIGNIFICANT IND 70042: ABNORMAL
SITE SITE: ABNORMAL
SODIUM SERPL-SCNC: 137 MMOL/L (ref 135–145)
SOURCE SOURCE: ABNORMAL
WBC # BLD AUTO: 11.9 K/UL (ref 4.8–10.8)

## 2024-04-17 PROCEDURE — 36415 COLL VENOUS BLD VENIPUNCTURE: CPT

## 2024-04-17 PROCEDURE — A9270 NON-COVERED ITEM OR SERVICE: HCPCS | Performed by: ANESTHESIOLOGY

## 2024-04-17 PROCEDURE — 700102 HCHG RX REV CODE 250 W/ 637 OVERRIDE(OP): Performed by: ANESTHESIOLOGY

## 2024-04-17 PROCEDURE — 97166 OT EVAL MOD COMPLEX 45 MIN: CPT

## 2024-04-17 PROCEDURE — 85027 COMPLETE CBC AUTOMATED: CPT

## 2024-04-17 PROCEDURE — A9270 NON-COVERED ITEM OR SERVICE: HCPCS | Performed by: SURGERY

## 2024-04-17 PROCEDURE — 700111 HCHG RX REV CODE 636 W/ 250 OVERRIDE (IP): Mod: JZ | Performed by: SURGERY

## 2024-04-17 PROCEDURE — 80053 COMPREHEN METABOLIC PANEL: CPT

## 2024-04-17 PROCEDURE — 770001 HCHG ROOM/CARE - MED/SURG/GYN PRIV*

## 2024-04-17 PROCEDURE — 700102 HCHG RX REV CODE 250 W/ 637 OVERRIDE(OP): Performed by: SURGERY

## 2024-04-17 PROCEDURE — 97535 SELF CARE MNGMENT TRAINING: CPT

## 2024-04-17 PROCEDURE — 700111 HCHG RX REV CODE 636 W/ 250 OVERRIDE (IP): Mod: JZ | Performed by: NURSE PRACTITIONER

## 2024-04-17 PROCEDURE — 700105 HCHG RX REV CODE 258: Performed by: SURGERY

## 2024-04-17 RX ADMIN — PIPERACILLIN AND TAZOBACTAM 4.5 G: 4; .5 INJECTION, POWDER, FOR SOLUTION INTRAVENOUS at 13:18

## 2024-04-17 RX ADMIN — Medication: at 07:47

## 2024-04-17 RX ADMIN — OXYCODONE HYDROCHLORIDE 10 MG: 10 TABLET ORAL at 01:53

## 2024-04-17 RX ADMIN — CELECOXIB 200 MG: 200 CAPSULE ORAL at 05:39

## 2024-04-17 RX ADMIN — Medication: at 17:59

## 2024-04-17 RX ADMIN — OXYCODONE HYDROCHLORIDE 10 MG: 10 TABLET ORAL at 07:35

## 2024-04-17 RX ADMIN — ACETAMINOPHEN 1000 MG: 500 TABLET, FILM COATED ORAL at 16:38

## 2024-04-17 RX ADMIN — CELECOXIB 200 MG: 200 CAPSULE ORAL at 16:38

## 2024-04-17 RX ADMIN — PIPERACILLIN AND TAZOBACTAM 4.5 G: 4; .5 INJECTION, POWDER, FOR SOLUTION INTRAVENOUS at 05:40

## 2024-04-17 RX ADMIN — Medication: at 02:09

## 2024-04-17 RX ADMIN — OXYCODONE HYDROCHLORIDE 10 MG: 10 TABLET ORAL at 21:12

## 2024-04-17 RX ADMIN — OXYCODONE HYDROCHLORIDE 10 MG: 10 TABLET ORAL at 14:16

## 2024-04-17 RX ADMIN — ACETAMINOPHEN 1000 MG: 500 TABLET, FILM COATED ORAL at 11:54

## 2024-04-17 RX ADMIN — OMEPRAZOLE 20 MG: 20 CAPSULE, DELAYED RELEASE ORAL at 05:39

## 2024-04-17 RX ADMIN — ENOXAPARIN SODIUM 40 MG: 100 INJECTION SUBCUTANEOUS at 09:05

## 2024-04-17 RX ADMIN — ACETAMINOPHEN 1000 MG: 500 TABLET, FILM COATED ORAL at 05:39

## 2024-04-17 RX ADMIN — PIPERACILLIN AND TAZOBACTAM 4.5 G: 4; .5 INJECTION, POWDER, FOR SOLUTION INTRAVENOUS at 21:08

## 2024-04-17 ASSESSMENT — COGNITIVE AND FUNCTIONAL STATUS - GENERAL
SUGGESTED CMS G CODE MODIFIER DAILY ACTIVITY: CJ
SUGGESTED CMS G CODE MODIFIER DAILY ACTIVITY: CI
SUGGESTED CMS G CODE MODIFIER MOBILITY: CK
MOVING TO AND FROM BED TO CHAIR: A LITTLE
STANDING UP FROM CHAIR USING ARMS: A LITTLE
DAILY ACTIVITIY SCORE: 21
HELP NEEDED FOR BATHING: A LITTLE
DRESSING REGULAR LOWER BODY CLOTHING: A LITTLE
MOVING FROM LYING ON BACK TO SITTING ON SIDE OF FLAT BED: A LITTLE
MOBILITY SCORE: 19
TOILETING: A LITTLE
CLIMB 3 TO 5 STEPS WITH RAILING: A LITTLE
HELP NEEDED FOR BATHING: A LITTLE
WALKING IN HOSPITAL ROOM: A LITTLE
DAILY ACTIVITIY SCORE: 23

## 2024-04-17 ASSESSMENT — PAIN DESCRIPTION - PAIN TYPE
TYPE: ACUTE PAIN

## 2024-04-17 ASSESSMENT — ENCOUNTER SYMPTOMS
VOMITING: 0
FEVER: 0
CHILLS: 0
ABDOMINAL PAIN: 1
NAUSEA: 0
WEIGHT LOSS: 0
COUGH: 0

## 2024-04-17 ASSESSMENT — LIFESTYLE VARIABLES
HAVE YOU EVER FELT YOU SHOULD CUT DOWN ON YOUR DRINKING: NO
DOES PATIENT WANT TO STOP DRINKING: NO
TOTAL SCORE: 0
EVER HAD A DRINK FIRST THING IN THE MORNING TO STEADY YOUR NERVES TO GET RID OF A HANGOVER: NO
EVER FELT BAD OR GUILTY ABOUT YOUR DRINKING: NO
ALCOHOL_USE: NO
AVERAGE NUMBER OF DAYS PER WEEK YOU HAVE A DRINK CONTAINING ALCOHOL: 0
ON A TYPICAL DAY WHEN YOU DRINK ALCOHOL HOW MANY DRINKS DO YOU HAVE: 0
CONSUMPTION TOTAL: NEGATIVE
TOTAL SCORE: 0
HOW MANY TIMES IN THE PAST YEAR HAVE YOU HAD 5 OR MORE DRINKS IN A DAY: 0
TOTAL SCORE: 0
HAVE PEOPLE ANNOYED YOU BY CRITICIZING YOUR DRINKING: NO

## 2024-04-17 ASSESSMENT — ACTIVITIES OF DAILY LIVING (ADL): TOILETING: INDEPENDENT

## 2024-04-17 NOTE — CARE PLAN
The patient is Stable - Low risk of patient condition declining or worsening    Shift Goals  Clinical Goals: pain management, neuro checks, drain management  Patient Goals: pain management, rest    Progress made toward(s) clinical / shift goals:      Problem: Pain - Standard  Goal: Alleviation of pain or a reduction in pain to the patient’s comfort goal  Outcome: Progressing     Problem: Knowledge Deficit - Standard  Goal: Patient and family/care givers will demonstrate understanding of plan of care, disease process/condition, diagnostic tests and medications  Outcome: Progressing     Patient has call light within reach and calls appropriately. He has been updated on plan of care and will continue to be updated as information arises. His pain has been controlled via pharmalogical administration (see MAR) as well as non-pharmalogical interventions including repositioning and rest.

## 2024-04-17 NOTE — THERAPY
Physical Therapy   Initial Evaluation     Patient Name: Abelardo Fenton  Age:  38 y.o., Sex:  male  Medical Record #: [id number]  Today's Date: 4/16/2024     Precautions  Precautions: Fall Risk  Comments: large abdominal wound    Assessment  Patient is 38 y.o. male presenting with h/o necrotizing pancreatic infection. Pt is now s/p scheduled exploratory laparotomy and pancreatic necrosectomy on 4/15. Pt with PMH including HTN and ETOH. Pt is independent with functional mobility at baseline using no AD. Pt endorsed community ambulation prior to onset of physical decline in Dec 2023 with only household ambulation thereafter. Pt has been staying with parents in their 2SH for increased family support during this same time period. During current session, pt presents with abdominal pain, generalized weakness, impaired balance, and decreased endurance requiring overall SBA-CGA for mobility as detailed below. Able to walk 15 ft in the room with FWW, slow pace but no LOB. Pt is primarily limited by abdominal pain, multiple lines and tubes to wounds, and quick onset of fatigue. Encouraged pt to continue mobilizing OOB at least 3x/day for toileting needs and meals with nursing, RN notified. Recommend post-acute placement to address these significant functional deficits below baseline. Pt would benefit from continued acute PT services to improve functional mobility prior to d/c.    Plan    Physical Therapy Initial Treatment Plan   Treatment Plan : Bed Mobility, Equipment, Family / Caregiver Training, Gait Training, Manual Therapy, Neuro Re-Education / Balance, Self Care / Home Evaluation, Stair Training, Therapeutic Activities, Therapeutic Exercise  Treatment Frequency: 3 Times per Week  Duration: Until Therapy Goals Met    DC Equipment Recommendations: Unable to determine at this time  Discharge Recommendations: Recommend post-acute placement for additional physical therapy services prior to discharge home        Subjective    Pt received resting in bed, agreeable to participate.      Objective       04/16/24 1706   Initial Contact Note    Initial Contact Note Order Received and Verified, Physical Therapy Evaluation in Progress with Full Report to Follow.   Precautions   Precautions Fall Risk   Comments large abdominal wound   Vitals   O2 Delivery Device None - Room Air   Pain 0 - 10 Group   Therapist Pain Assessment During Activity;Nurse Notified  (c/o increased abdominal pain with mobility)   Prior Living Situation   Prior Services Home-Independent   Housing / Facility 2 Story House   Steps Into Home 3  (platform)   Steps In Home   (FOS, however pt can stay downstairs if needed with access to full bathroom)   Equipment Owned Front-Wheel Walker;Quad Cane   Lives with - Patient's Self Care Capacity Parents   Comments Above info reflects parents' house where pt has been staying since onset of current medical episode in Dec 2023. Pt normally lives in a 1SH in [place]. Pt is currently on short term leave from work at [employer]   Prior Level of Functional Mobility   Bed Mobility Independent   Transfer Status Independent   Ambulation Independent   Ambulation Distance household   Assistive Devices Used None   Stairs Independent   Comments pt reported community ambulation prior to onset of physical decline in Dec 2023 with only household ambulation thereafter   Cognition    Cognition / Consciousness WDL   Level of Consciousness Alert   Comments very pleasant and cooperative, receptive to edu, slow moving d/t pain, occasionally tremulous   Passive ROM Lower Body   Passive ROM Lower Body WDL   Comments assessed functionally   Active ROM Lower Body    Active ROM Lower Body  WDL   Comments assessed functionally   Strength Lower Body   Lower Body Strength  X   Gross Strength Generalized Weakness, Equal Bilaterally   Comments functional for short distance ambulation   Sensation Lower Body   Comments no c/o altered  sensation BLE   Lower Body Muscle Tone   Lower Body Muscle Tone  WDL   Coordination Lower Body    Coordination Lower Body  WDL   Comments slow but WFL   Balance Assessment   Sitting Balance (Static) Fair   Sitting Balance (Dynamic) Fair   Standing Balance (Static) Fair   Standing Balance (Dynamic) Fair -   Weight Shift Sitting Fair   Weight Shift Standing Fair   Comments FWW   Bed Mobility    Supine to Sit Standby Assist   Sit to Supine Standby Assist   Scooting Standby Assist   Rolling Standby Assist   Comments HOBE (pt endorsed sleeping in recliner recently d/t increased pain when lying flat), fair to poor log roll despite cues, heavy reliance on bed rail   Gait Analysis   Gait Level Of Assist Contact Guard Assist   Assistive Device Front Wheel Walker   Distance (Feet) 15   # of Times Distance was Traveled 1   Deviation Bradykinetic   # of Stairs Climbed 0   Comments distance limited by fatigue   Functional Mobility   Sit to Stand Contact Guard Assist   Bed, Chair, Wheelchair Transfer Contact Guard Assist   Transfer Method Stand Step   Mobility bed>up in room with FWW>bed   6 Clicks Assessment - How much HELP from from another person do you currently need... (If the patient hasn't done an activity recently, how much help from another person do you think he/she would need if he/she tried?)   Turning from your back to your side while in a flat bed without using bedrails? 3   Moving from lying on your back to sitting on the side of a flat bed without using bedrails? 2   Moving to and from a bed to a chair (including a wheelchair)? 3   Standing up from a chair using your arms (e.g., wheelchair, or bedside chair)? 3   Walking in hospital room? 3   Climbing 3-5 steps with a railing? 2   6 clicks Mobility Score 16   Patient / Family Goals    Patient / Family Goal #1 to go home   Short Term Goals    Short Term Goal # 1 Pt will perform supine<>sit with SPV to progress bed mobility in 6 visits.   Short Term Goal # 2 Pt will  perform sit<>stand and stand step txfer with FWW vs no AD and SPV to progress OOB mobility in 6 visits.   Short Term Goal # 3 Pt will ambulate 150 ft with FWW vs no AD and SPV to access home in 6 visits.   Short Term Goal # 4 Pt will navigate FOS with SPV to access home in 6 visits.   Education Group   Education Provided Role of Physical Therapist   Role of Physical Therapist Patient Response Patient;Family;Acceptance;Explanation;Demonstration;Verbal Demonstration;Action Demonstration   Additional Comments educated pt and sister at bedside about log roll for abdominal prx   Physical Therapy Initial Treatment Plan    Treatment Plan  Bed Mobility;Equipment;Family / Caregiver Training;Gait Training;Manual Therapy;Neuro Re-Education / Balance;Self Care / Home Evaluation;Stair Training;Therapeutic Activities;Therapeutic Exercise   Treatment Frequency 3 Times per Week   Duration Until Therapy Goals Met   Problem List    Problems Pain;Impaired Bed Mobility;Impaired Transfers;Impaired Ambulation;Functional Strength Deficit;Impaired Balance;Decreased Activity Tolerance   Anticipated Discharge Equipment and Recommendations   DC Equipment Recommendations Unable to determine at this time   Discharge Recommendations Recommend post-acute placement for additional physical therapy services prior to discharge home   Interdisciplinary Plan of Care Collaboration   IDT Collaboration with  Nursing;Family / Caregiver   Patient Position at End of Therapy In Bed;Call Light within Reach;Tray Table within Reach;Phone within Reach;Family / Friend in Room   Collaboration Comments RN updated, sister at bedside   Session Information   Date / Session Number  4/16- 1(1/3, 4/22)

## 2024-04-17 NOTE — CARE PLAN
The patient is Stable - Low risk of patient condition declining or worsening    Shift Goals  Clinical Goals: pain control, drain management  Patient Goals: pain control  Family Goals: GRANT    Progress made toward(s) clinical / shift goals:  Patient ambulated with OT. Patient's pain has remained managed with PCA boluses and PRN medications per MAR. Vital signs have remained stable.     Patient is not progressing towards the following goals: Pending DC clearance.    Problem: Pain - Standard  Goal: Alleviation of pain or a reduction in pain to the patient’s comfort goal  Outcome: Progressing     Problem: Knowledge Deficit - Standard  Goal: Patient and family/care givers will demonstrate understanding of plan of care, disease process/condition, diagnostic tests and medications  Outcome: Progressing

## 2024-04-17 NOTE — PROGRESS NOTES
Howard catheter has been removed per nursing communication order. Stat lock removed from leg with an alcohol wipe. Patient tolerated well. Patient is DTV by 1500 and has been given a urinal.

## 2024-04-17 NOTE — PROGRESS NOTES
Date of Service  April 17, 2024     Chief Complaint  Status post necrotizing pancreatitis, infected necrotizing pancreatitis, multiple drains      Surgery Completed  1.  Exploratory laparotomy.  2.  Intraoperative ultrasound survey of the pancreatic pseudocyst, stomach and   biliary system.  3.  Open pancreatic necrosectomy with large amounts of necrotic material, foul   smelling, infected  4.  Peritoneal abscess drainage with massive washout using the Pulsavac.    Hospital Course  POD # 2     Interval Problem Update  No acute events  Epidural DCed, PCA started, patient reports improved pain control  Leukocytosis WBC 11.9 from 24.1, afebrile, Zosyn  Tolerating diet, denies N/V  Was up with PT yesterday  Drain 1: 110, Drain 2: 20    Problem List  Principal Problem:    Necrotizing pancreatitis (POA: Yes)  Active Problems:    SIRS (systemic inflammatory response syndrome) (HCC) (POA: Yes)    Infected pseudocyst of pancreas (POA: Yes)    Peritoneal abscess (HCC) (POA: Yes)  Resolved Problems:    * No resolved hospital problems. *       Subjective  Review of Systems   Constitutional:  Negative for chills, fever, malaise/fatigue and weight loss.   Respiratory:  Negative for cough.    Cardiovascular:  Negative for chest pain.   Gastrointestinal:  Positive for abdominal pain. Negative for nausea and vomiting.   Genitourinary:  Negative for dysuria.         Objective  Temp:  [36.3 °C (97.3 °F)-36.8 °C (98.2 °F)] 36.3 °C (97.3 °F)  Pulse:  [] 100  Resp:  [16-18] 16  BP: (104-136)/(72-89) 132/89  SpO2:  [92 %-95 %] 92 %      Physical Exam  Vitals and nursing note reviewed.   Constitutional:       General: He is not in acute distress.     Appearance: Normal appearance.   HENT:      Head: Normocephalic and atraumatic.      Nose: Nose normal.      Mouth/Throat:      Pharynx: Oropharynx is clear.   Eyes:      Conjunctiva/sclera: Conjunctivae normal.   Cardiovascular:      Rate and Rhythm: Normal rate.   Pulmonary:       Effort: Pulmonary effort is normal. No respiratory distress.   Abdominal:      General: There is no distension.      Palpations: Abdomen is soft.      Tenderness: There is abdominal tenderness. There is no guarding.      Comments: Abdominal incision with Prevena intact  Irrigation drain at right side of abdomen, with 110 mL output  PALAK drain at RLQ, with 20 mL output   Genitourinary:     Comments: Howard  Skin:     General: Skin is warm and dry.      Coloration: Skin is not jaundiced.   Neurological:      Mental Status: He is alert and oriented to person, place, and time.   Psychiatric:         Mood and Affect: Mood normal.         Behavior: Behavior normal.        Fluids    Intake/Output Summary (Last 24 hours) at 4/17/2024 0939  Last data filed at 4/17/2024 0744  Gross per 24 hour   Intake 133.5 ml   Output 1105 ml   Net -971.5 ml         Labs  Lab Results   Component Value Date/Time    SODIUM 137 04/17/2024 01:37 AM    POTASSIUM 4.0 04/17/2024 01:37 AM    CHLORIDE 100 04/17/2024 01:37 AM    CO2 25 04/17/2024 01:37 AM    GLUCOSE 103 (H) 04/17/2024 01:37 AM    BUN 10 04/17/2024 01:37 AM    CREATININE 0.72 04/17/2024 01:37 AM         Lab Results   Component Value Date/Time    PROTHROMBTM 14.9 (H) 03/20/2024 07:16 AM    INR 1.16 (H) 03/20/2024 07:16 AM         Lab Results   Component Value Date/Time    WBC 11.9 (H) 04/17/2024 01:37 AM    RBC 3.26 (L) 04/17/2024 01:37 AM    HEMOGLOBIN 8.7 (L) 04/17/2024 01:37 AM    HEMATOCRIT 26.8 (L) 04/17/2024 01:37 AM    MCV 82.2 04/17/2024 01:37 AM    MCH 26.7 (L) 04/17/2024 01:37 AM    MCHC 32.5 04/17/2024 01:37 AM    MPV 8.2 (L) 04/17/2024 01:37 AM    NEUTSPOLYS 78.30 (H) 03/25/2024 03:34 PM    LYMPHOCYTES 12.20 (L) 03/25/2024 03:34 PM    MONOCYTES 7.40 03/25/2024 03:34 PM    EOSINOPHILS 1.40 03/25/2024 03:34 PM    BASOPHILS 0.40 03/25/2024 03:34 PM    ANISOCYTOSIS 1+ 12/31/2023 01:05 AM         Recent Labs     04/16/24  0129 04/17/24  0137   ASTSGOT 16 13   ALTSGPT 17 11    TBILIRUBIN 0.2 0.2   GLOBULIN 3.8* 3.6*      VTE Prophylaxis: Lovenox     Assessment/Plan  Patient is a 38M with history of necrotizing pancreatitis, s/p exploratory laparotomy and pancreatic necrosectomy.    Exploratory laparotomy, pancreatic necrosectomy, POD #1  - Continue PCA   - DC Howard  - Continue Regular diet  - Reduce IVF  - Continue irrigation of large abdominal drain - 20 mL/hr NS, and suction  - PT/OT  - OOB and ambulate as tolerated    2. Leukocytosis, much improved, WBC 11.9, afebrile  - Continue Zosyn    3. DVT prophylaxis  - Lovenox

## 2024-04-17 NOTE — DIETARY
"Nutrition services: Day 2 of admit.  Abelardo Fenton is a 38 y.o. male with admitting DX of necrotizing pancreatitis.    Consult received for wt loss (34 lbs or more in six months), poor PO per admit screen.   Met with pt at bedside. Pt was sitting up in bedside chair, he appeared somewhat thin. Moderate fat loss evidenced by some hollowing to orbitals, some prominence to zygomatic arches, slight hollow to buccal region; moderate muscle loss evidenced by some shoulder angling.   Pt reports a poor appetite prior to admit. He shared his appetite is returning. Pt has been \"taking it slow\" with hydration and meals, which he feels is helping.  UBW per pt was previously 184 lbs, wt recently has stabilized at 142 lbs.   Pt consumes Ensure Plus at home. Agreeable to receive inpatient in the Vanilla flavor. RD will add supplement order and adjust menu accordingly.    Assessment:  Height: 175.3 cm (5' 9\")  Weight: 62.2 kg (137 lb 2 oz)  Body mass index is 20.25 kg/m²., BMI classification: normal  Diet/Intake: Regular; PO avg 50% of meals    Evaluation:   Admitted 2/15/24 with sepsis and infected pseudocyst of pancreas. Hx of pancreatitis.  Exploratory laparotomy, intraoperative ultrasound survey of the pancreatic pseudocyst, stomach and  biliary system, open pancreatic necrosectomy, peritoneal abscess drainage with massive washout 4/16.  Labs and meds reviewed.   Wt hx per chart review: 152 lbs 3/5/24, 159 lbs 2/15/24, 165 lbs 1/18/24, 188 lbs 12/31/23, 183 lbs 7/29/23. Wt loss of 10% in six weeks is severe.    Malnutrition Risk: Pt with chronic, severe malnutrition related to chronic pancreatitis with pseudocyst, AEB <75% of estimated energy needs >1 month and wt loss of 10% in six weeks.     Recommendations/Plan:  Ensure Plus with meals.  Encourage intake of meals and supplement.  Document intake of all meals and supplement as % taken in ADLs to provide interdisciplinary communication across all shifts.   Monitor " weight.  Nutrition rep will continue to see patient for ongoing meal and snack preferences.     RD will follow per dept guidelines.

## 2024-04-17 NOTE — THERAPY
"Occupational Therapy   Initial Evaluation     Patient Name: Abelardo Fenton  Age:  38 y.o., Sex:  male  Medical Record #: [id number]  Today's Date: 4/17/2024       Precautions: Fall Risk (Multiple abd drains, prevena wound vac, drain to suction)  Comments: large abdominal wound    Assessment  Patient is 38 y.o. male admitted with h/o necrotizing pancreatic infection. Pt is now s/p scheduled exploratory laparotomy and pancreatic necrosectomy on 4/15. Pt with PMH including HTN and ETOH.  Pt educated on abdominal precautions, log rolling in/out of bed & advised to avoid lifting >10 lbs.  Pt was SBA for supine to sit EOB.   Pt did require Mod A for LB dressing.  Currently pt's drain/tube is leaking.  Pt limited by abd pain from various drains, tubes & prevena.  Pt was pleasant & eager to be OOB.  Pt advised to be OOB for meals & amb with staff.  Pt reports good family support at home with his parents.   Pt should be able to D/C home once medically cleared.    Plan    Occupational Therapy Initial Treatment Plan   Treatment Interventions: Self Care / Activities of Daily Living, Adaptive Equipment, Neuro Re-Education / Balance, Therapeutic Exercises, Therapeutic Activity  Treatment Frequency: 3 Times per Week  Duration: Until Therapy Goals Met    DC Equipment Recommendations: Unable to determine at this time  Discharge Recommendations: Recommend home health for continued occupational therapy services     Subjective    \"It feels so good to be out of that bed\"     Objective      Initial Contact Note    Initial Contact Note Order Received and Verified, Occupational Therapy Evaluation in Progress with Full Report to Follow.   Prior Living Situation   Prior Services None   Housing / Facility 2 Story House   Steps Into Home 3   Steps In Home 14   Bathroom Set up Bathtub / Shower Combination;Walk In Shower   Equipment Owned Front-Wheel Walker;Quad Cane   Lives with - Patient's Self Care Capacity Parents   Comments Pt has " been staying with his parents due to recent illness & difficulty with IADL's   Prior Level of ADL Function   Self Feeding Independent   Grooming / Hygiene Independent   Bathing Independent   Dressing Independent   Toileting Independent   Prior Level of IADL Function   Medication Management Independent   Laundry Requires Assist   Kitchen Mobility Independent   Finances Independent   Home Management Requires Assist   Shopping Requires Assist   Prior Level Of Mobility Independent Without Device in Community   Precautions   Precautions Fall Risk  (Multiple abd drains, prevena wound vac, drain to suction)   Vitals   O2 Delivery Device None - Room Air   Pain   Pain Scales 0 to 10 Scale    Intervention Ambulation / Increased Activity   Pain 0 - 10 Group   Location Abdomen;Incision   Description Sharp;Sore   Therapist Pain Assessment During Activity;Nurse Notified;5  (pt has PCA)   Cognition    Cognition / Consciousness WDL   Level of Consciousness Alert   Comments pleasant, receptive to new learning & eager to be OOB   Passive ROM Upper Body   Passive ROM Upper Body WDL   Active ROM Upper Body   Active ROM Upper Body  WDL   Strength Upper Body   Upper Body Strength  WDL   Coordination Upper Body   Coordination WDL   Balance Assessment   Sitting Balance (Static) Fair +   Sitting Balance (Dynamic) Fair   Standing Balance (Static) Fair   Standing Balance (Dynamic) Fair   Weight Shift Sitting Good   Weight Shift Standing Good   Comments with FWW   Bed Mobility    Supine to Sit Standby Assist   Sit to Supine   (pt left up in recliner chair)   Scooting Standby Assist   Rolling Standby Assist   Comments pt taught how to log roll out of bed   ADL Assessment   Eating Modified Independent   Grooming Supervision;Standing   Upper Body Dressing Supervision   Lower Body Dressing Minimal Assist   Toileting Standby Assist   Functional Mobility   Sit to Stand Standby Assist   Bed, Chair, Wheelchair Transfer Contact Guard Assist   Toilet  Transfers Contact Guard Assist   Transfer Method Stand Step   Mobility Pt able to amb with FWW &SBA slowly with extra time   Activity Tolerance   Sitting in Chair 35   Sitting Edge of Bed 15   Standing 10   Patient / Family Goals   Patient / Family Goal #1 To be able to get all these drains out   Short Term Goals   Short Term Goal # 1 Pt will dress LB with SBA   Short Term Goal # 2 Pt will be supervised with ADL transfers   Short Term Goal # 3 Pt will demonstrate abd precautions during ADL's   Education Group   Education Provided Activities of Daily Living   Role of Occupational Therapist Patient Response Patient;Acceptance;Explanation;Verbal Demonstration   ADL Patient Response Patient;Acceptance;Explanation;Demonstration;Action Demonstration   Occupational Therapy Initial Treatment Plan    Treatment Interventions Self Care / Activities of Daily Living;Adaptive Equipment;Neuro Re-Education / Balance;Therapeutic Exercises;Therapeutic Activity   Treatment Frequency 3 Times per Week   Duration Until Therapy Goals Met   Problem List   Problem List Decreased Active Daily Living Skills;Decreased Homemaking Skills;Decreased Functional Mobility;Decreased Activity Tolerance;Impaired Postural Control / Balance;Limited Knowledge of Post Op Precautions;Other (Comments)  (pain)   Anticipated Discharge Equipment and Recommendations   DC Equipment Recommendations Unable to determine at this time   Discharge Recommendations Recommend home health for continued occupational therapy services   Interdisciplinary Plan of Care Collaboration   IDT Collaboration with  Nursing   Patient Position at End of Therapy Seated;Call Light within Reach;Tray Table within Reach;Phone within Reach   Collaboration Comments Nsg notified of OT findings   Session Information   Date / Session Number  4/17 #1 (1/3, 4/23)

## 2024-04-18 LAB
ALBUMIN SERPL BCP-MCNC: 2.8 G/DL (ref 3.2–4.9)
ALBUMIN/GLOB SERPL: 0.7 G/DL
ALP SERPL-CCNC: 118 U/L (ref 30–99)
ALT SERPL-CCNC: 11 U/L (ref 2–50)
ANION GAP SERPL CALC-SCNC: 10 MMOL/L (ref 7–16)
AST SERPL-CCNC: 10 U/L (ref 12–45)
BACTERIA SPEC ANAEROBE CULT: ABNORMAL
BILIRUB SERPL-MCNC: 0.2 MG/DL (ref 0.1–1.5)
BUN SERPL-MCNC: 10 MG/DL (ref 8–22)
CALCIUM ALBUM COR SERPL-MCNC: 9.5 MG/DL (ref 8.5–10.5)
CALCIUM SERPL-MCNC: 8.5 MG/DL (ref 8.5–10.5)
CHLORIDE SERPL-SCNC: 102 MMOL/L (ref 96–112)
CO2 SERPL-SCNC: 26 MMOL/L (ref 20–33)
CREAT SERPL-MCNC: 0.68 MG/DL (ref 0.5–1.4)
ERYTHROCYTE [DISTWIDTH] IN BLOOD BY AUTOMATED COUNT: 44.3 FL (ref 35.9–50)
GFR SERPLBLD CREATININE-BSD FMLA CKD-EPI: 123 ML/MIN/1.73 M 2
GLOBULIN SER CALC-MCNC: 3.9 G/DL (ref 1.9–3.5)
GLUCOSE SERPL-MCNC: 105 MG/DL (ref 65–99)
HCT VFR BLD AUTO: 26.5 % (ref 42–52)
HGB BLD-MCNC: 8.5 G/DL (ref 14–18)
MCH RBC QN AUTO: 26.4 PG (ref 27–33)
MCHC RBC AUTO-ENTMCNC: 32.1 G/DL (ref 32.3–36.5)
MCV RBC AUTO: 82.3 FL (ref 81.4–97.8)
PLATELET # BLD AUTO: 449 K/UL (ref 164–446)
PMV BLD AUTO: 8.2 FL (ref 9–12.9)
POTASSIUM SERPL-SCNC: 3.5 MMOL/L (ref 3.6–5.5)
PROT SERPL-MCNC: 6.7 G/DL (ref 6–8.2)
RBC # BLD AUTO: 3.22 M/UL (ref 4.7–6.1)
SIGNIFICANT IND 70042: ABNORMAL
SITE SITE: ABNORMAL
SODIUM SERPL-SCNC: 138 MMOL/L (ref 135–145)
SOURCE SOURCE: ABNORMAL
WBC # BLD AUTO: 14.5 K/UL (ref 4.8–10.8)

## 2024-04-18 PROCEDURE — A9270 NON-COVERED ITEM OR SERVICE: HCPCS | Performed by: SURGERY

## 2024-04-18 PROCEDURE — 85027 COMPLETE CBC AUTOMATED: CPT

## 2024-04-18 PROCEDURE — 36415 COLL VENOUS BLD VENIPUNCTURE: CPT

## 2024-04-18 PROCEDURE — 700102 HCHG RX REV CODE 250 W/ 637 OVERRIDE(OP): Performed by: SURGERY

## 2024-04-18 PROCEDURE — 700111 HCHG RX REV CODE 636 W/ 250 OVERRIDE (IP): Performed by: NURSE PRACTITIONER

## 2024-04-18 PROCEDURE — 80053 COMPREHEN METABOLIC PANEL: CPT

## 2024-04-18 PROCEDURE — 700111 HCHG RX REV CODE 636 W/ 250 OVERRIDE (IP): Mod: JZ | Performed by: SURGERY

## 2024-04-18 PROCEDURE — 97530 THERAPEUTIC ACTIVITIES: CPT

## 2024-04-18 PROCEDURE — 700102 HCHG RX REV CODE 250 W/ 637 OVERRIDE(OP): Performed by: ANESTHESIOLOGY

## 2024-04-18 PROCEDURE — 770001 HCHG ROOM/CARE - MED/SURG/GYN PRIV*

## 2024-04-18 PROCEDURE — 97116 GAIT TRAINING THERAPY: CPT

## 2024-04-18 PROCEDURE — A9270 NON-COVERED ITEM OR SERVICE: HCPCS | Performed by: ANESTHESIOLOGY

## 2024-04-18 PROCEDURE — 700105 HCHG RX REV CODE 258: Performed by: SURGERY

## 2024-04-18 RX ADMIN — OXYCODONE HYDROCHLORIDE 10 MG: 10 TABLET ORAL at 14:27

## 2024-04-18 RX ADMIN — PIPERACILLIN AND TAZOBACTAM 4.5 G: 4; .5 INJECTION, POWDER, FOR SOLUTION INTRAVENOUS at 04:31

## 2024-04-18 RX ADMIN — ACETAMINOPHEN 1000 MG: 500 TABLET, FILM COATED ORAL at 00:00

## 2024-04-18 RX ADMIN — CELECOXIB 200 MG: 200 CAPSULE ORAL at 16:51

## 2024-04-18 RX ADMIN — Medication: at 00:10

## 2024-04-18 RX ADMIN — Medication: at 17:51

## 2024-04-18 RX ADMIN — Medication: at 11:27

## 2024-04-18 RX ADMIN — PIPERACILLIN AND TAZOBACTAM 4.5 G: 4; .5 INJECTION, POWDER, FOR SOLUTION INTRAVENOUS at 21:31

## 2024-04-18 RX ADMIN — OXYCODONE HYDROCHLORIDE 10 MG: 10 TABLET ORAL at 21:23

## 2024-04-18 RX ADMIN — OXYCODONE HYDROCHLORIDE 10 MG: 10 TABLET ORAL at 05:13

## 2024-04-18 RX ADMIN — Medication: at 06:22

## 2024-04-18 RX ADMIN — PIPERACILLIN AND TAZOBACTAM 4.5 G: 4; .5 INJECTION, POWDER, FOR SOLUTION INTRAVENOUS at 13:07

## 2024-04-18 RX ADMIN — ENOXAPARIN SODIUM 40 MG: 100 INJECTION SUBCUTANEOUS at 08:52

## 2024-04-18 RX ADMIN — OMEPRAZOLE 20 MG: 20 CAPSULE, DELAYED RELEASE ORAL at 04:31

## 2024-04-18 RX ADMIN — CELECOXIB 200 MG: 200 CAPSULE ORAL at 04:31

## 2024-04-18 RX ADMIN — ACETAMINOPHEN 1000 MG: 500 TABLET, FILM COATED ORAL at 16:51

## 2024-04-18 RX ADMIN — ACETAMINOPHEN 1000 MG: 500 TABLET, FILM COATED ORAL at 11:25

## 2024-04-18 RX ADMIN — OXYCODONE HYDROCHLORIDE 10 MG: 10 TABLET ORAL at 01:10

## 2024-04-18 ASSESSMENT — PAIN DESCRIPTION - PAIN TYPE
TYPE: ACUTE PAIN

## 2024-04-18 ASSESSMENT — GAIT ASSESSMENTS
DISTANCE (FEET): 150
GAIT LEVEL OF ASSIST: SUPERVISED
ASSISTIVE DEVICE: NONE;FRONT WHEEL WALKER

## 2024-04-18 ASSESSMENT — ENCOUNTER SYMPTOMS
WEIGHT LOSS: 0
COUGH: 0
VOMITING: 0
CHILLS: 0
NAUSEA: 0
FEVER: 0
ABDOMINAL PAIN: 1

## 2024-04-18 ASSESSMENT — COGNITIVE AND FUNCTIONAL STATUS - GENERAL
WALKING IN HOSPITAL ROOM: A LITTLE
CLIMB 3 TO 5 STEPS WITH RAILING: A LITTLE
MOBILITY SCORE: 21
STANDING UP FROM CHAIR USING ARMS: A LITTLE
SUGGESTED CMS G CODE MODIFIER MOBILITY: CJ

## 2024-04-18 NOTE — THERAPY
"Physical Therapy   Discharge     Patient Name: Abelardo Fenton  Age:  38 y.o., Sex:  male  Medical Record #: [id number]  Today's Date: 4/18/2024     Precautions  Precautions: Fall Risk  Comments: abdominal precautions, prevena, JPx1    Assessment    Patient seen for follow up PT treatment session and is able to demo all functional mobility without AD.  He was educated about log roll and abdominal precautions.  He plans to DC to his parents house, where he will have assist as needed from his family.  No further acute care PT needs at this time.     Plan    Reason for Discharge From Therapy: Discharge Secondary to Goals Met    DC Equipment Recommendations: None  Discharge Recommendations: Anticipate that the patient will have no further physical therapy needs after discharge from the hospital      Subjective    \"I'm not sure why they're trying to send me to a rehab place\"     Objective       04/18/24 1215   Precautions   Precautions Fall Risk   Comments abdominal precautions, prevena, JPx1   Pain 0 - 10 Group   Location Abdomen   Therapist Pain Assessment 4   Cognition    Cognition / Consciousness WDL   Level of Consciousness Alert   Comments pleasant and cooperative   Sitting Lower Body Exercises   Sitting Lower Body Exercises Yes   Ankle Pumps 2 sets of 10   Long Arc Quad 2 sets of 10   Neuro-Muscular Treatments   Neuro-Muscular Treatments Postural Facilitation;Compensatory Strategies   Other Treatments   Other Treatments Provided discussed DC recs and educated about abdominal precautions and use of binder for comfort   Balance   Sitting Balance (Static) Fair +   Sitting Balance (Dynamic) Fair   Standing Balance (Static) Fair   Standing Balance (Dynamic) Fair   Weight Shift Sitting Good   Weight Shift Standing Good   Skilled Intervention Sequencing;Tactile Cuing;Verbal Cuing;Compensatory Strategies;Postural Facilitation   Bed Mobility    Supine to Sit Standby Assist   Scooting Supervised   Rolling Supervised "   Skilled Intervention Tactile Cuing;Verbal Cuing   Comments educated about logroll   Gait Analysis   Gait Level Of Assist Supervised   Assistive Device None;Front Wheel Walker   Distance (Feet) 150   # of Times Distance was Traveled 2   Skilled Intervention Sequencing;Tactile Cuing;Verbal Cuing;Postural Facilitation   Comments Patient started with FWW and was able to progress to no AD with cueing for reciprocal arm swing, no LOB   Functional Mobility   Sit to Stand Supervised   Bed, Chair, Wheelchair Transfer Supervised   6 Clicks Assessment - How much HELP from from another person do you currently need... (If the patient hasn't done an activity recently, how much help from another person do you think he/she would need if he/she tried?)   Turning from your back to your side while in a flat bed without using bedrails? 4   Moving from lying on your back to sitting on the side of a flat bed without using bedrails? 4   Moving to and from a bed to a chair (including a wheelchair)? 4   Standing up from a chair using your arms (e.g., wheelchair, or bedside chair)? 3   Walking in hospital room? 3   Climbing 3-5 steps with a railing? 3   6 clicks Mobility Score 21   Activity Tolerance   Sitting in Chair post session   Sitting Edge of Bed 10 min   Standing 15 min   Patient / Family Goals    Patient / Family Goal #1 to go home   Goal #1 Outcome Progressing as expected   Short Term Goals    Short Term Goal # 1 Pt will perform supine<>sit with SPV to progress bed mobility in 6 visits.   Goal Outcome # 1 Goal met   Short Term Goal # 2 Pt will perform sit<>stand and stand step txfer with FWW vs no AD and SPV to progress OOB mobility in 6 visits.   Goal Outcome # 2 Goal met   Short Term Goal # 3 Pt will ambulate 150 ft with FWW vs no AD and SPV to access home in 6 visits.   Goal Outcome # 3 Goal met   Short Term Goal # 4 Pt will navigate FOS with SPV to access home in 6 visits.   Goal Outcome # 4 Goal not met  (does not need to  navigate stairs)   Physical Therapy Treatment Plan   Physical Therapy Treatment Plan Modify Current Treatment Plan   Reason For Discharge Discharge Secondary to Goals Met   Anticipated Discharge Equipment and Recommendations   DC Equipment Recommendations None   Discharge Recommendations Anticipate that the patient will have no further physical therapy needs after discharge from the hospital     Aubrie Lopez, PT, DPT, GCS

## 2024-04-18 NOTE — CARE PLAN
The patient is Stable - Low risk of patient condition declining or worsening    Shift Goals  Clinical Goals: ambulation, pain management, continue antibiotic regimen, monitor bowel function  Patient Goals: pain management  Family Goals: not present at this time    Progress made toward(s) clinical / shift goals:  Patient ambulated with this RN and also ambulated with PT. Pain has been controlled with PCA. Patient is tolerating current antibiotic regimen.      Patient is not progressing towards the following goals: Pending DC clearance.

## 2024-04-18 NOTE — PROGRESS NOTES
Date of Service  April 18, 2024     Chief Complaint  Status post necrotizing pancreatitis, infected necrotizing pancreatitis, multiple drains      Surgery Completed  1.  Exploratory laparotomy.  2.  Intraoperative ultrasound survey of the pancreatic pseudocyst, stomach and   biliary system.  3.  Open pancreatic necrosectomy with large amounts of necrotic material, foul   smelling, infected  4.  Peritoneal abscess drainage with massive washout using the Pulsavac.    Hospital Course  POD # 3     Interval Problem Update  No acute events  PCA started, patient reports abdominal pain  Leukocytosis WBC 14.5 from 11.9, afebrile, Zosyn  Tolerating diet, denies N/V  Drain 1: 0 - appears not functional  Drain 2: 20 mL    Problem List  Principal Problem:    Necrotizing pancreatitis (POA: Yes)  Active Problems:    SIRS (systemic inflammatory response syndrome) (HCC) (POA: Yes)    Infected pseudocyst of pancreas (POA: Yes)    Peritoneal abscess (HCC) (POA: Yes)  Resolved Problems:    * No resolved hospital problems. *     Subjective  Review of Systems   Constitutional:  Negative for chills, fever, malaise/fatigue and weight loss.   Respiratory:  Negative for cough.    Cardiovascular:  Negative for chest pain.   Gastrointestinal:  Positive for abdominal pain. Negative for nausea and vomiting.   Genitourinary:  Negative for dysuria.      Objective  Temp:  [36.6 °C (97.9 °F)-37.1 °C (98.8 °F)] 36.8 °C (98.2 °F)  Pulse:  [86-94] 94  Resp:  [16-18] 18  BP: (119-140)/(76-91) 130/87  SpO2:  [93 %-97 %] 93 %      Physical Exam  Vitals and nursing note reviewed.   Constitutional:       General: He is not in acute distress.     Appearance: Normal appearance.   HENT:      Head: Normocephalic and atraumatic.      Nose: Nose normal.      Mouth/Throat:      Pharynx: Oropharynx is clear.   Eyes:      Conjunctiva/sclera: Conjunctivae normal.   Cardiovascular:      Rate and Rhythm: Normal rate.   Pulmonary:      Effort: Pulmonary effort is  normal. No respiratory distress.   Abdominal:      General: There is no distension.      Palpations: Abdomen is soft.      Tenderness: There is abdominal tenderness. There is no guarding.      Comments: Abdominal incision with Prevena intact  Irrigation drain at right side of abdomen, with 0 mL output  PALAK drain at RLQ, with 20 mL output   Skin:     General: Skin is warm and dry.      Coloration: Skin is not jaundiced.   Neurological:      Mental Status: He is alert and oriented to person, place, and time.   Psychiatric:         Mood and Affect: Mood normal.         Behavior: Behavior normal.        Fluids    Intake/Output Summary (Last 24 hours) at 4/18/2024 0811  Last data filed at 4/18/2024 0654  Gross per 24 hour   Intake 1405.3 ml   Output 740 ml   Net 665.3 ml         Labs  Lab Results   Component Value Date/Time    SODIUM 138 04/18/2024 01:32 AM    POTASSIUM 3.5 (L) 04/18/2024 01:32 AM    CHLORIDE 102 04/18/2024 01:32 AM    CO2 26 04/18/2024 01:32 AM    GLUCOSE 105 (H) 04/18/2024 01:32 AM    BUN 10 04/18/2024 01:32 AM    CREATININE 0.68 04/18/2024 01:32 AM         Lab Results   Component Value Date/Time    PROTHROMBTM 14.9 (H) 03/20/2024 07:16 AM    INR 1.16 (H) 03/20/2024 07:16 AM         Lab Results   Component Value Date/Time    WBC 14.5 (H) 04/18/2024 01:32 AM    RBC 3.22 (L) 04/18/2024 01:32 AM    HEMOGLOBIN 8.5 (L) 04/18/2024 01:32 AM    HEMATOCRIT 26.5 (L) 04/18/2024 01:32 AM    MCV 82.3 04/18/2024 01:32 AM    MCH 26.4 (L) 04/18/2024 01:32 AM    MCHC 32.1 (L) 04/18/2024 01:32 AM    MPV 8.2 (L) 04/18/2024 01:32 AM    NEUTSPOLYS 78.30 (H) 03/25/2024 03:34 PM    LYMPHOCYTES 12.20 (L) 03/25/2024 03:34 PM    MONOCYTES 7.40 03/25/2024 03:34 PM    EOSINOPHILS 1.40 03/25/2024 03:34 PM    BASOPHILS 0.40 03/25/2024 03:34 PM    ANISOCYTOSIS 1+ 12/31/2023 01:05 AM         Recent Labs     04/16/24  0129 04/17/24  0137 04/18/24  0132   ASTSGOT 16 13 10*   ALTSGPT 17 11 11   TBILIRUBIN 0.2 0.2 0.2   GLOBULIN 3.8* 3.6*  3.9*      VTE Prophylaxis: Lovenox     Assessment/Plan  Patient is a 38M with history of necrotizing pancreatitis, s/p exploratory laparotomy and pancreatic necrosectomy.    Exploratory laparotomy, pancreatic necrosectomy, POD #3  - Continue PCA   - Continue Regular diet  - Continue irrigation of large abdominal drain - 20 mL/hr NS, to wall suction  - PT/OT  - OOB and ambulate as tolerated    2. Leukocytosis, much improved, WBC 14.5, afebrile  - Continue Zosyn    3. DVT prophylaxis  - Lovenox

## 2024-04-18 NOTE — CARE PLAN
The patient is Stable - Low risk of patient condition declining or worsening    Shift Goals  Clinical Goals: pain management, drain management  Patient Goals: pain control  Family Goals: GRANT    Progress made toward(s) clinical / shift goals:      Problem: Pain - Standard  Goal: Alleviation of pain or a reduction in pain to the patient’s comfort goal  Outcome: Progressing     Problem: Knowledge Deficit - Standard  Goal: Patient and family/care givers will demonstrate understanding of plan of care, disease process/condition, diagnostic tests and medications  Outcome: Progressing     Patient has call light within reach and calls appropriately. He has been updated on plan of care and will continue to be updated as information arises. His pain has been controlled via pharmalogical administration including his PCA pump (see MAR for details). All needs met at this time.

## 2024-04-18 NOTE — DISCHARGE PLANNING
Care Transition Team Discharge Planning    Anticipated Discharge Information  Discharge Disposition: Disch to a long term care facility (63)              Discharge Plan:  [facility]    Per Renetta, Liaison with [facility], Pt is still  pending insurance auth for [facility]. Per Xander MONTANO , Pt is not yet medically cleared.     Pt has multiple drains and is on IV Zosyn .   Pt is from [place].

## 2024-04-19 LAB
ALBUMIN SERPL BCP-MCNC: 3.3 G/DL (ref 3.2–4.9)
ALBUMIN/GLOB SERPL: 0.8 G/DL
ALP SERPL-CCNC: 113 U/L (ref 30–99)
ALT SERPL-CCNC: 9 U/L (ref 2–50)
ANION GAP SERPL CALC-SCNC: 13 MMOL/L (ref 7–16)
AST SERPL-CCNC: 12 U/L (ref 12–45)
BILIRUB SERPL-MCNC: 0.2 MG/DL (ref 0.1–1.5)
BUN SERPL-MCNC: 7 MG/DL (ref 8–22)
CALCIUM ALBUM COR SERPL-MCNC: 9.6 MG/DL (ref 8.5–10.5)
CALCIUM SERPL-MCNC: 9 MG/DL (ref 8.5–10.5)
CHLORIDE SERPL-SCNC: 98 MMOL/L (ref 96–112)
CO2 SERPL-SCNC: 25 MMOL/L (ref 20–33)
CREAT SERPL-MCNC: 0.65 MG/DL (ref 0.5–1.4)
ERYTHROCYTE [DISTWIDTH] IN BLOOD BY AUTOMATED COUNT: 45.4 FL (ref 35.9–50)
GFR SERPLBLD CREATININE-BSD FMLA CKD-EPI: 125 ML/MIN/1.73 M 2
GLOBULIN SER CALC-MCNC: 4 G/DL (ref 1.9–3.5)
GLUCOSE SERPL-MCNC: 105 MG/DL (ref 65–99)
HCT VFR BLD AUTO: 29.9 % (ref 42–52)
HGB BLD-MCNC: 9.3 G/DL (ref 14–18)
MCH RBC QN AUTO: 26.3 PG (ref 27–33)
MCHC RBC AUTO-ENTMCNC: 31.1 G/DL (ref 32.3–36.5)
MCV RBC AUTO: 84.7 FL (ref 81.4–97.8)
PLATELET # BLD AUTO: 517 K/UL (ref 164–446)
PMV BLD AUTO: 8.3 FL (ref 9–12.9)
POTASSIUM SERPL-SCNC: 3.8 MMOL/L (ref 3.6–5.5)
PROT SERPL-MCNC: 7.3 G/DL (ref 6–8.2)
RBC # BLD AUTO: 3.53 M/UL (ref 4.7–6.1)
SODIUM SERPL-SCNC: 136 MMOL/L (ref 135–145)
WBC # BLD AUTO: 15.1 K/UL (ref 4.8–10.8)

## 2024-04-19 PROCEDURE — 85027 COMPLETE CBC AUTOMATED: CPT

## 2024-04-19 PROCEDURE — 770001 HCHG ROOM/CARE - MED/SURG/GYN PRIV*

## 2024-04-19 PROCEDURE — 36415 COLL VENOUS BLD VENIPUNCTURE: CPT

## 2024-04-19 PROCEDURE — 700111 HCHG RX REV CODE 636 W/ 250 OVERRIDE (IP): Mod: JZ | Performed by: NURSE PRACTITIONER

## 2024-04-19 PROCEDURE — A9270 NON-COVERED ITEM OR SERVICE: HCPCS | Performed by: NURSE PRACTITIONER

## 2024-04-19 PROCEDURE — 700105 HCHG RX REV CODE 258: Performed by: SURGERY

## 2024-04-19 PROCEDURE — 700102 HCHG RX REV CODE 250 W/ 637 OVERRIDE(OP): Performed by: SURGERY

## 2024-04-19 PROCEDURE — 700102 HCHG RX REV CODE 250 W/ 637 OVERRIDE(OP): Performed by: ANESTHESIOLOGY

## 2024-04-19 PROCEDURE — 80053 COMPREHEN METABOLIC PANEL: CPT

## 2024-04-19 PROCEDURE — 97535 SELF CARE MNGMENT TRAINING: CPT

## 2024-04-19 PROCEDURE — 700111 HCHG RX REV CODE 636 W/ 250 OVERRIDE (IP): Mod: JZ | Performed by: SURGERY

## 2024-04-19 PROCEDURE — 700105 HCHG RX REV CODE 258: Performed by: NURSE PRACTITIONER

## 2024-04-19 PROCEDURE — A9270 NON-COVERED ITEM OR SERVICE: HCPCS | Performed by: ANESTHESIOLOGY

## 2024-04-19 PROCEDURE — A9270 NON-COVERED ITEM OR SERVICE: HCPCS | Performed by: SURGERY

## 2024-04-19 PROCEDURE — 700102 HCHG RX REV CODE 250 W/ 637 OVERRIDE(OP): Performed by: NURSE PRACTITIONER

## 2024-04-19 RX ORDER — OXYCODONE HCL 10 MG/1
10 TABLET, FILM COATED, EXTENDED RELEASE ORAL 2 TIMES DAILY
Status: DISCONTINUED | OUTPATIENT
Start: 2024-04-19 | End: 2024-04-22 | Stop reason: HOSPADM

## 2024-04-19 RX ADMIN — ACETAMINOPHEN 1000 MG: 500 TABLET, FILM COATED ORAL at 04:45

## 2024-04-19 RX ADMIN — OXYCODONE HYDROCHLORIDE 10 MG: 10 TABLET, FILM COATED, EXTENDED RELEASE ORAL at 17:13

## 2024-04-19 RX ADMIN — OXYCODONE HYDROCHLORIDE 10 MG: 10 TABLET, FILM COATED, EXTENDED RELEASE ORAL at 10:00

## 2024-04-19 RX ADMIN — OXYCODONE HYDROCHLORIDE 10 MG: 10 TABLET ORAL at 06:08

## 2024-04-19 RX ADMIN — CELECOXIB 200 MG: 200 CAPSULE ORAL at 04:45

## 2024-04-19 RX ADMIN — PIPERACILLIN AND TAZOBACTAM 4.5 G: 4; .5 INJECTION, POWDER, FOR SOLUTION INTRAVENOUS at 04:47

## 2024-04-19 RX ADMIN — AMPICILLIN AND SULBACTAM 3 G: 1; 2 INJECTION, POWDER, FOR SOLUTION INTRAMUSCULAR; INTRAVENOUS at 23:57

## 2024-04-19 RX ADMIN — Medication: at 22:08

## 2024-04-19 RX ADMIN — ACETAMINOPHEN 1000 MG: 500 TABLET, FILM COATED ORAL at 00:47

## 2024-04-19 RX ADMIN — OMEPRAZOLE 20 MG: 20 CAPSULE, DELAYED RELEASE ORAL at 04:46

## 2024-04-19 RX ADMIN — Medication: at 06:11

## 2024-04-19 RX ADMIN — OXYCODONE HYDROCHLORIDE 10 MG: 10 TABLET ORAL at 01:28

## 2024-04-19 RX ADMIN — AMPICILLIN AND SULBACTAM 3 G: 1; 2 INJECTION, POWDER, FOR SOLUTION INTRAMUSCULAR; INTRAVENOUS at 20:52

## 2024-04-19 RX ADMIN — PIPERACILLIN AND TAZOBACTAM 4.5 G: 4; .5 INJECTION, POWDER, FOR SOLUTION INTRAVENOUS at 12:22

## 2024-04-19 RX ADMIN — CELECOXIB 200 MG: 200 CAPSULE ORAL at 17:13

## 2024-04-19 RX ADMIN — Medication: at 00:36

## 2024-04-19 RX ADMIN — ENOXAPARIN SODIUM 40 MG: 100 INJECTION SUBCUTANEOUS at 09:15

## 2024-04-19 RX ADMIN — ACETAMINOPHEN 1000 MG: 500 TABLET, FILM COATED ORAL at 12:20

## 2024-04-19 RX ADMIN — Medication: at 13:34

## 2024-04-19 RX ADMIN — ACETAMINOPHEN 1000 MG: 500 TABLET, FILM COATED ORAL at 17:14

## 2024-04-19 RX ADMIN — ACETAMINOPHEN 1000 MG: 500 TABLET, FILM COATED ORAL at 23:54

## 2024-04-19 ASSESSMENT — PAIN DESCRIPTION - PAIN TYPE
TYPE: ACUTE PAIN

## 2024-04-19 ASSESSMENT — ENCOUNTER SYMPTOMS
ABDOMINAL PAIN: 1
COUGH: 0
NAUSEA: 0
WEIGHT LOSS: 0
VOMITING: 0
CHILLS: 0
FEVER: 0

## 2024-04-19 ASSESSMENT — COGNITIVE AND FUNCTIONAL STATUS - GENERAL
HELP NEEDED FOR BATHING: A LITTLE
DAILY ACTIVITIY SCORE: 23
SUGGESTED CMS G CODE MODIFIER DAILY ACTIVITY: CI

## 2024-04-19 NOTE — CARE PLAN
"The patient is Stable - Low risk of patient condition declining or worsening    Shift Goals  Clinical Goals: continue IV antibiotics, pain management, ambulation, drain management  Patient Goals: \"I want to get better\"  Family Goals: not present at this time    Progress made toward(s) clinical / shift goals:  Patient continues to tolerate IV antibiotic regimen. Patient's pain continues to be managed via PCA and scheduled interventions. Patient has ambulated.     Patient is not progressing towards the following goals: Pending repeat labs. Pending DC clearance.      "

## 2024-04-19 NOTE — DISCHARGE PLANNING
Case Management Discharge Planning    Admission Date: 4/15/2024  GMLOS: 9  ALOS: 4    6-Clicks ADL Score: 23  6-Clicks Mobility Score: 21    Anticipated Discharge Dispo: Discharge Disposition: Disch to a long term care facility (63)  Discharge Address: [facility]    DME Needed: No    Action(s) Taken: DC Assessment Complete (See below) via Chart. CM consulted self due to Post Acute Referral. LMSW spoke with Renetta from [facility] regarding pt. Pt NOT medically cleared today.     Pending medical clearance and bed availability at [facility].     Escalations Completed: None    Medically Clear: No    Barriers to Discharge: Medical clearance    Is the patient up for discharge tomorrow: No    Care Transition Team Assessment    Information Source  Orientation Level: Oriented X4  Information Given By: Patient  Informant's Name: chart  Who is responsible for making decisions for patient? : Patient    Readmission Evaluation  Is this a readmission?: No    Elopement Risk  Legal Hold: No  Ambulatory or Self Mobile in Wheelchair: Yes  Disoriented: No  Psychiatric Symptoms: None  History of Wandering: No  Elopement this Admit: No  Vocalizing Wanting to Leave: No  Displays Behaviors, Body Language Wanting to Leave: No-Not at Risk for Elopement  Elopement Risk: Not at Risk for Elopement    Interdisciplinary Discharge Planning  Lives with - Patient's Self Care Capacity: Parents  Patient or legal guardian wants to designate a caregiver: No  Support Systems: Family Member(s), Children  Housing / Facility: [address]  Prior Services: None  Durable Medical Equipment: Not Applicable    Discharge Preparedness  What is your plan after discharge?: Other (comment) (LTACH)  What are your discharge supports?: Parent  Prior Functional Level: Independent with Activities of Daily Living    Functional Assesment  Prior Functional Level: Independent with Activities of Daily Living    Finances  Financial Barriers to Discharge: No  Prescription Coverage: Yes    Vision  / Hearing Impairment  Vision Impairment : No  Hearing Impairment : No    Advance Directive  Advance Directive?: None    Domestic Abuse  Have you ever been the victim of abuse or violence?: No  Physical Abuse or Sexual Abuse: No  Verbal Abuse or Emotional Abuse: No  Possible Abuse/Neglect Reported to:: Not Applicable    Discharge Risks or Barriers  Discharge risks or barriers?: No    Anticipated Discharge Information  Discharge Disposition: Disch to a long term care facility (63)  Discharge Address: [facility]

## 2024-04-19 NOTE — CARE PLAN
Problem: Pain - Standard  Goal: Alleviation of pain or a reduction in pain to the patient’s comfort goal  Outcome: Progressing     Problem: Knowledge Deficit - Standard  Goal: Patient and family/care givers will demonstrate understanding of plan of care, disease process/condition, diagnostic tests and medications  Outcome: Progressing     Problem: Bowel Elimination  Goal: Establish and maintain regular bowel function  Outcome: Progressing     Problem: Wound/ / Incision Healing  Goal: Patient's wound/surgical incision will decrease in size and heals properly  Outcome: Progressing     The patient is Stable - Low risk of patient condition declining or worsening    Shift Goals  Clinical Goals: pain control, IV abx, drain mgmt  Patient Goals: pain control  Family Goals: not present at this time    Progress made toward(s) clinical / shift goals: Pain managed via PCA bolus button and per MAR with reported relief. IV abx administered. PALAK drain to bulb suction, [place] drain to LIS. Prevena intact.     Patient is not progressing towards the following goals:

## 2024-04-19 NOTE — THERAPY
"Occupational Therapy  Daily Treatment     Patient Name: Abelardo Fenton  Age:  38 y.o., Sex:  male  Medical Record #: [id number]  Today's Date: 4/19/2024     Precautions  Precautions: (P) Fall Risk  Comments: (P) abdominal precautions, prevena, JPx1    Assessment    Pt seen for OT tx session. Pt ed/trained on how to perform BADLs w/ decreased pain. Pt demo'd BADLs at SPV level. No further acute OT needs noted at this time.     Plan    Treatment Plan Status: (P) Modify Current Treatment Plan  Type of Treatment: Self Care / Activities of Daily Living, Adaptive Equipment, Neuro Re-Education / Balance, Therapeutic Exercises, Therapeutic Activity  Treatment Frequency: 3 Times per Week  Treatment Duration: Until Therapy Goals Met    DC Equipment Recommendations: (P) Unable to determine at this time  Discharge Recommendations: (P) Recommend post-acute placement for additional occupational therapy services prior to discharge home    Subjective    \"I usually golf this time of the year\"     Objective      Precautions   Precautions Fall Risk   Comments abdominal precautions, prevena, JPx1   Vitals   O2 Delivery Device None - Room Air   Pain 0 - 10 Group   Therapist Pain Assessment Post Activity Pain Same as Prior to Activity;Nurse Notified  (no c/o pain during session)   Cognition    Cognition / Consciousness WDL   Level of Consciousness Alert   Comments pleasent and cooperative, appears to internalize ed   Active ROM Upper Body   Active ROM Upper Body  WDL   Strength Upper Body   Upper Body Strength  WDL   Balance   Sitting Balance (Static) Good   Sitting Balance (Dynamic) Good   Standing Balance (Static) Fair +   Standing Balance (Dynamic) Fair +   Weight Shift Sitting Good   Weight Shift Standing Good   Skilled Intervention Verbal Cuing   Comments w/ FWW   Bed Mobility    Scooting Supervised   Skilled Intervention Verbal Cuing;Tactile Cuing;Facilitation   Activities of Daily Living   Grooming Supervision;Standing "   Lower Body Dressing Supervision   Toileting Supervision   Skilled Intervention Verbal Cuing   How much help from another person does the patient currently need...   Putting on and taking off regular lower body clothing? 4   Bathing (including washing, rinsing, and drying)? 3   Toileting, which includes using a toilet, bedpan, or urinal? 4   Putting on and taking off regular upper body clothing? 4   Taking care of personal grooming such as brushing teeth? 4   Eating meals? 4   6 Clicks Daily Activity Score 23   Functional Mobility   Sit to Stand Supervised   Bed, Chair, Wheelchair Transfer Supervised   Toilet Transfers Supervised   Transfer Method Stand Step   Mobility within room w/ FWW   Skilled Intervention Verbal Cuing   Activity Tolerance   Sitting in Chair 10+ min (up post)   Standing 5 min   Patient / Family Goals   Patient / Family Goal #1 To be able to get all these drains out   Goal #1 Outcome Progressing as expected   Short Term Goals   Short Term Goal # 1 Pt will dress LB with SBA   Goal Outcome # 1 Goal met   Short Term Goal # 2 Pt will be supervised with ADL transfers   Goal Outcome # 2 Goal met   Short Term Goal # 3 Pt will demonstrate abd precautions during ADL's   Goal Outcome # 3 Goal met   Short Term Goal # 4 Pt will have no further acute OT needs by time of DC   Occupational Therapy Treatment Plan    O.T. Treatment Plan Modify Current Treatment Plan   Reason For Discharge Discharge Secondary to Goals Met  (DC needs only)   Anticipated Discharge Equipment and Recommendations   DC Equipment Recommendations Unable to determine at this time   Discharge Recommendations Recommend post-acute placement for additional occupational therapy services prior to discharge home   Interdisciplinary Plan of Care Collaboration   IDT Collaboration with  Nursing   Patient Position at End of Therapy Seated;Call Light within Reach;Tray Table within Reach;Phone within Reach   Collaboration Comments report given    Session Information   Date / Session Number  4/19, DC needs only

## 2024-04-19 NOTE — PROGRESS NOTES
Assumed care of patient at 1900. Bedside report received. Assessment complete.    A&O x4. Patient calls appropriately.  Reports 8/10 pain. Pain managed with prescribed medications per MAR.  Denies CP/SOB.   Tolerating reg diet. Denies N/V.  + void. + flatus. - BM. Last BM PTA.  Skin per flowsheets  MLI to prevena, RUQ PALAK drain to bulb suction, RUQ Gem[place] drain to suction  Mobility: standby    All needs met at this time. Call light and belongings within reach. Fall precautions in place. Hourly rounding in place.

## 2024-04-19 NOTE — PROGRESS NOTES
Report received from Dara MOORE and Trina RN; care of patient assumed at 0700. Per Voalte message received from DUSTY Jones on this date at 0724 - hold off of irrigation of patient's Ar[place] drain.

## 2024-04-19 NOTE — PROGRESS NOTES
Date of Service  April 19, 2024     Chief Complaint  Status post necrotizing pancreatitis, infected necrotizing pancreatitis, multiple drains      Surgery Completed  1.  Exploratory laparotomy.  2.  Intraoperative ultrasound survey of the pancreatic pseudocyst, stomach and   biliary system.  3.  Open pancreatic necrosectomy with large amounts of necrotic material, foul   smelling, infected  4.  Peritoneal abscess drainage with massive washout using the Pulsavac.    Hospital Course  POD # 4     Interval Problem Update  No acute events  PCA started, patient states pain controlled during day, not so good overnight  Leukocytosis WBC 15.2 from 14.5, afebrile, Zosyn  Tolerating diet, denies N/V  Drain 1: 10 mL (irrigation stopped)  Drain 2: 25 mL  Patient ambulated multiple times    Problem List  Principal Problem:    Necrotizing pancreatitis (POA: Yes)  Active Problems:    SIRS (systemic inflammatory response syndrome) (HCC) (POA: Yes)    Infected pseudocyst of pancreas (POA: Yes)    Peritoneal abscess (HCC) (POA: Yes)  Resolved Problems:    * No resolved hospital problems. *     Subjective  Review of Systems   Constitutional:  Negative for chills, fever, malaise/fatigue and weight loss.   Respiratory:  Negative for cough.    Cardiovascular:  Negative for chest pain.   Gastrointestinal:  Positive for abdominal pain. Negative for nausea and vomiting.   Genitourinary:  Negative for dysuria.      Objective  Temp:  [36.4 °C (97.5 °F)-36.9 °C (98.4 °F)] 36.8 °C (98.2 °F)  Pulse:  [86-95] 95  Resp:  [17] 17  BP: (133-140)/(84-91) 135/91  SpO2:  [92 %-95 %] 92 %      Physical Exam  Vitals and nursing note reviewed.   Constitutional:       General: He is not in acute distress.     Appearance: Normal appearance.   HENT:      Head: Normocephalic and atraumatic.      Nose: Nose normal.      Mouth/Throat:      Pharynx: Oropharynx is clear.   Eyes:      Conjunctiva/sclera: Conjunctivae normal.   Cardiovascular:      Rate and  Rhythm: Normal rate.   Pulmonary:      Effort: Pulmonary effort is normal. No respiratory distress.   Abdominal:      General: There is no distension.      Palpations: Abdomen is soft.      Tenderness: There is abdominal tenderness. There is no guarding.      Comments: Abdominal incision with Prevena intact  Irrigation drain at right side of abdomen, with 10 mL output  PALAK drain at RLQ, with 25 mL output  Left abdomen tender   Skin:     General: Skin is warm and dry.      Coloration: Skin is not jaundiced.   Neurological:      Mental Status: He is alert and oriented to person, place, and time.   Psychiatric:         Mood and Affect: Mood normal.         Behavior: Behavior normal.        Fluids    Intake/Output Summary (Last 24 hours) at 4/19/2024 0749  Last data filed at 4/19/2024 0711  Gross per 24 hour   Intake 368.8 ml   Output 1085 ml   Net -716.2 ml         Labs  Lab Results   Component Value Date/Time    SODIUM 136 04/19/2024 01:17 AM    POTASSIUM 3.8 04/19/2024 01:17 AM    CHLORIDE 98 04/19/2024 01:17 AM    CO2 25 04/19/2024 01:17 AM    GLUCOSE 105 (H) 04/19/2024 01:17 AM    BUN 7 (L) 04/19/2024 01:17 AM    CREATININE 0.65 04/19/2024 01:17 AM         Lab Results   Component Value Date/Time    PROTHROMBTM 14.9 (H) 03/20/2024 07:16 AM    INR 1.16 (H) 03/20/2024 07:16 AM         Lab Results   Component Value Date/Time    WBC 15.1 (H) 04/19/2024 01:17 AM    RBC 3.53 (L) 04/19/2024 01:17 AM    HEMOGLOBIN 9.3 (L) 04/19/2024 01:17 AM    HEMATOCRIT 29.9 (L) 04/19/2024 01:17 AM    MCV 84.7 04/19/2024 01:17 AM    MCH 26.3 (L) 04/19/2024 01:17 AM    MCHC 31.1 (L) 04/19/2024 01:17 AM    MPV 8.3 (L) 04/19/2024 01:17 AM    NEUTSPOLYS 78.30 (H) 03/25/2024 03:34 PM    LYMPHOCYTES 12.20 (L) 03/25/2024 03:34 PM    MONOCYTES 7.40 03/25/2024 03:34 PM    EOSINOPHILS 1.40 03/25/2024 03:34 PM    BASOPHILS 0.40 03/25/2024 03:34 PM    ANISOCYTOSIS 1+ 12/31/2023 01:05 AM         Recent Labs     04/16/24  0129 04/17/24  0137  04/18/24  0132 04/19/24  0117   ASTSGOT 16 13 10* 12   ALTSGPT 17 11 11 9   TBILIRUBIN 0.2 0.2 0.2 0.2   GLOBULIN 3.8* 3.6* 3.9* 4.0*      VTE Prophylaxis: Lovenox     Assessment/Plan  Patient is a 38M with history of necrotizing pancreatitis, s/p exploratory laparotomy and pancreatic necrosectomy.    Exploratory laparotomy, pancreatic necrosectomy, POD #4  - Continue PCA   - Consider long-acting oxy for overnight  - Continue Regular diet  - Continue large abdominal drain to wall suction  - No irrigation to the drain #1  - PT/OT  - OOB and ambulate as tolerated    2. Leukocytosis, much improved, WBC 15.1 from 14.5, afebrile  - Continue Zosyn    3. DVT prophylaxis  - Lovenox

## 2024-04-20 LAB
ALBUMIN SERPL BCP-MCNC: 3 G/DL (ref 3.2–4.9)
ALBUMIN/GLOB SERPL: 0.8 G/DL
ALP SERPL-CCNC: 103 U/L (ref 30–99)
ALT SERPL-CCNC: 8 U/L (ref 2–50)
ANION GAP SERPL CALC-SCNC: 12 MMOL/L (ref 7–16)
AST SERPL-CCNC: 14 U/L (ref 12–45)
BILIRUB SERPL-MCNC: 0.2 MG/DL (ref 0.1–1.5)
BUN SERPL-MCNC: 6 MG/DL (ref 8–22)
CALCIUM ALBUM COR SERPL-MCNC: 9.4 MG/DL (ref 8.5–10.5)
CALCIUM SERPL-MCNC: 8.6 MG/DL (ref 8.5–10.5)
CHLORIDE SERPL-SCNC: 100 MMOL/L (ref 96–112)
CO2 SERPL-SCNC: 25 MMOL/L (ref 20–33)
CREAT SERPL-MCNC: 0.53 MG/DL (ref 0.5–1.4)
ERYTHROCYTE [DISTWIDTH] IN BLOOD BY AUTOMATED COUNT: 43.8 FL (ref 35.9–50)
GFR SERPLBLD CREATININE-BSD FMLA CKD-EPI: 133 ML/MIN/1.73 M 2
GLOBULIN SER CALC-MCNC: 3.9 G/DL (ref 1.9–3.5)
GLUCOSE SERPL-MCNC: 99 MG/DL (ref 65–99)
HCT VFR BLD AUTO: 27.7 % (ref 42–52)
HGB BLD-MCNC: 8.7 G/DL (ref 14–18)
MCH RBC QN AUTO: 25.9 PG (ref 27–33)
MCHC RBC AUTO-ENTMCNC: 31.4 G/DL (ref 32.3–36.5)
MCV RBC AUTO: 82.4 FL (ref 81.4–97.8)
PLATELET # BLD AUTO: 425 K/UL (ref 164–446)
PMV BLD AUTO: 8.2 FL (ref 9–12.9)
POTASSIUM SERPL-SCNC: 3.5 MMOL/L (ref 3.6–5.5)
PROT SERPL-MCNC: 6.9 G/DL (ref 6–8.2)
RBC # BLD AUTO: 3.36 M/UL (ref 4.7–6.1)
SODIUM SERPL-SCNC: 137 MMOL/L (ref 135–145)
WBC # BLD AUTO: 13.7 K/UL (ref 4.8–10.8)

## 2024-04-20 PROCEDURE — 85027 COMPLETE CBC AUTOMATED: CPT

## 2024-04-20 PROCEDURE — 80053 COMPREHEN METABOLIC PANEL: CPT

## 2024-04-20 PROCEDURE — 700102 HCHG RX REV CODE 250 W/ 637 OVERRIDE(OP): Performed by: NURSE PRACTITIONER

## 2024-04-20 PROCEDURE — 770001 HCHG ROOM/CARE - MED/SURG/GYN PRIV*

## 2024-04-20 PROCEDURE — 700102 HCHG RX REV CODE 250 W/ 637 OVERRIDE(OP): Performed by: SURGERY

## 2024-04-20 PROCEDURE — A9270 NON-COVERED ITEM OR SERVICE: HCPCS | Performed by: SURGERY

## 2024-04-20 PROCEDURE — 36415 COLL VENOUS BLD VENIPUNCTURE: CPT

## 2024-04-20 PROCEDURE — 700111 HCHG RX REV CODE 636 W/ 250 OVERRIDE (IP): Mod: JZ | Performed by: NURSE PRACTITIONER

## 2024-04-20 PROCEDURE — A9270 NON-COVERED ITEM OR SERVICE: HCPCS | Performed by: NURSE PRACTITIONER

## 2024-04-20 PROCEDURE — 700105 HCHG RX REV CODE 258: Performed by: NURSE PRACTITIONER

## 2024-04-20 RX ORDER — POTASSIUM CHLORIDE 20 MEQ/1
20 TABLET, EXTENDED RELEASE ORAL DAILY
Status: COMPLETED | OUTPATIENT
Start: 2024-04-20 | End: 2024-04-21

## 2024-04-20 RX ADMIN — ENOXAPARIN SODIUM 40 MG: 100 INJECTION SUBCUTANEOUS at 09:27

## 2024-04-20 RX ADMIN — CELECOXIB 200 MG: 200 CAPSULE ORAL at 06:13

## 2024-04-20 RX ADMIN — AMPICILLIN AND SULBACTAM 3 G: 1; 2 INJECTION, POWDER, FOR SOLUTION INTRAMUSCULAR; INTRAVENOUS at 06:15

## 2024-04-20 RX ADMIN — POTASSIUM CHLORIDE 20 MEQ: 1500 TABLET, EXTENDED RELEASE ORAL at 09:27

## 2024-04-20 RX ADMIN — AMPICILLIN AND SULBACTAM 3 G: 1; 2 INJECTION, POWDER, FOR SOLUTION INTRAMUSCULAR; INTRAVENOUS at 17:02

## 2024-04-20 RX ADMIN — Medication: at 03:54

## 2024-04-20 RX ADMIN — Medication: at 19:46

## 2024-04-20 RX ADMIN — OXYCODONE HYDROCHLORIDE 10 MG: 10 TABLET, FILM COATED, EXTENDED RELEASE ORAL at 06:13

## 2024-04-20 RX ADMIN — Medication: at 09:34

## 2024-04-20 RX ADMIN — OXYCODONE HYDROCHLORIDE 10 MG: 10 TABLET, FILM COATED, EXTENDED RELEASE ORAL at 17:00

## 2024-04-20 RX ADMIN — ACETAMINOPHEN 1000 MG: 500 TABLET, FILM COATED ORAL at 11:53

## 2024-04-20 RX ADMIN — AMPICILLIN AND SULBACTAM 3 G: 1; 2 INJECTION, POWDER, FOR SOLUTION INTRAMUSCULAR; INTRAVENOUS at 11:57

## 2024-04-20 RX ADMIN — ACETAMINOPHEN 1000 MG: 500 TABLET, FILM COATED ORAL at 06:13

## 2024-04-20 RX ADMIN — OMEPRAZOLE 20 MG: 20 CAPSULE, DELAYED RELEASE ORAL at 06:13

## 2024-04-20 ASSESSMENT — PAIN DESCRIPTION - PAIN TYPE
TYPE: ACUTE PAIN

## 2024-04-20 ASSESSMENT — ENCOUNTER SYMPTOMS
WEIGHT LOSS: 0
VOMITING: 0
FEVER: 0
COUGH: 0
CHILLS: 0
NAUSEA: 0
ABDOMINAL PAIN: 1

## 2024-04-20 NOTE — CARE PLAN
Problem: Pain - Standard  Goal: Alleviation of pain or a reduction in pain to the patient’s comfort goal  Outcome: Progressing     Problem: Knowledge Deficit - Standard  Goal: Patient and family/care givers will demonstrate understanding of plan of care, disease process/condition, diagnostic tests and medications  Outcome: Progressing     Problem: Wound/ / Incision Healing  Goal: Patient's wound/surgical incision will decrease in size and heals properly  Outcome: Progressing     The patient is Stable - Low risk of patient condition declining or worsening    Shift Goals  Clinical Goals: IV abx, PCA mgmt, drain mgmt, ambulation  Patient Goals: pain control, rest  Family Goals: not present at this time    Progress made toward(s) clinical / shift goals:  IV abx administered. PCA bag changed. Drains patent. Ambulated to bathroom and ashby. Pain managed per bolus button and MAR.    Patient is not progressing towards the following goals:

## 2024-04-20 NOTE — PROGRESS NOTES
Date of Service  April 20, 2024     Chief Complaint  Status post necrotizing pancreatitis, infected necrotizing pancreatitis, multiple drains      Surgery Completed  1.  Exploratory laparotomy.  2.  Intraoperative ultrasound survey of the pancreatic pseudocyst, stomach and   biliary system.  3.  Open pancreatic necrosectomy with large amounts of necrotic material, foul   smelling, infected  4.  Peritoneal abscess drainage with massive washout using the Pulsavac.    Hospital Course  POD # 5     Interval Problem Update  No acute events  PCA started, patient states improved pain over night  Leukocytosis WBC 13.7 from 15.2, afebrile, switched abx to Unasyn  Tolerating diet, denies N/V  Drain 1: 25 mL   Drain 2: 0 mL  Patient ambulated multiple times    Problem List  Principal Problem:    Necrotizing pancreatitis (POA: Yes)  Active Problems:    SIRS (systemic inflammatory response syndrome) (HCC) (POA: Yes)    Infected pseudocyst of pancreas (POA: Yes)    Peritoneal abscess (HCC) (POA: Yes)  Resolved Problems:    * No resolved hospital problems. *     Subjective  Review of Systems   Constitutional:  Negative for chills, fever, malaise/fatigue and weight loss.   Respiratory:  Negative for cough.    Cardiovascular:  Negative for chest pain.   Gastrointestinal:  Positive for abdominal pain. Negative for nausea and vomiting.   Genitourinary:  Negative for dysuria.      Objective  Temp:  [36.8 °C (98.2 °F)-37 °C (98.6 °F)] 36.8 °C (98.2 °F)  Pulse:  [76-93] 93  Resp:  [16-18] 16  BP: (133-147)/(82-98) 147/98  SpO2:  [94 %-97 %] 97 %      Physical Exam  Vitals and nursing note reviewed.   Constitutional:       General: He is not in acute distress.     Appearance: Normal appearance.   HENT:      Head: Normocephalic and atraumatic.      Nose: Nose normal.      Mouth/Throat:      Pharynx: Oropharynx is clear.   Eyes:      Conjunctiva/sclera: Conjunctivae normal.   Cardiovascular:      Rate and Rhythm: Normal rate.    Pulmonary:      Effort: Pulmonary effort is normal. No respiratory distress.   Abdominal:      General: There is no distension.      Palpations: Abdomen is soft.      Tenderness: There is abdominal tenderness. There is no guarding.      Comments: Abdominal incision with Prevena intact  Irrigation drain at right side of abdomen, with 25 mL output  PALAK drain at RLQ, with 0 mL output  Left abdomen tender   Skin:     General: Skin is warm and dry.      Coloration: Skin is not jaundiced.   Neurological:      Mental Status: He is alert and oriented to person, place, and time.   Psychiatric:         Mood and Affect: Mood normal.         Behavior: Behavior normal.        Fluids    Intake/Output Summary (Last 24 hours) at 4/20/2024 0843  Last data filed at 4/20/2024 0650  Gross per 24 hour   Intake 436 ml   Output 1445 ml   Net -1009 ml         Labs  Lab Results   Component Value Date/Time    SODIUM 137 04/20/2024 04:41 AM    POTASSIUM 3.5 (L) 04/20/2024 04:41 AM    CHLORIDE 100 04/20/2024 04:41 AM    CO2 25 04/20/2024 04:41 AM    GLUCOSE 99 04/20/2024 04:41 AM    BUN 6 (L) 04/20/2024 04:41 AM    CREATININE 0.53 04/20/2024 04:41 AM         Lab Results   Component Value Date/Time    PROTHROMBTM 14.9 (H) 03/20/2024 07:16 AM    INR 1.16 (H) 03/20/2024 07:16 AM         Lab Results   Component Value Date/Time    WBC 13.7 (H) 04/20/2024 04:41 AM    RBC 3.36 (L) 04/20/2024 04:41 AM    HEMOGLOBIN 8.7 (L) 04/20/2024 04:41 AM    HEMATOCRIT 27.7 (L) 04/20/2024 04:41 AM    MCV 82.4 04/20/2024 04:41 AM    MCH 25.9 (L) 04/20/2024 04:41 AM    MCHC 31.4 (L) 04/20/2024 04:41 AM    MPV 8.2 (L) 04/20/2024 04:41 AM    NEUTSPOLYS 78.30 (H) 03/25/2024 03:34 PM    LYMPHOCYTES 12.20 (L) 03/25/2024 03:34 PM    MONOCYTES 7.40 03/25/2024 03:34 PM    EOSINOPHILS 1.40 03/25/2024 03:34 PM    BASOPHILS 0.40 03/25/2024 03:34 PM    ANISOCYTOSIS 1+ 12/31/2023 01:05 AM         Recent Labs     04/16/24  0129 04/17/24  0137 04/18/24  0132 04/19/24  0117  04/20/24  0441   ASTSGOT 16 13 10* 12 14   ALTSGPT 17 11 11 9 8   TBILIRUBIN 0.2 0.2 0.2 0.2 0.2   GLOBULIN 3.8* 3.6* 3.9* 4.0* 3.9*      VTE Prophylaxis: Lovenox     Assessment/Plan  Patient is a 38M with history of necrotizing pancreatitis, s/p exploratory laparotomy and pancreatic necrosectomy.    Exploratory laparotomy, pancreatic necrosectomy, POD #5  - Continue PCA + PO meds (tentatively DC PCA on 4/21/24)  - Continue Regular diet  - Continue large abdominal drain to wall suction  - DC drain #2  - PT/OT  - OOB and ambulate as tolerated    2. Leukocytosis, improved, WBC 13.7 from 15.1, afebrile  - Continue Unasyn    3. DVT prophylaxis  - Continue Lovenox    Plan is for patient to DC home when stable. He is NOT going to a care facility

## 2024-04-20 NOTE — PROGRESS NOTES
RLQ PALAK drain has been removed per order. Gauze and hypafix tape applied to site. Patient tolerated well.

## 2024-04-20 NOTE — PROGRESS NOTES
"Assumed care of patient at 1900. Bedside report received. Assessment complete.    A&O x4. Patient calls appropriately.  Reports 5/10 pain. Pain managed with prescribed medications per MAR.  Denies CP/SOB.   Tolerating regular diet. Denies N/V.  + void. + flatus. - BM. Last BM PTA.  Skin per flowsheets  RUQ PALAK drain to bulb suction. R Arpan drain to LIS. MLI to prevena.  Mobility: standby    /89   Pulse 86   Temp 36.9 °C (98.4 °F) (Temporal)   Resp 18   Ht 1.753 m (5' 9\")   Wt 62.2 kg (137 lb 2 oz)   SpO2 96%   BMI 20.25 kg/m²       All needs met at this time. Call light and belongings within reach. Fall precautions in place. Hourly rounding in place.     "

## 2024-04-20 NOTE — CARE PLAN
The patient is Stable - Low risk of patient condition declining or worsening    Shift Goals  Clinical Goals: pain control, antibiotic regimen, drain management  Patient Goals: pain control  Family Goals: GRANT    Progress made toward(s) clinical / shift goals:  Patient appears to be tolerating new antibiotic regimen. Patient reports pain to be controlled with scheduled interventions. RLQ PALAK drain is scheduled to be removed this afternoon.     Patient is not progressing towards the following goals: Pending DC clearance.

## 2024-04-21 LAB
ERYTHROCYTE [DISTWIDTH] IN BLOOD BY AUTOMATED COUNT: 44.9 FL (ref 35.9–50)
HCT VFR BLD AUTO: 31.2 % (ref 42–52)
HGB BLD-MCNC: 9.6 G/DL (ref 14–18)
MCH RBC QN AUTO: 25.8 PG (ref 27–33)
MCHC RBC AUTO-ENTMCNC: 30.8 G/DL (ref 32.3–36.5)
MCV RBC AUTO: 83.9 FL (ref 81.4–97.8)
PLATELET # BLD AUTO: 438 K/UL (ref 164–446)
PMV BLD AUTO: 9.4 FL (ref 9–12.9)
RBC # BLD AUTO: 3.72 M/UL (ref 4.7–6.1)
WBC # BLD AUTO: 8.6 K/UL (ref 4.8–10.8)

## 2024-04-21 PROCEDURE — 700102 HCHG RX REV CODE 250 W/ 637 OVERRIDE(OP): Performed by: NURSE PRACTITIONER

## 2024-04-21 PROCEDURE — 700111 HCHG RX REV CODE 636 W/ 250 OVERRIDE (IP): Mod: JZ | Performed by: NURSE PRACTITIONER

## 2024-04-21 PROCEDURE — A9270 NON-COVERED ITEM OR SERVICE: HCPCS | Performed by: NURSE PRACTITIONER

## 2024-04-21 PROCEDURE — 85027 COMPLETE CBC AUTOMATED: CPT

## 2024-04-21 PROCEDURE — 770001 HCHG ROOM/CARE - MED/SURG/GYN PRIV*

## 2024-04-21 PROCEDURE — A9270 NON-COVERED ITEM OR SERVICE: HCPCS | Performed by: SURGERY

## 2024-04-21 PROCEDURE — 700102 HCHG RX REV CODE 250 W/ 637 OVERRIDE(OP): Performed by: SURGERY

## 2024-04-21 PROCEDURE — 700105 HCHG RX REV CODE 258: Performed by: NURSE PRACTITIONER

## 2024-04-21 PROCEDURE — 700111 HCHG RX REV CODE 636 W/ 250 OVERRIDE (IP): Performed by: SURGERY

## 2024-04-21 RX ORDER — CELECOXIB 200 MG/1
200 CAPSULE ORAL 2 TIMES DAILY
Status: DISCONTINUED | OUTPATIENT
Start: 2024-04-21 | End: 2024-04-22 | Stop reason: HOSPADM

## 2024-04-21 RX ORDER — HYDROMORPHONE HYDROCHLORIDE 2 MG/ML
1 INJECTION, SOLUTION INTRAMUSCULAR; INTRAVENOUS; SUBCUTANEOUS
Status: DISCONTINUED | OUTPATIENT
Start: 2024-04-21 | End: 2024-04-22 | Stop reason: HOSPADM

## 2024-04-21 RX ORDER — OXYCODONE HYDROCHLORIDE 5 MG/1
5-10 TABLET ORAL
Status: DISCONTINUED | OUTPATIENT
Start: 2024-04-21 | End: 2024-04-22 | Stop reason: HOSPADM

## 2024-04-21 RX ORDER — OXYCODONE HYDROCHLORIDE 5 MG/1
5-10 TABLET ORAL EVERY 4 HOURS PRN
Status: DISCONTINUED | OUTPATIENT
Start: 2024-04-21 | End: 2024-04-21

## 2024-04-21 RX ORDER — OXYCODONE HYDROCHLORIDE 5 MG/1
5 TABLET ORAL EVERY 4 HOURS PRN
Status: DISCONTINUED | OUTPATIENT
Start: 2024-04-21 | End: 2024-04-21

## 2024-04-21 RX ORDER — ACETAMINOPHEN 500 MG
1000 TABLET ORAL EVERY 6 HOURS
Status: DISCONTINUED | OUTPATIENT
Start: 2024-04-22 | End: 2024-04-22 | Stop reason: HOSPADM

## 2024-04-21 RX ADMIN — POTASSIUM CHLORIDE 20 MEQ: 1500 TABLET, EXTENDED RELEASE ORAL at 04:58

## 2024-04-21 RX ADMIN — CELECOXIB 200 MG: 200 CAPSULE ORAL at 19:20

## 2024-04-21 RX ADMIN — CELECOXIB 200 MG: 200 CAPSULE ORAL at 22:15

## 2024-04-21 RX ADMIN — HYDROMORPHONE HYDROCHLORIDE 1 MG: 2 INJECTION, SOLUTION INTRAMUSCULAR; INTRAVENOUS; SUBCUTANEOUS at 14:46

## 2024-04-21 RX ADMIN — CELECOXIB 200 MG: 200 CAPSULE ORAL at 00:06

## 2024-04-21 RX ADMIN — OXYCODONE 5 MG: 5 TABLET ORAL at 22:31

## 2024-04-21 RX ADMIN — OXYCODONE HYDROCHLORIDE 10 MG: 10 TABLET, FILM COATED, EXTENDED RELEASE ORAL at 04:58

## 2024-04-21 RX ADMIN — AMPICILLIN AND SULBACTAM 3 G: 1; 2 INJECTION, POWDER, FOR SOLUTION INTRAMUSCULAR; INTRAVENOUS at 11:56

## 2024-04-21 RX ADMIN — AMPICILLIN AND SULBACTAM 3 G: 1; 2 INJECTION, POWDER, FOR SOLUTION INTRAMUSCULAR; INTRAVENOUS at 00:06

## 2024-04-21 RX ADMIN — OXYCODONE 5 MG: 5 TABLET ORAL at 11:52

## 2024-04-21 RX ADMIN — AMPICILLIN AND SULBACTAM 3 G: 1; 2 INJECTION, POWDER, FOR SOLUTION INTRAMUSCULAR; INTRAVENOUS at 04:55

## 2024-04-21 RX ADMIN — AMPICILLIN AND SULBACTAM 3 G: 1; 2 INJECTION, POWDER, FOR SOLUTION INTRAMUSCULAR; INTRAVENOUS at 17:07

## 2024-04-21 RX ADMIN — ACETAMINOPHEN 1000 MG: 500 TABLET, FILM COATED ORAL at 19:19

## 2024-04-21 RX ADMIN — HYDROMORPHONE HYDROCHLORIDE 1 MG: 2 INJECTION, SOLUTION INTRAMUSCULAR; INTRAVENOUS; SUBCUTANEOUS at 20:48

## 2024-04-21 RX ADMIN — OXYCODONE 5 MG: 5 TABLET ORAL at 19:20

## 2024-04-21 RX ADMIN — ENOXAPARIN SODIUM 40 MG: 100 INJECTION SUBCUTANEOUS at 08:46

## 2024-04-21 RX ADMIN — OMEPRAZOLE 20 MG: 20 CAPSULE, DELAYED RELEASE ORAL at 04:57

## 2024-04-21 RX ADMIN — OXYCODONE HYDROCHLORIDE 10 MG: 10 TABLET, FILM COATED, EXTENDED RELEASE ORAL at 17:03

## 2024-04-21 ASSESSMENT — ENCOUNTER SYMPTOMS
ABDOMINAL PAIN: 1
VOMITING: 0
CHILLS: 0
NAUSEA: 0
COUGH: 0
FEVER: 0
WEIGHT LOSS: 0

## 2024-04-21 ASSESSMENT — PAIN DESCRIPTION - PAIN TYPE
TYPE: ACUTE PAIN;SURGICAL PAIN
TYPE: ACUTE PAIN
TYPE: ACUTE PAIN;SURGICAL PAIN
TYPE: ACUTE PAIN

## 2024-04-21 NOTE — PROGRESS NOTES
Assumed care of patient at 1845. Bedside report received from Nori MOORE. Assessment complete.  AA&Ox4. Denies CP/SOB.  Reporting 4/10 pain. PCA in place and running.  Educated patient regarding pharmacologic and non pharmacologic modalities for pain management.  Skin per flowsheets  Tolerating Regular diet. Denies N/V.  + void. + BM. Last BM 4/20  Pt ambulates with SBA  All needs met at this time. Call light within reach. Pt calls appropriately. Bed low and locked, non skid socks in place. Hourly rounding in place.

## 2024-04-21 NOTE — CARE PLAN
The patient is Stable - Low risk of patient condition declining or worsening    Shift Goals: pain, drain, safety  Clinical Goals: Pain, Drain, Safety  Patient Goals: Pain, Rest  Family Goals: GRANT    Progress made toward(s) clinical / shift goals:  Pt resting comfortably, pain well controlled with current medications.  Pt had BM during day shift.  Pt's tolerating good PO nutrition and maintaining skin integrity.  This RN discussed POC and BTD with Pt, who verbalized understanding.

## 2024-04-21 NOTE — PROGRESS NOTES
Date of Service  April 21, 2024     Chief Complaint  Status post necrotizing pancreatitis, infected necrotizing pancreatitis, multiple drains      Surgery Completed  1.  Exploratory laparotomy.  2.  Intraoperative ultrasound survey of the pancreatic pseudocyst, stomach and   biliary system.  3.  Open pancreatic necrosectomy with large amounts of necrotic material, foul   smelling, infected  4.  Peritoneal abscess drainage with massive washout using the Pulsavac.    Hospital Course  POD # 6     Interval Problem Update  No acute events  PCA started, improved pain control  Leukocytosis WBC 8.6 from 13.7, Unasyn  Tolerating diet, denies N/V, +BM  Drain 1: 0 mL   Patient ambulated multiple times    Problem List  Principal Problem:    Necrotizing pancreatitis (POA: Yes)  Active Problems:    SIRS (systemic inflammatory response syndrome) (HCC) (POA: Yes)    Infected pseudocyst of pancreas (POA: Yes)    Peritoneal abscess (HCC) (POA: Yes)  Resolved Problems:    * No resolved hospital problems. *     Subjective  Review of Systems   Constitutional:  Negative for chills, fever, malaise/fatigue and weight loss.   Respiratory:  Negative for cough.    Cardiovascular:  Negative for chest pain.   Gastrointestinal:  Positive for abdominal pain. Negative for nausea and vomiting.   Genitourinary:  Negative for dysuria.      Objective  Temp:  [36.2 °C (97.2 °F)-36.9 °C (98.4 °F)] 36.6 °C (97.9 °F)  Pulse:  [76-88] 76  Resp:  [16] 16  BP: (124-146)/(81-99) 132/90  SpO2:  [95 %-96 %] 96 %      Physical Exam  Vitals and nursing note reviewed.   Constitutional:       General: He is not in acute distress.     Appearance: Normal appearance.   HENT:      Head: Normocephalic and atraumatic.      Nose: Nose normal.      Mouth/Throat:      Pharynx: Oropharynx is clear.   Eyes:      Conjunctiva/sclera: Conjunctivae normal.   Cardiovascular:      Rate and Rhythm: Normal rate.   Pulmonary:      Effort: Pulmonary effort is normal. No  respiratory distress.   Abdominal:      General: There is no distension.      Palpations: Abdomen is soft.      Tenderness: There is abdominal tenderness. There is no guarding.      Comments: Abdominal incision with Prevena intact  Irrigation drain at right side of abdomen, with 0 mL output  Left abdomen tender   Skin:     General: Skin is warm and dry.      Coloration: Skin is not jaundiced.   Neurological:      Mental Status: He is alert and oriented to person, place, and time.   Psychiatric:         Mood and Affect: Mood normal.         Behavior: Behavior normal.        Fluids    Intake/Output Summary (Last 24 hours) at 4/21/2024 0829  Last data filed at 4/21/2024 0800  Gross per 24 hour   Intake 96 ml   Output 1125 ml   Net -1029 ml         Labs  Lab Results   Component Value Date/Time    SODIUM 137 04/20/2024 04:41 AM    POTASSIUM 3.5 (L) 04/20/2024 04:41 AM    CHLORIDE 100 04/20/2024 04:41 AM    CO2 25 04/20/2024 04:41 AM    GLUCOSE 99 04/20/2024 04:41 AM    BUN 6 (L) 04/20/2024 04:41 AM    CREATININE 0.53 04/20/2024 04:41 AM         Lab Results   Component Value Date/Time    PROTHROMBTM 14.9 (H) 03/20/2024 07:16 AM    INR 1.16 (H) 03/20/2024 07:16 AM         Lab Results   Component Value Date/Time    WBC 13.7 (H) 04/20/2024 04:41 AM    RBC 3.36 (L) 04/20/2024 04:41 AM    HEMOGLOBIN 8.7 (L) 04/20/2024 04:41 AM    HEMATOCRIT 27.7 (L) 04/20/2024 04:41 AM    MCV 82.4 04/20/2024 04:41 AM    MCH 25.9 (L) 04/20/2024 04:41 AM    MCHC 31.4 (L) 04/20/2024 04:41 AM    MPV 8.2 (L) 04/20/2024 04:41 AM    NEUTSPOLYS 78.30 (H) 03/25/2024 03:34 PM    LYMPHOCYTES 12.20 (L) 03/25/2024 03:34 PM    MONOCYTES 7.40 03/25/2024 03:34 PM    EOSINOPHILS 1.40 03/25/2024 03:34 PM    BASOPHILS 0.40 03/25/2024 03:34 PM    ANISOCYTOSIS 1+ 12/31/2023 01:05 AM         Recent Labs     04/16/24  0129 04/17/24  0137 04/18/24  0132 04/19/24  0117 04/20/24  0441   ASTSGOT 16 13 10* 12 14   ALTSGPT 17 11 11 9 8   TBILIRUBIN 0.2 0.2 0.2 0.2 0.2    GLOBULIN 3.8* 3.6* 3.9* 4.0* 3.9*      VTE Prophylaxis: Lovenox     Assessment/Plan  Patient is a 38M with history of necrotizing pancreatitis, s/p exploratory laparotomy and pancreatic necrosectomy.    Exploratory laparotomy, pancreatic necrosectomy, POD #6  - DC PCA  - Prevena dressing removed  - Continue Regular diet  - Place large abdominal drain to bile bag   - PT/OT  - OOB and ambulate as tolerated    2. Leukocytosis, WBC 8.6, afebrile  - Continue Unasyn    3. DVT prophylaxis  - Continue Lovenox    Tentatively patient is going home tomorrow 4/22/24

## 2024-04-21 NOTE — PROGRESS NOTES
Report received from RN, assumed care at 0645  Pt is A0X4, and responds appropriately   Pt declines any SOB, chest pain, new onset of numbness/ tingling  Pt rates pain at 3/10, on a scale of 1-10, pt medicated per MAR via PCA  Pt is voiding adequately and without hesitancy  Pt has flatus, normoactive bowel sounds, BM on 4/20  Pt ambulates with a standby assist   Pt is tolerating a regular diet, pt denies any nausea/vomiting  Plan of care discussed, all questions answered. Explained importance of calling before getting OOB and pt verbalizes understanding. Explained importance of oral care. Call light is within reach, treaded slipper socks on, bed in lowest/ locked position, hourly rounding in place, all needs met at this time

## 2024-04-22 ENCOUNTER — PHARMACY VISIT (OUTPATIENT)
Dept: PHARMACY | Facility: MEDICAL CENTER | Age: 39
End: 2024-04-22
Payer: COMMERCIAL

## 2024-04-22 VITALS
HEIGHT: 69 IN | SYSTOLIC BLOOD PRESSURE: 154 MMHG | WEIGHT: 137.13 LBS | DIASTOLIC BLOOD PRESSURE: 86 MMHG | TEMPERATURE: 98.2 F | BODY MASS INDEX: 20.31 KG/M2 | RESPIRATION RATE: 16 BRPM | HEART RATE: 85 BPM | OXYGEN SATURATION: 97 %

## 2024-04-22 DIAGNOSIS — K86.3 INFECTED PSEUDOCYST OF PANCREAS: ICD-10-CM

## 2024-04-22 DIAGNOSIS — K85.91 NECROTIZING PANCREATITIS: ICD-10-CM

## 2024-04-22 DIAGNOSIS — K83.8 DILATION OF BILIARY TRACT: ICD-10-CM

## 2024-04-22 PROBLEM — R65.10 SIRS (SYSTEMIC INFLAMMATORY RESPONSE SYNDROME) (HCC): Status: RESOLVED | Noted: 2023-12-26 | Resolved: 2024-04-22

## 2024-04-22 PROBLEM — K65.1 PERITONEAL ABSCESS (HCC): Status: RESOLVED | Noted: 2024-04-09 | Resolved: 2024-04-22

## 2024-04-22 LAB
ERYTHROCYTE [DISTWIDTH] IN BLOOD BY AUTOMATED COUNT: 42.9 FL (ref 35.9–50)
HCT VFR BLD AUTO: 29 % (ref 42–52)
HGB BLD-MCNC: 9.5 G/DL (ref 14–18)
MCH RBC QN AUTO: 26.6 PG (ref 27–33)
MCHC RBC AUTO-ENTMCNC: 32.8 G/DL (ref 32.3–36.5)
MCV RBC AUTO: 81.2 FL (ref 81.4–97.8)
PLATELET # BLD AUTO: 430 K/UL (ref 164–446)
PMV BLD AUTO: 8.3 FL (ref 9–12.9)
RBC # BLD AUTO: 3.57 M/UL (ref 4.7–6.1)
WBC # BLD AUTO: 5.9 K/UL (ref 4.8–10.8)

## 2024-04-22 PROCEDURE — 700102 HCHG RX REV CODE 250 W/ 637 OVERRIDE(OP): Performed by: SURGERY

## 2024-04-22 PROCEDURE — A9270 NON-COVERED ITEM OR SERVICE: HCPCS | Performed by: NURSE PRACTITIONER

## 2024-04-22 PROCEDURE — 700105 HCHG RX REV CODE 258: Performed by: NURSE PRACTITIONER

## 2024-04-22 PROCEDURE — 700102 HCHG RX REV CODE 250 W/ 637 OVERRIDE(OP): Performed by: NURSE PRACTITIONER

## 2024-04-22 PROCEDURE — 99024 POSTOP FOLLOW-UP VISIT: CPT | Performed by: SURGERY

## 2024-04-22 PROCEDURE — 700111 HCHG RX REV CODE 636 W/ 250 OVERRIDE (IP): Mod: JZ | Performed by: NURSE PRACTITIONER

## 2024-04-22 PROCEDURE — RXMED WILLOW AMBULATORY MEDICATION CHARGE: Performed by: NURSE PRACTITIONER

## 2024-04-22 PROCEDURE — A9270 NON-COVERED ITEM OR SERVICE: HCPCS | Performed by: SURGERY

## 2024-04-22 PROCEDURE — 700111 HCHG RX REV CODE 636 W/ 250 OVERRIDE (IP): Performed by: SURGERY

## 2024-04-22 PROCEDURE — 85027 COMPLETE CBC AUTOMATED: CPT

## 2024-04-22 PROCEDURE — 302105 PEDIATRIC UROSTOMY POUCH: Performed by: SURGERY

## 2024-04-22 RX ORDER — POLYETHYLENE GLYCOL 3350 17 G/17G
17 POWDER, FOR SOLUTION ORAL DAILY
Qty: 10 PACKET | Refills: 0 | Status: SHIPPED | OUTPATIENT
Start: 2024-04-22 | End: 2024-05-11

## 2024-04-22 RX ORDER — AMOXICILLIN AND CLAVULANATE POTASSIUM 875; 125 MG/1; MG/1
1 TABLET, FILM COATED ORAL 2 TIMES DAILY
Qty: 14 TABLET | Refills: 0 | Status: ACTIVE | OUTPATIENT
Start: 2024-04-22 | End: 2024-05-08

## 2024-04-22 RX ORDER — CELECOXIB 200 MG/1
200 CAPSULE ORAL 2 TIMES DAILY
Qty: 20 CAPSULE | Refills: 0 | Status: SHIPPED | OUTPATIENT
Start: 2024-04-22 | End: 2024-05-11

## 2024-04-22 RX ORDER — OXYCODONE HYDROCHLORIDE 5 MG/1
5-10 TABLET ORAL
Qty: 30 TABLET | Refills: 0 | Status: SHIPPED | OUTPATIENT
Start: 2024-04-22 | End: 2024-04-30 | Stop reason: SDUPTHER

## 2024-04-22 RX ORDER — OXYCODONE HCL 10 MG/1
10 TABLET, FILM COATED, EXTENDED RELEASE ORAL 2 TIMES DAILY
Qty: 14 TABLET | Refills: 0 | Status: SHIPPED | OUTPATIENT
Start: 2024-04-22 | End: 2024-05-08

## 2024-04-22 RX ORDER — AMOXICILLIN AND CLAVULANATE POTASSIUM 875; 125 MG/1; MG/1
1 TABLET, FILM COATED ORAL 2 TIMES DAILY
Qty: 10 TABLET | Refills: 0 | Status: ACTIVE | OUTPATIENT
Start: 2024-04-22 | End: 2024-04-22

## 2024-04-22 RX ORDER — DOCUSATE SODIUM 100 MG/1
100 CAPSULE, LIQUID FILLED ORAL 2 TIMES DAILY
Qty: 20 CAPSULE | Refills: 0 | Status: SHIPPED | OUTPATIENT
Start: 2024-04-22 | End: 2024-05-11

## 2024-04-22 RX ORDER — ACETAMINOPHEN 500 MG
1000 TABLET ORAL EVERY 6 HOURS
Qty: 30 TABLET | Refills: 0 | Status: ON HOLD | OUTPATIENT
Start: 2024-04-22 | End: 2024-05-16

## 2024-04-22 RX ADMIN — AMPICILLIN AND SULBACTAM 3 G: 1; 2 INJECTION, POWDER, FOR SOLUTION INTRAMUSCULAR; INTRAVENOUS at 04:53

## 2024-04-22 RX ADMIN — ACETAMINOPHEN 1000 MG: 500 TABLET, FILM COATED ORAL at 00:01

## 2024-04-22 RX ADMIN — OMEPRAZOLE 20 MG: 20 CAPSULE, DELAYED RELEASE ORAL at 04:55

## 2024-04-22 RX ADMIN — CELECOXIB 200 MG: 200 CAPSULE ORAL at 04:55

## 2024-04-22 RX ADMIN — ENOXAPARIN SODIUM 40 MG: 100 INJECTION SUBCUTANEOUS at 09:31

## 2024-04-22 RX ADMIN — OXYCODONE 5 MG: 5 TABLET ORAL at 04:54

## 2024-04-22 RX ADMIN — OXYCODONE HYDROCHLORIDE 10 MG: 10 TABLET, FILM COATED, EXTENDED RELEASE ORAL at 04:55

## 2024-04-22 RX ADMIN — OXYCODONE 10 MG: 5 TABLET ORAL at 01:49

## 2024-04-22 RX ADMIN — ACETAMINOPHEN 1000 MG: 500 TABLET, FILM COATED ORAL at 04:55

## 2024-04-22 RX ADMIN — HYDROMORPHONE HYDROCHLORIDE 1 MG: 2 INJECTION, SOLUTION INTRAMUSCULAR; INTRAVENOUS; SUBCUTANEOUS at 00:13

## 2024-04-22 RX ADMIN — AMPICILLIN AND SULBACTAM 3 G: 1; 2 INJECTION, POWDER, FOR SOLUTION INTRAMUSCULAR; INTRAVENOUS at 00:02

## 2024-04-22 ASSESSMENT — ENCOUNTER SYMPTOMS
NAUSEA: 0
FEVER: 0
COUGH: 0
WEIGHT LOSS: 0
CHILLS: 0
ABDOMINAL PAIN: 1
VOMITING: 0

## 2024-04-22 ASSESSMENT — PAIN DESCRIPTION - PAIN TYPE
TYPE: ACUTE PAIN
TYPE: ACUTE PAIN

## 2024-04-22 NOTE — CARE PLAN
The patient is Stable - Low risk of patient condition declining or worsening    Shift Goals: Pain, Safety  Clinical Goals: Pain, Safety  Patient Goals: Pain, Rest  Family Goals: GRANT    Progress made toward(s) clinical / shift goals:  Pt resting in bed, pain manageable with current medication.  Pt reported BM during day shift.  Pt tolerating adequate PO intake and nutrition.

## 2024-04-22 NOTE — PROGRESS NOTES
Report received from RN, assumed care at 0645  Pt is A0X4, and responds appropriately   Pt declines any SOB, chest pain, new onset of numbness/ tingling  Pt rates pain at 3/10, on a scale of 1-10, pt medicated per MAR  Pt is voiding adequately and without hesitancy  Pt has flatus, normoactive bowel sounds, BM on 4/22  Pt ambulates with a self  Pt is tolerating a regular diet, pt denies any nausea/vomiting  Plan of care discussed, all questions answered. Explained importance of calling before getting OOB and pt verbalizes understanding. Explained importance of oral care. Call light is within reach, treaded slipper socks on, bed in lowest/ locked position, hourly rounding in place, all needs met at this time

## 2024-04-22 NOTE — PROGRESS NOTES
Date of Service  April 22, 2024     Chief Complaint  Status post necrotizing pancreatitis, infected necrotizing pancreatitis, multiple drains      Surgery Completed  1.  Exploratory laparotomy.  2.  Intraoperative ultrasound survey of the pancreatic pseudocyst, stomach and   biliary system.  3.  Open pancreatic necrosectomy with large amounts of necrotic material, foul   smelling, infected  4.  Peritoneal abscess drainage with massive washout using the Pulsavac.    Hospital Course  POD # 7     Interval Problem Update  No acute events  PCA stopped, changed to only PO meds  Leukocytosis WBC 8.6 on 4/21, AM labs pending  Tolerating diet, denies N/V, +BM  Drain 1: 125 mL, but no output overnight, bagged   Prevena removed    Problem List  Principal Problem:    Necrotizing pancreatitis (POA: Yes)  Active Problems:    SIRS (systemic inflammatory response syndrome) (HCC) (POA: Yes)    Infected pseudocyst of pancreas (POA: Yes)    Peritoneal abscess (HCC) (POA: Yes)  Resolved Problems:    * No resolved hospital problems. *     Subjective  Review of Systems   Constitutional:  Negative for chills, fever, malaise/fatigue and weight loss.   Respiratory:  Negative for cough.    Cardiovascular:  Negative for chest pain.   Gastrointestinal:  Positive for abdominal pain. Negative for nausea and vomiting.   Genitourinary:  Negative for dysuria.      Objective  Temp:  [36.5 °C (97.7 °F)-37.1 °C (98.7 °F)] 36.9 °C (98.4 °F)  Pulse:  [73-86] 73  Resp:  [16] 16  BP: (132-150)/(83-94) 145/91  SpO2:  [95 %-99 %] 97 %      Physical Exam  Vitals and nursing note reviewed.   Constitutional:       General: He is not in acute distress.     Appearance: Normal appearance.   HENT:      Head: Normocephalic and atraumatic.      Nose: Nose normal.      Mouth/Throat:      Pharynx: Oropharynx is clear.   Eyes:      Conjunctiva/sclera: Conjunctivae normal.   Cardiovascular:      Rate and Rhythm: Normal rate.   Pulmonary:      Effort: Pulmonary  effort is normal. No respiratory distress.   Abdominal:      General: There is no distension.      Palpations: Abdomen is soft.      Tenderness: There is abdominal tenderness. There is no guarding.      Comments: Prevena removed, incision intact  Irrigation drain at right side of abdomen, bagged  Left abdomen tender   Skin:     General: Skin is warm and dry.      Coloration: Skin is not jaundiced.   Neurological:      Mental Status: He is alert and oriented to person, place, and time.   Psychiatric:         Mood and Affect: Mood normal.         Behavior: Behavior normal.        Fluids    Intake/Output Summary (Last 24 hours) at 4/22/2024 0706  Last data filed at 4/22/2024 0600  Gross per 24 hour   Intake 252 ml   Output 1500 ml   Net -1248 ml         Labs  Lab Results   Component Value Date/Time    SODIUM 137 04/20/2024 04:41 AM    POTASSIUM 3.5 (L) 04/20/2024 04:41 AM    CHLORIDE 100 04/20/2024 04:41 AM    CO2 25 04/20/2024 04:41 AM    GLUCOSE 99 04/20/2024 04:41 AM    BUN 6 (L) 04/20/2024 04:41 AM    CREATININE 0.53 04/20/2024 04:41 AM         Lab Results   Component Value Date/Time    PROTHROMBTM 14.9 (H) 03/20/2024 07:16 AM    INR 1.16 (H) 03/20/2024 07:16 AM         Lab Results   Component Value Date/Time    WBC 8.6 04/21/2024 08:37 AM    RBC 3.72 (L) 04/21/2024 08:37 AM    HEMOGLOBIN 9.6 (L) 04/21/2024 08:37 AM    HEMATOCRIT 31.2 (L) 04/21/2024 08:37 AM    MCV 83.9 04/21/2024 08:37 AM    MCH 25.8 (L) 04/21/2024 08:37 AM    MCHC 30.8 (L) 04/21/2024 08:37 AM    MPV 9.4 04/21/2024 08:37 AM    NEUTSPOLYS 78.30 (H) 03/25/2024 03:34 PM    LYMPHOCYTES 12.20 (L) 03/25/2024 03:34 PM    MONOCYTES 7.40 03/25/2024 03:34 PM    EOSINOPHILS 1.40 03/25/2024 03:34 PM    BASOPHILS 0.40 03/25/2024 03:34 PM    ANISOCYTOSIS 1+ 12/31/2023 01:05 AM         Recent Labs     04/16/24  0129 04/17/24  0137 04/18/24  0132 04/19/24  0117 04/20/24  0441   ASTSGOT 16 13 10* 12 14   ALTSGPT 17 11 11 9 8   TBILIRUBIN 0.2 0.2 0.2 0.2 0.2    GLOBULIN 3.8* 3.6* 3.9* 4.0* 3.9*      VTE Prophylaxis: Lovenox     Assessment/Plan  Patient is a 38M with history of necrotizing pancreatitis, s/p exploratory laparotomy and pancreatic necrosectomy.    Exploratory laparotomy, pancreatic necrosectomy, POD #7  - Continue PO pain meds  - Continue Regular diet  - Continue drain bag  - PT/OT  - OOB and ambulate as tolerated    2. Leukocytosis, WBC 8.6 on 4/21/24  - Continue Unasyn  - Transition to PO Augmentin at discharge    3. DVT prophylaxis  - Continue Lovenox    Plan for patient is going home today 4/22/24    Continue on PO Augmentin  Keep drain bag in place  Repeat CT scan in 1 week  See back in outpatient surgery clinic on 4/30/24

## 2024-04-22 NOTE — PROGRESS NOTES
Assumed care of patient at 1845. Bedside report received from Chris MOORE. Assessment complete.  AA&Ox4. Denies CP/SOB.  Reporting 8/10 pain. Medicated per MAR.   Educated patient regarding pharmacologic and non pharmacologic modalities for pain management.  Skin per flowsheets  Tolerating Regular diet. Denies N/V.  + void. + BM. Last BM 4/21  Pt ambulates with SBA  All needs met at this time. Call light within reach. Pt calls appropriately. Bed low and locked, non skid socks in place. Hourly rounding in place.

## 2024-04-22 NOTE — DISCHARGE INSTRUCTIONS
Surgery Discharge Instructions    Abelardo Fenton   Age: 38 y.o.   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  (ie - ok to walk, climb stairs, ect)  Do not engage in strenuous activity for 7-10 days.   Do not lift more than 10 pounds for the next 4-6 weeks.   It is important you are up walking several times a day to decrease the risk of a blood clot     Diet:  Resume regular diet as tolerated. To reduce risk of post-operative nausea and vomiting avoid spicy or greasy foods; eat small, frequent meals and maintain adequate fluid intake.    Medication(s):   Current Discharge Medication List        START taking these medications    Details   oxyCODONE CR (OXYCONTIN) 10 MG Tablet Extended Release 12 hour Abuse-Deterrent Take 1 Tablet by mouth 2 times a day for 10 days.  Qty: 14 Tablet, Refills: 0    Associated Diagnoses: Peritoneal abscess (HCC); S/P exploratory laparotomy; Infected pseudocyst of pancreas      celecoxib (CELEBREX) 200 MG Cap Take 1 Capsule by mouth 2 times a day for 10 days.  Qty: 20 Capsule, Refills: 0    Associated Diagnoses: Peritoneal abscess (HCC); S/P exploratory laparotomy; Infected pseudocyst of pancreas      docusate sodium (COLACE) 100 MG Cap Take 1 Capsule by mouth 2 times a day for 10 days.  Qty: 20 Capsule, Refills: 0    Associated Diagnoses: S/P exploratory laparotomy; Intractable abdominal pain      polyethylene glycol/lytes  (MIRALAX) Pack Take 1 Packet by mouth every day for 10 days.  Qty: 10 Packet, Refills: 0    Associated Diagnoses: S/P exploratory laparotomy; Intractable abdominal pain      amoxicillin-clavulanate (AUGMENTIN) 875-125 MG Tab Take 1 Tablet by mouth 2 times a day for 7 days.  Qty: 14 Tablet, Refills: 0    Associated Diagnoses: Peritoneal abscess (HCC); S/P exploratory laparotomy; Infected pseudocyst of pancreas           CONTINUE these medications which have CHANGED    Details   acetaminophen (TYLENOL) 500 MG Tab Take 2 Tablets by mouth every 6 hours.  Qty: 30 Tablet, Refills: 0    Associated Diagnoses: Peritoneal abscess (HCC); S/P exploratory laparotomy; Infected pseudocyst of pancreas      oxyCODONE immediate-release (ROXICODONE) 5 MG Tab Take 1-2 Tablets by mouth every 3 hours as needed for Severe Pain for up to 10 days.  Qty: 30 Tablet, Refills: 0    Associated Diagnoses: Peritoneal abscess (HCC); S/P exploratory laparotomy; Infected pseudocyst of pancreas           CONTINUE these medications which have NOT CHANGED    Details   senna-docusate (PERICOLACE OR SENOKOT S) 8.6-50 MG Tab Take 1 Tablet by mouth as needed for Constipation.      omeprazole (PRILOSEC) 20 MG delayed-release capsule Take 20 mg by mouth every day.      Methocarbamol 1000 MG Tab Take 1,000 mg by mouth 2 times a day.      metoprolol tartrate (LOPRESSOR) 25 MG Tab Take 1 Tablet by mouth 2 times a day.  Qty: 60 Tablet, Refills: 0    Associated Diagnoses: Primary hypertension           STOP taking these medications       amoxicillin (AMOXIL) 500 MG Cap Comments:   Reason for Stopping:         ketorolac (TORADOL) 10 MG Tab Comments:   Reason for Stopping:              See prescription(s); take as directed.   You do NOT have to take all of your prescription pain medication.  Take only as needed if pain is not controlled with over-the-counter medications (ex: Tylenol, ibuprofen).    If you are taking narcotic pain medications be sure to take a stool  softener (available over the counter - ex: miralax, magnesium citrate, dulcolax/bisacoidyl) to reduce risk of constipation.  Additionally, make sure that you are taking in enough fluids.  Do NOT drive or make any important decision while taking prescription pain medication.    Wound Care:      Your incision has been covered with staples. The staples will be removed next time you come to clinic.    Do NOT apply any creams, lotions, etc to the incision unless you have been specifically instructed to do so by your surgical team.    It is normal to have a small amount of clear/yellow/pink drainage from you incision as it heals.    OK to shower and wash gently with soap and water  Please call the surgical clinic if you have:              - increasing drainage from incision              - change in the color of drainage from you incision              - redness more than 1 fingerbreadth (1 centimeter) from the incision edge   - increasing pain   - fever more than 101F  Avoid exposing your incision to the sun    Bathing/Showering:    Please shower daily starting in 24 hours. You may wash over incisions with regular soap and water and pat dry.  Ok to allow water from the shower to run over you incision.  Avoid submerging you incision under water (no baths, swimming or hot tubs) until fully healed.      Reducing post discharge nausea or vomiting:    Limit mobility, take slow, deep breaths    Restrictions:   No smoking  No alcohol while taking prescription pain medications  No strenuous activity until cleared by surgery clinic  No heavy lifting (more than 10 lbs) for at least the next 4-6 weeks  No driving while taking prescription pain medication  No driving until you have the mobility to be a safe     Additional Instructions:   If you experience chest pain or shortness of breath, or have an acute emergency - please call 911    Please call clinic or seek evaluation at the ER if you have any of the following:  - Fever  >101 F      - Wound infection (increasing redness, swelling, drainage, pus)     - Severe pain not controlled with oral medications     - Severe nausea or vomiting, inability to tolerate PO intake     - Bleeding that does not stop with holding pressure to the area       - Yellowing of the skin or eyes     - Change in mental status (confusion or lethargy)      - New numbness or weakness      - Any other concerns.    Follow-up:    Please follow-up in Surgery clinic in 1 week  Please call clinic to confirm appointment [phone]  Please keep all follow-up appointments

## 2024-04-22 NOTE — DISCHARGE SUMMARY
Discharge Summary    CHIEF COMPLAINT ON ADMISSION  Necrotizing pancreatitis, scheduled for resection    Reason for Admission  Necrotizing pancreatitis     Admission Date  4/15/2024    CODE STATUS  Full Code    HPI & HOSPITAL COURSE  This is a 38 y.o. male here with status post necrotizing pancreatitis, infected necrotizing pancreatitis, multiple drains being placed.  A peritoneal abscess, sepsis, septic pictures anemia and poor p.o. intake, who was scheduled for a   necrosectomy with multiple possibilities.  After a long discussion with the   patient and being consented, he elected to go forward.    Surgery  1.  Exploratory laparotomy.  2.  Intraoperative ultrasound survey of the pancreatic pseudocyst, stomach and   biliary system.  3.  Open pancreatic necrosectomy with large amounts of necrotic material, foul   smelling, infected  4.  Peritoneal abscess drainage with massive washout using the Pulsavac.    Pathology  FINAL DIAGNOSIS:   A. Cavity abscess:          Abundant necrotic hemorrhagic debris reactive fibrosis and           bacterial organisms admixed.          No malignancy identified.     Epidural anesthesia was placed preoperatively, along with Howard catheter.   Patient diet was advanced in standard gradual fashion, and patient tolerated well without complaints of N/V.    Patient was OOB and ambulated with support of nursing staff and PT team.   Epidural and Howard was discontinued before discharge, and patient was transitioned to PO pain medications. Drain was removed before discharge.    Surgical dressing over the abdominal midline surgical incision was removed before discharge. Wound care instructions provided to patient.     Therefore, he is discharged in good and stable condition to home with close outpatient follow-up.    The patient met 2-midnight criteria for an inpatient stay at the time of discharge.    Discharge Date  4/22/24    DISCHARGE DIAGNOSES  Principal Problem (Resolved):    Necrotizing  pancreatitis (POA: Yes)  Active Problems:    * No active hospital problems. *  Resolved Problems:    SIRS (systemic inflammatory response syndrome) (HCC) (POA: Yes)    Infected pseudocyst of pancreas (POA: Yes)    Peritoneal abscess (HCC) (POA: Yes)      FOLLOW UP  No future appointments.  NATHANAEL Spaulding  [address] [address]  [phone]    Follow up on 4/30/2024      SAMUEL Mckinney  [address] [facility] [id number] [phone]    Schedule an appointment as soon as possible for a visit        MEDICATIONS ON DISCHARGE     Medication List        START taking these medications        Instructions   amoxicillin-clavulanate 875-125 MG Tabs  Commonly known as: Augmentin   Take 1 Tablet by mouth 2 times a day for 7 days.  Dose: 1 Tablet     celecoxib 200 MG Caps  Commonly known as: CeleBREX   Take 1 Capsule by mouth 2 times a day for 10 days.  Dose: 200 mg     docusate sodium 100 MG Caps  Commonly known as: Colace   Take 1 Capsule by mouth 2 times a day for 10 days.  Dose: 100 mg     * oxyCODONE CR 10 MG T12a  Commonly known as: OxyCONTIN  Replaces: oxyCODONE immediate release 10 MG immediate release tablet   Take 1 Tablet by mouth 2 times a day for 10 days.  Dose: 10 mg     * oxyCODONE immediate-release 5 MG Tabs  Commonly known as: Roxicodone   Take 1-2 Tablets by mouth every 3 hours as needed for Severe Pain for up to 10 days.  Dose: 5-10 mg     polyethylene glycol/lytes Pack  Commonly known as: MiraLax   Take 1 Packet by mouth every day for 10 days.  Dose: 17 g           * This list has 2 medication(s) that are the same as other medications prescribed for you. Read the directions carefully, and ask your doctor or other care provider to review them with you.                CHANGE how you take these medications        Instructions   acetaminophen 500 MG Tabs  What changed:   when to take this  reasons to take this  Commonly known as: Tylenol   Take 2 Tablets by  mouth every 6 hours.  Dose: 1,000 mg            CONTINUE taking these medications        Instructions   metoprolol tartrate 25 MG Tabs  Commonly known as: Lopressor   Take 1 Tablet by mouth 2 times a day.  Dose: 25 mg     omeprazole 20 MG delayed-release capsule  Commonly known as: PriLOSEC   Take 20 mg by mouth every day.  Dose: 20 mg            STOP taking these medications      amoxicillin 500 MG Caps  Commonly known as: Amoxil     ketorolac 10 MG Tabs  Commonly known as: Toradol     oxyCODONE immediate release 10 MG immediate release tablet  Commonly known as: Roxicodone  Replaced by: oxyCODONE CR 10 MG T12a            ASK your doctor about these medications        Instructions   Methocarbamol 1000 MG Tabs   Take 1,000 mg by mouth 2 times a day.  Dose: 1,000 mg     senna-docusate 8.6-50 MG Tabs  Commonly known as: Pericolace Or Senokot S   Take 1 Tablet by mouth as needed for Constipation.  Dose: 1 Tablet              Allergies  No Known Allergies    DIET  Orders Placed This Encounter   Procedures    Diet Order Diet: Regular     Standing Status:   Standing     Number of Occurrences:   1     Order Specific Question:   Diet:     Answer:   Regular [1]     ACTIVITY  As tolerated.  Weight bearing as tolerated    LABORATORY  Lab Results   Component Value Date    SODIUM 137 04/20/2024    POTASSIUM 3.5 (L) 04/20/2024    CHLORIDE 100 04/20/2024    CO2 25 04/20/2024    GLUCOSE 99 04/20/2024    BUN 6 (L) 04/20/2024    CREATININE 0.53 04/20/2024        Lab Results   Component Value Date    WBC 5.9 04/22/2024    HEMOGLOBIN 9.5 (L) 04/22/2024    HEMATOCRIT 29.0 (L) 04/22/2024    PLATELETCT 430 04/22/2024        Total time of the discharge process exceeds 35 minutes.

## 2024-04-22 NOTE — DISCHARGE PLANNING
Case Management Discharge Planning    Admission Date: 4/15/2024  GMLOS: 9  ALOS: 7    6-Clicks ADL Score: 23  6-Clicks Mobility Score: 21      Anticipated Discharge Dispo: Discharge Disposition: Discharged to home/self care (01)  Discharge Address: [facility]    DME Needed: No    Action(s) Taken: Updated Provider/Nurse on Discharge Plan  Per Chart: Pt is medically cleared and plans are to continue on PO Augmentin, keep drain bag in place.  Plan  is repeat CT scan in 1 week  and Pt will be seen in outpatient surgery clinic on 4/30/24       Escalations Completed: None    Medically Clear: Yes    Next Steps:   CM to continue to assist Pt with discharge as needed    Barriers to Discharge: None    Is the patient up for discharge tomorrow: No

## 2024-04-22 NOTE — PROGRESS NOTES
D/c instructions given, educated on worsening s/s. Pt understands and questions answered. D/c home with mother.

## 2024-04-23 NOTE — PROGRESS NOTES
Subjective:   4/30/2024  9:31 AM  Primary care physician: SAMUEL Mckinney  Referring Provider: Hospital consult      Chief Complaint:   Chief Complaint   Patient presents with    Post-op     PANCREATITIS  PSEUDOCYST  RESECT 4/15  CT 4/29        Diagnosis:   1. Necrotizing pancreatitis        2. Infected pseudocyst of pancreas        3. Dilation of biliary tract        4. Severe acute pancreatitis        5. Intractable abdominal pain        6. Peritoneal abscess (HCC)          History of presenting illness:    Abelardo Fenton is a pleasant 38 y.o. male with past medical history of pancreatitis with pseudocyst who presented to the ED on 3/14/2024 with worsening abdominal pain, significant for sepsis.     CT abdomen pelvis showing pancreatitis with large multifocal peripancreatic fluid collections within the largest component seen extending inferiorly from the pancreatic head into the mesentery into the upper pelvis measuring 18 x 16 x 12 cm in size, new multifocal areas of gas within those fluid collections with suggest presence of infective pseudocyst.       Patient underwent IR drain placement on 2/15/24. Repeat CT imaging on 2/20/24 noted slightly decreased now 13 x 10 cm, formerly 18 x 16 x 12 cm - large pancreatic fluid collection/pseudocyst. Patient to continue with drain in place, flushing it daily, continue with oral antibiotic therapy (augmentin, reviewed with ID), was discharged with plans for follow up with hepatobiliary surgery.     Update 3/5/24  He is here today for follow up from recent hospitalization.  The patient was admitted to the hospital with what appears to be necrotizing pancreatitis which then became infected.  IR drains have been placed in order to get him over the septic.  And then we will consult centimeters.  The patient had a large pseudocyst on imaging that extended from the head of the pancreas along the body.  The bulk of the  pseudocyst and infected collection with the air pockets was located in the right upper quadrant towards the gallbladder.  Patient did do well in the hospital was discharged home with his drain and on long-term antibiotics.  He now returns with his parents.  He denies any nausea or vomiting but did have a low-grade fever just the other day at 100.2.  The drain itself has been putting out about 10 to 20 cc a day and it does appear to be purulent.  Unfortunate his last set of imaging which I reviewed was from February 20 we do not have any new imaging to see where or how large the remnant cyst is.  Patient is being active but cannot return to work because of his fatigue and his intermittent pain.  Patient has not had any recent drinking.  He does appear to look much better than he was in the hospital.  He is here with his parents to discuss neck steps.  We reviewed the patient's cultures as well.  Long with his labs and imaging.     Update 4/2/24  CT ABDOMEN W/O on 3/12/24 noted large complex pancreatic pseudocyst with suspended microbubbles consistent with semisolid material and residual contrast from the preceding abscessogram. Most notably, there is contrast opacification confirmed into the colon at the mid transverse colon and splenic flexure of the colon concordant with findings on the abscessogram. One suspects a fistulous communication at the inferior medial aspect of the pseudocyst which directly abuts and compresses the transverse colon.     On 3/12/24 he had procedure by Dr Howard for pseudocyst abscessogram and exchange drain from 12F to 14F pigtail. Heavy growth of Ecoli. Drain was exchanged/upsized on 3/20/24.     CT ABDOMEN/PELVIS on 3/25/24 noted known large pancreatic pseudocyst which contains multiple areas of gas which contains a large bore percutaneous drain. This is slightly smaller in size than comparison. There are again seen components extending into the pancreatic tail as well as the splenic  hilum. Again seen attenuation of the portal vein, superior mesenteric vein and splenic vein without complete occlusion. Small of free fluid dependently within the pelvis. Again seen is intrahepatic biliary ductal prominence.     Patient was seen in ED on 3/25/24 for concern with drain problems. Drain catheter was exchanged on 3/26/24 by Dr Haley     He is here today for follow-up visit status post repeat CT scan which was done on March 25, 2024.  It shows that the cyst actually has shrunk slightly after the drain has been changed over several times.  Patient continues to complain of discomfort and that the drainage fluid is still foul-smelling but is very low volume.  Possibly 20 to 30 cc a day.  It is purulent with no sign of bile or fecal material.  There was a question of a colonic fistula but that is somewhat difficult to assess on fistulogram due to the the unusual nature of a pseudocyst.  It would be unlikely but could happen when he develops a fistula to the colon but has appears to be extremely small.  He is here with his parents to discuss neck steps.  I personally reviewed the imaging studies several weeks intact since March 12 to March 25.     Update 4/9/24  He is here today for follow-up status post repeat CT scan.  Patient had multiple drains placed over the last 2 weeks with upsizing him and it appears as though he had slight shrinkage by several centimeters in 1 dimension but the rest of the cyst appears to be intact.  There is large amount of air and debris in the cyst consistent with necrotizing pancreatitis.  It is definitely infected and is foul-smelling drainage.  The patient is here with his family discuss neck steps.  I personally reviewed the patient's CT scan from April 8, 2024 as well as a 1 week ago.  Sent show there is minimal to no change.  Continues to lose weight and have abdominal discomfort.    Update 4/30/24  On 4/15/24 he completed surgery by Dr Sainz, including  1.   Exploratory laparotomy.  2.  Intraoperative ultrasound survey of the pancreatic pseudocyst, stomach and   biliary system.  3.  Open pancreatic necrosectomy with large amounts of necrotic material, foul   smelling, infected  4.  Peritoneal abscess drainage with massive washout using the Pulsavac.  He was discharged home on 4/22/24.    CT ABDOMEN on 4/29/24 noted decreased, now 2.2 x 1.0 cm fluid collection at the tail the pancreas. Resolved fluid collection in the right anterior abdominal peritoneum. However, there is now residual air-containing cavitary fistulous tract measuring 3.5 cm. This air containing cavity freely communicates with the overlying right anterior abdominal wall.    He is here today for follow up from recent surgery. He reports he has been doing well. Pain is controlled. He is back to normal diet and basic activities. He reports minimal output from fistula tract into wound bag. He is eager to return to work. He is here with his mom. He has been taking prescribed antibiotics and pain medications.    Past Medical History:   Diagnosis Date    High cholesterol     4/11/2024 not medicated    Hyperlipemia     Hypertension     medicated    Pancreatic cyst     Pancreatitis      Past Surgical History:   Procedure Laterality Date    RI ULTRASONIC GUIDANCE, INTRAOPERATIVE N/A 4/15/2024    Procedure: INTRA-OPERATIVE ULTRASOUND GUIDANCE;  Surgeon: Blair Sainz M.D.;  Location: SUR[facility];  Service: General    RI EXPLORATORY OF ABDOMEN N/A 4/15/2024    Procedure: DRAINAGE PERITONEAL ABSCESS;  Surgeon: Blair Sainz M.D.;  Location: SUR[facility];  Service: General    HEPATICOJEJUNOSTOMY, CAITLYN-EN-Y N/A 4/15/2024    Procedure: PANCREATIC NECROSECTOMY;  Surgeon: Blair Sainz M.D.;  Location: SUR[facility]ERY [facility];  Service: General    STENT PLACEMENT Right 2024    for pancreatic pseudocyst     No Known Allergies  Outpatient Encounter Medications as of 4/30/2024    Medication Sig Dispense Refill    acetaminophen (TYLENOL) 500 MG Tab Take 2 Tablets by mouth every 6 hours. 30 Tablet 0    oxyCODONE CR (OXYCONTIN) 10 MG Tablet Extended Release 12 hour Abuse-Deterrent Take 1 Tablet by mouth 2 times a day for 10 days. 14 Tablet 0    oxyCODONE immediate-release (ROXICODONE) 5 MG Tab Take 1-2 Tablets by mouth every 3 hours as needed for Severe Pain for up to 10 days. 30 Tablet 0    celecoxib (CELEBREX) 200 MG Cap Take 1 Capsule by mouth 2 times a day for 10 days. 20 Capsule 0    docusate sodium (COLACE) 100 MG Cap Take 1 Capsule by mouth 2 times a day for 10 days. 20 Capsule 0    polyethylene glycol/lytes (MIRALAX) Pack Mix 1 Packet per package instructions and drink by mouth every day for 10 days. 10 Packet 0    senna-docusate (PERICOLACE OR SENOKOT S) 8.6-50 MG Tab Take 1 Tablet by mouth as needed for Constipation.      omeprazole (PRILOSEC) 20 MG delayed-release capsule Take 20 mg by mouth every day.      Methocarbamol 1000 MG Tab Take 1,000 mg by mouth 2 times a day.      metoprolol tartrate (LOPRESSOR) 25 MG Tab Take 1 Tablet by mouth 2 times a day. 60 Tablet 0    [] amoxicillin-clavulanate (AUGMENTIN) 875-125 MG Tab Take 1 Tablet by mouth 2 times a day for 7 days. 14 Tablet 0     No facility-administered encounter medications on file as of 2024.     Social History     Socioeconomic History    Marital status: Single     Spouse name: Not on file    Number of children: Not on file    Years of education: Not on file    Highest education level: Not on file   Occupational History    Not on file   Tobacco Use    Smoking status: Never     Passive exposure: Never    Smokeless tobacco: Never   Vaping Use    Vaping Use: Never used   Substance and Sexual Activity    Alcohol use: Not Currently     Comment: No alcohol since 25    Drug use: Never    Sexual activity: Yes   Other Topics Concern    Not on file   Social History Narrative    Not on file     Social Determinants  "of Health     Financial Resource Strain: Not on file   Food Insecurity: Not on file   Transportation Needs: Not on file   Physical Activity: Not on file   Stress: Not on file   Social Connections: Not on file   Intimate Partner Violence: Not on file   Housing Stability: Not on file      Social History     Tobacco Use   Smoking Status Never    Passive exposure: Never   Smokeless Tobacco Never     Social History     Substance and Sexual Activity   Alcohol Use Not Currently    Comment: No alcohol since 12/25/23     Social History     Substance and Sexual Activity   Drug Use Never      No family history on file.    Review of Systems   Constitutional:  Negative for chills, fever, malaise/fatigue and weight loss.   Respiratory:  Negative for cough and shortness of breath.    Cardiovascular:  Negative for chest pain.   Gastrointestinal:  Negative for abdominal pain, constipation, diarrhea, nausea and vomiting.        Some yellow/white output at abdominal fistula site        Objective:   /86 (BP Location: Left arm, Patient Position: Sitting, BP Cuff Size: Adult)   Pulse 74   Temp 36.5 °C (97.7 °F) (Temporal)   Ht 1.753 m (5' 9\")   Wt 59.9 kg (132 lb)   SpO2 100%   BMI 19.49 kg/m²     Physical Exam  Vitals reviewed.   Constitutional:       Appearance: Normal appearance. He is not ill-appearing.   HENT:      Head: Normocephalic and atraumatic.      Nose: Nose normal.      Mouth/Throat:      Pharynx: Oropharynx is clear.   Eyes:      Conjunctiva/sclera: Conjunctivae normal.   Cardiovascular:      Rate and Rhythm: Normal rate.   Pulmonary:      Effort: Pulmonary effort is normal. No respiratory distress.   Abdominal:      General: There is no distension.      Palpations: Abdomen is soft.      Tenderness: There is no abdominal tenderness. There is no guarding.      Comments: Abdominal incision well approximated, surgical staples, non-tender  RLQ fistula tract with approx 9 mm opening, yellow/white discharge, wound " bag in place   Musculoskeletal:         General: Normal range of motion.   Skin:     General: Skin is warm and dry.   Neurological:      Mental Status: He is alert and oriented to person, place, and time.   Psychiatric:         Mood and Affect: Mood normal.         Behavior: Behavior normal.       Labs   Latest Reference Range & Units 04/22/24 07:04   WBC 4.8 - 10.8 K/uL 5.9   RBC 4.70 - 6.10 M/uL 3.57 (L)   Hemoglobin 14.0 - 18.0 g/dL 9.5 (L)   Hematocrit 42.0 - 52.0 % 29.0 (L)   MCV 81.4 - 97.8 fL 81.2 (L)   MCH 27.0 - 33.0 pg 26.6 (L)   MCHC 32.3 - 36.5 g/dL 32.8   RDW 35.9 - 50.0 fL 42.9   Platelet Count 164 - 446 K/uL 430   MPV 9.0 - 12.9 fL 8.3 (L)   (L): Data is abnormally low      Latest Reference Range & Units 04/20/24 04:41   Sodium 135 - 145 mmol/L 137   Potassium 3.6 - 5.5 mmol/L 3.5 (L)   Chloride 96 - 112 mmol/L 100   Co2 20 - 33 mmol/L 25   Anion Gap 7.0 - 16.0  12.0   Glucose 65 - 99 mg/dL 99   Bun 8 - 22 mg/dL 6 (L)   Creatinine 0.50 - 1.40 mg/dL 0.53   GFR (CKD-EPI) >60 mL/min/1.73 m 2 133   Calcium 8.5 - 10.5 mg/dL 8.6   Correct Calcium 8.5 - 10.5 mg/dL 9.4   AST(SGOT) 12 - 45 U/L 14   ALT(SGPT) 2 - 50 U/L 8   Alkaline Phosphatase 30 - 99 U/L 103 (H)   Total Bilirubin 0.1 - 1.5 mg/dL 0.2   Albumin 3.2 - 4.9 g/dL 3.0 (L)   Total Protein 6.0 - 8.2 g/dL 6.9   Globulin 1.9 - 3.5 g/dL 3.9 (H)   A-G Ratio g/dL 0.8   (L): Data is abnormally low  (H): Data is abnormally high     Imaging  CT ABDOMEN (4/29/24)  IMPRESSION:  1. Decreased, now 2.2 x 1.0 cm fluid collection at the tail the pancreas.  2. Resolved fluid collection in the right anterior abdominal peritoneum. However, there is now residual air-containing cavitary fistulous tract measuring 3.5 cm. This air containing cavity freely communicates with the overlying right anterior abdominal   wall.    CT PANCREAS (4/8/24)  IMPRESSION:  1.  There has been interval advancement and repositioning of large caliber abscess drainage catheter in the abdomen  since prior.  2.  Abscess collection along the inferior aspect of the pancreas tail region appears improved.  3.  There does remain a very large thick walled abscess collection in the right abdomen with mottled gas and debris which is only slightly decreased in size since the previous exam.  4.  Additional findings as detailed above.     CT A/P (3/25/24)  IMPRESSION:     1.  Again seen large pancreatic pseudocyst which contains multiple areas of gas which contains a large bore percutaneous drain. This is slightly smaller in size than comparison. There are again seen components extending into the pancreatic tail as well   as the splenic hilum.     2.  Again seen attenuation of the portal vein, superior mesenteric vein and splenic vein without complete occlusion.     3.  Small of free fluid dependently within the pelvis.     4.  Again seen is intrahepatic biliary ductal prominence.     CT ABDOMEN W/O (3/12/24)  IMPRESSION:  1.  Large complex pancreatic pseudocyst with suspended microbubbles consistent with semisolid material and residual contrast from the preceding abscessogram.  2.  Most notably, there is contrast opacification confirmed into the colon at the mid transverse colon and splenic flexure of the colon concordant with findings on the abscessogram. One suspects a fistulous communication at the inferior medial aspect of   the pseudocyst which directly abuts and compresses the transverse colon.  3.  The case was discussed (call report) with DR AGUAYO immediately following the scan. Plan is for the patient to schedule an outpatient appointment with Dr. KERNS at the soonest convenience.     CT A/P (2/20/23)  FINDINGS:  Lung: Linear density in the right lower lobe again noted, consistent with scarring or subsegmental atelectasis. Tiny simple left pleural effusion has developed. Heart is normal in size.  Liver: No focal liver lesion  Spleen: Normal in size, without focal lesion  Adrenal glands: Normal  Pancreas:  When measuring at the same location as the previous exam, there is a slightly decreased 13 x 10 cm pancreatic fluid and air collection. Drainage catheter is present within the collection. Additional fluid extends along the pancreas to the level   of the spleen.  Gallbladder: No radiodense stone visualized.  Biliary: No biliary duct dilation visualized.  Kidneys: No stone or hydronephrosis. No cystic or solid renal lesion.  Vasculature: Nonaneurysmal  Lymph nodes: No adenopathy  Bowel: No evidence of obstruction or acute inflammation  Appendix:  Not visualized  Peritoneum: Mild ascites within the pelvis  Pelvis: Urinary bladder is grossly normal. No pelvic mass or adenopathy.  Musculoskeletal: No acute abnormality or concerning osseous lesion     IMPRESSION:  1. Slightly decreased large pancreatic fluid collection/pseudocyst. Air within the collection raise the possibility of superimposed infection.  2. Minimal ascites within the pelvis.  3. New tiny simple left pleural effusion.     Pathology  PATH (4/15/24)  FINAL DIAGNOSIS:     A. Cavity abscess:          Abundant necrotic hemorrhagic debris reactive fibrosis and           bacterial organisms admixed.          No malignancy identified.     PATH (3/12/24)  Ecoli - heavy growth     Procedures  Surgery (4/15/23)  1.  Exploratory laparotomy.  2.  Intraoperative ultrasound survey of the pancreatic pseudocyst, stomach and   biliary system.  3.  Open pancreatic necrosectomy with large amounts of necrotic material, foul   smelling, infected  4.  Peritoneal abscess drainage with massive washout using the Pulsavac.    IR catheter exchange (3/26/24)     PROCEDURE (3/20/24)  IMPRESSION:     1.  Over-the-wire exchange of a 14 [place] locking loop pigtail catheter within a large right midabdomen complex pseudocyst for an upsized 18 [place] locking loop pigtail catheter.  2.  Abscessogram demonstrating large residual collection.     PROCEDURE (3/12/24)  Procedure(s): PSEUDOCYST  ABSCESSOGRAM AND EXCHANGE UPSIZE FROM 12F TO 14F PIGTAIL      Peritoneal drain placed on 2/15/24    Diagnosis:     1. Necrotizing pancreatitis        2. Infected pseudocyst of pancreas        3. Dilation of biliary tract        4. Severe acute pancreatitis        5. Intractable abdominal pain        6. Peritoneal abscess (HCC)          Medical Decision Making:  Today's Assessment / Status / Plan:     Overall, Abelardo Fenton appears to be recovering well from recent surgery. The patient is tolerating a regular diet and is back to basic daily activities.     Surgical pain appears to be controlled, and she denies N/V. The surgical incisions appear to be healing as expected.     Surgical staples were removed, and steri strips placed, and wound care instructions provided. Wound bag over fistula tract removed, and area cleaned, new dressing applied.    Patient cleared for return to work with restrictions to lifting < 20 lb, until 2 more weeks.    Updated prescription of pain medication sent.    Referral to wound clinic sent, suggested placement of wound vac to help healing of fistula tract.    Patient will follow up with us once complete in wound care clinic.    Xander MANUEL NP, have entered, reviewed and confirmed the above diagnoses related to this patient on this date of service, as per the time and date noted at top of this note.

## 2024-04-29 ENCOUNTER — HOSPITAL ENCOUNTER (OUTPATIENT)
Dept: RADIOLOGY | Facility: MEDICAL CENTER | Age: 39
End: 2024-04-29
Attending: NURSE PRACTITIONER
Payer: COMMERCIAL

## 2024-04-29 DIAGNOSIS — K83.8 DILATION OF BILIARY TRACT: ICD-10-CM

## 2024-04-29 DIAGNOSIS — K86.3 INFECTED PSEUDOCYST OF PANCREAS: ICD-10-CM

## 2024-04-29 DIAGNOSIS — K85.91 NECROTIZING PANCREATITIS: ICD-10-CM

## 2024-04-29 PROCEDURE — 74170 CT ABD WO CNTRST FLWD CNTRST: CPT

## 2024-04-29 PROCEDURE — 700117 HCHG RX CONTRAST REV CODE 255: Performed by: NURSE PRACTITIONER

## 2024-04-29 RX ADMIN — IOHEXOL 100 ML: 350 INJECTION, SOLUTION INTRAVENOUS at 14:13

## 2024-04-29 NOTE — DOCUMENTATION QUERY
"                                                                         [facility]                                                                       Query Response Note      PATIENT:               TATI VASQUEZ  ACCT #:                  [id number]  MRN:                     [id number]  :                      1985  ADMIT DATE:       4/15/2024 8:11 AM  DISCH DATE:        2024 1:21 PM  RESPONDING  PROVIDER #:        [id number]           QUERY TEXT:    In your clinical judgment, please clarify the sepsis with organ dysfunction further.      Note: If you agree with a diagnosis listed above, please remember to include it in your concurrent daily documentation and onto the Discharge Summary.    Sepsis - real or suspected infection plus 2 or more SIRS criteria + organ dysfunction related to sepsis    Examples of organ dysfunction:  - acute renal failure  - acute respiratory failure  - critical illness myopathy  - critical illness polymyopathy  - disseminated intravascular coagulopathy (DIC)  - encephalopathy  - hepatic failure  - lactic acidosis  - septic shock    Temp <96.8 or >101  HR >90  RR >20  WBC <4,000 or > 12,000 or >10% bandemia         The patient's Clinical Indicators include:  38 year old male admitted for necrotizing pancreatitis.     Fors APRN/Emeli;  Discharge Summary \"A peritoneal abscess, sepsis, septic pictures anemia and poor p.o. intake, who was scheduled for a necrosectomy with multiple possibilities.\"     Fors APRN \"SIRS (systemic inflammatory response syndrome) (HCC) (POA: Yes)\"  \"Leukocytosis, likely following surgery, WBC 24.1, afebrile',  Continue Zosyn\"    RN flowsheets :118/72, P 89, T 97.6, R 16, 99%   WBC 24.1 (no WBC on admission 4/15)    Risk factors:  Necrotizing pancreatitis, Exploratory laparotomy   Treatment: Labs, Exploratory laparotomy, pancreatic necrosectomy, ABX    If you have any questions, please contact:  Meena Nunez RN [facility]" [facility]  [email or web address]  Meena Nunez [address] [employer]  Options provided:   -- Sepsis without organ dysfunction, sepsis ruled out   -- Sepsis with organ dysfunction exists, (provide SIRS criteria plus sepsis-related organ dysfunction)   -- SIRS without organ dysfunction, sepsis ruled out   -- SIRS of non-infectious origin   -- Other explanation, Please specify      Query created by: Meena Nunez on 4/29/2024 7:18 AM    RESPONSE TEXT:    SIRS without organ dysfunction, sepsis ruled out          Electronically signed by:  POLLY AGUAYO MD 4/29/2024 2:57 PM

## 2024-04-30 ENCOUNTER — OFFICE VISIT (OUTPATIENT)
Dept: SURGICAL ONCOLOGY | Facility: MEDICAL CENTER | Age: 39
End: 2024-04-30
Payer: COMMERCIAL

## 2024-04-30 VITALS
OXYGEN SATURATION: 100 % | WEIGHT: 132 LBS | TEMPERATURE: 97.7 F | HEIGHT: 69 IN | DIASTOLIC BLOOD PRESSURE: 86 MMHG | HEART RATE: 74 BPM | BODY MASS INDEX: 19.55 KG/M2 | SYSTOLIC BLOOD PRESSURE: 118 MMHG

## 2024-04-30 DIAGNOSIS — R10.9 INTRACTABLE ABDOMINAL PAIN: ICD-10-CM

## 2024-04-30 DIAGNOSIS — K83.8 DILATION OF BILIARY TRACT: ICD-10-CM

## 2024-04-30 DIAGNOSIS — K85.91 NECROTIZING PANCREATITIS: ICD-10-CM

## 2024-04-30 DIAGNOSIS — K85.90 SEVERE ACUTE PANCREATITIS: ICD-10-CM

## 2024-04-30 DIAGNOSIS — K65.1 PERITONEAL ABSCESS (HCC): ICD-10-CM

## 2024-04-30 DIAGNOSIS — K86.3 INFECTED PSEUDOCYST OF PANCREAS: ICD-10-CM

## 2024-04-30 DIAGNOSIS — Z98.890 S/P EXPLORATORY LAPAROTOMY: ICD-10-CM

## 2024-04-30 RX ORDER — OXYCODONE HYDROCHLORIDE 5 MG/1
5-10 TABLET ORAL
Qty: 30 TABLET | Refills: 0 | Status: SHIPPED | OUTPATIENT
Start: 2024-04-30 | End: 2024-05-07

## 2024-04-30 ASSESSMENT — ENCOUNTER SYMPTOMS
CHILLS: 0
COUGH: 0
VOMITING: 0
NAUSEA: 0
CONSTIPATION: 0
DIARRHEA: 0
ABDOMINAL PAIN: 0
WEIGHT LOSS: 0
FEVER: 0
SHORTNESS OF BREATH: 0

## 2024-04-30 ASSESSMENT — FIBROSIS 4 INDEX: FIB4 SCORE: 0.41

## 2024-05-01 ENCOUNTER — PHARMACY VISIT (OUTPATIENT)
Dept: PHARMACY | Facility: MEDICAL CENTER | Age: 39
End: 2024-05-01

## 2024-05-15 ENCOUNTER — HOSPITAL ENCOUNTER (EMERGENCY)
Facility: MEDICAL CENTER | Age: 39
End: 2024-05-15
Attending: EMERGENCY MEDICINE
Payer: COMMERCIAL

## 2024-05-15 ENCOUNTER — APPOINTMENT (OUTPATIENT)
Dept: RADIOLOGY | Facility: MEDICAL CENTER | Age: 39
End: 2024-05-15
Attending: EMERGENCY MEDICINE
Payer: COMMERCIAL

## 2024-05-15 ENCOUNTER — HOSPITAL ENCOUNTER (INPATIENT)
Facility: MEDICAL CENTER | Age: 39
LOS: 5 days | DRG: 380 | End: 2024-05-20
Attending: INTERNAL MEDICINE | Admitting: STUDENT IN AN ORGANIZED HEALTH CARE EDUCATION/TRAINING PROGRAM
Payer: COMMERCIAL

## 2024-05-15 VITALS
RESPIRATION RATE: 16 BRPM | WEIGHT: 138.67 LBS | TEMPERATURE: 98.9 F | HEART RATE: 97 BPM | SYSTOLIC BLOOD PRESSURE: 137 MMHG | HEIGHT: 69 IN | DIASTOLIC BLOOD PRESSURE: 85 MMHG | OXYGEN SATURATION: 98 % | BODY MASS INDEX: 20.54 KG/M2

## 2024-05-15 DIAGNOSIS — K29.00 ACUTE GASTRITIS WITHOUT HEMORRHAGE, UNSPECIFIED GASTRITIS TYPE: ICD-10-CM

## 2024-05-15 DIAGNOSIS — K55.069 SUPERIOR MESENTERIC VEIN THROMBOSIS (HCC): ICD-10-CM

## 2024-05-15 DIAGNOSIS — I81 PORTAL VEIN THROMBOSIS: ICD-10-CM

## 2024-05-15 DIAGNOSIS — K63.2 ABDOMINAL FISTULA: ICD-10-CM

## 2024-05-15 DIAGNOSIS — K85.91 NECROTIZING PANCREATITIS: ICD-10-CM

## 2024-05-15 DIAGNOSIS — R10.12 LUQ ABDOMINAL PAIN: ICD-10-CM

## 2024-05-15 DIAGNOSIS — K65.1 INTRA-ABDOMINAL ABSCESS (HCC): ICD-10-CM

## 2024-05-15 PROBLEM — R10.9 ABDOMINAL PAIN: Status: ACTIVE | Noted: 2024-05-15

## 2024-05-15 LAB
ALBUMIN SERPL BCP-MCNC: 4 G/DL (ref 3.2–4.9)
ALBUMIN/GLOB SERPL: 1 G/DL
ALP SERPL-CCNC: 434 U/L (ref 30–99)
ALT SERPL-CCNC: 152 U/L (ref 2–50)
ANION GAP SERPL CALC-SCNC: 13 MMOL/L (ref 7–16)
APPEARANCE UR: CLEAR
APTT PPP: 30.7 SEC (ref 24.7–36)
AST SERPL-CCNC: 225 U/L (ref 12–45)
BASOPHILS # BLD AUTO: 0.5 % (ref 0–1.8)
BASOPHILS # BLD: 0.04 K/UL (ref 0–0.12)
BILIRUB SERPL-MCNC: 0.8 MG/DL (ref 0.1–1.5)
BILIRUB UR QL STRIP.AUTO: NEGATIVE
BUN SERPL-MCNC: 9 MG/DL (ref 8–22)
CALCIUM ALBUM COR SERPL-MCNC: 9.5 MG/DL (ref 8.5–10.5)
CALCIUM SERPL-MCNC: 9.5 MG/DL (ref 8.4–10.2)
CHLORIDE SERPL-SCNC: 100 MMOL/L (ref 96–112)
CO2 SERPL-SCNC: 25 MMOL/L (ref 20–33)
COLOR UR: YELLOW
CREAT SERPL-MCNC: 0.74 MG/DL (ref 0.5–1.4)
EOSINOPHIL # BLD AUTO: 0.05 K/UL (ref 0–0.51)
EOSINOPHIL NFR BLD: 0.6 % (ref 0–6.9)
ERYTHROCYTE [DISTWIDTH] IN BLOOD BY AUTOMATED COUNT: 55.9 FL (ref 35.9–50)
GFR SERPLBLD CREATININE-BSD FMLA CKD-EPI: 120 ML/MIN/1.73 M 2
GLOBULIN SER CALC-MCNC: 3.9 G/DL (ref 1.9–3.5)
GLUCOSE SERPL-MCNC: 128 MG/DL (ref 65–99)
GLUCOSE UR STRIP.AUTO-MCNC: NEGATIVE MG/DL
HCT VFR BLD AUTO: 38.6 % (ref 42–52)
HGB BLD-MCNC: 12.2 G/DL (ref 14–18)
IMM GRANULOCYTES # BLD AUTO: 0.02 K/UL (ref 0–0.11)
IMM GRANULOCYTES NFR BLD AUTO: 0.2 % (ref 0–0.9)
INR PPP: 1.1 (ref 0.87–1.13)
KETONES UR STRIP.AUTO-MCNC: NEGATIVE MG/DL
LEUKOCYTE ESTERASE UR QL STRIP.AUTO: NEGATIVE
LIPASE SERPL-CCNC: 20 U/L (ref 11–82)
LYMPHOCYTES # BLD AUTO: 0.89 K/UL (ref 1–4.8)
LYMPHOCYTES NFR BLD: 10.3 % (ref 22–41)
MCH RBC QN AUTO: 27.6 PG (ref 27–33)
MCHC RBC AUTO-ENTMCNC: 31.6 G/DL (ref 32.3–36.5)
MCV RBC AUTO: 87.3 FL (ref 81.4–97.8)
MICRO URNS: NORMAL
MONOCYTES # BLD AUTO: 0.94 K/UL (ref 0–0.85)
MONOCYTES NFR BLD AUTO: 10.9 % (ref 0–13.4)
NEUTROPHILS # BLD AUTO: 6.66 K/UL (ref 1.82–7.42)
NEUTROPHILS NFR BLD: 77.5 % (ref 44–72)
NITRITE UR QL STRIP.AUTO: NEGATIVE
NRBC # BLD AUTO: 0 K/UL
NRBC BLD-RTO: 0 /100 WBC (ref 0–0.2)
PH UR STRIP.AUTO: 7 [PH] (ref 5–8)
PLATELET # BLD AUTO: 311 K/UL (ref 164–446)
PMV BLD AUTO: 9.2 FL (ref 9–12.9)
POTASSIUM SERPL-SCNC: 4 MMOL/L (ref 3.6–5.5)
PROT SERPL-MCNC: 7.9 G/DL (ref 6–8.2)
PROT UR QL STRIP: NEGATIVE MG/DL
PROTHROMBIN TIME: 14.7 SEC (ref 12–14.6)
RBC # BLD AUTO: 4.42 M/UL (ref 4.7–6.1)
RBC UR QL AUTO: NEGATIVE
SODIUM SERPL-SCNC: 138 MMOL/L (ref 135–145)
SP GR UR STRIP.AUTO: 1.01
UFH PPP CHRO-ACNC: <0.1 IU/ML
WBC # BLD AUTO: 8.6 K/UL (ref 4.8–10.8)

## 2024-05-15 PROCEDURE — 99222 1ST HOSP IP/OBS MODERATE 55: CPT | Performed by: STUDENT IN AN ORGANIZED HEALTH CARE EDUCATION/TRAINING PROGRAM

## 2024-05-15 RX ORDER — OXYCODONE HYDROCHLORIDE AND ACETAMINOPHEN 5; 325 MG/1; MG/1
1 TABLET ORAL EVERY 4 HOURS PRN
Status: SHIPPED | COMMUNITY
End: 2024-05-24

## 2024-05-15 RX ORDER — METHOCARBAMOL 500 MG/1
1000 TABLET, FILM COATED ORAL PRN
Status: ON HOLD | COMMUNITY
End: 2024-05-16

## 2024-05-15 RX ORDER — HYDROMORPHONE HYDROCHLORIDE 1 MG/ML
0.5 INJECTION, SOLUTION INTRAMUSCULAR; INTRAVENOUS; SUBCUTANEOUS
Status: DISCONTINUED | OUTPATIENT
Start: 2024-05-15 | End: 2024-05-16

## 2024-05-15 RX ORDER — OXYCODONE HYDROCHLORIDE 10 MG/1
10 TABLET ORAL EVERY 4 HOURS PRN
Status: SHIPPED | COMMUNITY
End: 2024-05-24

## 2024-05-15 RX ORDER — KETOROLAC TROMETHAMINE 10 MG/1
10 TABLET, FILM COATED ORAL EVERY 6 HOURS PRN
Status: ON HOLD | COMMUNITY
End: 2024-05-16

## 2024-05-15 RX ORDER — LIDOCAINE HYDROCHLORIDE 20 MG/ML
15 SOLUTION OROPHARYNGEAL ONCE
Status: COMPLETED | OUTPATIENT
Start: 2024-05-15 | End: 2024-05-15

## 2024-05-15 RX ORDER — HEPARIN SODIUM 1000 [USP'U]/ML
40 INJECTION, SOLUTION INTRAVENOUS; SUBCUTANEOUS PRN
Status: DISCONTINUED | OUTPATIENT
Start: 2024-05-15 | End: 2024-05-15 | Stop reason: HOSPADM

## 2024-05-15 RX ORDER — HEPARIN SODIUM 1000 [USP'U]/ML
80 INJECTION, SOLUTION INTRAVENOUS; SUBCUTANEOUS ONCE
Status: COMPLETED | OUTPATIENT
Start: 2024-05-15 | End: 2024-05-15

## 2024-05-15 RX ORDER — HEPARIN SODIUM 5000 [USP'U]/100ML
0-30 INJECTION, SOLUTION INTRAVENOUS CONTINUOUS
Status: DISCONTINUED | OUTPATIENT
Start: 2024-05-15 | End: 2024-05-15 | Stop reason: HOSPADM

## 2024-05-15 RX ORDER — CELECOXIB 200 MG/1
200 CAPSULE ORAL 2 TIMES DAILY
Status: ON HOLD | COMMUNITY
End: 2024-05-16

## 2024-05-15 RX ORDER — ONDANSETRON 2 MG/ML
4 INJECTION INTRAMUSCULAR; INTRAVENOUS ONCE
Status: COMPLETED | OUTPATIENT
Start: 2024-05-15 | End: 2024-05-15

## 2024-05-15 RX ORDER — HEPARIN SODIUM 5000 [USP'U]/100ML
0-30 INJECTION, SOLUTION INTRAVENOUS CONTINUOUS
Status: DISCONTINUED | OUTPATIENT
Start: 2024-05-15 | End: 2024-05-17

## 2024-05-15 RX ORDER — HEPARIN SODIUM 1000 [USP'U]/ML
40 INJECTION, SOLUTION INTRAVENOUS; SUBCUTANEOUS PRN
Status: DISCONTINUED | OUTPATIENT
Start: 2024-05-15 | End: 2024-05-17

## 2024-05-15 RX ORDER — OXYCODONE HYDROCHLORIDE 5 MG/1
5 TABLET ORAL
Status: DISCONTINUED | OUTPATIENT
Start: 2024-05-15 | End: 2024-05-16

## 2024-05-15 RX ORDER — TRAZODONE HYDROCHLORIDE 50 MG/1
50 TABLET ORAL NIGHTLY
COMMUNITY

## 2024-05-15 RX ORDER — SODIUM CHLORIDE, SODIUM LACTATE, POTASSIUM CHLORIDE, CALCIUM CHLORIDE 600; 310; 30; 20 MG/100ML; MG/100ML; MG/100ML; MG/100ML
INJECTION, SOLUTION INTRAVENOUS CONTINUOUS
Status: ACTIVE | OUTPATIENT
Start: 2024-05-15 | End: 2024-05-16

## 2024-05-15 RX ORDER — AMOXICILLIN AND CLAVULANATE POTASSIUM 875; 125 MG/1; MG/1
1 TABLET, FILM COATED ORAL 2 TIMES DAILY
Status: ON HOLD | COMMUNITY
End: 2024-05-18

## 2024-05-15 RX ORDER — OXYCODONE HYDROCHLORIDE 10 MG/1
10 TABLET ORAL
Status: DISCONTINUED | OUTPATIENT
Start: 2024-05-15 | End: 2024-05-16

## 2024-05-15 RX ORDER — MORPHINE SULFATE 4 MG/ML
4 INJECTION INTRAVENOUS ONCE
Status: COMPLETED | OUTPATIENT
Start: 2024-05-15 | End: 2024-05-15

## 2024-05-15 RX ADMIN — LIDOCAINE HYDROCHLORIDE 15 ML: 20 SOLUTION ORAL at 20:33

## 2024-05-15 RX ADMIN — ONDANSETRON 4 MG: 2 INJECTION INTRAMUSCULAR; INTRAVENOUS at 17:23

## 2024-05-15 RX ADMIN — HYDROMORPHONE HYDROCHLORIDE 0.5 MG: 1 INJECTION, SOLUTION INTRAMUSCULAR; INTRAVENOUS; SUBCUTANEOUS at 23:40

## 2024-05-15 RX ADMIN — HEPARIN SODIUM 18 UNITS/KG/HR: 5000 INJECTION, SOLUTION INTRAVENOUS at 20:06

## 2024-05-15 RX ADMIN — HEPARIN SODIUM 18 UNITS/KG/HR: 5000 INJECTION, SOLUTION INTRAVENOUS at 23:24

## 2024-05-15 RX ADMIN — HEPARIN SODIUM 5000 UNITS: 1000 INJECTION, SOLUTION INTRAVENOUS; SUBCUTANEOUS at 20:04

## 2024-05-15 RX ADMIN — SODIUM CHLORIDE, POTASSIUM CHLORIDE, SODIUM LACTATE AND CALCIUM CHLORIDE: 600; 310; 30; 20 INJECTION, SOLUTION INTRAVENOUS at 23:34

## 2024-05-15 RX ADMIN — IOHEXOL 100 ML: 350 INJECTION, SOLUTION INTRAVENOUS at 18:43

## 2024-05-15 RX ADMIN — MORPHINE SULFATE 4 MG: 4 INJECTION, SOLUTION INTRAMUSCULAR; INTRAVENOUS at 17:23

## 2024-05-15 SDOH — ECONOMIC STABILITY: TRANSPORTATION INSECURITY
IN THE PAST 12 MONTHS, HAS THE LACK OF TRANSPORTATION KEPT YOU FROM MEDICAL APPOINTMENTS OR FROM GETTING MEDICATIONS?: NO

## 2024-05-15 SDOH — ECONOMIC STABILITY: TRANSPORTATION INSECURITY
IN THE PAST 12 MONTHS, HAS LACK OF RELIABLE TRANSPORTATION KEPT YOU FROM MEDICAL APPOINTMENTS, MEETINGS, WORK OR FROM GETTING THINGS NEEDED FOR DAILY LIVING?: NO

## 2024-05-15 ASSESSMENT — ENCOUNTER SYMPTOMS
ABDOMINAL PAIN: 1
SORE THROAT: 0
BLURRED VISION: 0
MYALGIAS: 0
COUGH: 0
PALPITATIONS: 0
VOMITING: 1
SHORTNESS OF BREATH: 0
FEVER: 0
HEADACHES: 0
DIZZINESS: 0
CHILLS: 0
DOUBLE VISION: 0
BLOOD IN STOOL: 0
NECK PAIN: 0
NAUSEA: 1

## 2024-05-15 ASSESSMENT — FIBROSIS 4 INDEX: FIB4 SCORE: 0.41

## 2024-05-15 ASSESSMENT — LIFESTYLE VARIABLES
ALCOHOL_USE: NO
TOTAL SCORE: 0
CONSUMPTION TOTAL: NEGATIVE
ON A TYPICAL DAY WHEN YOU DRINK ALCOHOL HOW MANY DRINKS DO YOU HAVE: 0
EVER HAD A DRINK FIRST THING IN THE MORNING TO STEADY YOUR NERVES TO GET RID OF A HANGOVER: NO
AVERAGE NUMBER OF DAYS PER WEEK YOU HAVE A DRINK CONTAINING ALCOHOL: 0
TOTAL SCORE: 0
DOES PATIENT WANT TO STOP DRINKING: CANNOT ASSESS
TOTAL SCORE: 0
HOW MANY TIMES IN THE PAST YEAR HAVE YOU HAD 5 OR MORE DRINKS IN A DAY: 0
HAVE PEOPLE ANNOYED YOU BY CRITICIZING YOUR DRINKING: NO
EVER FELT BAD OR GUILTY ABOUT YOUR DRINKING: NO
HAVE YOU EVER FELT YOU SHOULD CUT DOWN ON YOUR DRINKING: NO

## 2024-05-15 ASSESSMENT — SOCIAL DETERMINANTS OF HEALTH (SDOH)
WITHIN THE PAST 12 MONTHS, YOU WORRIED THAT YOUR FOOD WOULD RUN OUT BEFORE YOU GOT THE MONEY TO BUY MORE: NEVER TRUE
WITHIN THE LAST YEAR, HAVE YOU BEEN KICKED, HIT, SLAPPED, OR OTHERWISE PHYSICALLY HURT BY YOUR PARTNER OR EX-PARTNER?: NO
WITHIN THE LAST YEAR, HAVE YOU BEEN HUMILIATED OR EMOTIONALLY ABUSED IN OTHER WAYS BY YOUR PARTNER OR EX-PARTNER?: NO
WITHIN THE LAST YEAR, HAVE TO BEEN RAPED OR FORCED TO HAVE ANY KIND OF SEXUAL ACTIVITY BY YOUR PARTNER OR EX-PARTNER?: NO
IN THE PAST 12 MONTHS, HAS THE ELECTRIC, GAS, OIL, OR WATER COMPANY THREATENED TO SHUT OFF SERVICE IN YOUR HOME?: NO
WITHIN THE LAST YEAR, HAVE YOU BEEN AFRAID OF YOUR PARTNER OR EX-PARTNER?: NO
WITHIN THE PAST 12 MONTHS, THE FOOD YOU BOUGHT JUST DIDN'T LAST AND YOU DIDN'T HAVE MONEY TO GET MORE: NEVER TRUE

## 2024-05-15 ASSESSMENT — PAIN DESCRIPTION - PAIN TYPE: TYPE: ACUTE PAIN

## 2024-05-15 NOTE — ED TRIAGE NOTES
"BIB father for following complaints.     Chief Complaint   Patient presents with    Abdominal Pain     Pt has LUQ pain since yesterday and nausea. Pt states he was admitted for surgery for pancreatitis approx 3 weeks ago. Denies dysuria, vomiting or diarrhea.     Nausea     /85   Pulse (!) 109   Temp 37.2 °C (98.9 °F) (Temporal)   Resp 20   Ht 1.753 m (5' 9\")   Wt 62.9 kg (138 lb 10.7 oz)   SpO2 96%   BMI 20.48 kg/m²     "

## 2024-05-16 ENCOUNTER — APPOINTMENT (OUTPATIENT)
Dept: RADIOLOGY | Facility: MEDICAL CENTER | Age: 39
DRG: 380 | End: 2024-05-16
Attending: GENERAL PRACTICE
Payer: COMMERCIAL

## 2024-05-16 ENCOUNTER — NON-PROVIDER VISIT (OUTPATIENT)
Dept: WOUND CARE | Facility: MEDICAL CENTER | Age: 39
End: 2024-05-16
Attending: NURSE PRACTITIONER
Payer: COMMERCIAL

## 2024-05-16 PROBLEM — K55.069 SUPERIOR MESENTERIC VEIN THROMBOSIS (HCC): Status: ACTIVE | Noted: 2024-05-16

## 2024-05-16 PROBLEM — K31.5 DUODENAL STRICTURE: Status: ACTIVE | Noted: 2024-05-16

## 2024-05-16 LAB
APTT PPP: 46.8 SEC (ref 24.7–36)
INR PPP: 1.25 (ref 0.87–1.13)
PROTHROMBIN TIME: 15.9 SEC (ref 12–14.6)
UFH PPP CHRO-ACNC: 0.1 IU/ML
UFH PPP CHRO-ACNC: 0.38 IU/ML
UFH PPP CHRO-ACNC: 0.56 IU/ML
UFH PPP CHRO-ACNC: <0.1 IU/ML
UFH PPP CHRO-ACNC: <0.1 IU/ML

## 2024-05-16 PROCEDURE — 99222 1ST HOSP IP/OBS MODERATE 55: CPT | Performed by: INTERNAL MEDICINE

## 2024-05-16 PROCEDURE — RXMED WILLOW AMBULATORY MEDICATION CHARGE: Performed by: GENERAL PRACTICE

## 2024-05-16 PROCEDURE — 99024 POSTOP FOLLOW-UP VISIT: CPT | Performed by: SURGERY

## 2024-05-16 PROCEDURE — 99233 SBSQ HOSP IP/OBS HIGH 50: CPT | Performed by: GENERAL PRACTICE

## 2024-05-16 RX ORDER — OXYCODONE HYDROCHLORIDE 5 MG/1
5 TABLET ORAL
Status: DISCONTINUED | OUTPATIENT
Start: 2024-05-16 | End: 2024-05-20 | Stop reason: HOSPADM

## 2024-05-16 RX ORDER — TRAZODONE HYDROCHLORIDE 50 MG/1
50 TABLET ORAL NIGHTLY
Status: DISCONTINUED | OUTPATIENT
Start: 2024-05-16 | End: 2024-05-17

## 2024-05-16 RX ORDER — AMOXICILLIN 250 MG
2 CAPSULE ORAL EVERY EVENING
Status: DISCONTINUED | OUTPATIENT
Start: 2024-05-16 | End: 2024-05-20 | Stop reason: HOSPADM

## 2024-05-16 RX ORDER — HYDROMORPHONE HYDROCHLORIDE 1 MG/ML
0.5 INJECTION, SOLUTION INTRAMUSCULAR; INTRAVENOUS; SUBCUTANEOUS
Status: DISCONTINUED | OUTPATIENT
Start: 2024-05-16 | End: 2024-05-20 | Stop reason: HOSPADM

## 2024-05-16 RX ORDER — OXYCODONE HYDROCHLORIDE 10 MG/1
10 TABLET ORAL
Status: DISCONTINUED | OUTPATIENT
Start: 2024-05-16 | End: 2024-05-20 | Stop reason: HOSPADM

## 2024-05-16 RX ORDER — AMOXICILLIN 250 MG
2 CAPSULE ORAL EVERY EVENING
Status: DISCONTINUED | OUTPATIENT
Start: 2024-05-16 | End: 2024-05-16

## 2024-05-16 RX ORDER — SODIUM CHLORIDE, SODIUM LACTATE, POTASSIUM CHLORIDE, CALCIUM CHLORIDE 600; 310; 30; 20 MG/100ML; MG/100ML; MG/100ML; MG/100ML
INJECTION, SOLUTION INTRAVENOUS CONTINUOUS
Status: DISCONTINUED | OUTPATIENT
Start: 2024-05-16 | End: 2024-05-17

## 2024-05-16 RX ORDER — POLYETHYLENE GLYCOL 3350 17 G/17G
1 POWDER, FOR SOLUTION ORAL
Status: DISCONTINUED | OUTPATIENT
Start: 2024-05-16 | End: 2024-05-20 | Stop reason: HOSPADM

## 2024-05-16 RX ORDER — POLYETHYLENE GLYCOL 3350 17 G/17G
1 POWDER, FOR SOLUTION ORAL
Status: DISCONTINUED | OUTPATIENT
Start: 2024-05-16 | End: 2024-05-16

## 2024-05-16 RX ORDER — TRAZODONE HYDROCHLORIDE 50 MG/1
50 TABLET ORAL NIGHTLY
Status: DISCONTINUED | OUTPATIENT
Start: 2024-05-16 | End: 2024-05-16

## 2024-05-16 RX ADMIN — METOPROLOL TARTRATE 25 MG: 25 TABLET, FILM COATED ORAL at 18:11

## 2024-05-16 RX ADMIN — HEPARIN SODIUM 22.02 UNITS/KG/HR: 5000 INJECTION, SOLUTION INTRAVENOUS at 15:57

## 2024-05-16 RX ADMIN — IOHEXOL 200 ML: 300 INJECTION, SOLUTION INTRAVENOUS at 13:45

## 2024-05-16 RX ADMIN — OXYCODONE HYDROCHLORIDE 10 MG: 10 TABLET ORAL at 10:46

## 2024-05-16 RX ADMIN — SODIUM CHLORIDE, POTASSIUM CHLORIDE, SODIUM LACTATE AND CALCIUM CHLORIDE 1000 ML: 600; 310; 30; 20 INJECTION, SOLUTION INTRAVENOUS at 20:16

## 2024-05-16 RX ADMIN — HYDROMORPHONE HYDROCHLORIDE 0.5 MG: 1 INJECTION, SOLUTION INTRAMUSCULAR; INTRAVENOUS; SUBCUTANEOUS at 19:09

## 2024-05-16 RX ADMIN — HYDROMORPHONE HYDROCHLORIDE 0.5 MG: 1 INJECTION, SOLUTION INTRAMUSCULAR; INTRAVENOUS; SUBCUTANEOUS at 16:10

## 2024-05-16 RX ADMIN — TRAZODONE HYDROCHLORIDE 50 MG: 50 TABLET ORAL at 22:20

## 2024-05-16 RX ADMIN — HYDROMORPHONE HYDROCHLORIDE 0.5 MG: 1 INJECTION, SOLUTION INTRAMUSCULAR; INTRAVENOUS; SUBCUTANEOUS at 02:43

## 2024-05-16 RX ADMIN — OXYCODONE HYDROCHLORIDE 10 MG: 10 TABLET ORAL at 18:11

## 2024-05-16 RX ADMIN — OXYCODONE HYDROCHLORIDE 10 MG: 10 TABLET ORAL at 13:51

## 2024-05-16 RX ADMIN — METOPROLOL TARTRATE 25 MG: 25 TABLET, FILM COATED ORAL at 08:36

## 2024-05-16 RX ADMIN — SENNOSIDES AND DOCUSATE SODIUM 2 TABLET: 50; 8.6 TABLET ORAL at 18:11

## 2024-05-16 RX ADMIN — HEPARIN SODIUM 2500 UNITS: 1000 INJECTION, SOLUTION INTRAVENOUS; SUBCUTANEOUS at 04:12

## 2024-05-16 RX ADMIN — HEPARIN SODIUM 2500 UNITS: 1000 INJECTION, SOLUTION INTRAVENOUS; SUBCUTANEOUS at 18:30

## 2024-05-16 RX ADMIN — OXYCODONE HYDROCHLORIDE 10 MG: 10 TABLET ORAL at 06:23

## 2024-05-16 RX ADMIN — SODIUM CHLORIDE, POTASSIUM CHLORIDE, SODIUM LACTATE AND CALCIUM CHLORIDE: 600; 310; 30; 20 INJECTION, SOLUTION INTRAVENOUS at 10:19

## 2024-05-16 RX ADMIN — OXYCODONE HYDROCHLORIDE 10 MG: 10 TABLET ORAL at 22:20

## 2024-05-16 ASSESSMENT — PAIN DESCRIPTION - PAIN TYPE
TYPE: ACUTE PAIN

## 2024-05-16 ASSESSMENT — ENCOUNTER SYMPTOMS
DIARRHEA: 0
SHORTNESS OF BREATH: 0
FEVER: 0
WEIGHT LOSS: 1
NAUSEA: 0
CHILLS: 0
ABDOMINAL PAIN: 1
VOMITING: 0
COUGH: 0

## 2024-05-16 NOTE — ED NOTES
Attempted to call report to [facility]SC[facility] Walker ([id number], #[id number]), waiting for call back, notified that Pt is on the way - [facility]SC[facility] Walker verbalized understanding;

## 2024-05-16 NOTE — ASSESSMENT & PLAN NOTE
In setting of recent necrotizing pancreatitis with pseudocyst and multiple peripancreatic fluid collections, status post drain placement and exploratory laparotomy.  With occlusion of portal vein  Surgical oncology consulted  Heparin gtt  Pain control

## 2024-05-16 NOTE — CONSULTS
"       Date of Service  May 16, 2024     Reason for Consult: Abdominal pain, N/V, hx pancreatitis, s/p necrosectomy on 4/15/24    Requested by: ED    Location: [facility]    Chief Complaint  Abdominal pain, N/V, inability to tolerate PO intake      HPI: Abelardo Fenton is a 38 y.o. male who presents to the ED complaining of left upper quadrant abdominal pain onset 2 weeks ago.  On 4/15/24 he completed resection of infected necrotizing pancreatitis and multiple drains placed.  He was discharged home and was starting to recovered and was eating better. However over last 2 weeks have been having pain primarily in left upper abdomen \"coming in waves\".    CT ABDOMEN on 5/15/24 noted intra-abdominal fluid collection, distended stomach with retained materials, new portal vein thrombosis, appearance of duodenal stricturing/inflammation, and SMV stenosis. Started on IV heparin.    This morning he is awake, oriented, and answers questions. He appears to have a depressed mood. AM labs with moderately elevated LFTs, but otherwise essentially unremarkable.      Past Medical History  Past Medical History:   Diagnosis Date    High cholesterol     4/11/2024 not medicated    Hyperlipemia     Hypertension     medicated    Pancreatic cyst     Pancreatitis        Past Surgical History  Past Surgical History:   Procedure Laterality Date    FL ULTRASONIC GUIDANCE, INTRAOPERATIVE N/A 4/15/2024    Procedure: INTRA-OPERATIVE ULTRASOUND GUIDANCE;  Surgeon: Blair Sainz M.D.;  Location: SURGERY [facility];  Service: General    FL EXPLORATORY OF ABDOMEN N/A 4/15/2024    Procedure: DRAINAGE PERITONEAL ABSCESS;  Surgeon: Blair Sainz M.D.;  Location: SURGERY [facility];  Service: General    HEPATICOJEJUNOSTOMY, CAITLYN-EN-Y N/A 4/15/2024    Procedure: PANCREATIC NECROSECTOMY;  Surgeon: Blair Sainz M.D.;  Location: SURGERY [facility];  Service: General    STENT PLACEMENT Right 2024    for pancreatic " pseudocyst     Anticoagulation:  Heparin drip    Allergies:   No Known Allergies    Problem List  Principal Problem:    Portal vein thrombosis (POA: Unknown)  Active Problems:    Hypertension (POA: Yes)    Necrotizing pancreatitis (POA: Yes)    Abdominal pain (POA: Yes)    Superior mesenteric vein thrombosis (HCC) (POA: Unknown)  Resolved Problems:    * No resolved hospital problems. *       Subjective  Review of Systems   Constitutional:  Positive for weight loss. Negative for chills, fever and malaise/fatigue.   Respiratory:  Negative for cough and shortness of breath.    Cardiovascular:  Negative for chest pain.   Gastrointestinal:  Positive for abdominal pain. Negative for diarrhea, nausea and vomiting.       Objective  Temp:  [36.8 °C (98.2 °F)-37.4 °C (99.3 °F)] 37 °C (98.6 °F)  Pulse:  [] 96  Resp:  [17-18] 17  BP: (119-145)/(90-93) 126/91  SpO2:  [92 %-97 %] 97 %      Physical Exam  Vitals and nursing note reviewed.   Constitutional:       General: He is not in acute distress.     Appearance: Normal appearance. He is ill-appearing.   HENT:      Head: Normocephalic and atraumatic.      Nose: Nose normal.      Comments: NGT  Cardiovascular:      Rate and Rhythm: Normal rate.   Pulmonary:      Effort: Pulmonary effort is normal. No respiratory distress.   Abdominal:      General: Abdomen is flat. There is no distension.      Palpations: Abdomen is soft.      Tenderness: There is abdominal tenderness.   Skin:     General: Skin is warm and dry.   Neurological:      Mental Status: He is alert and oriented to person, place, and time.   Psychiatric:         Attention and Perception: Attention normal.         Mood and Affect: Mood is depressed.         Speech: Speech normal.         Behavior: Behavior normal.        Fluids    Intake/Output Summary (Last 24 hours) at 5/16/2024 1256  Last data filed at 5/16/2024 1041  Gross per 24 hour   Intake 1005 ml   Output 1275 ml   Net -270 ml         Labs  Lab Results    Component Value Date/Time    SODIUM 138 05/15/2024 05:00 PM    POTASSIUM 4.0 05/15/2024 05:00 PM    CHLORIDE 100 05/15/2024 05:00 PM    CO2 25 05/15/2024 05:00 PM    GLUCOSE 128 (H) 05/15/2024 05:00 PM    BUN 9 05/15/2024 05:00 PM    CREATININE 0.74 05/15/2024 05:00 PM         Lab Results   Component Value Date/Time    PROTHROMBTM 15.9 (H) 05/15/2024 11:49 PM    INR 1.25 (H) 05/15/2024 11:49 PM         Lab Results   Component Value Date/Time    WBC 8.6 05/15/2024 05:00 PM    RBC 4.42 (L) 05/15/2024 05:00 PM    HEMOGLOBIN 12.2 (L) 05/15/2024 05:00 PM    HEMATOCRIT 38.6 (L) 05/15/2024 05:00 PM    MCV 87.3 05/15/2024 05:00 PM    MCH 27.6 05/15/2024 05:00 PM    MCHC 31.6 (L) 05/15/2024 05:00 PM    MPV 9.2 05/15/2024 05:00 PM    NEUTSPOLYS 77.50 (H) 05/15/2024 05:00 PM    LYMPHOCYTES 10.30 (L) 05/15/2024 05:00 PM    MONOCYTES 10.90 05/15/2024 05:00 PM    EOSINOPHILS 0.60 05/15/2024 05:00 PM    BASOPHILS 0.50 05/15/2024 05:00 PM    ANISOCYTOSIS 1+ 12/31/2023 01:05 AM         Recent Labs     05/15/24  1700 05/15/24  2349   ASTSGOT 225*  --    ALTSGPT 152*  --    TBILIRUBIN 0.8  --    GLOBULIN 3.9*  --    INR 1.10 1.25*      Imaging  CT ABDOMEN (5/15/24)  IMPRESSION:  1.  Faint peripancreatic fluid fat stranding could indicate acute pancreatitis  2.  Increased size of the peripancreatic thick-walled fluid collection extending to the RIGHT anterior abdominal wall  3.  This fluid collection appears to extend and at least partially encase the proximal duodenum  4.  Moderately distended stomach with changes in the gastric antrum and proximal duodenum probably reactive gastritis and duodenitis  5.  High-grade stenosis of or possibly occlusion of the main portal vein and occlusion of central portion of the superior mesenteric vein  6.  Small perisplenic fluid collection adjacent to the pancreatic tail extent of the peripancreatic fluid collection similar to prior study    Assessment and Plan  Patient is a 38M with history of  infected necrotizing pancreatitis, sp/ resection of necrotic  pancreas tissue, now readmitted with concern recurrence of abdominal fluid collections, new PV thrombosis, concern duodenal stricturing causing gastric near obstruction.    CT ABDOMEN on 5/15/24 noted intra-abdominal fluid collection, distended stomach with retained materials, new portal vein thrombosis, appearance of duodenal stricturing, and SMV stenosis. Started on IV heparin.    Portal vein thrombosis  - Heparin drip  - Likely to convert to DOAC when discharged home    2. Distended stomach, concern duodenal stricturing/inflammation  - Continue NGT  - DX upper GI study with oral contrast  - Upper endoscopy, consider duodenal dilation and possible duodenal stens as needed    3. Abdominal fluid collections  - No plans for drain placement at this point  - Monitor    Patient seen with Dr Cai. If duodenal obstruction can be resolved then patient may be able to DC home in next few days. Follow up in Surgical Oncology clinic approx 2 weeks after discharge, with repeat CT ABDOMEN

## 2024-05-16 NOTE — PROGRESS NOTES
0830 - NG flushed to give meds, clamped for 30 mins. Pt complains of minimal pain, 2/10. Declines pain medication at this time. Dressing to R abd removed, cleaned and changed.

## 2024-05-16 NOTE — ASSESSMENT & PLAN NOTE
CT of the abdomen is concerning for duodenal stricturing.    Hepatobillary Surgery consulted, Upper GI series compatible with stenosis of proximal duodenum.  GI consulted, s/p endoscopy on 5/17, noted grade B reflux esophagitis, diffuse mild gastritis, acute angulation of the antrum creating gastric outlet obstruction, balloon dilatation, reinsertion of NGT, clipped to small intestine.        Patient tolerating clear liquid diet, advance to full liquid and GI soft as tolerated.  Continue with tube eating for today.

## 2024-05-16 NOTE — CARE PLAN
The patient is Watcher - Medium risk of patient condition declining or worsening    Shift Goals  Clinical Goals: NG tube, Heparin gtt, pain control  Patient Goals: Pain Control, Comfort    Progress made toward(s) clinical / shift goals:      NG tube to low intermittent suction, tolerating well     Patient is not progressing towards the following goals:

## 2024-05-16 NOTE — HOSPITAL COURSE
This is a 38-year-old male with past medical history of necrotizing pancreatitis with prior history of infected pancreatic pseudocyst,  cystocutaneous fistula, recent surgery open irrigation and washout for infected pancreatic cyst on 4/15 who was transferred from outside facility with abdominal pain, significant for SMV occlusion and PVT.    Patient transferred to our facility for higher level of care for hepatobiliary and GI consult.    CT of the abdomen is concerning for duodenal stricturing, with gastric outlet obstruction.  Upper GI series compatible with stenosis of proximal duodenum.  NGT decompression.  GI consulted, s/p endoscopy on 5/17, noted grade B reflux esophagitis, diffuse mild gastritis, acute angulation of the antrum creating gastric outlet obstruction, balloon dilatation, reinsertion of NGT, clipped to small intestine.  Patient advised slowly advance, was tolerating oral intake and NGT feeding.  He will follow-up with hepatobiliary surgery outpatient.    Patient also noted to have PVT initially on heparin gtt, transitioned to Eliquis.

## 2024-05-16 NOTE — ED NOTES
NG tube placed, suction setup to low intermittent, + bowel sounds during auscultation, Xray ordered for verification, Pt tolerated well;

## 2024-05-16 NOTE — PROGRESS NOTES
Hospital Medicine Daily Progress Note    Date of Service  5/16/2024    Chief Complaint  Abelardo Fenotn is a 38 y.o. male admitted 5/15/2024 with abdominal pain    Hospital Course  This is a 38-year-old male with past medical history of necrotizing pancreatitis with prior history of infected pancreatic pseudocyst,  cystocutaneous fistula, recent surgery open irrigation and washout for infected pancreatic cyst on 4/15 who was transferred from outside facility with abdominal pain, significant for SMV occlusion and PVT thrombosis.    Patient transferred to our facility for higher level of care for hepatobiliary consult, with recommendations for heparin gtt. and NGT decompression.    CT of the abdomen is concerning for duodenal stricturing.  Upper GI series compatible with stenosis of proximal duodenum.  GI consulted for possible endoscopy and stent placement if warranted.    Interval Problem Update  NGT in place for decompression upper GI series ordered to evaluate for duodenal stricture.    Case discussed today with hepatobiliary surgery, GI to be consulted for possible endoscopy and stent placement if warranted.    Patient's father at bedside, updated on plan of care, all question answered.    I have discussed this patient's plan of care and discharge plan at IDT rounds today with Case Management, Nursing, Nursing leadership, and other members of the IDT team.    Consultants/Specialty  general surgery    Code Status  Full Code    Disposition  The patient is not medically cleared for discharge to home or a post-acute facility.  Anticipate discharge to: home with close outpatient follow-up    I have placed the appropriate orders for post-discharge needs.    Review of Systems  Review of Systems   All other systems reviewed and are negative.       Physical Exam  Temp:  [36.8 °C (98.2 °F)-37.4 °C (99.3 °F)] 37 °C (98.6 °F)  Pulse:  [] 96  Resp:  [16-20] 17  BP: (117-145)/(85-95) 126/91  SpO2:  [92 %-98 %]  97 %    Physical Exam  Vitals and nursing note reviewed.   Constitutional:       General: He is not in acute distress.     Appearance: He is ill-appearing.   HENT:      Head: Normocephalic and atraumatic.      Nose:      Comments: NGT  Eyes:      Extraocular Movements: Extraocular movements intact.      Conjunctiva/sclera: Conjunctivae normal.      Pupils: Pupils are equal, round, and reactive to light.   Cardiovascular:      Rate and Rhythm: Normal rate and regular rhythm.      Pulses: Normal pulses.      Heart sounds: No murmur heard.     No friction rub. No gallop.   Pulmonary:      Effort: Pulmonary effort is normal. No respiratory distress.      Breath sounds: Normal breath sounds. No wheezing, rhonchi or rales.   Abdominal:      General: Bowel sounds are normal. There is no distension.      Palpations: Abdomen is soft.      Tenderness: There is abdominal tenderness.   Musculoskeletal:         General: No swelling or tenderness. Normal range of motion.      Cervical back: Normal range of motion and neck supple. No muscular tenderness.      Right lower leg: No edema.      Left lower leg: No edema.   Skin:     General: Skin is warm and dry.      Capillary Refill: Capillary refill takes less than 2 seconds.      Findings: No bruising, erythema or rash.   Neurological:      General: No focal deficit present.      Mental Status: He is alert and oriented to person, place, and time.         Fluids    Intake/Output Summary (Last 24 hours) at 5/16/2024 1352  Last data filed at 5/16/2024 1300  Gross per 24 hour   Intake 1005 ml   Output 1875 ml   Net -870 ml       Laboratory  Recent Labs     05/15/24  1700   WBC 8.6   RBC 4.42*   HEMOGLOBIN 12.2*   HEMATOCRIT 38.6*   MCV 87.3   MCH 27.6   MCHC 31.6*   RDW 55.9*   PLATELETCT 311   MPV 9.2     Recent Labs     05/15/24  1700   SODIUM 138   POTASSIUM 4.0   CHLORIDE 100   CO2 25   GLUCOSE 128*   BUN 9   CREATININE 0.74   CALCIUM 9.5     Recent Labs     05/15/24  1700  05/15/24  2349   APTT 30.7 46.8*   INR 1.10 1.25*               Imaging  DX-UPPER GI-SERIES WITH KUB   Final Result      Findings compatible with stenosis of the proximal duodenum but not complete obstruction.           Assessment/Plan  * Portal vein thrombosis  Assessment & Plan  Occlusion of portal vein on CT  Surgical oncology consulted, recommended starting on heparin    Duodenal stricture  Assessment & Plan  CT of the abdomen is concerning for duodenal stricturing.    Upper GI series compatible with stenosis of proximal duodenum.  GI consulted for possible endoscopy and stent placement if warranted.    Necrotizing pancreatitis- (present on admission)  Assessment & Plan  Hx of necrotizing pancreatitis with prior history of infected pancreatic pseudocyst,  cystocutaneous fistula, recent surgery open irrigation and washout for infected pancreatic cyst on 4/15    Superior mesenteric vein thrombosis (HCC)  Assessment & Plan  Noted on CT  Heparin gtt    Abdominal pain- (present on admission)  Assessment & Plan  In setting of recent necrotizing pancreatitis with pseudocyst and multiple peripancreatic fluid collections, status post drain placement and exploratory laparotomy.  With occlusion of portal vein  Surgical oncology consulted  Heparin gtt  Pain control    Hypertension- (present on admission)  Assessment & Plan  Resume patient's metoprolol         VTE prophylaxis: Heparin gtt    I have performed a physical exam and reviewed and updated ROS and Plan today (5/16/2024). In review of yesterday's note (5/15/2024), there are no changes except as documented above.    Greater than 51 minutes spent prepping to see patient (e.g. reviewing EMR) obtaining and/or reviewing separately obtained history. Performing a medically appropriate examination and evaluation. Independently interpreting results and communicating results to patient/family/caregiver. Counseling and educating the patient/family/caregiver. Ordering  medications, tests, and/or procedures. Referring and communicating with other health care professionals.  Documenting clinical information in EPIC. Care coordination.

## 2024-05-16 NOTE — ED PROVIDER NOTES
"ER Provider Note    Scribed for Austin Hernandez M.d. by Paulino Meyers. 5/15/2024  5:04 PM    Primary Care Provider: SAMUEL Mckinney    CHIEF COMPLAINT   Chief Complaint   Patient presents with    Abdominal Pain     Pt has LUQ pain since yesterday and nausea. Pt states he was admitted for surgery for pancreatitis approx 3 weeks ago. Denies dysuria, vomiting or diarrhea.     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 The patient had open surgery with Dr. Sainz on the 15th of last month to treat this. Reviewed a chart from 4/30/24 where the patient followed up with a PA and was looking better.    HPI/ROS  LIMITATION TO HISTORY   Select: : None  OUTSIDE HISTORIAN(S):  Family member was present at bedside.    Abelardo Fenton is a 38 y.o. male who presents to the ED complaining of left upper quadrant abdominal pain onset 2 weeks ago. The patient reports that he had pancreatic surgery on the 15th of last month. He adds that he has since been experiencing abdominal pain for the past 2 weeks which has been progressively been getting worse. The patient notes that when the pain first started 2 weeks ago it would only last a few minutes, but adds that it will now last 6-7 hours. He explains that the pain is present to his left upper abdominal quadrant and is exacerbated when he eats. Describes the pain as sharp, 5/10, and \"coming in waves\". The patient experienced this pain yesterday and decided to present to the ED at [facility]. He received labs at this time which were reassuring and showed no infections. The patient was advised to follow up with Dr. Sainz and received Oxycodone which alleviated his pain. The patient reports that his pain started again today which prompted him to present to the [facility] ED. States associated nausea, flatulence, and abdominal swelling but denies any " fevers. The patient adds that he has a wound care appointment tomorrow. He reports that he has not taken any Ibuprofen and denies any alcohol use since December of last year.    PAST MEDICAL HISTORY  Past Medical History:   Diagnosis Date    High cholesterol     4/11/2024 not medicated    Hyperlipemia     Hypertension     medicated    Pancreatic cyst     Pancreatitis      SURGICAL HISTORY  Past Surgical History:   Procedure Laterality Date    WY ULTRASONIC GUIDANCE, INTRAOPERATIVE N/A 4/15/2024    Procedure: INTRA-OPERATIVE ULTRASOUND GUIDANCE;  Surgeon: Blair Sainz M.D.;  Location: SURGERY [facility];  Service: General    WY EXPLORATORY OF ABDOMEN N/A 4/15/2024    Procedure: DRAINAGE PERITONEAL ABSCESS;  Surgeon: Blair Sainz M.D.;  Location: SURGERY [facility];  Service: General    HEPATICOJEJUNOSTOMY, CAITLYN-EN-Y N/A 4/15/2024    Procedure: PANCREATIC NECROSECTOMY;  Surgeon: Blair Sainz M.D.;  Location: SURGERY [facility];  Service: General    STENT PLACEMENT Right 2024    for pancreatic pseudocyst     FAMILY HISTORY  History reviewed. No pertinent family history.    SOCIAL HISTORY   reports that he has never smoked. He has never been exposed to tobacco smoke. He has never used smokeless tobacco. He reports that he does not currently use alcohol. He reports that he does not use drugs.    CURRENT MEDICATIONS  Discharge Medication List as of 5/15/2024  9:41 PM        CONTINUE these medications which have NOT CHANGED    Details   oxyCODONE-acetaminophen (PERCOCET) 5-325 MG Tab Take 1 Tablet by mouth every four hours as needed for Severe Pain., Historical Med      traZODone (DESYREL) 50 MG Tab Take 50 mg by mouth every evening. Pt started on 5/13/2024, Historical Med      amoxicillin-clavulanate (AUGMENTIN) 875-125 MG Tab Take 1 Tablet by mouth 2 times a day. Pt started on 4/22/2024 for 7 day course, Historical Med      oxyCODONE immediate release (ROXICODONE) 10 MG immediate  "release tablet Take 10 mg by mouth every four hours as needed for Moderate Pain., Historical Med      celecoxib (CELEBREX) 200 MG Cap Take 200 mg by mouth 2 times a day. Pt started on 4/22/2024 bar 10 day course, Historical Med      methocarbamol (ROBAXIN) 500 MG Tab Take 1,000 mg by mouth as needed. Indications: Musculoskeletal Pain, Historical Med      ketorolac (TORADOL) 10 MG Tab Take 10 mg by mouth every 6 hours as needed for Severe Pain., Historical Med      acetaminophen (TYLENOL) 500 MG Tab Take 2 Tablets by mouth every 6 hours., Disp-30 Tablet, R-0, Normal      metoprolol tartrate (LOPRESSOR) 25 MG Tab Take 1 Tablet by mouth 2 times a day., Disp-60 Tablet, R-0, Normal           ALLERGIES  Patient has no known allergies.    PHYSICAL EXAM  /85   Pulse (!) 109   Temp 37.2 °C (98.9 °F) (Temporal)   Resp 20   Ht 1.753 m (5' 9\")   Wt 62.9 kg (138 lb 10.7 oz)   SpO2 96%   BMI 20.48 kg/m²     Constitutional: Well developed, Well nourished, Mild distress.   HENT: Normocephalic, Atraumatic.  Eyes: Conjunctiva normal, No discharge.   Cardiovascular: Normal heart rate, Normal rhythm, No murmurs, equal pulses.   Pulmonary: Normal breath sounds, No respiratory distress, No wheezing, No rales, No rhonchi.  Chest: No chest wall tenderness or deformity.   Abdomen: Soft, LUQ tenderness with slight guarding, Well healed midline abdominal incision, Fistula with purulent drainage to RUQ with no surrounding erythema, no rebound.  Back: No CVA tenderness.   Musculoskeletal: No major deformities noted, No tenderness.   Skin: Warm, Dry, No erythema, No rash.   Neurologic: Alert & oriented x 3, Normal motor function,  No focal deficits noted.   Psychiatric: Affect normal, Judgment normal, Mood normal.     DIAGNOSTIC STUDIES    EKG/LABS  Results for orders placed or performed during the hospital encounter of 05/15/24   CBC WITH DIFFERENTIAL   Result Value Ref Range    WBC 8.6 4.8 - 10.8 K/uL    RBC 4.42 (L) 4.70 - 6.10 " M/uL    Hemoglobin 12.2 (L) 14.0 - 18.0 g/dL    Hematocrit 38.6 (L) 42.0 - 52.0 %    MCV 87.3 81.4 - 97.8 fL    MCH 27.6 27.0 - 33.0 pg    MCHC 31.6 (L) 32.3 - 36.5 g/dL    RDW 55.9 (H) 35.9 - 50.0 fL    Platelet Count 311 164 - 446 K/uL    MPV 9.2 9.0 - 12.9 fL    Neutrophils-Polys 77.50 (H) 44.00 - 72.00 %    Lymphocytes 10.30 (L) 22.00 - 41.00 %    Monocytes 10.90 0.00 - 13.40 %    Eosinophils 0.60 0.00 - 6.90 %    Basophils 0.50 0.00 - 1.80 %    Immature Granulocytes 0.20 0.00 - 0.90 %    Nucleated RBC 0.00 0.00 - 0.20 /100 WBC    Neutrophils (Absolute) 6.66 1.82 - 7.42 K/uL    Lymphs (Absolute) 0.89 (L) 1.00 - 4.80 K/uL    Monos (Absolute) 0.94 (H) 0.00 - 0.85 K/uL    Eos (Absolute) 0.05 0.00 - 0.51 K/uL    Baso (Absolute) 0.04 0.00 - 0.12 K/uL    Immature Granulocytes (abs) 0.02 0.00 - 0.11 K/uL    NRBC (Absolute) 0.00 K/uL   COMP METABOLIC PANEL   Result Value Ref Range    Sodium 138 135 - 145 mmol/L    Potassium 4.0 3.6 - 5.5 mmol/L    Chloride 100 96 - 112 mmol/L    Co2 25 20 - 33 mmol/L    Anion Gap 13.0 7.0 - 16.0    Glucose 128 (H) 65 - 99 mg/dL    Bun 9 8 - 22 mg/dL    Creatinine 0.74 0.50 - 1.40 mg/dL    Calcium 9.5 8.4 - 10.2 mg/dL    Correct Calcium 9.5 8.5 - 10.5 mg/dL    AST(SGOT) 225 (H) 12 - 45 U/L    ALT(SGPT) 152 (H) 2 - 50 U/L    Alkaline Phosphatase 434 (H) 30 - 99 U/L    Total Bilirubin 0.8 0.1 - 1.5 mg/dL    Albumin 4.0 3.2 - 4.9 g/dL    Total Protein 7.9 6.0 - 8.2 g/dL    Globulin 3.9 (H) 1.9 - 3.5 g/dL    A-G Ratio 1.0 g/dL   LIPASE   Result Value Ref Range    Lipase 20 11 - 82 U/L   URINALYSIS    Specimen: Blood   Result Value Ref Range    Color Yellow     Character Clear     Specific Gravity 1.010 <1.035    Ph 7.0 5.0 - 8.0    Glucose Negative Negative mg/dL    Ketones Negative Negative mg/dL    Protein Negative Negative mg/dL    Bilirubin Negative Negative    Nitrite Negative Negative    Leukocyte Esterase Negative Negative    Occult Blood Negative Negative    Micro Urine Req see below     ESTIMATED GFR   Result Value Ref Range    GFR (CKD-EPI) 120 >60 mL/min/1.73 m 2   aPTT   Result Value Ref Range    APTT 30.7 24.7 - 36.0 sec   Prothrombin Time   Result Value Ref Range    PT 14.7 (H) 12.0 - 14.6 sec    INR 1.10 0.87 - 1.13   Heparin Xa (Unfractionated)   Result Value Ref Range    Heparin Xa (UFH) <0.10 IU/mL     RADIOLOGY/PROCEDURES   Radiologist interpretation:  [id number] VIEW   Final Result      Gastric tube extends into the stomach.      CT-ABDOMEN-PELVIS WITH   Final Result      1.  Faint peripancreatic fluid fat stranding could indicate acute pancreatitis   2.  Increased size of the peripancreatic thick-walled fluid collection extending to the RIGHT anterior abdominal wall   3.  This fluid collection appears to extend and at least partially encase the proximal duodenum   4.  Moderately distended stomach with changes in the gastric antrum and proximal duodenum probably reactive gastritis and duodenitis   5.  High-grade stenosis of or possibly occlusion of the main portal vein and occlusion of central portion of the superior mesenteric vein   6.  Small perisplenic fluid collection adjacent to the pancreatic tail extent of the peripancreatic fluid collection similar to prior study        COURSE & MEDICAL DECISION MAKING     ASSESSMENT, COURSE AND PLAN  Care Narrative:   5:10 PM - Patient seen and examined at bedside. Discussed plan of care, including a repeat CT scan to evaluate for another potential abscess. Patient agrees to the plan of care. The patient was medicated with Zofran 4 mg injection and morphine 4 mg injection. Ordered for CT-Abdomen-Pelvis w/, UA, Lipase, CMP, and CBC w/Diff to evaluate his symptoms. Differential diagnoses include but not limited to: Intraabdominal abscess versus ulcer versus pancreatitis versus sepsis.     7:30 PM - Patient was reevaluated at bedside. Discussed diagnostic studies which show a blood clot to the liver portal vein. Will consult with urology to  evaluate potential blood thinners. Informed the patient that his stomach is also distended which points to gastritis. Patient verbalizes understanding and agreement to this plan of care. Paged surgical oncology.    7:52 PM - Spoke with Dr. Cai (Surgical Oncology) about the patient's condition. He thinks that the patient has stenosis and should receive an NG tube for treatment. He advised that the patient be transferred to [facility] where they have interventional radiology as well as vascular surgery.  He wanted the patient started on heparin..     7:55 PM - Patient was reevaluated at bedside. Discussed my conversation with Dr. Cai and the plan for the patient to be transferred. Patient verbalizes understanding and agreement to this plan of care. The patient was medicated with Xylocaine 2% 15 mL PO.    8:43 PM - I discussed the patient's case and the above findings with Dr. abad (Hospitalist) who agrees to evaluate the patient for hospitalization.     PROBLEM LIST  Problem 1 left upper quadrant abdominal pain at this point time I think this is likely secondary to possibly the stomach distention.  After discussion with surgery is felt that this may be secondary to his stenosis causing the stomach distention.  We will place an NG tube.  Surgery feels the patient may need endoscopy to take a look for ulcer or actual stenosis.    Problem #2 patient has a portal vein thrombosis likely secondary to these recent infections.  Will start the patient on heparin.  I think this is the cause of the patient's elevated transaminases.    Problem #3 continue to intra-abdominal abscess this appears to have a fistula with drainage of purulent material to the right upper quadrant.  At this point time this looks to be chronic I have discussed antibiotics with feel that the patient does not require antibiotics at this time.  Surgery and they    DISPOSITION AND DISCUSSIONS  I have discussed management of the patient with  the following physicians and FRANC's:  Dr. Cai (Surgical Oncology) and Dr. abad (Hospitalist)    Discussion of management with other [facility] or appropriate source(s): None     Barriers to care at this time, including but not limited to:  None .     Decision tools and prescription drugs considered including, but not limited to: Pain Medications patient was given morphine for pain. .    Patient will be transferred to [facility] (Dr. abad) in guarded condition.    FINAL DIAGNOSIS  1. Acute gastritis without hemorrhage, unspecified gastritis type    2. Portal vein thrombosis    3. Intra-abdominal abscess (HCC)    4. LUQ abdominal pain       Paulino MANUEL (Scribe), am scribing for, and in the presence of, AR Abrams*.    Electronically signed by: Paulino Meyers (Scribe), 5/15/2024    IAustin M.* personally performed the services described in this documentation, as scribed by Paulino Meyers in my presence, and it is both accurate and complete.     The note accurately reflects work and decisions made by me.  Austin Hernandez M.D.  5/15/2024  10:12 PM

## 2024-05-16 NOTE — CARE PLAN
Problem: Pain - Standard  Goal: Alleviation of pain or a reduction in pain to the patient’s comfort goal  Outcome: Progressing     Problem: Gastrointestinal Irritability  Goal: Nausea and vomiting will be absent or improve  Outcome: Progressing     Problem: Knowledge Deficit - Standard  Goal: Patient and family/care givers will demonstrate understanding of plan of care, disease process/condition, diagnostic tests and medications  Outcome: Progressing   The patient is Watcher - Medium risk of patient condition declining or worsening    Shift Goals  Clinical Goals: NG tube, Heparin gtt, pain control  Patient Goals: Pain Control, Comfort    Progress made toward(s) clinical / shift goals:        Patient is not progressing towards the following goals:

## 2024-05-16 NOTE — PROGRESS NOTES
[facility] DIRECT ADMISSION REPORT  Transferring facility: [facility]  Transferring physician: Mary     Chief complaint: Abdominal pain, nausea, vomiting  Pertinent history & patient course: 38-year-old male with history of pancreatitis, infected pancreatic cyst, cystocutaneous fistula, recent surgery open irrigation and washout for infected pancreatic cyst on 4/15, hypertension, presented with complaints of abdominal pain, nausea, vomiting.  CT of the abdomen is concerning for intra-abdominal fluid collection, possible sepsis given WBC elevation, PVT and SMV thrombosis.  Transferring physician spoke to Dr. Cai who recommended IV heparin, NG tube splint and transfer  to [facility] for IR consult for drain placement General abdominal fluid collection.  Patient started on antibiotics for possible sepsis secondary to intra-abdominal infection.  Pertinent imaging & lab results:   Consultants called prior to transfer and pertinent input from consultants: IR  Code Status:  per transferring provider, I personally verified with the transferring provider patient's code status and the transferring provider has confirmed this with the patient.  Reason for Transfer: IR Consult for drain placement  Further work up or recommendations requested prior to transfer:     Patient accepted for transfer: Yes  Accepting [facility] Facility: [facility] to notify the Triage Coordinator in the [facility] via Voalte or Phone ext. [phone] when patient arrives to the unit. The Triage Coordinator will assign the admitting provider.    Consultants to be called upon arrival: IR in AM  Admission status: Inpatient.   Floor requested: med  If ICU transfer, name of intensivist case discussed with and pertinent input from critical care: n/a    The admitting provider is the point of contact for questions or concerns regarding patient's care.

## 2024-05-16 NOTE — ED NOTES
Medication history reviewed with pt. Med rec is complete.  Allergies reviewed, per pt  Interviewed pt with father at bedside with permission from pt.    Pt reports that he has not taken DOCUSATE 100MG or MIRALAX.    Pt reports that he started TRAZODONE 50MG on 5/13/2024, pt takes one tablet at bedtime    Patient has had outpatient antibiotics in the last 30 days, pt started AUGMENTIN 875-125MG on 4/22/2024 for 7 day course.  Per pt reports that he finish course of antibiotic     Pt is not on any anticoagulants

## 2024-05-16 NOTE — ED NOTES
Pt medicated per MAR, denied having any needs at this time, no distress noted;    Pt aware that he is waiting for CT scan, and to not eat and/or drink at this time;

## 2024-05-16 NOTE — H&P
Hospital Medicine History & Physical Note    Date of Service  5/15/2024    Primary Care Physician  PAPI Mckinney.    Consultants  Surgical oncology    Specialist Names: Dr. Cai    Code Status  Full Code    Chief Complaint  No chief complaint on file.      History of Presenting Illness  Abelardo Fenton is a 38 y.o. male who presented 5/15/2024 with abdominal pain.  Very pleasant 38-year-old man with history of necrotizing pancreatitis with pseudocyst and multiple peripancreatic fluid collections status post multiple drain placements, exploratory laparotomy.  Over the last 2 weeks has been having intermittent abdominal pain, worst after eating but would self resolved.  Recently it has increased in frequency and duration.  It is now lasting much of the day several hours at a time.  He visited the ED in [place] yesterday, today it worsened and he presented to [facility] ED.  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  EDP discussed with surgical oncology, Dr. Cai who recommended beginning the patient on heparin, NG tube placement and transfer to [facility].  On arrival patient reports that he is still having some intermittent pain that is not as bad as before, he did receive pain meds on the way here.    I discussed the plan of care with patient.    Review of Systems  Review of Systems   Constitutional:  Negative for chills and fever.   HENT:  Negative for congestion and sore throat.    Eyes:  Negative for blurred vision and double vision.   Respiratory:  Negative for cough and shortness of breath.    Cardiovascular:  Negative for chest pain and palpitations.   Gastrointestinal:  Positive for abdominal pain, nausea and vomiting. Negative for blood in stool.   Genitourinary:  Negative for dysuria and urgency.   Musculoskeletal:  Negative for myalgias and neck pain.   Neurological:  Negative for dizziness  and headaches.       Past Medical History   has a past medical history of High cholesterol, Hyperlipemia, Hypertension, Pancreatic cyst, and Pancreatitis.    Surgical History   has a past surgical history that includes stent placement (Right, 2024); pr ultrasonic guidance, intraoperative (N/A, 4/15/2024); pr exploratory of abdomen (N/A, 4/15/2024); and hepaticojejunostomy, hilario-en-y (N/A, 4/15/2024).     Family History  family history is not on file.   Family history reviewed with patient. There is no family history that is pertinent to the chief complaint.     Social History   reports that he has never smoked. He has never been exposed to tobacco smoke. He has never used smokeless tobacco. He reports that he does not currently use alcohol. He reports that he does not use drugs.    Allergies  No Known Allergies    Medications  Prior to Admission Medications   Prescriptions Last Dose Informant Patient Reported? Taking?   acetaminophen (TYLENOL) 500 MG Tab  Patient No No   Sig: Take 2 Tablets by mouth every 6 hours.   Patient not taking: Reported on 5/15/2024   amoxicillin-clavulanate (AUGMENTIN) 875-125 MG Tab  Patient Yes No   Sig: Take 1 Tablet by mouth 2 times a day. Pt started on 4/22/2024 for 7 day course   celecoxib (CELEBREX) 200 MG Cap  Patient Yes No   Sig: Take 200 mg by mouth 2 times a day. Pt started on 4/22/2024 bar 10 day course   ketorolac (TORADOL) 10 MG Tab  Patient Yes No   Sig: Take 10 mg by mouth every 6 hours as needed for Severe Pain.   methocarbamol (ROBAXIN) 500 MG Tab  Patient Yes No   Sig: Take 1,000 mg by mouth as needed. Indications: Musculoskeletal Pain   metoprolol tartrate (LOPRESSOR) 25 MG Tab  Patient No No   Sig: Take 1 Tablet by mouth 2 times a day.   oxyCODONE immediate release (ROXICODONE) 10 MG immediate release tablet  Patient Yes No   Sig: Take 10 mg by mouth every four hours as needed for Moderate Pain.   oxyCODONE-acetaminophen (PERCOCET) 5-325 MG Tab  Patient Yes No   Sig:  Take 1 Tablet by mouth every four hours as needed for Severe Pain.   traZODone (DESYREL) 50 MG Tab  Patient Yes No   Sig: Take 50 mg by mouth every evening. Pt started on 5/13/2024      Facility-Administered Medications: None       Physical Exam  Temp:  [37.2 °C (98.9 °F)-37.3 °C (99.1 °F)] 37.3 °C (99.1 °F)  Pulse:  [] 70  Resp:  [16-20] 18  BP: (117-142)/(85-95) 119/90  SpO2:  [94 %-98 %] 94 %  Blood Pressure: (!) 119/90   Temperature: 37.3 °C (99.1 °F)   Pulse: 70   Respiration: 18   Pulse Oximetry: 94 %       Physical Exam  Constitutional:       General: He is not in acute distress.     Appearance: He is not toxic-appearing.   HENT:      Head: Normocephalic and atraumatic.      Nose: Nose normal.      Mouth/Throat:      Mouth: Mucous membranes are dry.      Pharynx: Oropharynx is clear.   Eyes:      Extraocular Movements: Extraocular movements intact.      Conjunctiva/sclera: Conjunctivae normal.   Cardiovascular:      Rate and Rhythm: Normal rate and regular rhythm.      Pulses: Normal pulses.      Heart sounds: Normal heart sounds.   Pulmonary:      Effort: Pulmonary effort is normal.      Breath sounds: Normal breath sounds.   Abdominal:      Palpations: Abdomen is soft.      Tenderness: There is abdominal tenderness. There is guarding.      Comments: Tenderness greatest in left upper quadrant/epigastric area   Musculoskeletal:         General: No swelling or deformity.      Cervical back: Neck supple. No rigidity.   Skin:     General: Skin is warm and dry.   Neurological:      General: No focal deficit present.      Mental Status: He is oriented to person, place, and time.         Laboratory:  Recent Labs     05/15/24  1700   WBC 8.6   RBC 4.42*   HEMOGLOBIN 12.2*   HEMATOCRIT 38.6*   MCV 87.3   MCH 27.6   MCHC 31.6*   RDW 55.9*   PLATELETCT 311   MPV 9.2     Recent Labs     05/15/24  1700   SODIUM 138   POTASSIUM 4.0   CHLORIDE 100   CO2 25   GLUCOSE 128*   BUN 9   CREATININE 0.74   CALCIUM 9.5  "    Recent Labs     05/15/24  1700   ALTSGPT 152*   ASTSGOT 225*   ALKPHOSPHAT 434*   TBILIRUBIN 0.8   LIPASE 20   GLUCOSE 128*     Recent Labs     05/15/24  1700   APTT 30.7   INR 1.10     No results for input(s): \"NTPROBNP\" in the last 72 hours.      No results for input(s): \"TROPONINT\" in the last 72 hours.    Imaging:  No orders to display       Assessment/Plan:  Justification for Admission Status  I anticipate this patient will require at least two midnights for appropriate medical management, necessitating inpatient admission because portal vein thrombosis requiring anticoagulation, surgical consultations    Patient will need a Med/Surg bed on MEDICAL service .  The need is secondary to above.    * Abdominal pain- (present on admission)  Assessment & Plan  In setting of recent necrotizing pancreatitis with pseudocyst and multiple peripancreatic fluid collections, status post drain placement and exploratory laparotomy.  With occlusion of portal vein  Surgical oncology consulted, appreciate recommendations  Heparin GTT  Pain control  N.p.o.    Portal vein thrombosis  Assessment & Plan  Occlusion of portal vein on CT  Surgical oncology consulted, recommended starting on heparin  Vascular versus IR consult        VTE prophylaxis: SCDs/TEDs and therapeutic anticoagulation with heparin  "

## 2024-05-16 NOTE — PROGRESS NOTES
Med rec complete per med rec tech at [facility] prior to transfer.     ED Note from med rec tech:     Medication history reviewed with pt. Med rec is complete.  Allergies reviewed, per pt  Interviewed pt with father at bedside with permission from pt.     Pt reports that he has not taken DOCUSATE 100MG or MIRALAX.     Pt reports that he started TRAZODONE 50MG on 5/13/2024, pt takes one tablet at bedtime     Patient has had outpatient antibiotics in the last 30 days, pt started AUGMENTIN 875-125MG on 4/22/2024 for 7 day course.  Per pt reports that he finish course of antibiotic      Pt is not on any anticoagulants

## 2024-05-16 NOTE — PROGRESS NOTES
4 Eyes Skin Assessment Completed by Aaron RN and GEO RN.    Head WDL  Ears WDL  Nose WDL  Mouth WDL  Neck WDL  Breast/Chest WDL  Shoulder Blades WDL  Spine WDL  (R) Arm/Elbow/Hand Redness and Blanching  (L) Arm/Elbow/Hand Redness and Blanching  Abdomen Scar and Incision  Groin WDL  Scrotum/Coccyx/Buttocks WDL  (R) Leg WDL  (L) Leg WDL  (R) Heel/Foot/Toe WDL  (L) Heel/Foot/Toe WDL          Devices In Places Pulse Ox and OG/NG      Interventions In Place Pillows    Possible Skin Injury No    Pictures Uploaded Into Epic N/A  Wound Consult Placed N/A  RN Wound Prevention Protocol Ordered No

## 2024-05-16 NOTE — CONSULTS
Date of Consultation:  5/16/2024    Patient: : Abelardo Fenton  MRN: [id number]    Referring Physician:  Dr Cai     GI:Tony Choe M.D.     Reason for Consultation: Gastric outlet obstruction    History of Present Illness:   38-year-old male with necrotizing pancreatitis having undergone surgical debridement.  Presents for progressive left upper quadrant pain.  Diagnosed with gastric outlet obstruction based on CT.  Had NG tube placed.  Bilious contents coming through the NG.  Hepatobiliary surgery requesting upper endoscopy for possible dilation therapy and potential for nasojejunal tube placement for nutrition.      Past Medical History:   Diagnosis Date    High cholesterol     4/11/2024 not medicated    Hyperlipemia     Hypertension     medicated    Pancreatic cyst     Pancreatitis        Past Surgical History:   Procedure Laterality Date    MS ULTRASONIC GUIDANCE, INTRAOPERATIVE N/A 4/15/2024    Procedure: INTRA-OPERATIVE ULTRASOUND GUIDANCE;  Surgeon: Blair Sainz M.D.;  Location: SURGERY [facility];  Service: General    MS EXPLORATORY OF ABDOMEN N/A 4/15/2024    Procedure: DRAINAGE PERITONEAL ABSCESS;  Surgeon: Blair Saizn M.D.;  Location: SURGERY [facility];  Service: General    HEPATICOJEJUNOSTOMY, CAITLYN-EN-Y N/A 4/15/2024    Procedure: PANCREATIC NECROSECTOMY;  Surgeon: Blair Sainz M.D.;  Location: SURGERY [facility];  Service: General    STENT PLACEMENT Right 2024    for pancreatic pseudocyst       No family history on file.    Social History     Socioeconomic History    Marital status: Single   Tobacco Use    Smoking status: Never     Passive exposure: Never    Smokeless tobacco: Never   Vaping Use    Vaping status: Never Used   Substance and Sexual Activity    Alcohol use: Not Currently     Comment: No alcohol since 12/25/23    Drug use: Never    Sexual activity: Yes     Social Determinants of Health     Food Insecurity: No Food Insecurity  "(5/15/2024)    Hunger Vital Sign     Worried About Running Out of Food in the Last Year: Never true     Ran Out of Food in the Last Year: Never true   Transportation Needs: No Transportation Needs (5/15/2024)    PRAPARE - Transportation     Lack of Transportation (Medical): No     Lack of Transportation (Non-Medical): No   Intimate Partner Violence: Not At Risk (5/15/2024)    Humiliation, Afraid, Rape, and Kick questionnaire     Fear of Current or Ex-Partner: No     Emotionally Abused: No     Physically Abused: No     Sexually Abused: No   Housing Stability: Low Risk  (5/15/2024)    Housing Stability Vital Sign     Unable to Pay for Housing in the Last Year: No     Number of Places Lived in the Last Year: 1     Unstable Housing in the Last Year: No       Current Meds (name, sig, last dose):     Current Facility-Administered Medications:     Pharmacy    oxyCODONE immediate-release **OR** oxyCODONE immediate-release **OR** HYDROmorphone    LR    metoprolol tartrate    traZODone    senna-docusate **AND** polyethylene glycol/lytes    Pharmacy Consult Request    heparin    heparin      ROS  10 systems reviewed and are negative unless otherwise noted in history of present illness.    Physical Exam:  Vitals:    05/15/24 2300 05/16/24 0448 05/16/24 0800 05/16/24 1125   BP:  (!) 145/93 (!) 123/91 (!) 126/91   Pulse:  100 (!) 111 96   Resp:  18 18 17   Temp:  37.4 °C (99.3 °F) 36.8 °C (98.2 °F) 37 °C (98.6 °F)   TempSrc:  Temporal Temporal Temporal   SpO2:  92% 94% 97%   Height: 1.753 m (5' 9.02\")          Physical Exam  Constitutional:       General: He is not in acute distress.     Appearance: He is ill-appearing. He is not toxic-appearing.   HENT:      Head: Normocephalic and atraumatic.      Nose:      Comments: Nasogastric tube in place draining bilious fluid.  Eyes:      General: No scleral icterus.  Cardiovascular:      Rate and Rhythm: Normal rate and regular rhythm.   Pulmonary:      Effort: Pulmonary effort is " normal.      Breath sounds: Normal breath sounds.   Abdominal:      General: Abdomen is flat. Bowel sounds are normal. There is no distension.      Tenderness: There is abdominal tenderness.   Skin:     General: Skin is warm and dry.   Neurological:      General: No focal deficit present.      Mental Status: He is alert. Mental status is at baseline.   Psychiatric:         Behavior: Behavior normal.         Judgment: Judgment normal.           Labs:  Recent Labs     05/15/24  1700   SODIUM 138   POTASSIUM 4.0   CHLORIDE 100   CO2 25   BUN 9   CREATININE 0.74   CALCIUM 9.5     Recent Labs     05/15/24  1700   ALTSGPT 152*   ASTSGOT 225*   ALKPHOSPHAT 434*   TBILIRUBIN 0.8   LIPASE 20   GLUCOSE 128*     Recent Labs     05/15/24  1700   WBC 8.6   NEUTSPOLYS 77.50*   LYMPHOCYTES 10.30*   MONOCYTES 10.90   EOSINOPHILS 0.60   BASOPHILS 0.50   ASTSGOT 225*   ALTSGPT 152*   ALKPHOSPHAT 434*   TBILIRUBIN 0.8     Recent Labs     05/15/24  1700 05/15/24  2349   RBC 4.42*  --    HEMOGLOBIN 12.2*  --    HEMATOCRIT 38.6*  --    PLATELETCT 311  --    PROTHROMBTM 14.7* 15.9*   APTT 30.7 46.8*   INR 1.10 1.25*     Recent Results (from the past 24 hour(s))   URINALYSIS    Collection Time: 05/15/24  4:30 PM    Specimen: Blood   Result Value Ref Range    Color Yellow     Character Clear     Specific Gravity 1.010 <1.035    Ph 7.0 5.0 - 8.0    Glucose Negative Negative mg/dL    Ketones Negative Negative mg/dL    Protein Negative Negative mg/dL    Bilirubin Negative Negative    Nitrite Negative Negative    Leukocyte Esterase Negative Negative    Occult Blood Negative Negative    Micro Urine Req see below    CBC WITH DIFFERENTIAL    Collection Time: 05/15/24  5:00 PM   Result Value Ref Range    WBC 8.6 4.8 - 10.8 K/uL    RBC 4.42 (L) 4.70 - 6.10 M/uL    Hemoglobin 12.2 (L) 14.0 - 18.0 g/dL    Hematocrit 38.6 (L) 42.0 - 52.0 %    MCV 87.3 81.4 - 97.8 fL    MCH 27.6 27.0 - 33.0 pg    MCHC 31.6 (L) 32.3 - 36.5 g/dL    RDW 55.9 (H) 35.9 -  50.0 fL    Platelet Count 311 164 - 446 K/uL    MPV 9.2 9.0 - 12.9 fL    Neutrophils-Polys 77.50 (H) 44.00 - 72.00 %    Lymphocytes 10.30 (L) 22.00 - 41.00 %    Monocytes 10.90 0.00 - 13.40 %    Eosinophils 0.60 0.00 - 6.90 %    Basophils 0.50 0.00 - 1.80 %    Immature Granulocytes 0.20 0.00 - 0.90 %    Nucleated RBC 0.00 0.00 - 0.20 /100 WBC    Neutrophils (Absolute) 6.66 1.82 - 7.42 K/uL    Lymphs (Absolute) 0.89 (L) 1.00 - 4.80 K/uL    Monos (Absolute) 0.94 (H) 0.00 - 0.85 K/uL    Eos (Absolute) 0.05 0.00 - 0.51 K/uL    Baso (Absolute) 0.04 0.00 - 0.12 K/uL    Immature Granulocytes (abs) 0.02 0.00 - 0.11 K/uL    NRBC (Absolute) 0.00 K/uL   COMP METABOLIC PANEL    Collection Time: 05/15/24  5:00 PM   Result Value Ref Range    Sodium 138 135 - 145 mmol/L    Potassium 4.0 3.6 - 5.5 mmol/L    Chloride 100 96 - 112 mmol/L    Co2 25 20 - 33 mmol/L    Anion Gap 13.0 7.0 - 16.0    Glucose 128 (H) 65 - 99 mg/dL    Bun 9 8 - 22 mg/dL    Creatinine 0.74 0.50 - 1.40 mg/dL    Calcium 9.5 8.4 - 10.2 mg/dL    Correct Calcium 9.5 8.5 - 10.5 mg/dL    AST(SGOT) 225 (H) 12 - 45 U/L    ALT(SGPT) 152 (H) 2 - 50 U/L    Alkaline Phosphatase 434 (H) 30 - 99 U/L    Total Bilirubin 0.8 0.1 - 1.5 mg/dL    Albumin 4.0 3.2 - 4.9 g/dL    Total Protein 7.9 6.0 - 8.2 g/dL    Globulin 3.9 (H) 1.9 - 3.5 g/dL    A-G Ratio 1.0 g/dL   LIPASE    Collection Time: 05/15/24  5:00 PM   Result Value Ref Range    Lipase 20 11 - 82 U/L   ESTIMATED GFR    Collection Time: 05/15/24  5:00 PM   Result Value Ref Range    GFR (CKD-EPI) 120 >60 mL/min/1.73 m 2   aPTT    Collection Time: 05/15/24  5:00 PM   Result Value Ref Range    APTT 30.7 24.7 - 36.0 sec   Prothrombin Time    Collection Time: 05/15/24  5:00 PM   Result Value Ref Range    PT 14.7 (H) 12.0 - 14.6 sec    INR 1.10 0.87 - 1.13   Heparin Xa (Unfractionated)    Collection Time: 05/15/24  5:00 PM   Result Value Ref Range    Heparin Xa (UFH) <0.10 IU/mL   aPTT    Collection Time: 05/15/24 11:49 PM    Result Value Ref Range    APTT 46.8 (H) 24.7 - 36.0 sec   Prothrombin Time    Collection Time: 05/15/24 11:49 PM   Result Value Ref Range    PT 15.9 (H) 12.0 - 14.6 sec    INR 1.25 (H) 0.87 - 1.13   Heparin Xa (Unfractionated)    Collection Time: 05/15/24 11:49 PM   Result Value Ref Range    Heparin Xa (UFH) <0.10 IU/mL   Heparin Xa (Unfractionated)    Collection Time: 05/16/24  2:09 AM   Result Value Ref Range    Heparin Xa (UFH) <0.10 IU/mL   Heparin Xa (Unfractionated)    Collection Time: 05/16/24 10:08 AM   Result Value Ref Range    Heparin Xa (UFH) 0.38 IU/mL         Imaging:  DX-UPPER GI-SERIES WITH KUB  Narrative: 5/16/2024 1:00 PM    HISTORY/REASON FOR EXAM:  Pancreatic bed fluid collection apparently encasing the duodenum.    TECHNIQUE/EXAM DESCRIPTION AND NUMBER OF VIEWS:  Omnipaque 300 water soluble contrast upper GI series was performed. Contrast was injected through the nasogastric tube.    12 fluoroscopic images obtained.    Fluoroscopy time was 1.9 minutes.    Fluoroscopy dose(DAP): 4.306 Gy*cm^2    COMPARISON: CT from the previous date    FINDINGS:    Gastroesophageal junction:  No evidence of hiatal hernia. There is some reflux around the nasogastric tube, of uncertain physiologic significance.    Stomach:  Distended. Normal mucosal pattern. Qualitatively delayed emptying.    Duodenum:  Narrowing of the proximal duodenum with only intermittent passage of contrast.  Impression: Findings compatible with stenosis of the proximal duodenum but not complete obstruction.        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  Patient now has portal vein thrombosis.  He is on heparin drip.  Patient has gastric outlet obstruction based on cross-sectional imaging.  We discussed endoscopic evaluation and tube management.  Nasogastric tube will need to be removed during the upper endoscopy.  It may be in the patient's best interest to place a nasojejunal tube during the procedure if this is feasible.  Possible dilation therapy of the duodenum if there is stricturing disease.    The risk, benefits, and alternatives were discussed in detail. Risks include bowel perforation, procedure related bleeding event, infection, inability to safely complete the exam, sedation related complications. The patient, understanding the discussion, consents to proceed forward.        Recommendations:  Hold heparin 4 hours prior to procedure  EGD tomorrow with possible dilation therapy, possible nasojejunal tube placement    Discussed patient condition and risk of morbidity and/or mortality with  hepatobiliary surgery, Dr. Cai .  Time spent in chart review, counseling, coordination of care: 45min

## 2024-05-16 NOTE — PROGRESS NOTES
1043 Reached out to pharmacy regarding restarting heparin dose upon admission to unit. Pharmacy stated okay to restart infusion at 18 unit/kg/hr and recheck Anti-Xa in 6 hours (0200).

## 2024-05-16 NOTE — ASSESSMENT & PLAN NOTE
Hx of necrotizing pancreatitis with prior history of infected pancreatic pseudocyst,  cystocutaneous fistula, recent surgery open irrigation and washout for infected pancreatic cyst on 4/15

## 2024-05-17 ENCOUNTER — ANESTHESIA (OUTPATIENT)
Dept: SURGERY | Facility: MEDICAL CENTER | Age: 39
DRG: 380 | End: 2024-05-17
Payer: COMMERCIAL

## 2024-05-17 ENCOUNTER — ANESTHESIA EVENT (OUTPATIENT)
Dept: SURGERY | Facility: MEDICAL CENTER | Age: 39
DRG: 380 | End: 2024-05-17
Payer: COMMERCIAL

## 2024-05-17 LAB
ALBUMIN SERPL BCP-MCNC: 3.6 G/DL (ref 3.2–4.9)
ALBUMIN/GLOB SERPL: 1.1 G/DL
ALP SERPL-CCNC: 486 U/L (ref 30–99)
ALT SERPL-CCNC: 182 U/L (ref 2–50)
ANION GAP SERPL CALC-SCNC: 17 MMOL/L (ref 7–16)
AST SERPL-CCNC: 79 U/L (ref 12–45)
BILIRUB SERPL-MCNC: 0.6 MG/DL (ref 0.1–1.5)
BUN SERPL-MCNC: 7 MG/DL (ref 8–22)
CALCIUM ALBUM COR SERPL-MCNC: 9.4 MG/DL (ref 8.5–10.5)
CALCIUM SERPL-MCNC: 9.1 MG/DL (ref 8.5–10.5)
CHLORIDE SERPL-SCNC: 98 MMOL/L (ref 96–112)
CO2 SERPL-SCNC: 22 MMOL/L (ref 20–33)
CREAT SERPL-MCNC: 0.62 MG/DL (ref 0.5–1.4)
ERYTHROCYTE [DISTWIDTH] IN BLOOD BY AUTOMATED COUNT: 52.5 FL (ref 35.9–50)
EXTRA TUBE BLU BLU: NORMAL
GFR SERPLBLD CREATININE-BSD FMLA CKD-EPI: 126 ML/MIN/1.73 M 2
GLOBULIN SER CALC-MCNC: 3.2 G/DL (ref 1.9–3.5)
GLUCOSE SERPL-MCNC: 76 MG/DL (ref 65–99)
HCT VFR BLD AUTO: 35.1 % (ref 42–52)
HGB BLD-MCNC: 11.5 G/DL (ref 14–18)
MAGNESIUM SERPL-MCNC: 1.8 MG/DL (ref 1.5–2.5)
MCH RBC QN AUTO: 27.4 PG (ref 27–33)
MCHC RBC AUTO-ENTMCNC: 32.8 G/DL (ref 32.3–36.5)
MCV RBC AUTO: 83.6 FL (ref 81.4–97.8)
PHOSPHATE SERPL-MCNC: 3.9 MG/DL (ref 2.5–4.5)
PLATELET # BLD AUTO: 220 K/UL (ref 164–446)
PMV BLD AUTO: 9.2 FL (ref 9–12.9)
POTASSIUM SERPL-SCNC: 3.8 MMOL/L (ref 3.6–5.5)
PROT SERPL-MCNC: 6.8 G/DL (ref 6–8.2)
RBC # BLD AUTO: 4.2 M/UL (ref 4.7–6.1)
SODIUM SERPL-SCNC: 137 MMOL/L (ref 135–145)
WBC # BLD AUTO: 6.7 K/UL (ref 4.8–10.8)

## 2024-05-17 PROCEDURE — 0D778ZZ DILATION OF STOMACH, PYLORUS, VIA NATURAL OR ARTIFICIAL OPENING ENDOSCOPIC: ICD-10-PCS | Performed by: INTERNAL MEDICINE

## 2024-05-17 PROCEDURE — 99232 SBSQ HOSP IP/OBS MODERATE 35: CPT | Performed by: GENERAL PRACTICE

## 2024-05-17 PROCEDURE — 0DH98UZ INSERTION OF FEEDING DEVICE INTO DUODENUM, VIA NATURAL OR ARTIFICIAL OPENING ENDOSCOPIC: ICD-10-PCS | Performed by: INTERNAL MEDICINE

## 2024-05-17 PROCEDURE — 43249 ESOPH EGD DILATION <30 MM: CPT | Performed by: INTERNAL MEDICINE

## 2024-05-17 PROCEDURE — 43241 EGD TUBE/CATH INSERTION: CPT | Performed by: INTERNAL MEDICINE

## 2024-05-17 RX ORDER — MIDAZOLAM HYDROCHLORIDE 1 MG/ML
1 INJECTION INTRAMUSCULAR; INTRAVENOUS
Status: DISCONTINUED | OUTPATIENT
Start: 2024-05-17 | End: 2024-05-17 | Stop reason: HOSPADM

## 2024-05-17 RX ORDER — HYDRALAZINE HYDROCHLORIDE 20 MG/ML
5 INJECTION INTRAMUSCULAR; INTRAVENOUS
Status: DISCONTINUED | OUTPATIENT
Start: 2024-05-17 | End: 2024-05-17 | Stop reason: HOSPADM

## 2024-05-17 RX ORDER — ROCURONIUM BROMIDE 10 MG/ML
INJECTION, SOLUTION INTRAVENOUS PRN
Status: DISCONTINUED | OUTPATIENT
Start: 2024-05-17 | End: 2024-05-17 | Stop reason: SURG

## 2024-05-17 RX ORDER — MIDAZOLAM HYDROCHLORIDE 1 MG/ML
INJECTION INTRAMUSCULAR; INTRAVENOUS PRN
Status: DISCONTINUED | OUTPATIENT
Start: 2024-05-17 | End: 2024-05-17 | Stop reason: SURG

## 2024-05-17 RX ORDER — OXYCODONE HCL 5 MG/5 ML
5 SOLUTION, ORAL ORAL
Status: DISCONTINUED | OUTPATIENT
Start: 2024-05-17 | End: 2024-05-17 | Stop reason: HOSPADM

## 2024-05-17 RX ORDER — LIDOCAINE HYDROCHLORIDE 20 MG/ML
INJECTION, SOLUTION EPIDURAL; INFILTRATION; INTRACAUDAL; PERINEURAL PRN
Status: DISCONTINUED | OUTPATIENT
Start: 2024-05-17 | End: 2024-05-17 | Stop reason: SURG

## 2024-05-17 RX ORDER — METOPROLOL TARTRATE 1 MG/ML
1 INJECTION, SOLUTION INTRAVENOUS
Status: DISCONTINUED | OUTPATIENT
Start: 2024-05-17 | End: 2024-05-17 | Stop reason: HOSPADM

## 2024-05-17 RX ORDER — IPRATROPIUM BROMIDE AND ALBUTEROL SULFATE 2.5; .5 MG/3ML; MG/3ML
3 SOLUTION RESPIRATORY (INHALATION)
Status: DISCONTINUED | OUTPATIENT
Start: 2024-05-17 | End: 2024-05-17 | Stop reason: HOSPADM

## 2024-05-17 RX ORDER — HALOPERIDOL 5 MG/ML
1 INJECTION INTRAMUSCULAR
Status: DISCONTINUED | OUTPATIENT
Start: 2024-05-17 | End: 2024-05-17 | Stop reason: HOSPADM

## 2024-05-17 RX ORDER — LABETALOL HYDROCHLORIDE 5 MG/ML
5 INJECTION, SOLUTION INTRAVENOUS
Status: DISCONTINUED | OUTPATIENT
Start: 2024-05-17 | End: 2024-05-17 | Stop reason: HOSPADM

## 2024-05-17 RX ORDER — ONDANSETRON 2 MG/ML
INJECTION INTRAMUSCULAR; INTRAVENOUS PRN
Status: DISCONTINUED | OUTPATIENT
Start: 2024-05-17 | End: 2024-05-17 | Stop reason: SURG

## 2024-05-17 RX ORDER — TRAZODONE HYDROCHLORIDE 50 MG/1
50 TABLET ORAL NIGHTLY
Status: DISCONTINUED | OUTPATIENT
Start: 2024-05-17 | End: 2024-05-20 | Stop reason: HOSPADM

## 2024-05-17 RX ORDER — ONDANSETRON 2 MG/ML
4 INJECTION INTRAMUSCULAR; INTRAVENOUS
Status: DISCONTINUED | OUTPATIENT
Start: 2024-05-17 | End: 2024-05-17 | Stop reason: HOSPADM

## 2024-05-17 RX ORDER — OXYCODONE HCL 5 MG/5 ML
10 SOLUTION, ORAL ORAL
Status: DISCONTINUED | OUTPATIENT
Start: 2024-05-17 | End: 2024-05-17 | Stop reason: HOSPADM

## 2024-05-17 RX ORDER — DIPHENHYDRAMINE HYDROCHLORIDE 50 MG/ML
12.5 INJECTION INTRAMUSCULAR; INTRAVENOUS
Status: DISCONTINUED | OUTPATIENT
Start: 2024-05-17 | End: 2024-05-17 | Stop reason: HOSPADM

## 2024-05-17 RX ORDER — HEPARIN SODIUM 5000 [USP'U]/100ML
0-30 INJECTION, SOLUTION INTRAVENOUS CONTINUOUS
Status: DISCONTINUED | OUTPATIENT
Start: 2024-05-17 | End: 2024-05-17

## 2024-05-17 RX ORDER — DEXAMETHASONE SODIUM PHOSPHATE 4 MG/ML
INJECTION, SOLUTION INTRA-ARTICULAR; INTRALESIONAL; INTRAMUSCULAR; INTRAVENOUS; SOFT TISSUE PRN
Status: DISCONTINUED | OUTPATIENT
Start: 2024-05-17 | End: 2024-05-17 | Stop reason: HOSPADM

## 2024-05-17 RX ORDER — EPHEDRINE SULFATE 50 MG/ML
5 INJECTION, SOLUTION INTRAVENOUS
Status: DISCONTINUED | OUTPATIENT
Start: 2024-05-17 | End: 2024-05-17 | Stop reason: HOSPADM

## 2024-05-17 RX ADMIN — HYDROMORPHONE HYDROCHLORIDE 0.5 MG: 1 INJECTION, SOLUTION INTRAMUSCULAR; INTRAVENOUS; SUBCUTANEOUS at 16:59

## 2024-05-17 RX ADMIN — HYDROMORPHONE HYDROCHLORIDE 0.5 MG: 1 INJECTION, SOLUTION INTRAMUSCULAR; INTRAVENOUS; SUBCUTANEOUS at 00:05

## 2024-05-17 RX ADMIN — DEXAMETHASONE SODIUM PHOSPHATE 8 MG: 4 INJECTION INTRA-ARTICULAR; INTRALESIONAL; INTRAMUSCULAR; INTRAVENOUS; SOFT TISSUE at 08:00

## 2024-05-17 RX ADMIN — MIDAZOLAM HYDROCHLORIDE 2 MG: 1 INJECTION, SOLUTION INTRAMUSCULAR; INTRAVENOUS at 07:49

## 2024-05-17 RX ADMIN — OXYCODONE HYDROCHLORIDE 10 MG: 10 TABLET ORAL at 21:56

## 2024-05-17 RX ADMIN — FENTANYL CITRATE 100 MCG: 50 INJECTION, SOLUTION INTRAMUSCULAR; INTRAVENOUS at 07:50

## 2024-05-17 RX ADMIN — HYDROMORPHONE HYDROCHLORIDE 0.5 MG: 1 INJECTION, SOLUTION INTRAMUSCULAR; INTRAVENOUS; SUBCUTANEOUS at 23:27

## 2024-05-17 RX ADMIN — METOPROLOL TARTRATE 25 MG: 25 TABLET, FILM COATED ORAL at 04:31

## 2024-05-17 RX ADMIN — ROCURONIUM BROMIDE 50 MG: 50 INJECTION, SOLUTION INTRAVENOUS at 08:00

## 2024-05-17 RX ADMIN — OXYCODONE HYDROCHLORIDE 10 MG: 10 TABLET ORAL at 15:43

## 2024-05-17 RX ADMIN — SUGAMMADEX 200 MG: 100 INJECTION, SOLUTION INTRAVENOUS at 08:27

## 2024-05-17 RX ADMIN — PROPOFOL 200 MG: 10 INJECTION, EMULSION INTRAVENOUS at 08:00

## 2024-05-17 RX ADMIN — LIDOCAINE HYDROCHLORIDE 100 MG: 20 INJECTION, SOLUTION EPIDURAL; INFILTRATION; INTRACAUDAL at 08:00

## 2024-05-17 RX ADMIN — METOPROLOL TARTRATE 25 MG: 25 TABLET, FILM COATED ORAL at 17:02

## 2024-05-17 RX ADMIN — APIXABAN 10 MG: 5 TABLET, FILM COATED ORAL at 13:39

## 2024-05-17 RX ADMIN — APIXABAN 10 MG: 5 TABLET, FILM COATED ORAL at 20:03

## 2024-05-17 RX ADMIN — ONDANSETRON 4 MG: 2 INJECTION INTRAMUSCULAR; INTRAVENOUS at 08:08

## 2024-05-17 ASSESSMENT — COGNITIVE AND FUNCTIONAL STATUS - GENERAL
HELP NEEDED FOR BATHING: A LITTLE
MOBILITY SCORE: 24
SUGGESTED CMS G CODE MODIFIER MOBILITY: CH
DAILY ACTIVITIY SCORE: 23
SUGGESTED CMS G CODE MODIFIER DAILY ACTIVITY: CI

## 2024-05-17 ASSESSMENT — PAIN DESCRIPTION - PAIN TYPE
TYPE: CHRONIC PAIN
TYPE: ACUTE PAIN
TYPE: CHRONIC PAIN
TYPE: SURGICAL PAIN
TYPE: CHRONIC PAIN
TYPE: CHRONIC PAIN;ACUTE PAIN
TYPE: CHRONIC PAIN
TYPE: CHRONIC PAIN
TYPE: SURGICAL PAIN

## 2024-05-17 ASSESSMENT — PAIN SCALES - GENERAL: PAIN_LEVEL: 2

## 2024-05-17 NOTE — OP REPORT
PreOp Diagnosis: Nausea and vomiting, gastric outlet obstruction      PostOp Diagnosis:   #1.  Reflux esophagitis limited to the distal 3 cm of the esophagus, LA classification grade B.  #2.  Diffuse mild gastritis.  #3.  Acute angulation of the antrum creating gastric outlet obstruction.  Pyloric TTS balloon dilation to 15 mm  #4.  Nasoduodenal feeding tube placement, 10 Setswana clipped to the small intestine, likely third portion of the duodenum.  Secured at the left nares with bridle.      Procedure(s):  GASTROSCOPY - Wound Class: Clean Contaminated  GASTROSCOPY, WITH FEEDING TUBE INSERTION - Wound Class: Clean Contaminated  GASTROSCOPY, WITH BALLOON DILATION - Wound Class: Clean Contaminated  EGD, WITH CLIP PLACEMENT - Wound Class: Clean Contaminated    Surgeon(s):  Tony Choe M.D.    Anesthesiologist/Type of Anesthesia:  Anesthesiologist: Carlton Bustillos M.D./General    Surgical Staff:  Circulator: Meena Ferguson R.N.  Endoscopy Technician: Vandana Slade  Endoscopy Nurse: Sheeba Rodriguez R.N.    Specimens removed if any:  * No specimens in log *      CONSENT: The risks, benefits and alternatives of the procedure were discussed in detail. The risks include and are not limited to bleeding, infection, perforation, missed lesions, and sedations risks (cardiopulmonary compromise and allergic reaction to medications).    DESCRIPTION:   The patient presented to the operating room.  A time out was performed prior to beginning the procedure.   The patient was placed in the left lateral recumbent position.   Patient was sedated by anesthesia: GETA.    OPERATIVE FINDINGS:  Nasogastric tube removed.  Esophagus: LA grade B reflux esophagitis involving the distal 3 cm of the esophagus.  Esophagus otherwise normal.  Stomach: Retained gastric contents in the fundus.  Approximately 200 cc removed.  Diffuse mild gastritis.  Acute angulation of the antrum creating gastric outlet obstruction.  Difficulty  navigating the pylorus.    Duodenum: Normal to the third portion.  Unable to advance scope further.    Endoscope withdrawn to the pylorus.  Using TTS 12 to 15 mm balloon the pyloric channel and first portion of the duodenum were dilated to 15 mm.    Core track 10 Icelandic by 109 cm advanced through the left nares and grasped with a Pree attached suture.  Feeding tube advanced to the third portion of the duodenum.  Clip to the duodenal wall.  Secured at the left nares with bridle.    Blood loss: None    The patient tolerated the procedure well.      There were no immediate complications.    IMPRESSION:  #1.  Reflux esophagitis limited to the distal 3 cm of the esophagus, LA classification grade B.  #2.  Diffuse mild gastritis.  #3.  Acute angulation of the antrum creating gastric outlet obstruction.  Pyloric TTS balloon dilation to 15 mm  #4.  Nasoduodenal feeding tube placement, 10 Icelandic, clipped to the small intestine, likely third portion of the duodenum.  Secured at the left nares with bridle.    RECOMMENDATIONS:  Sips and chips for comfort.  May have liquid with medicines.  Advance to full liquids as tolerated.  Initiate feedings via the nasoduodenal tube feeding.  Return patient to hospital [facility] for continued care.  GI will sign off. Please re-consult PRN.  May re-initiate anticoagulation now.

## 2024-05-17 NOTE — CARE PLAN
The patient is Stable - Low risk of patient condition declining or worsening    Shift Goals  Clinical Goals: NG Tube, Heparin GTT, Pain Control  Patient Goals: Pain Control, Comfort    Progress made toward(s) clinical / shift goals:  Yes      Problem: Knowledge Deficit - Standard  Goal: Patient and family/care givers will demonstrate understanding of plan of care, disease process/condition, diagnostic tests and medications  Outcome: Progressing     Problem: Pain - Standard  Goal: Alleviation of pain or a reduction in pain to the patient’s comfort goal  Outcome: Progressing     Problem: Fluid Volume  Goal: Fluid volume balance will be maintained  Outcome: Progressing     Problem: Mobility  Goal: Patient's capacity to carry out activities will improve  Outcome: Progressing     Problem: Infection - Standard  Goal: Patient will remain free from infection  Outcome: Progressing

## 2024-05-17 NOTE — CARE PLAN
Problem: Knowledge Deficit - Standard  Goal: Patient and family/care givers will demonstrate understanding of plan of care, disease process/condition, diagnostic tests and medications  Outcome: Progressing     Problem: Pain - Standard  Goal: Alleviation of pain or a reduction in pain to the patient’s comfort goal  Outcome: Progressing     Problem: Gastrointestinal Irritability  Goal: Nausea and vomiting will be absent or improve  Outcome: Progressing   The patient is Watcher - Medium risk of patient condition declining or worsening    Shift Goals  Clinical Goals: tube feeding, cortrak  Patient Goals: rest comfort  Family Goals: updates    Progress made toward(s) clinical / shift goals:       Patient is not progressing towards the following goals:

## 2024-05-17 NOTE — ANESTHESIA TIME REPORT
Anesthesia Start and Stop Event Times       Date Time Event    5/17/2024 0728 Ready for Procedure     0747 Anesthesia Start     0839 Anesthesia Stop          Responsible Staff  05/17/24      Name Role Begin End    Carlton Bustillos M.D. Anesth 0747 0839          Overtime Reason:  no overtime (within assigned shift)    Comments:

## 2024-05-17 NOTE — ANESTHESIA PROCEDURE NOTES
Airway    Date/Time: 5/17/2024 7:54 AM    Performed by: Carlton Bustillos M.D.  Authorized by: Carlton Bustillos M.D.    Location:  OR  Urgency:  Elective  Difficult Airway: No    Indications for Airway Management:  Anesthesia      Spontaneous Ventilation: absent    Sedation Level:  Deep  Preoxygenated: Yes    Patient Position:  Sniffing  Mask Difficulty Assessment:  1 - vent by mask  Final Airway Type:  Endotracheal airway  Final Endotracheal Airway:  ETT  Cuffed: Yes    Technique Used for Successful ETT Placement:  Direct laryngoscopy    Insertion Site:  Oral  Blade Type:  Head  Laryngoscope Blade/Videolaryngoscope Blade Size:  2  ETT Size (mm):  7.5  Measured from:  Teeth  ETT to Teeth (cm):  23  Placement Verified by: auscultation and capnometry    Cormack-Lehane Classification:  Grade I - full view of glottis  Number of Attempts at Approach:  1

## 2024-05-17 NOTE — ANESTHESIA POSTPROCEDURE EVALUATION
Patient: Abelardo Fenton    Procedure Summary       Date: 05/17/24 Room / Location: [facility] ROOM 26 / SURGERY SAME DAY [facility]    Anesthesia Start: 0747 Anesthesia Stop: 0839    Procedures:       GASTROSCOPY (Esophagus)      GASTROSCOPY, WITH FEEDING TUBE INSERTION (Esophagus)      GASTROSCOPY, WITH BALLOON DILATION (Esophagus)      EGD, WITH CLIP PLACEMENT (Esophagus) Diagnosis: (DILATED PYLORUS, NASO DUODENAL FEEDING TUBE INSERTION)    Surgeons: Tony Choe M.D. Responsible Provider: Carlton Bustillos M.D.    Anesthesia Type: general ASA Status: 2            Final Anesthesia Type: general  Last vitals  BP   Blood Pressure: 111/67    Temp   36.3 °C (97.3 °F)    Pulse   92   Resp   18    SpO2   99 %      Anesthesia Post Evaluation    Patient location during evaluation: PACU  Patient participation: complete - patient participated  Level of consciousness: awake and alert  Pain score: 2    Airway patency: patent  Anesthetic complications: no  Cardiovascular status: hemodynamically stable  Respiratory status: acceptable  Hydration status: euvolemic    PONV: none          There were no known notable events for this encounter.     Nurse Pain Score: 6 (NPRS)

## 2024-05-17 NOTE — OR NURSING
0833- Pt to PACU 3 from OR. Bedside report from anesthesiologist and RN.  Attached to monitoring, VSS, breathing is calm and unlabored, Patient is asleep currently. Cortrak placed in left nare at 70 cm  with a bridle. . Remains on 6 L oxygen via mask with an Oral airway in place.  Oral airway removed upon arrival.     0845- Pt denies pain or nausea at this time.    0856- Dr. Choe at bedside. Ok to start patient on clear liquids. Tolerating at this time.       0859- Report given to Felicia Cook RN. All questions answered.      0913- Patient out of PACU to room with transport.

## 2024-05-17 NOTE — ANESTHESIA PREPROCEDURE EVALUATION
Case: [id number] Date/Time: 05/17/24 0900    Procedure: GASTROSCOPY (Esophagus)    Anesthesia type: MAC    Pre-op diagnosis: Gastric outlet obstruction    Location: [facility] ROOM 26 / SURGERY SAME DAY [facility]    Surgeons: Tony Choe M.D.            Relevant Problems   CARDIAC   (positive) Hypertension   (positive) Portal vein thrombosis   (positive) Superior mesenteric vein thrombosis (HCC)       Physical Exam    Airway   Mallampati: II  TM distance: >3 FB  Neck ROM: full       Cardiovascular - normal exam  Rhythm: regular  Rate: normal  (-) murmur     Dental - normal exam           Pulmonary - normal exam  Breath sounds clear to auscultation     Abdominal   Abdomen: tender     Neurological - normal exam         Other findings: Patient has a gastric outlet obstruction.              Anesthesia Plan    ASA 2       Plan - general       Airway plan will be mask          Induction: intravenous      Pertinent diagnostic labs and testing reviewed    Informed Consent:    Anesthetic plan and risks discussed with patient.    Use of blood products discussed with: patient whom consented to blood products.

## 2024-05-17 NOTE — PROGRESS NOTES
Hospital Medicine Daily Progress Note    Date of Service  5/17/2024    Chief Complaint  Abelardo Fenton is a 38 y.o. male admitted 5/15/2024 with abdominal pain    Hospital Course  This is a 38-year-old male with past medical history of necrotizing pancreatitis with prior history of infected pancreatic pseudocyst,  cystocutaneous fistula, recent surgery open irrigation and washout for infected pancreatic cyst on 4/15 who was transferred from outside facility with abdominal pain, significant for SMV occlusion and PVT.    Patient transferred to our facility for higher level of care for hepatobiliary and GI consult.    CT of the abdomen is concerning for duodenal stricturing, with gastric outlet obstruction.  Upper GI series compatible with stenosis of proximal duodenum.  NGT decompression.  GI consulted, s/p endoscopy on 5/17, noted grade B reflux esophagitis, diffuse mild gastritis, acute angulation of the antrum creating gastric outlet obstruction, balloon dilatation, reinsertion of NGT, clipped to small intestine.  Patient started on clear liquid diet and enteral feeding.    Patient also noted to have PVT initially on heparin gtt, transitioned to Eliquis.    Interval Problem Update  GI consulted, s/p endoscopy on 5/17, noted grade B reflux esophagitis, diffuse mild gastritis, acute angulation of the antrum creating gastric outlet obstruction, balloon dilatation, reinsertion of NGT, clipped to small intestine.  Patient started on clear liquid diet and enteral feeding. Pending further HBS recommendations.    Heparin gtt, transitioned to Eliquis 5/17 for PVT.    Patient's father at bedside, updated on plan of care, all question answered.    I have discussed this patient's plan of care and discharge plan at IDT rounds today with Case Management, Nursing, Nursing leadership, and other members of the IDT team.    Consultants/Specialty  general surgery    Code Status  Full Code    Disposition  The patient is  not medically cleared for discharge to home or a post-acute facility.  Anticipate discharge to: home with close outpatient follow-up    I have placed the appropriate orders for post-discharge needs.    Review of Systems  Review of Systems   All other systems reviewed and are negative.       Physical Exam  Temp:  [36.3 °C (97.3 °F)-37.2 °C (99 °F)] 36.3 °C (97.3 °F)  Pulse:  [88-99] 93  Resp:  [14-18] 16  BP: (111-143)/(67-95) 128/77  SpO2:  [95 %-99 %] 96 %    Physical Exam  Vitals and nursing note reviewed.   Constitutional:       General: He is not in acute distress.  HENT:      Head: Normocephalic and atraumatic.      Nose:      Comments: NGT  Eyes:      Extraocular Movements: Extraocular movements intact.      Conjunctiva/sclera: Conjunctivae normal.      Pupils: Pupils are equal, round, and reactive to light.   Cardiovascular:      Rate and Rhythm: Normal rate and regular rhythm.      Pulses: Normal pulses.      Heart sounds: No murmur heard.     No friction rub. No gallop.   Pulmonary:      Effort: Pulmonary effort is normal. No respiratory distress.      Breath sounds: Normal breath sounds. No wheezing, rhonchi or rales.   Abdominal:      General: Bowel sounds are normal. There is no distension.      Palpations: Abdomen is soft.      Tenderness: There is abdominal tenderness.   Musculoskeletal:         General: No swelling or tenderness. Normal range of motion.      Cervical back: Normal range of motion and neck supple. No muscular tenderness.      Right lower leg: No edema.      Left lower leg: No edema.   Skin:     General: Skin is warm and dry.      Capillary Refill: Capillary refill takes less than 2 seconds.      Findings: No bruising, erythema or rash.   Neurological:      General: No focal deficit present.      Mental Status: He is alert and oriented to person, place, and time.         Fluids    Intake/Output Summary (Last 24 hours) at 5/17/2024 1143  Last data filed at 5/17/2024 0831  Gross per 24  hour   Intake 1950 ml   Output 1350 ml   Net 600 ml       Laboratory  Recent Labs     05/15/24  1700 05/17/24  0430   WBC 8.6 6.7   RBC 4.42* 4.20*   HEMOGLOBIN 12.2* 11.5*   HEMATOCRIT 38.6* 35.1*   MCV 87.3 83.6   MCH 27.6 27.4   MCHC 31.6* 32.8   RDW 55.9* 52.5*   PLATELETCT 311 220   MPV 9.2 9.2     Recent Labs     05/15/24  1700 05/17/24  0430   SODIUM 138 137   POTASSIUM 4.0 3.8   CHLORIDE 100 98   CO2 25 22   GLUCOSE 128* 76   BUN 9 7*   CREATININE 0.74 0.62   CALCIUM 9.5 9.1     Recent Labs     05/15/24  1700 05/15/24  2349   APTT 30.7 46.8*   INR 1.10 1.25*               Imaging  DX-UPPER GI-SERIES WITH KUB   Final Result      Findings compatible with stenosis of the proximal duodenum but not complete obstruction.           Assessment/Plan  * Portal vein thrombosis  Assessment & Plan  Occlusion of portal vein on CT  Heparin gtt, transitioned to Eliquis 5/17    Duodenal stricture  Assessment & Plan  CT of the abdomen is concerning for duodenal stricturing.    Hepatobillary Surgery consulted, Upper GI series compatible with stenosis of proximal duodenum.  GI consulted, s/p endoscopy on 5/17, noted grade B reflux esophagitis, diffuse mild gastritis, acute angulation of the antrum creating gastric outlet obstruction, balloon dilatation, reinsertion of NGT, clipped to small intestine.  Patient started on clear liquid diet and enteral feeding.    Necrotizing pancreatitis- (present on admission)  Assessment & Plan  Hx of necrotizing pancreatitis with prior history of infected pancreatic pseudocyst,  cystocutaneous fistula, recent surgery open irrigation and washout for infected pancreatic cyst on 4/15    Superior mesenteric vein thrombosis (HCC)  Assessment & Plan  Noted on CT  Heparin gtt, transitioned to Eliquis 5/17    Abdominal pain- (present on admission)  Assessment & Plan  In setting of recent necrotizing pancreatitis with pseudocyst and multiple peripancreatic fluid collections, status post drain placement  and exploratory laparotomy.  With occlusion of portal vein  Surgical oncology consulted  Heparin gtt  Pain control    Hypertension- (present on admission)  Assessment & Plan  Resume patient's metoprolol         VTE prophylaxis: Eliquis    I have performed a physical exam and reviewed and updated ROS and Plan today (5/17/2024). In review of yesterday's note (5/16/2024), there are no changes except as documented above.

## 2024-05-18 LAB
ALBUMIN SERPL BCP-MCNC: 3.6 G/DL (ref 3.2–4.9)
ALBUMIN/GLOB SERPL: 1.1 G/DL
ALP SERPL-CCNC: 400 U/L (ref 30–99)
ALT SERPL-CCNC: 117 U/L (ref 2–50)
ANION GAP SERPL CALC-SCNC: 13 MMOL/L (ref 7–16)
AST SERPL-CCNC: 28 U/L (ref 12–45)
BILIRUB SERPL-MCNC: 0.3 MG/DL (ref 0.1–1.5)
BUN SERPL-MCNC: 7 MG/DL (ref 8–22)
CALCIUM ALBUM COR SERPL-MCNC: 9.6 MG/DL (ref 8.5–10.5)
CALCIUM SERPL-MCNC: 9.3 MG/DL (ref 8.5–10.5)
CHLORIDE SERPL-SCNC: 97 MMOL/L (ref 96–112)
CO2 SERPL-SCNC: 25 MMOL/L (ref 20–33)
CREAT SERPL-MCNC: 0.55 MG/DL (ref 0.5–1.4)
CRP SERPL HS-MCNC: 8.19 MG/DL (ref 0–0.75)
ERYTHROCYTE [DISTWIDTH] IN BLOOD BY AUTOMATED COUNT: 53.3 FL (ref 35.9–50)
GFR SERPLBLD CREATININE-BSD FMLA CKD-EPI: 131 ML/MIN/1.73 M 2
GLOBULIN SER CALC-MCNC: 3.4 G/DL (ref 1.9–3.5)
GLUCOSE SERPL-MCNC: 115 MG/DL (ref 65–99)
HCT VFR BLD AUTO: 33 % (ref 42–52)
HGB BLD-MCNC: 10.8 G/DL (ref 14–18)
MCH RBC QN AUTO: 27.5 PG (ref 27–33)
MCHC RBC AUTO-ENTMCNC: 32.7 G/DL (ref 32.3–36.5)
MCV RBC AUTO: 84 FL (ref 81.4–97.8)
PLATELET # BLD AUTO: 307 K/UL (ref 164–446)
PMV BLD AUTO: 9.7 FL (ref 9–12.9)
POTASSIUM SERPL-SCNC: 3.7 MMOL/L (ref 3.6–5.5)
PREALB SERPL-MCNC: 9.5 MG/DL (ref 18–38)
PROT SERPL-MCNC: 7 G/DL (ref 6–8.2)
RBC # BLD AUTO: 3.93 M/UL (ref 4.7–6.1)
SODIUM SERPL-SCNC: 135 MMOL/L (ref 135–145)
WBC # BLD AUTO: 6.8 K/UL (ref 4.8–10.8)

## 2024-05-18 PROCEDURE — 99232 SBSQ HOSP IP/OBS MODERATE 35: CPT | Performed by: GENERAL PRACTICE

## 2024-05-18 PROCEDURE — 99024 POSTOP FOLLOW-UP VISIT: CPT | Performed by: SURGERY

## 2024-05-18 RX ADMIN — METOPROLOL TARTRATE 25 MG: 25 TABLET, FILM COATED ORAL at 16:27

## 2024-05-18 RX ADMIN — TRAZODONE HYDROCHLORIDE 50 MG: 50 TABLET ORAL at 21:28

## 2024-05-18 RX ADMIN — APIXABAN 10 MG: 5 TABLET, FILM COATED ORAL at 05:09

## 2024-05-18 RX ADMIN — HYDROMORPHONE HYDROCHLORIDE 0.5 MG: 1 INJECTION, SOLUTION INTRAMUSCULAR; INTRAVENOUS; SUBCUTANEOUS at 18:38

## 2024-05-18 RX ADMIN — OXYCODONE HYDROCHLORIDE 10 MG: 10 TABLET ORAL at 17:06

## 2024-05-18 RX ADMIN — METOPROLOL TARTRATE 25 MG: 25 TABLET, FILM COATED ORAL at 05:09

## 2024-05-18 RX ADMIN — APIXABAN 10 MG: 5 TABLET, FILM COATED ORAL at 16:27

## 2024-05-18 ASSESSMENT — ENCOUNTER SYMPTOMS
NAUSEA: 0
BRUISES/BLEEDS EASILY: 0
CHILLS: 0
DIZZINESS: 0
DEPRESSION: 0
EYE DISCHARGE: 0
SPUTUM PRODUCTION: 0
COUGH: 0
DOUBLE VISION: 0
HEMOPTYSIS: 0
SENSORY CHANGE: 0
PHOTOPHOBIA: 0
ABDOMINAL PAIN: 1
BACK PAIN: 0
SPEECH CHANGE: 0
CLAUDICATION: 0
TREMORS: 0
NERVOUS/ANXIOUS: 0
HEADACHES: 0
TINGLING: 0
CONSTIPATION: 0
VOMITING: 0
WEIGHT LOSS: 0
FEVER: 0
HALLUCINATIONS: 0
DIARRHEA: 0
EYE PAIN: 0
MYALGIAS: 0
PALPITATIONS: 0
FOCAL WEAKNESS: 0
ORTHOPNEA: 0
BLURRED VISION: 0
HEARTBURN: 0
NECK PAIN: 0

## 2024-05-18 ASSESSMENT — LIFESTYLE VARIABLES: SUBSTANCE_ABUSE: 0

## 2024-05-18 ASSESSMENT — PAIN DESCRIPTION - PAIN TYPE
TYPE: SURGICAL PAIN

## 2024-05-18 NOTE — WOUND TEAM
[facility]  Inpatient Services  Initial Wound and Skin Care Evaluation    Admission Date: 5/15/2024     Last order of IP CONSULT TO WOUND CARE was found on 5/17/2024 from Hospital Encounter on 5/15/2024     HPI, PMH, SH: Reviewed    Past Surgical History:   Procedure Laterality Date    DC UPPER GI ENDOSCOPY,DIAGNOSIS N/A 5/17/2024    Procedure: GASTROSCOPY;  Surgeon: Tony Choe M.D.;  Location: SURGERY SAME DAY [facility];  Service: Gastroenterology    DC PLACE PERCUT GASTROSTOMY TUBE N/A 5/17/2024    Procedure: GASTROSCOPY, WITH FEEDING TUBE INSERTION;  Surgeon: Tony Choe M.D.;  Location: SURGERY SAME DAY [facility];  Service: Gastroenterology    DC UPPER GI ENDOSCOPY,W/DILAT,GASTRIC OUT N/A 5/17/2024    Procedure: GASTROSCOPY, WITH BALLOON DILATION;  Surgeon: Tony Choe M.D.;  Location: SURGERY SAME DAY [facility];  Service: Gastroenterology    DC UPPER GI ENDOSCOPY,CTRL BLEED N/A 5/17/2024    Procedure: EGD, WITH CLIP PLACEMENT;  Surgeon: Tony Choe M.D.;  Location: SURGERY SAME DAY [facility];  Service: Gastroenterology    DC ULTRASONIC GUIDANCE, INTRAOPERATIVE N/A 4/15/2024    Procedure: INTRA-OPERATIVE ULTRASOUND GUIDANCE;  Surgeon: Blair Sainz M.D.;  Location: SURGERY [facility];  Service: General    DC EXPLORATORY OF ABDOMEN N/A 4/15/2024    Procedure: DRAINAGE PERITONEAL ABSCESS;  Surgeon: Blair Sainz M.D.;  Location: SURGERY [facility];  Service: General    HEPATICOJEJUNOSTOMY, CAITLYN-EN-Y N/A 4/15/2024    Procedure: PANCREATIC NECROSECTOMY;  Surgeon: Blair Sainz M.D.;  Location: SURGERY [facility];  Service: General    STENT PLACEMENT Right 2024    for pancreatic pseudocyst     Social History     Tobacco Use    Smoking status: Never     Passive exposure: Never    Smokeless tobacco: Never   Substance Use Topics    Alcohol use: Not Currently     Comment: No alcohol since 12/25/23     No chief complaint on file.    Diagnosis: Abdominal  pain [R10.9]    Unit where seen by Wound Team: T307/01     WOUND CONSULT RELATED TO:  Right quadrant fistula    WOUND TEAM PLAN OF CARE - Frequency of Follow-up:   Nursing to follow dressing orders written for wound care. Contact wound team if area fails to progress, deteriorates or with any questions/concerns if something comes up before next scheduled follow up (See below as to whether wound is following and frequency of wound follow up)   Bi-weekly - Right quadrant fistula    WOUND HISTORY:   This is a 38-year-old male with past medical history of necrotizing pancreatitis with prior history of infected pancreatic pseudocyst,  cystocutaneous fistula, recent surgery open irrigation and washout for infected pancreatic cyst on 4/15 who was transferred from outside facility with abdominal pain, significant for SMV occlusion and PVT.   Patient has been following with Dr. Sainz' office for management of his condition.  Patient was also supposed to go to [facility] for RUQ fistula, but unfortunately has been admitted to the hospital. He has been covering the area with gauze and adhesive foam, typically changes the dressing once every 24hrs.       WOUND ASSESSMENT/LDA  Wound 05/16/24 Fistula  Abdomen Lower Right (Active)   Date First Assessed/Time First Assessed: 05/16/24 0800   Primary Wound Type: Fistula  Surgical Wound Type: (c)   Location: Abdomen  Wound Orientation: Lower  Laterality: Right      Assessments 5/17/2024  5:00 PM   Wound Image     Site Assessment Red   Periwound Assessment Red   Margins Attached edges;Defined edges   Closure Secondary intention   Drainage Amount Scant   Drainage Description Green;Yellow   Treatments Cleansed   Wound Cleansing Normal Saline Irrigation   Periwound Protectant Skin Protectant Wipes to Periwound   Dressing Status Clean;Dry;Intact   Dressing Changed Changed   Dressing Cleansing/Solutions Not Applicable   Dressing Options Ostomy Appliance   Dressing  "Change/Treatment Frequency By Wound Team Only   NEXT Dressing Change/Treatment Date 05/19/24   Wound Team Following Bi-Weekly   Non-staged Wound Description Full thickness        Vascular:    AIDE:   No results found.    Lab Values:    Lab Results   Component Value Date/Time    WBC 6.7 05/17/2024 04:30 AM    RBC 4.20 (L) 05/17/2024 04:30 AM    HEMOGLOBIN 11.5 (L) 05/17/2024 04:30 AM    HEMATOCRIT 35.1 (L) 05/17/2024 04:30 AM    CREACTPROT 24.98 (H) 01/02/2024 02:31 AM    SEDRATEWES 4 07/29/2023 03:29 AM    HBA1C 5.4 12/28/2023 02:05 AM         Culture Results show:  No results found for this or any previous visit (from the past 720 hour(s)).    Pain Level/Medicated:  None, Tolerated without pain medication       INTERVENTIONS BY WOUND TEAM:  Chart and images reviewed. Discussed with bedside RN. All areas of concern (based on picture review, LDA review and discussion with bedside RN) have been thoroughly assessed. Documentation of areas based on significant findings. This RN in to assess patient. Performed standard wound care which includes appropriate positioning, dressing removal and non-selective debridement. Pictures and measurements obtained weekly if/when required.    Wound:  Right quadrant  Preparation for Dressing removal: Removed without difficulty  Cleansed/Non-selectively Debrided with:  Normal Saline and Gauze  Barbara wound: Cleansed with Normal Saline and Gauze, Prepped with No Sting and Paste Rings  Primary Dressing:  Pouchkins with 2\" paste ring applied, edges bordered with hydrocolloid thin.     Advanced Wound Care Discharge Planning  Number of Clinicians necessary to complete wound care: 1  Is patient requiring IV pain medications for dressing changes:  No   Length of time for dressing change 10 min. (This does not include chart review, pre-medication time, set up, clean up or time spent charting.)    Interdisciplinary consultation: Patient, Bedside RN, Fatou KIDD (Wound RN).    EVALUATION / RATIONALE FOR " TREATMENT:     Date:  05/17/24  Wound Status:  Initial evaluation    Patient with stomatized fistula to right quadrant. Surrounding skin with non-blanching linear discoloration, patient stated that this area used to be a drain site. Pouchkins applied over stoma (pediatric ostomy pouch unavailable at this time, but would recommend for future dressing changes). Would also recommend pinc tape to secure appliance edges.         Goals: Steady decrease in wound area and depth weekly.    NURSING PLAN OF CARE ORDERS:  Dressing changes: See Dressing Care orders    NUTRITION RECOMMENDATIONS   Wound Team Recommendations:  N/A    DIET ORDERS (From admission to next 24h)       Start     Ordered    05/17/24 1017  Diet: Diet Tube Feed; Formula: Jevity; Goal Rate (mL/Hour): 25; Jevity: 1.2 RTH  ALL MEALS        Comments: Start at 25mL/hr. Do not advance until dietitian consult completed.   Question Answer Comment   Diet Diet Tube Feed    Formula: Jevity    Goal Rate (mL/Hour) 25    Jevity: 1.2 RTH        05/17/24 1016    05/17/24 0851  Diet Order Diet: Clear Liquid  ALL MEALS        Question:  Diet:  Answer:  Clear Liquid    05/17/24 0850                    PREVENTATIVE INTERVENTIONS:    Q shift Mehran - performed per nursing policy  Q shift pressure point assessments - performed per nursing policy    Surface/Positioning  Standard/trauma mattress - Currently in Place    Anticipated discharge plans:  Outpatient Wound Center    New referral placed    Vac Discharge Needs:  Vac Discharge plan is purely a recommendation from wound team and not a requirement for discharge unless otherwise stated by physician.  Not Applicable Pt not on a wound vac

## 2024-05-18 NOTE — CARE PLAN
The patient is Stable - Low risk of patient condition declining or worsening    Shift Goals  Clinical Goals: Pain control, tolerate diet, tolerate tube feedings, mobility  Patient Goals: pain control, plan of care updates  Family Goals: plan of care updates    Progress made toward(s) clinical / shift goals: Pain controlled thus far, tolerating liquids advanced to full liquids await first tray, tolerating tube feedings, mobilizing without difficulty.     Patient is not progressing towards the following goals:

## 2024-05-18 NOTE — PROGRESS NOTES
Hospital Medicine Daily Progress Note    Date of Service  5/18/2024    Chief Complaint  Abelardo Fenton is a 38 y.o. male admitted 5/15/2024 with abdominal pain    Hospital Course  This is a 38-year-old male with past medical history of necrotizing pancreatitis with prior history of infected pancreatic pseudocyst,  cystocutaneous fistula, recent surgery open irrigation and washout for infected pancreatic cyst on 4/15 who was transferred from outside facility with abdominal pain, significant for SMV occlusion and PVT.    Patient transferred to our facility for higher level of care for hepatobiliary and GI consult.    CT of the abdomen is concerning for duodenal stricturing, with gastric outlet obstruction.  Upper GI series compatible with stenosis of proximal duodenum.  NGT decompression.  GI consulted, s/p endoscopy on 5/17, noted grade B reflux esophagitis, diffuse mild gastritis, acute angulation of the antrum creating gastric outlet obstruction, balloon dilatation, reinsertion of NGT, clipped to small intestine.  Patient started on clear liquid diet and enteral feeding.    Patient also noted to have PVT initially on heparin gtt, transitioned to Eliquis.    Interval Problem Update  GI consulted, s/p endoscopy on 5/17, noted grade B reflux esophagitis, diffuse mild gastritis, acute angulation of the antrum creating gastric outlet obstruction, balloon dilatation, reinsertion of NGT, clipped to small intestine.     Patient tolerating clear liquid diet, advance to full liquid and GI soft as tolerated.  Continue with tube eating for today.    Anticipate discharge home next 24 hours if patient is tolerating oral intake.  Cortrack to be discontinued 5/19    Heparin gtt, transitioned to Eliquis 5/17 for PVT.    Patient's father at bedside, updated on plan of care, all question answered.    I have discussed this patient's plan of care and discharge plan at IDT rounds today with Case Management, Nursing, Nursing  leadership, and other members of the IDT team.    Consultants/Specialty  general surgery    Code Status  Full Code    Disposition  The patient is not medically cleared for discharge to home or a post-acute facility.  Anticipate discharge to: home with close outpatient follow-up    I have placed the appropriate orders for post-discharge needs.    Review of Systems  Review of Systems   All other systems reviewed and are negative.       Physical Exam  Temp:  [36.3 °C (97.3 °F)-36.8 °C (98.2 °F)] 36.3 °C (97.3 °F)  Pulse:  [65-85] 80  Resp:  [16-18] 16  BP: (106-141)/(71-99) 130/71  SpO2:  [93 %-99 %] 94 %    Physical Exam  Vitals and nursing note reviewed.   Constitutional:       General: He is not in acute distress.  HENT:      Head: Normocephalic and atraumatic.      Nose:      Comments: NGT  Eyes:      Extraocular Movements: Extraocular movements intact.      Conjunctiva/sclera: Conjunctivae normal.      Pupils: Pupils are equal, round, and reactive to light.   Cardiovascular:      Rate and Rhythm: Normal rate and regular rhythm.      Pulses: Normal pulses.      Heart sounds: No murmur heard.     No friction rub. No gallop.   Pulmonary:      Effort: Pulmonary effort is normal. No respiratory distress.      Breath sounds: Normal breath sounds. No wheezing, rhonchi or rales.   Abdominal:      General: Bowel sounds are normal. There is no distension.      Palpations: Abdomen is soft.      Tenderness: There is abdominal tenderness.   Musculoskeletal:         General: No swelling or tenderness. Normal range of motion.      Cervical back: Normal range of motion and neck supple. No muscular tenderness.      Right lower leg: No edema.      Left lower leg: No edema.   Skin:     General: Skin is warm and dry.      Capillary Refill: Capillary refill takes less than 2 seconds.      Findings: No bruising, erythema or rash.   Neurological:      General: No focal deficit present.      Mental Status: He is alert and oriented to  person, place, and time.         Fluids    Intake/Output Summary (Last 24 hours) at 5/18/2024 1226  Last data filed at 5/18/2024 0900  Gross per 24 hour   Intake 240 ml   Output 0 ml   Net 240 ml       Laboratory  Recent Labs     05/15/24  1700 05/17/24  0430 05/18/24  0212   WBC 8.6 6.7 6.8   RBC 4.42* 4.20* 3.93*   HEMOGLOBIN 12.2* 11.5* 10.8*   HEMATOCRIT 38.6* 35.1* 33.0*   MCV 87.3 83.6 84.0   MCH 27.6 27.4 27.5   MCHC 31.6* 32.8 32.7   RDW 55.9* 52.5* 53.3*   PLATELETCT 311 220 307   MPV 9.2 9.2 9.7     Recent Labs     05/15/24  1700 05/17/24  0430 05/18/24  0212   SODIUM 138 137 135   POTASSIUM 4.0 3.8 3.7   CHLORIDE 100 98 97   CO2 25 22 25   GLUCOSE 128* 76 115*   BUN 9 7* 7*   CREATININE 0.74 0.62 0.55   CALCIUM 9.5 9.1 9.3     Recent Labs     05/15/24  1700 05/15/24  2349   APTT 30.7 46.8*   INR 1.10 1.25*               Imaging  DX-UPPER GI-SERIES WITH KUB   Final Result      Findings compatible with stenosis of the proximal duodenum but not complete obstruction.           Assessment/Plan  * Portal vein thrombosis  Assessment & Plan  Occlusion of portal vein on CT  Heparin gtt, transitioned to Eliquis 5/17    Duodenal stricture  Assessment & Plan  CT of the abdomen is concerning for duodenal stricturing.    Hepatobillary Surgery consulted, Upper GI series compatible with stenosis of proximal duodenum.  GI consulted, s/p endoscopy on 5/17, noted grade B reflux esophagitis, diffuse mild gastritis, acute angulation of the antrum creating gastric outlet obstruction, balloon dilatation, reinsertion of NGT, clipped to small intestine.        Patient tolerating clear liquid diet, advance to full liquid and GI soft as tolerated.  Continue with tube eating for today.    Necrotizing pancreatitis- (present on admission)  Assessment & Plan  Hx of necrotizing pancreatitis with prior history of infected pancreatic pseudocyst,  cystocutaneous fistula, recent surgery open irrigation and washout for infected pancreatic  cyst on 4/15    Superior mesenteric vein thrombosis (HCC)  Assessment & Plan  Noted on CT  Heparin gtt, transitioned to Eliquis 5/17    Abdominal pain- (present on admission)  Assessment & Plan  In setting of recent necrotizing pancreatitis with pseudocyst and multiple peripancreatic fluid collections, status post drain placement and exploratory laparotomy.  With occlusion of portal vein  Surgical oncology consulted  Heparin gtt  Pain control    Hypertension- (present on admission)  Assessment & Plan  Resume patient's metoprolol         VTE prophylaxis: Eliquis    I have performed a physical exam and reviewed and updated ROS and Plan today (5/18/2024). In review of yesterday's note (5/17/2024), there are no changes except as documented above.

## 2024-05-18 NOTE — DIETARY
Nutrition Services: Brief Update   Day 3 of admit.  Abelardo Fenton is a 38 y.o. male with admitting DX of Abdominal pain [R10.9]    RD consulted for TF assessment.    Pt is currently on full liquid diet and tolerating Jevity 1.2 @ 25ml/hr. This RD confirmed POC regarding TF vs PO full liquid diet. Per MD, hold off on TF assessment for now as plan is to d/c cortrak tomorrow AM.     Recommendations/Plan:  Will complete TF consult at this time.  Re-consult RD if feeding tube is not removed and pt is not tolerating full liquids.  Document intake of all meals as % taken in ADL's to provide interdisciplinary communication across all shifts.   Continue TF per MD.  Monitor weight.    RD following.

## 2024-05-18 NOTE — PROGRESS NOTES
Assumed care of pt, report received from OSCAR Kwon. Pt resting quietly in bed, tolerating TF Jevity 1.2 @ 25mL/hr via cortrak in left nare. Call light within reach, personal belongings available, bed in lowest position, and hourly rounding in place.

## 2024-05-18 NOTE — PROGRESS NOTES
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 *       Subjective  Review of Systems   Constitutional:  Negative for chills, fever, malaise/fatigue and weight loss.   HENT:  Negative for congestion, ear discharge, ear pain, hearing loss and nosebleeds.    Eyes:  Negative for blurred vision, double vision, photophobia, pain and discharge.   Respiratory:  Negative for cough, hemoptysis and sputum production.    Cardiovascular:  Negative for chest pain, palpitations, orthopnea and claudication.   Gastrointestinal:  Positive for abdominal pain. Negative for constipation, diarrhea, heartburn, nausea and vomiting.   Genitourinary:  Negative for dysuria, frequency, hematuria and urgency.   Musculoskeletal:  Negative for back pain, joint pain, myalgias and neck pain.   Skin:  Negative for itching and rash.   Neurological:  Negative for dizziness, tingling, tremors, sensory change, speech change, focal weakness and headaches.   Endo/Heme/Allergies:  Negative for environmental allergies. Does not bruise/bleed easily.   Psychiatric/Behavioral:  Negative for depression, hallucinations, substance abuse and suicidal ideas. The patient is not nervous/anxious.          Objective  Temp:  [36.3 °C (97.3 °F)-36.7 °C (98.1 °F)] 36.3 °C (97.3 °F)  Pulse:  [65-93]  Sent message to Kendy earlier today through the pt advise request pool. Duplicate messages.   67  Resp:  [14-18] 16  BP: (106-141)/(75-99) 122/89  SpO2:  [93 %-99 %] 99 %      Physical Exam  Constitutional:       General: He is not in acute distress.     Appearance: Normal appearance. He is not ill-appearing.   HENT:      Head: Normocephalic.   Cardiovascular:      Rate and Rhythm: Normal rate and regular rhythm.   Pulmonary:      Effort: Pulmonary effort is normal. No respiratory distress.   Abdominal:      Comments: Well-healed surgical incision, minimal tenderness to palpation, no distention, NG tube in place   Musculoskeletal:         General: No swelling, tenderness or deformity.      Cervical back: Neck supple.   Skin:     Coloration: Skin is not jaundiced or pale.      Findings: No bruising or erythema.   Neurological:      Mental Status: He is alert.          Fluids  No intake or output data in the 24 hours ending 05/18/24 0854      Labs  Lab Results   Component Value Date/Time    SODIUM 135 05/18/2024 02:12 AM    POTASSIUM 3.7 05/18/2024 02:12 AM    CHLORIDE 97 05/18/2024 02:12 AM    CO2 25 05/18/2024 02:12 AM    GLUCOSE 115 (H) 05/18/2024 02:12 AM    BUN 7 (L) 05/18/2024 02:12 AM    CREATININE 0.55 05/18/2024 02:12 AM         Lab Results   Component Value Date/Time    PROTHROMBTM 15.9 (H) 05/15/2024 11:49 PM    INR 1.25 (H) 05/15/2024 11:49 PM         Lab Results   Component Value Date/Time    WBC 6.8 05/18/2024 02:12 AM    RBC 3.93 (L) 05/18/2024 02:12 AM    HEMOGLOBIN 10.8 (L) 05/18/2024 02:12 AM    HEMATOCRIT 33.0 (L) 05/18/2024 02:12 AM    MCV 84.0 05/18/2024 02:12 AM    MCH 27.5 05/18/2024 02:12 AM    MCHC 32.7 05/18/2024 02:12 AM    MPV 9.7 05/18/2024 02:12 AM    NEUTSPOLYS 77.50 (H) 05/15/2024 05:00 PM    LYMPHOCYTES 10.30 (L) 05/15/2024 05:00 PM    MONOCYTES 10.90 05/15/2024 05:00 PM    EOSINOPHILS 0.60 05/15/2024 05:00 PM    BASOPHILS 0.50 05/15/2024 05:00 PM    ANISOCYTOSIS 1+ 12/31/2023 01:05 AM         Recent Labs     05/15/24  1700 05/15/24  2349 05/17/24  0430 05/18/24  0212   ASTNOELLEOT  225*  --  79* 28   ALTSGPT 152*  --  182* 117*   TBILIRUBIN 0.8  --  0.6 0.3   GLOBULIN 3.9*  --  3.2 3.4   INR 1.10 1.25*  --   --           Patient Active Problem List   Diagnosis    Hypokalemia    Hypertension    Necrotizing pancreatitis    Transaminitis    High triglycerides    Hypocalcemia    Hypomagnesemia    Fever    Drooping eyelid, left    Sepsis (HCC)    Tachycardia    Dilation of biliary tract    Anemia    Intractable abdominal pain    Abdominal pain    Portal vein thrombosis    Superior mesenteric vein thrombosis (HCC)    Duodenal stricture        Assessment/Plan   38-year-old male with a history of necrotizing pancreatitis underwent a exploratory laparotomy and drainage of large infected pseudocyst about 3 weeks ago who presented with symptoms of a partial gastric outlet obstruction.    Discussed EGD findings with Dr. Choe, there is an angulation near his pylorus and first part of his duodenum likely from the pancreatic inflammation this seems to be causing some partial out left obstruction.  He did dilate this and it does not appear to be completely obstructed.  He does have an NG tube in right now and is tolerating feeds without a problem.  I am okay with him trialing full liquids to see if he can tolerate these okay and if so we can work on trying to get him out of the hospital in the next 1 to 2 days with a follow-up with Dr. Diaz in the office.       Discussed plans with patient    Pete Cai M.D.

## 2024-05-19 LAB
ALBUMIN SERPL BCP-MCNC: 3.6 G/DL (ref 3.2–4.9)
ALBUMIN/GLOB SERPL: 1 G/DL
ALP SERPL-CCNC: 347 U/L (ref 30–99)
ALT SERPL-CCNC: 89 U/L (ref 2–50)
ANION GAP SERPL CALC-SCNC: 13 MMOL/L (ref 7–16)
AST SERPL-CCNC: 20 U/L (ref 12–45)
BILIRUB SERPL-MCNC: 0.2 MG/DL (ref 0.1–1.5)
BUN SERPL-MCNC: 7 MG/DL (ref 8–22)
CALCIUM ALBUM COR SERPL-MCNC: 9.5 MG/DL (ref 8.5–10.5)
CALCIUM SERPL-MCNC: 9.2 MG/DL (ref 8.5–10.5)
CHLORIDE SERPL-SCNC: 102 MMOL/L (ref 96–112)
CO2 SERPL-SCNC: 23 MMOL/L (ref 20–33)
CREAT SERPL-MCNC: 0.64 MG/DL (ref 0.5–1.4)
ERYTHROCYTE [DISTWIDTH] IN BLOOD BY AUTOMATED COUNT: 54.1 FL (ref 35.9–50)
GFR SERPLBLD CREATININE-BSD FMLA CKD-EPI: 125 ML/MIN/1.73 M 2
GLOBULIN SER CALC-MCNC: 3.5 G/DL (ref 1.9–3.5)
GLUCOSE SERPL-MCNC: 116 MG/DL (ref 65–99)
HCT VFR BLD AUTO: 38.2 % (ref 42–52)
HGB BLD-MCNC: 12.2 G/DL (ref 14–18)
MCH RBC QN AUTO: 27.1 PG (ref 27–33)
MCHC RBC AUTO-ENTMCNC: 31.9 G/DL (ref 32.3–36.5)
MCV RBC AUTO: 84.9 FL (ref 81.4–97.8)
PLATELET # BLD AUTO: 343 K/UL (ref 164–446)
PMV BLD AUTO: 9.2 FL (ref 9–12.9)
POTASSIUM SERPL-SCNC: 3.7 MMOL/L (ref 3.6–5.5)
PROT SERPL-MCNC: 7.1 G/DL (ref 6–8.2)
RBC # BLD AUTO: 4.5 M/UL (ref 4.7–6.1)
SODIUM SERPL-SCNC: 138 MMOL/L (ref 135–145)
WBC # BLD AUTO: 6.7 K/UL (ref 4.8–10.8)

## 2024-05-19 PROCEDURE — 99232 SBSQ HOSP IP/OBS MODERATE 35: CPT | Performed by: GENERAL PRACTICE

## 2024-05-19 RX ADMIN — HYDROMORPHONE HYDROCHLORIDE 0.5 MG: 1 INJECTION, SOLUTION INTRAMUSCULAR; INTRAVENOUS; SUBCUTANEOUS at 14:42

## 2024-05-19 RX ADMIN — APIXABAN 10 MG: 5 TABLET, FILM COATED ORAL at 05:22

## 2024-05-19 RX ADMIN — TRAZODONE HYDROCHLORIDE 50 MG: 50 TABLET ORAL at 22:44

## 2024-05-19 RX ADMIN — APIXABAN 10 MG: 5 TABLET, FILM COATED ORAL at 17:48

## 2024-05-19 RX ADMIN — OXYCODONE HYDROCHLORIDE 10 MG: 10 TABLET ORAL at 13:33

## 2024-05-19 RX ADMIN — OXYCODONE HYDROCHLORIDE 10 MG: 10 TABLET ORAL at 21:24

## 2024-05-19 RX ADMIN — HYDROMORPHONE HYDROCHLORIDE 0.5 MG: 1 INJECTION, SOLUTION INTRAMUSCULAR; INTRAVENOUS; SUBCUTANEOUS at 22:44

## 2024-05-19 RX ADMIN — METOPROLOL TARTRATE 25 MG: 25 TABLET, FILM COATED ORAL at 17:48

## 2024-05-19 RX ADMIN — METOPROLOL TARTRATE 25 MG: 25 TABLET, FILM COATED ORAL at 05:21

## 2024-05-19 ASSESSMENT — PAIN DESCRIPTION - PAIN TYPE
TYPE: ACUTE PAIN;SURGICAL PAIN
TYPE: ACUTE PAIN;SURGICAL PAIN
TYPE: SURGICAL PAIN
TYPE: SURGICAL PAIN

## 2024-05-19 NOTE — DISCHARGE SUMMARY
Discharge Summary    CHIEF COMPLAINT ON ADMISSION  No chief complaint on file.      Reason for Admission  intraabdominal abscess, portal vei*     Admission Date  5/15/2024    CODE STATUS  Full Code    HPI & HOSPITAL COURSE  This is a 38-year-old male with past medical history of necrotizing pancreatitis with prior history of infected pancreatic pseudocyst,  cystocutaneous fistula, recent surgery open irrigation and washout for infected pancreatic cyst on 4/15 who was transferred from outside facility with abdominal pain, significant for SMV occlusion and PVT.    Patient transferred to our facility for higher level of care for hepatobiliary and GI consult.    CT of the abdomen is concerning for duodenal stricturing, with gastric outlet obstruction.  Upper GI series compatible with stenosis of proximal duodenum.  NGT decompression.  GI consulted, s/p endoscopy on 5/17, noted grade B reflux esophagitis, diffuse mild gastritis, acute angulation of the antrum creating gastric outlet obstruction, balloon dilatation, reinsertion of NGT, clipped to small intestine.  Patient advised slowly advance, was tolerating oral intake and NGT feeding.  He will follow-up with hepatobiliary surgery outpatient.    Patient also noted to have PVT initially on heparin gtt, transitioned to Eliquis.    Therefore, he is discharged in good and stable condition to home with close outpatient follow-up.    The patient met 2-midnight criteria for an inpatient stay at the time of discharge.    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  *      FOLLOW UP  Future Appointments   Date Time Provider Department Center   5/22/2024  8:00 AM [facility] [address]   5/29/2024  2:00 PM [facility] [address]     SAMUEL Mckinney  [address] [facility] [address]  [phone]    Follow up      Blair Sainz M.D.  [address] [place] [address] [address]  [place] [address]  [phone]    Follow up        MEDICATIONS ON DISCHARGE     Medication List        START taking these medications        Instructions   apixaban 5 MG Tabs tablet  Start taking on: May 16, 2024  Commonly known as: Eliquis   Take 2 Tablets by mouth 2 times a day for 7 days, THEN 1 Tablet 2 times a day for 90 days.            CONTINUE taking these medications        Instructions   metoprolol tartrate 25 MG Tabs  Commonly known as: Lopressor   Take 1 Tablet by mouth 2 times a day.  Dose: 25 mg     oxyCODONE immediate release 10 MG immediate release tablet  Commonly known as: Roxicodone   Take 10 mg by mouth every four hours as needed for Moderate Pain.  Dose: 10 mg     oxyCODONE-acetaminophen 5-325 MG Tabs  Commonly known as: Percocet   Take 1 Tablet by mouth every four hours as needed for Severe Pain.  Dose: 1 Tablet     traZODone 50 MG Tabs  Commonly known as: Desyrel   Take 50 mg by mouth every evening. Pt started on 5/13/2024  Dose: 50 mg            STOP taking these medications      Acetaminophen Extra Strength 500 MG Tabs     amoxicillin-clavulanate 875-125 MG Tabs  Commonly known as: Augmentin     celecoxib 200 MG Caps  Commonly known as: CeleBREX              Allergies  No Known Allergies    DIET  Orders Placed This Encounter   Procedures    Diet: Diet Tube Feed; Formula: Jevity; Goal Rate (mL/Hour): 25; Jevity: 1.2 RTH     Start at 25mL/hr. Do not advance until dietitian consult completed.     Standing Status:   Standing     Number of Occurrences:   1     Order Specific Question:   Diet     Answer:   Diet Tube Feed [35]     Order Specific Question:    Formula:     Answer:   Jevity     Order Specific Question:   Goal Rate (mL/Hour)     Answer:   25     Order Specific Question:   Jevity:     Answer:   1.2 RTH    Diet Order Diet: Full Liquid     Standing Status:   Standing     Number of Occurrences:   1     Order Specific Question:   Diet:     Answer:   Full Liquid [11]       ACTIVITY  As tolerated.  Weight bearing as tolerated    CONSULTATIONS  Biliary surgery  GI    PROCEDURES  Endoscopy with dilatation    LABORATORY  Lab Results   Component Value Date    SODIUM 138 05/19/2024    POTASSIUM 3.7 05/19/2024    CHLORIDE 102 05/19/2024    CO2 23 05/19/2024    GLUCOSE 116 (H) 05/19/2024    BUN 7 (L) 05/19/2024    CREATININE 0.64 05/19/2024        Lab Results   Component Value Date    WBC 6.7 05/19/2024    HEMOGLOBIN 12.2 (L) 05/19/2024    HEMATOCRIT 38.2 (L) 05/19/2024    PLATELETCT 343 05/19/2024      DX-UPPER GI-SERIES WITH KUB   Final Result      Findings compatible with stenosis of the proximal duodenum but not complete obstruction.           Total time of the discharge process exceeds 45 minutes.

## 2024-05-19 NOTE — PROGRESS NOTES
Assumed care of patient. Bedside report received from OSCAR Day. Patient updated on plan of care. Call light is within reach and fall precautions are in place. All needs met at this time. Hourly rounding in place.

## 2024-05-19 NOTE — CARE PLAN
The patient is Stable - Low risk of patient condition declining or worsening    Shift Goals  Clinical Goals: Pain control, Tolerate diet  Patient Goals: Pain control  Family Goals: Not present    Progress made toward(s) clinical / shift goals:    Problem: Knowledge Deficit - Standard  Goal: Patient and family/care givers will demonstrate understanding of plan of care, disease process/condition, diagnostic tests and medications  Outcome: Progressing  Note: Plan of care discussed, verbalized understanding. Questions answered   \     Problem: Pain - Standard  Goal: Alleviation of pain or a reduction in pain to the patient’s comfort goal  Outcome: Progressing     Problem: Psychosocial  Goal: Patient's ability to verbalize feelings about condition will improve  Outcome: Progressing     Problem: Fluid Volume  Goal: Fluid volume balance will be maintained  Outcome: Progressing     Problem: Nutrition  Goal: Patient's nutritional and fluid intake will be adequate or improve  Outcome: Progressing     Problem: Gastrointestinal Irritability  Goal: Nausea and vomiting will be absent or improve  Outcome: Progressing     Problem: Mobility  Goal: Patient's capacity to carry out activities will improve  Outcome: Progressing     Problem: Infection - Standard  Goal: Patient will remain free from infection  Outcome: Progressing       Patient is not progressing towards the following goals:

## 2024-05-19 NOTE — PROGRESS NOTES
[facility] Medicine Daily Progress Note    Date of Service  5/19/2024    Chief Complaint  Abelardo Fenton is a 38 y.o. male admitted 5/15/2024 with abdominal pain    Hospital Course  This is a 38-year-old male with past medical history of necrotizing pancreatitis with prior history of infected pancreatic pseudocyst,  cystocutaneous fistula, recent surgery open irrigation and washout for infected pancreatic cyst on 4/15 who was transferred from outside facility with abdominal pain, significant for SMV occlusion and PVT.    Patient transferred to our facility for higher level of care for hepatobiliary and GI consult.    CT of the abdomen is concerning for duodenal stricturing, with gastric outlet obstruction.  Upper GI series compatible with stenosis of proximal duodenum.  NGT decompression.  GI consulted, s/p endoscopy on 5/17, noted grade B reflux esophagitis, diffuse mild gastritis, acute angulation of the antrum creating gastric outlet obstruction, balloon dilatation, reinsertion of NGT, clipped to small intestine.  Patient advised slowly advance, was tolerating oral intake and NGT feeding.  He will follow-up with hepatobiliary surgery outpatient.    Patient also noted to have PVT initially on heparin gtt, transitioned to Eliquis.    Interval Problem Update  GI consulted, s/p endoscopy on 5/17, noted grade B reflux esophagitis, diffuse mild gastritis, acute angulation of the antrum creating gastric outlet obstruction, balloon dilatation, reinsertion of NGT, clipped to small intestine.     Nursing staff unable to remove NGT at bedside, case discussed with GI, they will remove this via EGD tomorrow.    Patient to discharge after removal of NGT.    I have discussed this patient's plan of care and discharge plan at IDT rounds today with Case Management, Nursing, Nursing leadership, and other members of the IDT team.    Consultants/Specialty  general surgery    Code Status  Full Code    Disposition  The  patient is not medically cleared for discharge to home or a post-acute facility.  Anticipate discharge to: home with close outpatient follow-up    I have placed the appropriate orders for post-discharge needs.    Review of Systems  Review of Systems   All other systems reviewed and are negative.       Physical Exam  Temp:  [36.1 °C (97 °F)-36.6 °C (97.9 °F)] 36.6 °C (97.9 °F)  Pulse:  [60-78] 72  Resp:  [16-18] 17  BP: (105-136)/(79-92) 115/81  SpO2:  [96 %-100 %] 100 %    Physical Exam  Vitals and nursing note reviewed.   Constitutional:       General: He is not in acute distress.  HENT:      Head: Normocephalic and atraumatic.      Nose:      Comments: NGT  Eyes:      Extraocular Movements: Extraocular movements intact.      Conjunctiva/sclera: Conjunctivae normal.      Pupils: Pupils are equal, round, and reactive to light.   Cardiovascular:      Rate and Rhythm: Normal rate and regular rhythm.      Pulses: Normal pulses.      Heart sounds: No murmur heard.     No friction rub. No gallop.   Pulmonary:      Effort: Pulmonary effort is normal. No respiratory distress.      Breath sounds: Normal breath sounds. No wheezing, rhonchi or rales.   Abdominal:      General: Bowel sounds are normal. There is no distension.      Palpations: Abdomen is soft.      Tenderness: There is abdominal tenderness.   Musculoskeletal:         General: No swelling or tenderness. Normal range of motion.      Cervical back: Normal range of motion and neck supple. No muscular tenderness.      Right lower leg: No edema.      Left lower leg: No edema.   Skin:     General: Skin is warm and dry.      Capillary Refill: Capillary refill takes less than 2 seconds.      Findings: No bruising, erythema or rash.   Neurological:      General: No focal deficit present.      Mental Status: He is alert and oriented to person, place, and time.         Fluids    Intake/Output Summary (Last 24 hours) at 5/19/2024 1246  Last data filed at 5/19/2024  0441  Gross per 24 hour   Intake 120 ml   Output 300 ml   Net -180 ml       Laboratory  Recent Labs     05/17/24  0430 05/18/24  0212 05/19/24  0207   WBC 6.7 6.8 6.7   RBC 4.20* 3.93* 4.50*   HEMOGLOBIN 11.5* 10.8* 12.2*   HEMATOCRIT 35.1* 33.0* 38.2*   MCV 83.6 84.0 84.9   MCH 27.4 27.5 27.1   MCHC 32.8 32.7 31.9*   RDW 52.5* 53.3* 54.1*   PLATELETCT 220 307 343   MPV 9.2 9.7 9.2     Recent Labs     05/17/24  0430 05/18/24  0212 05/19/24  0207   SODIUM 137 135 138   POTASSIUM 3.8 3.7 3.7   CHLORIDE 98 97 102   CO2 22 25 23   GLUCOSE 76 115* 116*   BUN 7* 7* 7*   CREATININE 0.62 0.55 0.64   CALCIUM 9.1 9.3 9.2                     Imaging  DX-UPPER GI-SERIES WITH KUB   Final Result      Findings compatible with stenosis of the proximal duodenum but not complete obstruction.           Assessment/Plan  * Portal vein thrombosis  Assessment & Plan  Occlusion of portal vein on CT  Heparin gtt, transitioned to Eliquis 5/17    Duodenal stricture  Assessment & Plan  CT of the abdomen is concerning for duodenal stricturing.    Hepatobillary Surgery consulted, Upper GI series compatible with stenosis of proximal duodenum.  GI consulted, s/p endoscopy on 5/17, noted grade B reflux esophagitis, diffuse mild gastritis, acute angulation of the antrum creating gastric outlet obstruction, balloon dilatation, reinsertion of NGT, clipped to small intestine.        Patient tolerating clear liquid diet, advance to full liquid and GI soft as tolerated.  Continue with tube eating for today.    Necrotizing pancreatitis- (present on admission)  Assessment & Plan  Hx of necrotizing pancreatitis with prior history of infected pancreatic pseudocyst,  cystocutaneous fistula, recent surgery open irrigation and washout for infected pancreatic cyst on 4/15    Superior mesenteric vein thrombosis (HCC)  Assessment & Plan  Noted on CT  Heparin gtt, transitioned to Eliquis 5/17    Abdominal pain- (present on admission)  Assessment & Plan  In setting  of recent necrotizing pancreatitis with pseudocyst and multiple peripancreatic fluid collections, status post drain placement and exploratory laparotomy.  With occlusion of portal vein  Surgical oncology consulted  Heparin gtt  Pain control    Hypertension- (present on admission)  Assessment & Plan  Resume patient's metoprolol         VTE prophylaxis: Eliquis    I have performed a physical exam and reviewed and updated ROS and Plan today (5/19/2024). In review of yesterday's note (5/18/2024), there are no changes except as documented above.

## 2024-05-20 ENCOUNTER — ANESTHESIA EVENT (OUTPATIENT)
Dept: SURGERY | Facility: MEDICAL CENTER | Age: 39
DRG: 380 | End: 2024-05-20
Payer: COMMERCIAL

## 2024-05-20 ENCOUNTER — PHARMACY VISIT (OUTPATIENT)
Dept: PHARMACY | Facility: MEDICAL CENTER | Age: 39
End: 2024-05-20
Payer: COMMERCIAL

## 2024-05-20 ENCOUNTER — ANESTHESIA (OUTPATIENT)
Dept: SURGERY | Facility: MEDICAL CENTER | Age: 39
DRG: 380 | End: 2024-05-20
Payer: COMMERCIAL

## 2024-05-20 VITALS
TEMPERATURE: 97.6 F | BODY MASS INDEX: 20.47 KG/M2 | SYSTOLIC BLOOD PRESSURE: 114 MMHG | HEART RATE: 90 BPM | OXYGEN SATURATION: 97 % | DIASTOLIC BLOOD PRESSURE: 88 MMHG | RESPIRATION RATE: 15 BRPM | HEIGHT: 69 IN

## 2024-05-20 LAB
ALBUMIN SERPL BCP-MCNC: 4 G/DL (ref 3.2–4.9)
ALBUMIN/GLOB SERPL: 0.9 G/DL
ALP SERPL-CCNC: 363 U/L (ref 30–99)
ALT SERPL-CCNC: 68 U/L (ref 2–50)
ANION GAP SERPL CALC-SCNC: 16 MMOL/L (ref 7–16)
AST SERPL-CCNC: 15 U/L (ref 12–45)
BILIRUB SERPL-MCNC: 0.3 MG/DL (ref 0.1–1.5)
BUN SERPL-MCNC: 8 MG/DL (ref 8–22)
CALCIUM ALBUM COR SERPL-MCNC: 10.1 MG/DL (ref 8.5–10.5)
CALCIUM SERPL-MCNC: 10.1 MG/DL (ref 8.5–10.5)
CHLORIDE SERPL-SCNC: 99 MMOL/L (ref 96–112)
CO2 SERPL-SCNC: 22 MMOL/L (ref 20–33)
CREAT SERPL-MCNC: 0.67 MG/DL (ref 0.5–1.4)
CRP SERPL HS-MCNC: 1.91 MG/DL (ref 0–0.75)
ERYTHROCYTE [DISTWIDTH] IN BLOOD BY AUTOMATED COUNT: 54 FL (ref 35.9–50)
GFR SERPLBLD CREATININE-BSD FMLA CKD-EPI: 123 ML/MIN/1.73 M 2
GLOBULIN SER CALC-MCNC: 4.3 G/DL (ref 1.9–3.5)
GLUCOSE SERPL-MCNC: 99 MG/DL (ref 65–99)
HCT VFR BLD AUTO: 42.7 % (ref 42–52)
HGB BLD-MCNC: 13.9 G/DL (ref 14–18)
MCH RBC QN AUTO: 27.7 PG (ref 27–33)
MCHC RBC AUTO-ENTMCNC: 32.6 G/DL (ref 32.3–36.5)
MCV RBC AUTO: 85.1 FL (ref 81.4–97.8)
PLATELET # BLD AUTO: 408 K/UL (ref 164–446)
PMV BLD AUTO: 9.1 FL (ref 9–12.9)
POTASSIUM SERPL-SCNC: 4.4 MMOL/L (ref 3.6–5.5)
PREALB SERPL-MCNC: 18.4 MG/DL (ref 18–38)
PROT SERPL-MCNC: 8.3 G/DL (ref 6–8.2)
RBC # BLD AUTO: 5.02 M/UL (ref 4.7–6.1)
SODIUM SERPL-SCNC: 137 MMOL/L (ref 135–145)
WBC # BLD AUTO: 8.2 K/UL (ref 4.8–10.8)

## 2024-05-20 PROCEDURE — 43247 EGD REMOVE FOREIGN BODY: CPT | Performed by: INTERNAL MEDICINE

## 2024-05-20 PROCEDURE — 0DP68UZ REMOVAL OF FEEDING DEVICE FROM STOMACH, VIA NATURAL OR ARTIFICIAL OPENING ENDOSCOPIC: ICD-10-PCS | Performed by: INTERNAL MEDICINE

## 2024-05-20 PROCEDURE — 99239 HOSP IP/OBS DSCHRG MGMT >30: CPT | Performed by: GENERAL PRACTICE

## 2024-05-20 RX ORDER — DIPHENHYDRAMINE HYDROCHLORIDE 50 MG/ML
12.5 INJECTION INTRAMUSCULAR; INTRAVENOUS
Status: DISCONTINUED | OUTPATIENT
Start: 2024-05-20 | End: 2024-05-20 | Stop reason: HOSPADM

## 2024-05-20 RX ORDER — HALOPERIDOL 5 MG/ML
1 INJECTION INTRAMUSCULAR
Status: DISCONTINUED | OUTPATIENT
Start: 2024-05-20 | End: 2024-05-20 | Stop reason: HOSPADM

## 2024-05-20 RX ORDER — IPRATROPIUM BROMIDE AND ALBUTEROL SULFATE 2.5; .5 MG/3ML; MG/3ML
3 SOLUTION RESPIRATORY (INHALATION)
Status: DISCONTINUED | OUTPATIENT
Start: 2024-05-20 | End: 2024-05-20 | Stop reason: HOSPADM

## 2024-05-20 RX ORDER — OXYCODONE HCL 5 MG/5 ML
5 SOLUTION, ORAL ORAL
Status: DISCONTINUED | OUTPATIENT
Start: 2024-05-20 | End: 2024-05-20 | Stop reason: HOSPADM

## 2024-05-20 RX ORDER — ONDANSETRON 2 MG/ML
4 INJECTION INTRAMUSCULAR; INTRAVENOUS
Status: DISCONTINUED | OUTPATIENT
Start: 2024-05-20 | End: 2024-05-20 | Stop reason: HOSPADM

## 2024-05-20 RX ORDER — LIDOCAINE HYDROCHLORIDE 20 MG/ML
INJECTION, SOLUTION EPIDURAL; INFILTRATION; INTRACAUDAL; PERINEURAL PRN
Status: DISCONTINUED | OUTPATIENT
Start: 2024-05-20 | End: 2024-05-20 | Stop reason: SURG

## 2024-05-20 RX ORDER — MIDAZOLAM HYDROCHLORIDE 1 MG/ML
INJECTION INTRAMUSCULAR; INTRAVENOUS PRN
Status: DISCONTINUED | OUTPATIENT
Start: 2024-05-20 | End: 2024-05-20 | Stop reason: SURG

## 2024-05-20 RX ORDER — OXYCODONE HCL 5 MG/5 ML
10 SOLUTION, ORAL ORAL
Status: DISCONTINUED | OUTPATIENT
Start: 2024-05-20 | End: 2024-05-20 | Stop reason: HOSPADM

## 2024-05-20 RX ORDER — SODIUM CHLORIDE, SODIUM LACTATE, POTASSIUM CHLORIDE, CALCIUM CHLORIDE 600; 310; 30; 20 MG/100ML; MG/100ML; MG/100ML; MG/100ML
INJECTION, SOLUTION INTRAVENOUS
Status: DISCONTINUED | OUTPATIENT
Start: 2024-05-20 | End: 2024-05-20 | Stop reason: SURG

## 2024-05-20 RX ORDER — SODIUM CHLORIDE, SODIUM LACTATE, POTASSIUM CHLORIDE, CALCIUM CHLORIDE 600; 310; 30; 20 MG/100ML; MG/100ML; MG/100ML; MG/100ML
INJECTION, SOLUTION INTRAVENOUS CONTINUOUS
Status: DISCONTINUED | OUTPATIENT
Start: 2024-05-20 | End: 2024-05-20 | Stop reason: HOSPADM

## 2024-05-20 RX ADMIN — MIDAZOLAM HYDROCHLORIDE 2 MG: 1 INJECTION, SOLUTION INTRAMUSCULAR; INTRAVENOUS at 08:29

## 2024-05-20 RX ADMIN — PROPOFOL 60 MG: 10 INJECTION, EMULSION INTRAVENOUS at 08:31

## 2024-05-20 RX ADMIN — LIDOCAINE HYDROCHLORIDE 60 MG: 20 INJECTION, SOLUTION EPIDURAL; INFILTRATION; INTRACAUDAL at 08:31

## 2024-05-20 RX ADMIN — SODIUM CHLORIDE, POTASSIUM CHLORIDE, SODIUM LACTATE AND CALCIUM CHLORIDE: 600; 310; 30; 20 INJECTION, SOLUTION INTRAVENOUS at 08:28

## 2024-05-20 ASSESSMENT — PAIN SCALES - GENERAL: PAIN_LEVEL: 0

## 2024-05-20 ASSESSMENT — PAIN DESCRIPTION - PAIN TYPE
TYPE: SURGICAL PAIN

## 2024-05-20 NOTE — DISCHARGE SUMMARY
Discharge Summary    CHIEF COMPLAINT ON ADMISSION  No chief complaint on file.      Reason for Admission  intraabdominal abscess, portal vei*     Admission Date  5/15/2024    CODE STATUS  Full Code    HPI & HOSPITAL COURSE  This is a 38-year-old male with past medical history of necrotizing pancreatitis with prior history of infected pancreatic pseudocyst,  cystocutaneous fistula, recent surgery open irrigation and washout for infected pancreatic cyst on 4/15 who was transferred from outside facility with abdominal pain, significant for SMV occlusion and PVT.    Patient transferred to our facility for higher level of care for hepatobiliary and GI consult.    CT of the abdomen is concerning for duodenal stricturing, with gastric outlet obstruction.  Upper GI series compatible with stenosis of proximal duodenum.  NGT decompression.  GI consulted, s/p endoscopy on 5/17, noted grade B reflux esophagitis, diffuse mild gastritis, acute angulation of the antrum creating gastric outlet obstruction, balloon dilatation, reinsertion of NGT, clipped to small intestine.  Patient advised slowly advance, was tolerating oral intake and NGT feeding.  He will follow-up with hepatobiliary surgery outpatient.    Patient also noted to have PVT initially on heparin gtt, transitioned to Eliquis.    Therefore, he is discharged in good and stable condition to home with close outpatient follow-up.    The patient met 2-midnight criteria for an inpatient stay at the time of discharge.    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  *      FOLLOW UP  Future Appointments   Date Time Provider Department Center   5/22/2024  8:00 AM [facility] [address]   5/29/2024  2:00 PM OSTOMY [facility] [address]     SAMUEL Mckinney  [address] [facility] [address]  [phone]    Follow up      Blair Sainz M.D.  [address] [place] [address]  [facility] [address]  [phone]    Follow up        MEDICATIONS ON DISCHARGE     Medication List        START taking these medications        Instructions   apixaban 5 MG Tabs tablet  Start taking on: May 16, 2024  Commonly known as: Eliquis   Take 2 Tablets by mouth 2 times a day for 7 days, THEN 1 Tablet 2 times a day for 90 days.            CONTINUE taking these medications        Instructions   metoprolol tartrate 25 MG Tabs  Commonly known as: Lopressor   Take 1 Tablet by mouth 2 times a day.  Dose: 25 mg     oxyCODONE immediate release 10 MG immediate release tablet  Commonly known as: Roxicodone   Take 10 mg by mouth every four hours as needed for Moderate Pain.  Dose: 10 mg     oxyCODONE-acetaminophen 5-325 MG Tabs  Commonly known as: Percocet   Take 1 Tablet by mouth every four hours as needed for Severe Pain.  Dose: 1 Tablet     traZODone 50 MG Tabs  Commonly known as: Desyrel   Take 50 mg by mouth every evening. Pt started on 5/13/2024  Dose: 50 mg            STOP taking these medications      Acetaminophen Extra Strength 500 MG Tabs     amoxicillin-clavulanate 875-125 MG Tabs  Commonly known as: Augmentin     celecoxib 200 MG Caps  Commonly known as: CeleBREX              Allergies  No Known Allergies    DIET  Orders Placed This Encounter   Procedures    Diet NPO Restrict to: Sips with Medications     Standing Status:   Standing     Number of Occurrences:   8     Order Specific Question:   Diet NPO Restrict to:     Answer:   Sips with Medications [3]       ACTIVITY  As tolerated.  Weight bearing as tolerated    CONSULTATIONS  Hepatobiliary surgery     PROCEDURES  EGD  with dilatation    LABORATORY  Lab Results   Component Value Date    SODIUM 138 05/19/2024    POTASSIUM 3.7 05/19/2024    CHLORIDE 102 05/19/2024    CO2 23 05/19/2024    GLUCOSE 116 (H) 05/19/2024    BUN 7 (L) 05/19/2024    CREATININE 0.64 05/19/2024        Lab Results   Component Value Date    WBC 6.7 05/19/2024    HEMOGLOBIN 12.2 (L) 05/19/2024    HEMATOCRIT 38.2 (L) 05/19/2024    PLATELETCT 343 05/19/2024      DX-UPPER GI-SERIES WITH KUB   Final Result      Findings compatible with stenosis of the proximal duodenum but not complete obstruction.         Total time of the discharge process exceeds 45 minutes.

## 2024-05-20 NOTE — PROGRESS NOTES
Reached out to Elham MONTANO on call hospitalist regarding AM Eliquis and Metoprolol. Ok to hold prior to IR procedure

## 2024-05-20 NOTE — ANESTHESIA PREPROCEDURE EVALUATION
Case: [id number] Anesthesia Start Date/Time: 05/20/24 0828    Procedures:       GASTROSCOPY, WITH NG TUBE REMOVAL (Esophagus)      GASTROSCOPY (Esophagus)    Anesthesia type: MAC    Pre-op diagnosis: Nausea and vomiting, gastric outlet obstruction    Location: [facility] ROOM 26 / SURGERY SAME DAY [facility]    Surgeons: Tony Choe M.D.            Past Medical History:   Diagnosis Date    High cholesterol     4/11/2024 not medicated    Hyperlipemia     Hypertension     medicated    Pancreatic cyst     Pancreatitis        Past Surgical History:   Procedure Laterality Date    AR UPPER GI ENDOSCOPY,DIAGNOSIS N/A 5/17/2024    Procedure: GASTROSCOPY;  Surgeon: Tony Choe M.D.;  Location: SURGERY SAME DAY [facility];  Service: Gastroenterology    AR PLACE PERCUT GASTROSTOMY TUBE N/A 5/17/2024    Procedure: GASTROSCOPY, WITH FEEDING TUBE INSERTION;  Surgeon: Tony Choe M.D.;  Location: SURGERY SAME DAY [facility];  Service: Gastroenterology    AR UPPER GI ENDOSCOPY,W/DILAT,GASTRIC OUT N/A 5/17/2024    Procedure: GASTROSCOPY, WITH BALLOON DILATION;  Surgeon: Tony Choe M.D.;  Location: SURGERY SAME DAY [facility];  Service: Gastroenterology    AR UPPER GI ENDOSCOPY,CTRL BLEED N/A 5/17/2024    Procedure: EGD, WITH CLIP PLACEMENT;  Surgeon: Tony Choe M.D.;  Location: SURGERY SAME DAY [facility];  Service: Gastroenterology    AR ULTRASONIC GUIDANCE, INTRAOPERATIVE N/A 4/15/2024    Procedure: INTRA-OPERATIVE ULTRASOUND GUIDANCE;  Surgeon: Blair Sainz M.D.;  Location: SURGERY [facility];  Service: General    AR EXPLORATORY OF ABDOMEN N/A 4/15/2024    Procedure: DRAINAGE PERITONEAL ABSCESS;  Surgeon: Blair Sainz M.D.;  Location: SURGERY [facility];  Service: General    HEPATICOJEJUNOSTOMY, CAITLYN-EN-Y N/A 4/15/2024    Procedure: PANCREATIC NECROSECTOMY;  Surgeon: Blair Sainz M.D.;  Location: SURGERY [facility];  Service: General    STENT PLACEMENT Right 2024    for pancreatic  "pseudocyst       Current Outpatient Medications   Medication Instructions    apixaban (ELIQUIS) 5 MG Tab tablet Take 2 Tablets by mouth 2 times a day for 7 days, THEN 1 Tablet 2 times a day for 90 days.    metoprolol tartrate (LOPRESSOR) 25 mg, Oral, 2 TIMES DAILY    oxyCODONE immediate release (ROXICODONE) 10 mg, Oral, EVERY 4 HOURS PRN    oxyCODONE-acetaminophen (PERCOCET) 5-325 MG Tab 1 Tablet, Oral, EVERY 4 HOURS PRN    traZODone (DESYREL) 50 mg, Oral, NIGHTLY, Pt started on 5/13/2024       /67   Pulse 85   Temp 36.2 °C (97.1 °F) (Temporal)   Resp 16   Ht 1.753 m (5' 9.02\")   SpO2 97%     No Known Allergies    Lab Results   Component Value Date/Time    SODIUM 138 05/19/2024 02:07 AM    POTASSIUM 3.7 05/19/2024 02:07 AM    CHLORIDE 102 05/19/2024 02:07 AM    GLUCOSE 116 (H) 05/19/2024 02:07 AM    BUN 7 (L) 05/19/2024 02:07 AM    CREATININE 0.64 05/19/2024 02:07 AM        Lab Results   Component Value Date/Time    WBC 6.7 05/19/2024 02:07 AM    RBC 4.50 (L) 05/19/2024 02:07 AM    HEMOGLOBIN 12.2 (L) 05/19/2024 02:07 AM    HEMATOCRIT 38.2 (L) 05/19/2024 02:07 AM    PLATELETCT 343 05/19/2024 02:07 AM          Relevant Problems   CARDIAC   (positive) Hypertension   (positive) Portal vein thrombosis   (positive) Superior mesenteric vein thrombosis (HCC)       Physical Exam    Airway   Mallampati: II  TM distance: >3 FB  Neck ROM: full       Cardiovascular - normal exam  Rhythm: regular  Rate: normal  (-) murmur     Dental - normal exam           Pulmonary - normal exam  Breath sounds clear to auscultation     Abdominal    Neurological - normal exam                   Anesthesia Plan    ASA 2       Plan - general       Airway plan will be natural airway          Induction: intravenous      Pertinent diagnostic labs and testing reviewed    Informed Consent:    Anesthetic plan and risks discussed with patient.      Anesthetic plan discussed with patient in detail. Plan for general anesthesia with a natural " airway and oxygen supplementation. Risks discussed include but are not limited to awareness, aspiration, allergic reaction, need for ventilatory assistance including intubation, and cardiopulmonary problems up to and including death. All questions answered and patient fully consents to plan as described.

## 2024-05-20 NOTE — CARE PLAN
The patient is Stable - Low risk of patient condition declining or worsening    Shift Goals  Clinical Goals: Pain control, Tolerate Diet  Patient Goals: Pain control, Comfort  Family Goals: GRANT    Progress made toward(s) clinical / shift goals:      Problem: Knowledge Deficit - Standard  Goal: Patient and family/care givers will demonstrate understanding of plan of care, disease process/condition, diagnostic tests and medications  Outcome: Progressing   Patient updated on plan of care; bedside NG removal unsuccessful. Patient will go to IR tomorrow morning to have NG replaced.     Problem: Pain - Standard  Goal: Alleviation of pain or a reduction in pain to the patient’s comfort goal  Outcome: Progressing   Pain controlled with prn pain medications.    Problem: Nutrition  Goal: Patient's nutritional and fluid intake will be adequate or improve  Outcome: Progressing   Tolerating full liquid diet.    Patient is not progressing towards the following goals:

## 2024-05-20 NOTE — OR NURSING
0841- Pt to PACU from Endo. Report from anesthesia and Endo RN. On 8L O2 via mask. Respirations even and unlabored. VSS.    0853- Pt waking up, declines pain and nausea at this time. Pt tolerating sips of water.    0910- report to Simran MOORE    0934- PT transferred to [id number] with pt transport. Per patient, no belongings brought to unit

## 2024-05-20 NOTE — OP REPORT
PreOp Diagnosis: Removal of nasoduodenal tube which had previously been secured by suture.  Unable to remove at bedside.      PostOp Diagnosis: Feeding tube removal      Procedure(s):  GASTROSCOPY, WITH NG TUBE REMOVAL - Wound Class: Clean Contaminated  GASTROSCOPY - Wound Class: Clean Contaminated    Surgeon(s):  Tony Choe M.D.    Anesthesiologist/Type of Anesthesia:  Anesthesiologist: Devonte Bermeo M.D./MAC    Surgical Staff:  Circulator: Tiago Grove R.N.  Endoscopy Technician: Vandana Slade  Endoscopy Nurse: Meena Ferguson R.N.    Specimens removed if any:  * No specimens in log *      CONSENT: The risks, benefits and alternatives of the procedure were discussed in detail. The risks include and are not limited to bleeding, infection, perforation, missed lesions, and sedations risks (cardiopulmonary compromise and allergic reaction to medications).    DESCRIPTION:   The patient presented to the operating room.  A time out was performed prior to beginning the procedure.   The patient was placed in the left lateral recumbent position.   Patient was sedated by anesthesia: Propofol.    OPERATIVE FINDINGS:    Endoscope advanced to the duodenum.  Previously clipped suture identified.  Cut with needle-knife.  Feeding tube removed.  Remainder of upper endoscopy reassuring.  No changes from prior findings.    Blood loss: None    The patient tolerated the procedure well.      There were no immediate complications.    IMPRESSION:  Successful removal of previously sutured nasoduodenal tube.      RECOMMENDATIONS:  Advance diet to full liquid/puréed  Return patient to hospital [facility] for discharge today

## 2024-05-20 NOTE — CARE PLAN
The patient is Stable - Low risk of patient condition declining or worsening    Shift Goals  Clinical Goals: Pain control, Tolerate Diet  Patient Goals: Pain control, Comfort  Family Goals: Not presen t    Progress made toward(s) clinical / shift goals:    Problem: Knowledge Deficit - Standard  Goal: Patient and family/care givers will demonstrate understanding of plan of care, disease process/condition, diagnostic tests and medications  Outcome: Progressing  Note: Plan of care discussed, verbalized understanding. Questions answered        Problem: Pain - Standard  Goal: Alleviation of pain or a reduction in pain to the patient’s comfort goal  Outcome: Progressing  Note: Patient educated on pain scale and to notify RN of pain. Medicated per MAR and repositioned        Problem: Psychosocial  Goal: Patient's ability to verbalize feelings about condition will improve  Outcome: Progressing     Problem: Fluid Volume  Goal: Fluid volume balance will be maintained  Outcome: Progressing     Problem: Nutrition  Goal: Patient's nutritional and fluid intake will be adequate or improve  Outcome: Progressing     Problem: Gastrointestinal Irritability  Goal: Nausea and vomiting will be absent or improve  Outcome: Progressing     Problem: Mobility  Goal: Patient's capacity to carry out activities will improve  Outcome: Progressing     Problem: Infection - Standard  Goal: Patient will remain free from infection  Outcome: Progressing       Patient is not progressing towards the following goals:

## 2024-05-20 NOTE — PROGRESS NOTES
Gastroenterology Progress Note               Author:  Tony Choe M.D. Date & Time Created: 5/20/2024 8:20 AM       Patient ID:  Name:             Abelardo Fenton  YOB: 1985  Age:                 38 y.o.  male  MRN:               [id number]      Interval History:  Request for feeding tube removal.  Patient had nasoduodenal tube placement with Hemoclip application.  Attempted removal at bedside not successful.  Tube appears well attached to the duodenum.      Hospital Medications:  Current Facility-Administered Medications   Medication Dose Frequency Provider Last Rate Last Admin    [MAR Hold] apixaban (Eliquis) tablet 10 mg  10 mg BID Debra Selena, D.O.   10 mg at 05/19/24 1748    Followed by    [MAR Hold] apixaban (Eliquis) tablet 5 mg  5 mg BID Debra Selena, D.O.        [MAR Hold] metoprolol tartrate (Lopressor) tablet 25 mg  25 mg BID Debraleslie Walters, D.O.   25 mg at 05/19/24 1748    [MAR Hold] traZODone (Desyrel) tablet 50 mg  50 mg Nightly Debraricky Walters, D.O.   50 mg at 05/19/24 2244    [MAR Hold] oxyCODONE immediate-release (Roxicodone) tablet 5 mg  5 mg Q3HRS PRN Santosh Graves M.D.        Or    [MAR Hold] oxyCODONE immediate release (Roxicodone) tablet 10 mg  10 mg Q3HRS PRN Santosh Graves M.D.   10 mg at 05/19/24 2124    Or    [MAR Hold] HYDROmorphone (Dilaudid) injection 0.5 mg  0.5 mg Q3HRS PRN Santosh Graves M.D.   0.5 mg at 05/19/24 2244    [MAR Hold] senna-docusate (Pericolace Or Senokot S) 8.6-50 MG per tablet 2 Tablet  2 Tablet Q EVENING Debra Walters, D.O.   2 Tablet at 05/16/24 1811    And    [MAR Hold] polyethylene glycol/lytes (Miralax) Packet 1 Packet  1 Packet QDAY PRN Debra Walters D.O.        [MAR Hold] Pharmacy Consult Request ...Pain Management Review 1 Each  1 Each PHARMACY TO DOSE Gerald Joseph M.D.       Last reviewed on 5/16/2024  9:18 AM by Carlos Shahid       Vital signs:  Weight/BMI: Body mass index is 20.47 kg/m².  /67   " Pulse 85   Temp 36.2 °C (97.1 °F) (Temporal)   Resp 16   Ht 1.753 m (5' 9.02\")   SpO2 97%   Vitals:    05/19/24 1610 05/19/24 2008 05/20/24 0439 05/20/24 0728   BP: 121/87 135/79 104/75 121/67   Pulse: 83 72 75 85   Resp: 16 16 16 16   Temp: 36.1 °C (97 °F) 36.8 °C (98.2 °F) 36.6 °C (97.9 °F) 36.2 °C (97.1 °F)   TempSrc: Temporal Temporal Temporal Temporal   SpO2: 99% 98% 97% 97%   Height:         Oxygen Therapy:  Pulse Oximetry: 97 %, O2 (LPM): 0, O2 Delivery Device: None - Room Air    Intake/Output Summary (Last 24 hours) at 5/20/2024 0820  Last data filed at 5/19/2024 2217  Gross per 24 hour   Intake 0 ml   Output --   Net 0 ml         Physical Exam:  Physical Exam  Constitutional:       General: He is not in acute distress.     Appearance: He is not toxic-appearing.   Eyes:      General: No scleral icterus.  Cardiovascular:      Rate and Rhythm: Normal rate and regular rhythm.   Pulmonary:      Breath sounds: Normal breath sounds.   Abdominal:      Palpations: Abdomen is soft.   Neurological:      General: No focal deficit present.      Mental Status: He is alert. Mental status is at baseline.   Psychiatric:         Mood and Affect: Mood normal.         Behavior: Behavior normal.         Judgment: Judgment normal.           Labs:  Recent Labs     05/18/24 0212 05/19/24  0207   SODIUM 135 138   POTASSIUM 3.7 3.7   CHLORIDE 97 102   CO2 25 23   BUN 7* 7*   CREATININE 0.55 0.64   CALCIUM 9.3 9.2     Recent Labs     05/18/24 0212 05/19/24  0207   ALTSGPT 117* 89*   ASTSGOT 28 20   ALKPHOSPHAT 400* 347*   TBILIRUBIN 0.3 0.2   PREALBUMIN 9.5*  --    GLUCOSE 115* 116*     Recent Labs     05/18/24 0212 05/19/24  0207   WBC 6.8 6.7   ASTSGOT 28 20   ALTSGPT 117* 89*   ALKPHOSPHAT 400* 347*   TBILIRUBIN 0.3 0.2     Recent Labs     05/18/24 0212 05/19/24  0207   RBC 3.93* 4.50*   HEMOGLOBIN 10.8* 12.2*   HEMATOCRIT 33.0* 38.2*   PLATELETCT 307 343     No results found for this or any previous visit (from the past " 24 hour(s)).    Radiology Review:  DX-UPPER GI-SERIES WITH KUB  Narrative: 5/16/2024 1:00 PM    HISTORY/REASON FOR EXAM:  Pancreatic bed fluid collection apparently encasing the duodenum.    TECHNIQUE/EXAM DESCRIPTION AND NUMBER OF VIEWS:  Omnipaque 300 water soluble contrast upper GI series was performed. Contrast was injected through the nasogastric tube.    12 fluoroscopic images obtained.    Fluoroscopy time was 1.9 minutes.    Fluoroscopy dose(DAP): 4.306 Gy*cm^2    COMPARISON: CT from the previous date    FINDINGS:    Gastroesophageal junction:  No evidence of hiatal hernia. There is some reflux around the nasogastric tube, of uncertain physiologic significance.    Stomach:  Distended. Normal mucosal pattern. Qualitatively delayed emptying.    Duodenum:  Narrowing of the proximal duodenum with only intermittent passage of contrast.  Impression: Findings compatible with stenosis of the proximal duodenum but not complete obstruction.        MDM (Data Review):   -Records reviewed and summarized in current documentation  -I personally reviewed and interpreted the laboratory results  -I personally reviewed the radiology images  -I have personally reviewed medications      Hospital Problem List:  Active Hospital Problems    Diagnosis     Superior mesenteric vein thrombosis (HCC) [K55.069]     Duodenal stricture [K31.5]     Abdominal pain [R10.9]     Portal vein thrombosis [I81]     Necrotizing pancreatitis [K85.91]     Hypertension [I10]        Assessment/Recommendations:    In need of feeding tube removal which could not be done at the bedside because of Hemoclip application.    The risk, benefits, and alternatives were discussed in detail. Risks include bowel perforation, procedure related bleeding event, infection, inability to safely complete the exam, sedation related complications. The patient, understanding the discussion, consents to proceed forward.  EGD with feeding tube removal.

## 2024-05-20 NOTE — ANESTHESIA TIME REPORT
Anesthesia Start and Stop Event Times       Date Time Event    5/20/2024 0822 Ready for Procedure     0828 Anesthesia Start     0847 Anesthesia Stop          Responsible Staff  05/20/24      Name Role Begin End    Devonte Bermeo M.D. Anesth 0828 0847          Overtime Reason:  no overtime (within assigned shift)    Comments:

## 2024-05-20 NOTE — PROGRESS NOTES
Pt ambulated from [facility] to hospital bed without assistance. A&Ox4, VSS. Requested pudding and jello. No complaints of pain. Pt when he is able to DC, per MD, patient able to DC when done eating.

## 2024-05-20 NOTE — ANESTHESIA POSTPROCEDURE EVALUATION
Patient: Abelardo Fenton    Procedure Summary       Date: 05/20/24 Room / Location: [facility] ROOM 26 / SURGERY SAME DAY [facility]    Anesthesia Start: 0828 Anesthesia Stop:     Procedures:       GASTROSCOPY, WITH NG TUBE REMOVAL (Esophagus)      GASTROSCOPY (Esophagus) Diagnosis: (NG tube removal)    Surgeons: Tony Choe M.D. Responsible Provider: Devonte Bermeo M.D.    Anesthesia Type: general ASA Status: 2            Final Anesthesia Type: general  Last vitals  BP   Blood Pressure: 114/88    Temp   36.4 °C (97.6 °F)    Pulse   90   Resp   15    SpO2   97 %      Anesthesia Post Evaluation    Patient location during evaluation: PACU  Patient participation: complete - patient participated  Level of consciousness: sleepy but conscious  Pain score: 0    Airway patency: patent  Anesthetic complications: no  Cardiovascular status: hemodynamically stable  Respiratory status: acceptable  Hydration status: balanced    PONV: none          No notable events documented.     Nurse Pain Score: 0 (NPRS)

## 2024-05-20 NOTE — WOUND TEAM
[facility]  Inpatient Services  Wound and Skin Care Follow-up    Admission Date: 5/15/2024     Last order of IP CONSULT TO WOUND CARE was found on 5/17/2024 from Hospital Encounter on 5/15/2024     HPI, PMH, SH: Reviewed    Past Surgical History:   Procedure Laterality Date    CO UPPER GI ENDOSCOPY,DIAGNOSIS N/A 5/17/2024    Procedure: GASTROSCOPY;  Surgeon: Tony Choe M.D.;  Location: SURGERY SAME DAY [facility];  Service: Gastroenterology    CO PLACE PERCUT GASTROSTOMY TUBE N/A 5/17/2024    Procedure: GASTROSCOPY, WITH FEEDING TUBE INSERTION;  Surgeon: Tony Choe M.D.;  Location: SURGERY SAME DAY [facility];  Service: Gastroenterology    CO UPPER GI ENDOSCOPY,W/DILAT,GASTRIC OUT N/A 5/17/2024    Procedure: GASTROSCOPY, WITH BALLOON DILATION;  Surgeon: Tony Choe M.D.;  Location: SURGERY SAME DAY [facility];  Service: Gastroenterology    CO UPPER GI ENDOSCOPY,CTRL BLEED N/A 5/17/2024    Procedure: EGD, WITH CLIP PLACEMENT;  Surgeon: Tony Choe M.D.;  Location: SURGERY SAME DAY [facility];  Service: Gastroenterology    CO ULTRASONIC GUIDANCE, INTRAOPERATIVE N/A 4/15/2024    Procedure: INTRA-OPERATIVE ULTRASOUND GUIDANCE;  Surgeon: Blair Sainz M.D.;  Location: SURGERY [facility];  Service: General    CO EXPLORATORY OF ABDOMEN N/A 4/15/2024    Procedure: DRAINAGE PERITONEAL ABSCESS;  Surgeon: Blair Sainz M.D.;  Location: SURGERY [facility];  Service: General    HEPATICOJEJUNOSTOMY, CAITLYN-EN-Y N/A 4/15/2024    Procedure: PANCREATIC NECROSECTOMY;  Surgeon: Blair Sainz M.D.;  Location: SURGERY [facility];  Service: General    STENT PLACEMENT Right 2024    for pancreatic pseudocyst     Social History     Tobacco Use    Smoking status: Never     Passive exposure: Never    Smokeless tobacco: Never   Substance Use Topics    Alcohol use: Not Currently     Comment: No alcohol since 12/25/23     No chief complaint on file.    Diagnosis: Abdominal pain  [R10.9]    Unit where seen by Wound Team: T307/01     WOUND FOLLOW UP RELATED TO:  Right quadrant fistula        WOUND TEAM PLAN OF CARE - Frequency of Follow-up:   Nursing to follow dressing orders written for wound care. Contact wound team if area fails to progress, deteriorates or with any questions/concerns if something comes up before next scheduled follow up (See below as to whether wound is following and frequency of wound follow up)  Dressing changes by wound team:                   Bi-weekly - Right quadrant fistula    WOUND HISTORY:       This is a 38-year-old male with past medical history of necrotizing pancreatitis with prior history of infected pancreatic pseudocyst,  cystocutaneous fistula, recent surgery open irrigation and washout for infected pancreatic cyst on 4/15 who was transferred from outside facility with abdominal pain, significant for SMV occlusion and PVT.   Patient has been following with Dr. Sainz' office for management of his condition.  Patient was also supposed to go to [facility] for RUQ fistula, but unfortunately has been admitted to the hospital. He has been covering the area with gauze and adhesive foam, typically changes the dressing once every 24hrs.       WOUND ASSESSMENT/LDA  Wound 05/16/24 Fistula  Abdomen Lower Right (Active)   Date First Assessed/Time First Assessed: 05/16/24 0800   Primary Wound Type: Fistula  Surgical Wound Type: (c)   Location: Abdomen  Wound Orientation: Lower  Laterality: Right      Assessments 5/19/2024  1:10 PM   Site Assessment Pink   Periwound Assessment Pink   Margins Defined edges   Closure None   Drainage Amount Scant   Drainage Description Yellow   Treatments Cleansed;Nonselective debridement   Wound Cleansing Normal Saline Irrigation   Periwound Protectant No-sting Skin Prep;Paste Ring   Dressing Status Intact   Dressing Changed Changed   Dressing Cleansing/Solutions Not Applicable   Dressing Options Ostomy Appliance  "  Dressing Change/Treatment Frequency Twice Weekly   NEXT Dressing Change/Treatment Date 05/22/24   NEXT Weekly Photo (Inpatient Only) 05/22/24   Wound Team Following Bi-Weekly   Non-staged Wound Description Full thickness        Vascular:    AIDE:   No results found.    Lab Values:    Lab Results   Component Value Date/Time    WBC 6.7 05/19/2024 02:07 AM    RBC 4.50 (L) 05/19/2024 02:07 AM    HEMOGLOBIN 12.2 (L) 05/19/2024 02:07 AM    HEMATOCRIT 38.2 (L) 05/19/2024 02:07 AM    CREACTPROT 8.19 (H) 05/18/2024 02:12 AM    SEDRATEWES 4 07/29/2023 03:29 AM    HBA1C 5.4 12/28/2023 02:05 AM         Culture Results show:  No results found for this or any previous visit (from the past 720 hour(s)).    Pain Level/Medicated:  None, Tolerated without pain medication       INTERVENTIONS BY WOUND TEAM:  Chart and images reviewed. Discussed with bedside RN. All areas of concern (based on picture review, LDA review and discussion with bedside RN) have been thoroughly assessed. Documentation of areas based on significant findings. This RN in to assess patient. Performed standard wound care which includes appropriate positioning, dressing removal and non-selective debridement. Pictures and measurements obtained weekly if/when required.    Wound:  R quadrant abdomen  Preparation for Dressing removal: Removed without difficulty  Cleansed/Non-selectively Debrided with:  Normal Saline and Gauze  Barbara wound: Cleansed with Normal Saline and Gauze, Prepped with No Sting and Paste Rings  Primary Dressing:  pediatric urostomy pouch with paste ring \"worm\"    Advanced Wound Care Discharge Planning  Number of Clinicians necessary to complete wound care: 1  Is patient requiring IV pain medications for dressing changes:  No   Length of time for dressing change 20 min. (This does not include chart review, pre-medication time, set up, clean up or time spent charting.)    Interdisciplinary consultation: Patient, Bedside RN (Carina)    EVALUATION / " RATIONALE FOR TREATMENT:     Date:  05/19/24  Wound Status:  Wound progressing as expected    Pouching system was intact. Extra tape was present to pouching system. Used pediatric urostomy and pink tape to Proximal and distal edges os barrier. Discussed wear time 2-3 days or immediately if leaking. Extra supplies (3) were given   Date:  05/17/24  Wound Status:  Initial evaluation    Patient with stomatized fistula to right quadrant. Surrounding skin with non-blanching linear discoloration, patient stated that this area used to be a drain site. Pouchkins applied over stoma (pediatric ostomy pouch unavailable at this time, but would recommend for future dressing changes). Would also recommend pinc tape to secure appliance edges.         Goals: Steady decrease in wound area and depth weekly.    NURSING PLAN OF CARE ORDERS:  No new orders this visit    NUTRITION RECOMMENDATIONS   Wound Team Recommendations:  N/A     DIET ORDERS (From admission to next 24h)       Start     Ordered    05/19/24 0837  Diet NPO Restrict to: Sips with Medications  AT MIDNIGHT      Question:  Diet NPO Restrict to:  Answer:  Sips with Medications    05/19/24 1243    05/18/24 0936  Diet Order Diet: Full Liquid  START AT BREAKFAST        Question:  Diet:  Answer:  Full Liquid    05/18/24 0936                    PREVENTATIVE INTERVENTIONS:   Q shift Meharn - performed per nursing policy  Q shift pressure point assessments - performed per nursing policy    Surface/Positioning  Standard/trauma mattress - Currently in Place  Reposition q 2 hours - Currently in Place  pt performs    Mobilization      Unable to assess he was going to take a shower later    Anticipated discharge plans:  Outpatient Wound Center        Vac Discharge Needs:  Vac Discharge plan is purely a recommendation from wound team and not a requirement for discharge unless otherwise stated by physician.  Not Applicable Pt not on a wound vac

## 2024-05-21 ENCOUNTER — TELEPHONE (OUTPATIENT)
Dept: SURGICAL ONCOLOGY | Facility: MEDICAL CENTER | Age: 39
End: 2024-05-21
Payer: COMMERCIAL

## 2024-05-21 NOTE — TELEPHONE ENCOUNTER
LVM for patient to CB to schedule         ----- Message from NATHANAEL Spaulding sent at 5/20/2024  7:16 AM PDT -----  Regarding: Patient to be scheduled for follow up with Dr Sainz  This patient expected to be discharged home today.  He is to be seen for follow up by Dr Sainz in approx 2 weeks  Dr KERNS may want to have him do a repeat CT (needs to be clarified)    Thx

## 2024-05-22 ENCOUNTER — APPOINTMENT (OUTPATIENT)
Dept: WOUND CARE | Facility: MEDICAL CENTER | Age: 39
End: 2024-05-22
Attending: NURSE PRACTITIONER
Payer: COMMERCIAL

## 2024-05-23 DIAGNOSIS — R07.89 OTHER CHEST PAIN: Primary | ICD-10-CM

## 2024-05-23 LAB — EKG IMPRESSION: NORMAL

## 2024-05-23 PROCEDURE — 93010 ELECTROCARDIOGRAM REPORT: CPT | Performed by: INTERNAL MEDICINE

## 2024-05-24 ENCOUNTER — TELEPHONE (OUTPATIENT)
Dept: SURGICAL ONCOLOGY | Facility: MEDICAL CENTER | Age: 39
End: 2024-05-24

## 2024-05-24 ENCOUNTER — NON-PROVIDER VISIT (OUTPATIENT)
Dept: WOUND CARE | Facility: MEDICAL CENTER | Age: 39
End: 2024-05-24
Attending: GENERAL PRACTICE
Payer: COMMERCIAL

## 2024-05-24 RX ORDER — OXYCODONE HYDROCHLORIDE 5 MG/1
TABLET ORAL
COMMUNITY
Start: 2024-05-16

## 2024-05-24 NOTE — TELEPHONE ENCOUNTER
LVM again    ----- Message from NATHANAEL Spaulding sent at 5/20/2024  7:16 AM PDT -----  Regarding: Patient to be scheduled for follow up with Dr Sainz  This patient expected to be discharged home today.  He is to be seen for follow up by Dr Sainz in approx 2 weeks  Dr KERNS may want to have him do a repeat CT (needs to be clarified)    Thx

## 2024-05-24 NOTE — PROGRESS NOTES
Advanced Wound Care   [facility]   [address]   [address]   [place] [address]   [phone] Fax: [phone]        Duration of Supply Order: 90 Days  Dispense as written.       Patient has a stomatized fistula. He is cleansing with saline. Dressing with dry gauze and tape daily.       PROCEDURE: Non-selective debridement to wound and periwound using normal saline and gauze to remove nonviable tissue and biofilm.      I agree with current plan of care.    SIGNATURE:_______________________________________ DATE:________________    PROVIDER NAME:__________________________________

## 2024-05-24 NOTE — CERTIFICATION
Fistula Evaluation  For 90 Day Certification Period: 05/24/24   - 08/22/24     Surgeon: Dr. Sainz    Surgery type and date: 4/15/24   Pertinent Hx: Patient is from [place]. Patient presented to [facility] ED for higher level of care 12/26/23 with severe pain, nausea and vomiting to right abdomen. He has history of necrotizing pancreatitis with pseudocyst and multiple peripancreatic fluid collections status post multiple drain placements and exploratory laparotomy.  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 He has now been discharged from the hospital, drain removed, midline incision is completely resolved and a stomatized fistula to the RUQ abdomen. He reports minimal drainage from this.     Start of Care: 05/24/2024    Supplier: [employer] date: 05/24/2024    OBJECTIVE: Dressing of gauze and tape is intact with no leaking and no strike through. In the hospital, they had placed a pediatric ostomy pouch to manage the drainage. Patient reports it started leaking almost immediately after being discharged from the hospital. He would prefer to use the gauze and tape at this time to dress the area. He still has three pouches at home and knows how to use them if he feels he needs to.     STOMATIZED FISTULA ASSESSMENT:  Red. Periwound skin intact with mild tape irritation.          PLAN/GOALS:  PRN  Call clinic and come back if there are issues with the periwound skin.   Follow up with Dr. Sainz.         WOUND PROCEDURE NOTE (if indicated):  NA

## 2024-05-28 ENCOUNTER — TELEPHONE (OUTPATIENT)
Dept: SURGICAL ONCOLOGY | Facility: MEDICAL CENTER | Age: 39
End: 2024-05-28
Payer: COMMERCIAL

## 2024-05-28 ENCOUNTER — APPOINTMENT (OUTPATIENT)
Dept: WOUND CARE | Facility: MEDICAL CENTER | Age: 39
End: 2024-05-28
Attending: GENERAL PRACTICE
Payer: COMMERCIAL

## 2024-05-28 DIAGNOSIS — Z98.890 S/P EXPLORATORY LAPAROTOMY: ICD-10-CM

## 2024-05-28 DIAGNOSIS — K86.3 INFECTED PSEUDOCYST OF PANCREAS: ICD-10-CM

## 2024-05-28 DIAGNOSIS — K85.91 NECROTIZING PANCREATITIS: ICD-10-CM

## 2024-05-28 DIAGNOSIS — K65.1 PERITONEAL ABSCESS (HCC): ICD-10-CM

## 2024-05-28 NOTE — TELEPHONE ENCOUNTER
M to see where we need to send the CT order.  ----- Message from SUNITHA SpauldingRALICE sent at 5/28/2024  1:50 PM PDT -----  Regarding: FW: Patient to be scheduled for follow up with Dr Sainz  Patient needs an updated CT scan  I have placed the order  Please contact him and check WHERE he wants to do scan  May need to fax orders to if not at [facility]  Should be done before he sees Dr Sainz  Thx  JF  ----- Message -----  From: Dinae Hill Med Ass't  Sent: 5/28/2024   8:54 AM PDT  To: Bandar Cornelius Med Ass't; #  Subject: RE: Patient to be scheduled for follow up wi#    Patient has been scheduled. Unsure if he needs a ct.  ----- Message -----  From: FERNANDEZ SpauldingPMalouRALICE  Sent: 5/20/2024   7:19 AM PDT  To: Bandar Cornelius Med Ass't; #  Subject: Patient to be scheduled for follow up with DHayley    This patient expected to be discharged home today.  He is to be seen for follow up by Dr Sainz in approx 2 weeks  Dr KERNS may want to have him do a repeat CT (needs to be clarified)    x

## 2024-05-30 ENCOUNTER — TELEPHONE (OUTPATIENT)
Dept: SURGICAL ONCOLOGY | Facility: MEDICAL CENTER | Age: 39
End: 2024-05-30
Payer: COMMERCIAL

## 2024-05-30 NOTE — TELEPHONE ENCOUNTER
Patient called back, said he wont be able to get the CT scan before his appointment on 6/4/24. I did let him know we will have to reschedule this appointment since we will need the CT results. He requested the CT to be sent to [place], I have faxed the order to  [phone]. Patient is aware and will give them a call later today to schedule CT scan, he will give us a call once he has a date so we can reschedule his appointment.

## 2024-05-30 NOTE — TELEPHONE ENCOUNTER
LVM to patient to call back.    ----- Message from NATHANAEL Spaulding sent at 5/30/2024  2:09 PM PDT -----  Regarding: Any update re the CT he needs to do before 6/4

## 2024-06-07 ENCOUNTER — TELEPHONE (OUTPATIENT)
Dept: SURGICAL ONCOLOGY | Facility: MEDICAL CENTER | Age: 39
End: 2024-06-07
Payer: COMMERCIAL

## 2024-06-07 NOTE — TELEPHONE ENCOUNTER
Spoke with patient regarding questions regarding residual abdominal wall defect from prior drain site. Patient was seen at Wound clinic where they had suggested this may need surgery for it to fully close up. Discussed this with OSCAR Feliciano at wound clinic. Discussed with Dr Haywood. Will follow up with patient, and have him come by Surgery clinic to discuss options.

## 2024-08-19 ENCOUNTER — APPOINTMENT (OUTPATIENT)
Dept: RADIOLOGY | Facility: MEDICAL CENTER | Age: 39
End: 2024-08-19
Attending: NURSE PRACTITIONER
Payer: COMMERCIAL

## 2024-08-22 ENCOUNTER — TELEPHONE (OUTPATIENT)
Dept: SURGICAL ONCOLOGY | Facility: MEDICAL CENTER | Age: 39
End: 2024-08-22
Payer: COMMERCIAL

## 2024-08-22 NOTE — TELEPHONE ENCOUNTER
----- Message from Medical Assistant Jose De Jesus Manjarrez Ass't sent at 8/22/2024 11:22 AM PDT -----  Regarding: RE: Please schedule this patient in clinic for surgery and wound follow up  LVM to schedule, thank you  ----- Message -----  From: FERNANDEZ SpauldingP.R.NMalou  Sent: 8/22/2024   9:14 AM PDT  To: Bandar Cornelius Med Ass't  Subject: RE: Please schedule this patient in clinic f#    Just an appt  ----- Message -----  From: Bandar Cornelius Med Ass't  Sent: 8/21/2024   3:43 PM PDT  To: MICHELLE Spaulding.P.R.N.; #  Subject: RE: Please schedule this patient in clinic f#    Hi Xander can you clarify what this patient needs, just an appointment correct?  ----- Message -----  From: Xander Jones A.P.R.NMalou  Sent: 8/20/2024  12:45 PM PDT  To: aBndar Cornelius Med Ass't; #  Subject: Please schedule this patient in clinic for s#    Patient is with Dr Sainz, and thus would be seen when he is back from his trip.    If patient wants to be seen more urgently, then can be scheduled with Dr LLANOS or Dr GARCIA - if patient prefers this.    Thanks    JF

## 2024-09-24 NOTE — PROGRESS NOTES
Subjective:      Primary care physician: SAMUEL Mckinney  Referring Provider: Hospital consult     Chief Complaint: No chief complaint on file.    Diagnosis:   1. Necrotizing pancreatitis        2. Infected pseudocyst of pancreas        3. Peritoneal abscess (HCC)        4. S/P exploratory laparotomy        5. Abdominal wall fistula          History of presenting illness:    Abelardo Fenton is a pleasant 38 y.o. male with past medical history of pancreatitis with pseudocyst who presented to the ED on 3/14/2024 with worsening abdominal pain, significant for sepsis.     CT abdomen pelvis showing pancreatitis with large multifocal peripancreatic fluid collections within the largest component seen extending inferiorly from the pancreatic head into the mesentery into the upper pelvis measuring 18 x 16 x 12 cm in size, new multifocal areas of gas within those fluid collections with suggest presence of infective pseudocyst.       Patient underwent IR drain placement on 2/15/24. Repeat CT imaging on 2/20/24 noted slightly decreased now 13 x 10 cm, formerly 18 x 16 x 12 cm - large pancreatic fluid collection/pseudocyst. Patient to continue with drain in place, flushing it daily, continue with oral antibiotic therapy (augmentin, reviewed with ID), was discharged with plans for follow up with hepatobiliary surgery.     Update 3/5/24  He is here today for follow up from recent hospitalization.  The patient was admitted to the hospital with what appears to be necrotizing pancreatitis which then became infected.  IR drains have been placed in order to get him over the septic.  And then we will consult centimeters.  The patient had a large pseudocyst on imaging that extended from the head of the pancreas along the body.  The bulk of the pseudocyst and infected collection with the air pockets was located in the right upper quadrant towards the gallbladder.  Patient did do  well in the hospital was discharged home with his drain and on long-term antibiotics.  He now returns with his parents.  He denies any nausea or vomiting but did have a low-grade fever just the other day at 100.2.  The drain itself has been putting out about 10 to 20 cc a day and it does appear to be purulent.  Unfortunate his last set of imaging which I reviewed was from February 20 we do not have any new imaging to see where or how large the remnant cyst is.  Patient is being active but cannot return to work because of his fatigue and his intermittent pain.  Patient has not had any recent drinking.  He does appear to look much better than he was in the hospital.  He is here with his parents to discuss neck steps.  We reviewed the patient's cultures as well.  Long with his labs and imaging.     Update 4/2/24  CT ABDOMEN W/O on 3/12/24 noted large complex pancreatic pseudocyst with suspended microbubbles consistent with semisolid material and residual contrast from the preceding abscessogram. Most notably, there is contrast opacification confirmed into the colon at the mid transverse colon and splenic flexure of the colon concordant with findings on the abscessogram. One suspects a fistulous communication at the inferior medial aspect of the pseudocyst which directly abuts and compresses the transverse colon.     On 3/12/24 he had procedure by Dr Howard for pseudocyst abscessogram and exchange drain from 12F to 14F pigtail. Heavy growth of Ecoli. Drain was exchanged/upsized on 3/20/24.     CT ABDOMEN/PELVIS on 3/25/24 noted known large pancreatic pseudocyst which contains multiple areas of gas which contains a large bore percutaneous drain. This is slightly smaller in size than comparison. There are again seen components extending into the pancreatic tail as well as the splenic hilum. Again seen attenuation of the portal vein, superior mesenteric vein and splenic vein without complete occlusion. Small of free fluid  dependently within the pelvis. Again seen is intrahepatic biliary ductal prominence.     Patient was seen in ED on 3/25/24 for concern with drain problems. Drain catheter was exchanged on 3/26/24 by Dr Haley     He is here today for follow-up visit status post repeat CT scan which was done on March 25, 2024.  It shows that the cyst actually has shrunk slightly after the drain has been changed over several times.  Patient continues to complain of discomfort and that the drainage fluid is still foul-smelling but is very low volume.  Possibly 20 to 30 cc a day.  It is purulent with no sign of bile or fecal material.  There was a question of a colonic fistula but that is somewhat difficult to assess on fistulogram due to the the unusual nature of a pseudocyst.  It would be unlikely but could happen when he develops a fistula to the colon but has appears to be extremely small.  He is here with his parents to discuss neck steps.  I personally reviewed the imaging studies several weeks intact since March 12 to March 25.     Update 4/9/24  He is here today for follow-up status post repeat CT scan.  Patient had multiple drains placed over the last 2 weeks with upsizing him and it appears as though he had slight shrinkage by several centimeters in 1 dimension but the rest of the cyst appears to be intact.  There is large amount of air and debris in the cyst consistent with necrotizing pancreatitis.  It is definitely infected and is foul-smelling drainage.  The patient is here with his family discuss neck steps.  I personally reviewed the patient's CT scan from April 8, 2024 as well as a 1 week ago.  Sent show there is minimal to no change.  Continues to lose weight and have abdominal discomfort.     Update 4/30/24  On 4/15/24 he completed surgery by Dr Sainz, including  1.  Exploratory laparotomy.  2.  Intraoperative ultrasound survey of the pancreatic pseudocyst, stomach and   biliary system.  3.  Open pancreatic  necrosectomy with large amounts of necrotic material, foul   smelling, infected  4.  Peritoneal abscess drainage with massive washout using the Pulsavac.  He was discharged home on 4/22/24.     CT ABDOMEN on 4/29/24 noted decreased, now 2.2 x 1.0 cm fluid collection at the tail the pancreas. Resolved fluid collection in the right anterior abdominal peritoneum. However, there is now residual air-containing cavitary fistulous tract measuring 3.5 cm. This air containing cavity freely communicates with the overlying right anterior abdominal wall.     He is here today for follow up from recent surgery. He reports he has been doing well. Pain is controlled. He is back to normal diet and basic activities. He reports minimal output from fistula tract into wound bag. He is eager to return to work. He is here with his mom. He has been taking prescribed antibiotics and pain medications.    Update 10/1/24  He was seen in ED on 5/15/24, discharged on 5/20/24. Had feeding tube for help with nutrition.    CT ABDOMEN on 6/10/24 noted acute interstitial edematous pancreatitis with small amount of associated adjacent free fluid without evidence of a pseudocyst. Previous pseudocyst has been resected.    He is here today regarding persistent abdominal wall defect / fistula. ***    Past Medical History:   Diagnosis Date    High cholesterol     4/11/2024 not medicated    Hyperlipemia     Hypertension     medicated    Pancreatic cyst     Pancreatitis      Past Surgical History:   Procedure Laterality Date    GA UPPER GI ENDOSCOPY,DIAGNOSIS N/A 5/20/2024    Procedure: GASTROSCOPY, WITH NG TUBE REMOVAL;  Surgeon: Tony Choe M.D.;  Location: SURGERY SAME DAY [facility];  Service: Gastroenterology    GA UPPER GI ENDOSCOPY,DIAGNOSIS N/A 5/20/2024    Procedure: GASTROSCOPY;  Surgeon: Tony Choe M.D.;  Location: SURGERY SAME DAY [facility];  Service: Gastroenterology    GA UPPER GI ENDOSCOPY,DIAGNOSIS N/A 5/17/2024    Procedure:  GASTROSCOPY;  Surgeon: Tony Choe M.D.;  Location: SURGERY SAME DAY [facility];  Service: Gastroenterology    DE PLACE PERCUT GASTROSTOMY TUBE N/A 5/17/2024    Procedure: GASTROSCOPY, WITH FEEDING TUBE INSERTION;  Surgeon: Tony Choe M.D.;  Location: SURGERY SAME DAY [facility];  Service: Gastroenterology    DE UPPER GI ENDOSCOPY,W/DILAT,GASTRIC OUT N/A 5/17/2024    Procedure: GASTROSCOPY, WITH BALLOON DILATION;  Surgeon: Tony Choe M.D.;  Location: SURGERY SAME DAY [facility];  Service: Gastroenterology    DE UPPER GI ENDOSCOPY,CTRL BLEED N/A 5/17/2024    Procedure: EGD, WITH CLIP PLACEMENT;  Surgeon: Tony Choe M.D.;  Location: SURGERY SAME DAY [facility];  Service: Gastroenterology    DE ULTRASONIC GUIDANCE, INTRAOPERATIVE N/A 4/15/2024    Procedure: INTRA-OPERATIVE ULTRASOUND GUIDANCE;  Surgeon: Blair Sainz M.D.;  Location: SURGERY [facility];  Service: General    DE EXPLORATORY OF ABDOMEN N/A 4/15/2024    Procedure: DRAINAGE PERITONEAL ABSCESS;  Surgeon: Blair Sainz M.D.;  Location: SURGERY [facility];  Service: General    HEPATICOJEJUNOSTOMY, CAITLYN-EN-Y N/A 4/15/2024    Procedure: PANCREATIC NECROSECTOMY;  Surgeon: Blair Sainz M.D.;  Location: SURGERY [facility];  Service: General    STENT PLACEMENT Right 2024    for pancreatic pseudocyst     No Known Allergies  Outpatient Encounter Medications as of 10/1/2024   Medication Sig Dispense Refill    oxyCODONE immediate-release (ROXICODONE) 5 MG Tab       traZODone (DESYREL) 50 MG Tab Take 50 mg by mouth every evening. Pt started on 5/13/2024      metoprolol tartrate (LOPRESSOR) 25 MG Tab Take 1 Tablet by mouth 2 times a day. 60 Tablet 0     No facility-administered encounter medications on file as of 10/1/2024.     Social History     Socioeconomic History    Marital status: Single     Spouse name: Not on file    Number of children: Not on file    Years of education: Not on file    Highest education level: Not  on file   Occupational History    Not on file   Tobacco Use    Smoking status: Never     Passive exposure: Never    Smokeless tobacco: Never   Vaping Use    Vaping status: Never Used   Substance and Sexual Activity    Alcohol use: Not Currently     Comment: No alcohol since 12/25/23    Drug use: Never    Sexual activity: Yes   Other Topics Concern    Not on file   Social History Narrative    Not on file     Social Determinants of Health     Financial Resource Strain: Not on file   Food Insecurity: No Food Insecurity (5/15/2024)    Hunger Vital Sign     Worried About Running Out of Food in the Last Year: Never true     Ran Out of Food in the Last Year: Never true   Transportation Needs: No Transportation Needs (5/15/2024)    PRAPARE - Transportation     Lack of Transportation (Medical): No     Lack of Transportation (Non-Medical): No   Physical Activity: Not on file   Stress: Not on file (2/10/2021)   Social Connections: Not on file   Intimate Partner Violence: Not At Risk (5/15/2024)    Humiliation, Afraid, Rape, and Kick questionnaire     Fear of Current or Ex-Partner: No     Emotionally Abused: No     Physically Abused: No     Sexually Abused: No   Housing Stability: Low Risk  (5/15/2024)    Housing Stability Vital Sign     Unable to Pay for Housing in the Last Year: No     Number of Places Lived in the Last Year: 1     Unstable Housing in the Last Year: No      Social History     Tobacco Use   Smoking Status Never    Passive exposure: Never   Smokeless Tobacco Never     Social History     Substance and Sexual Activity   Alcohol Use Not Currently    Comment: No alcohol since 12/25/23     Social History     Substance and Sexual Activity   Drug Use Never      No family history on file.    ROS     Objective:   There were no vitals taken for this visit.    Physical Exam    Labs   Latest Reference Range & Units 05/20/24 09:53   WBC 4.8 - 10.8 K/uL 8.2   RBC 4.70 - 6.10 M/uL 5.02   Hemoglobin 14.0 - 18.0 g/dL 13.9 (L)    Hematocrit 42.0 - 52.0 % 42.7   MCV 81.4 - 97.8 fL 85.1   MCH 27.0 - 33.0 pg 27.7   MCHC 32.3 - 36.5 g/dL 32.6   RDW 35.9 - 50.0 fL 54.0 (H)   Platelet Count 164 - 446 K/uL 408   MPV 9.0 - 12.9 fL 9.1   (L): Data is abnormally low  (H): Data is abnormally high      Latest Reference Range & Units 05/20/24 09:53   Sodium 135 - 145 mmol/L 137   Potassium 3.6 - 5.5 mmol/L 4.4   Chloride 96 - 112 mmol/L 99   Co2 20 - 33 mmol/L 22   Anion Gap 7.0 - 16.0  16.0   Glucose 65 - 99 mg/dL 99   Bun 8 - 22 mg/dL 8   Creatinine 0.50 - 1.40 mg/dL 0.67   GFR (CKD-EPI) >60 mL/min/1.73 m 2 123   Calcium 8.5 - 10.5 mg/dL 10.1   Correct Calcium 8.5 - 10.5 mg/dL 10.1   AST(SGOT) 12 - 45 U/L 15   ALT(SGPT) 2 - 50 U/L 68 (H)   Alkaline Phosphatase 30 - 99 U/L 363 (H)   Total Bilirubin 0.1 - 1.5 mg/dL 0.3   Albumin 3.2 - 4.9 g/dL 4.0   Total Protein 6.0 - 8.2 g/dL 8.3 (H)   Globulin 1.9 - 3.5 g/dL 4.3 (H)   A-G Ratio g/dL 0.9   (H): Data is abnormally high     Imaging  CT ABDOMEN (6/10/24) - [facility]      CT ABDOMEN (4/29/24)  IMPRESSION:  1. Decreased, now 2.2 x 1.0 cm fluid collection at the tail the pancreas.  2. Resolved fluid collection in the right anterior abdominal peritoneum. However, there is now residual air-containing cavitary fistulous tract measuring 3.5 cm. This air containing cavity freely communicates with the overlying right anterior abdominal   wall.     CT PANCREAS (4/8/24)  IMPRESSION:  1.  There has been interval advancement and repositioning of large caliber abscess drainage catheter in the abdomen since prior.  2.  Abscess collection along the inferior aspect of the pancreas tail region appears improved.  3.  There does remain a very large thick walled abscess collection in the right abdomen with mottled gas and debris which is only slightly decreased in size since the previous exam.  4.  Additional findings as detailed above.     CT A/P (3/25/24)  IMPRESSION:     1.  Again seen large pancreatic pseudocyst  which contains multiple areas of gas which contains a large bore percutaneous drain. This is slightly smaller in size than comparison. There are again seen components extending into the pancreatic tail as well   as the splenic hilum.     2.  Again seen attenuation of the portal vein, superior mesenteric vein and splenic vein without complete occlusion.     3.  Small of free fluid dependently within the pelvis.     4.  Again seen is intrahepatic biliary ductal prominence.     CT ABDOMEN W/O (3/12/24)  IMPRESSION:  1.  Large complex pancreatic pseudocyst with suspended microbubbles consistent with semisolid material and residual contrast from the preceding abscessogram.  2.  Most notably, there is contrast opacification confirmed into the colon at the mid transverse colon and splenic flexure of the colon concordant with findings on the abscessogram. One suspects a fistulous communication at the inferior medial aspect of   the pseudocyst which directly abuts and compresses the transverse colon.  3.  The case was discussed (call report) with DR AGUAYO immediately following the scan. Plan is for the patient to schedule an outpatient appointment with Dr. KERNS at the soonest convenience.     CT A/P (2/20/23)  FINDINGS:  Lung: Linear density in the right lower lobe again noted, consistent with scarring or subsegmental atelectasis. Tiny simple left pleural effusion has developed. Heart is normal in size.  Liver: No focal liver lesion  Spleen: Normal in size, without focal lesion  Adrenal glands: Normal  Pancreas: When measuring at the same location as the previous exam, there is a slightly decreased 13 x 10 cm pancreatic fluid and air collection. Drainage catheter is present within the collection. Additional fluid extends along the pancreas to the level   of the spleen.  Gallbladder: No radiodense stone visualized.  Biliary: No biliary duct dilation visualized.  Kidneys: No stone or hydronephrosis. No cystic or solid  renal lesion.  Vasculature: Nonaneurysmal  Lymph nodes: No adenopathy  Bowel: No evidence of obstruction or acute inflammation  Appendix:  Not visualized  Peritoneum: Mild ascites within the pelvis  Pelvis: Urinary bladder is grossly normal. No pelvic mass or adenopathy.  Musculoskeletal: No acute abnormality or concerning osseous lesion     IMPRESSION:  1. Slightly decreased large pancreatic fluid collection/pseudocyst. Air within the collection raise the possibility of superimposed infection.  2. Minimal ascites within the pelvis.  3. New tiny simple left pleural effusion.     Pathology  PATH (4/15/24)  FINAL DIAGNOSIS:   A. Cavity abscess:          Abundant necrotic hemorrhagic debris reactive fibrosis and           bacterial organisms admixed.          No malignancy identified.      PATH (3/12/24)  Ecoli - heavy growth     Procedures  Surgery (4/15/23)  1.  Exploratory laparotomy.  2.  Intraoperative ultrasound survey of the pancreatic pseudocyst, stomach and   biliary system.  3.  Open pancreatic necrosectomy with large amounts of necrotic material, foul   smelling, infected  4.  Peritoneal abscess drainage with massive washout using the Pulsavac.     IR catheter exchange (3/26/24)     PROCEDURE (3/20/24)  IMPRESSION:  1.  Over-the-wire exchange of a 14 [place] locking loop pigtail catheter within a large right midabdomen complex pseudocyst for an upsized 18 [place] locking loop pigtail catheter.  2.  Abscessogram demonstrating large residual collection.     PROCEDURE (3/12/24)  Procedure(s): PSEUDOCYST ABSCESSOGRAM AND EXCHANGE UPSIZE FROM 12F TO 14F PIGTAIL      Peritoneal drain placed on 2/15/24    Diagnosis:     1. Necrotizing pancreatitis        2. Infected pseudocyst of pancreas        3. Peritoneal abscess (HCC)        4. S/P exploratory laparotomy        5. Abdominal wall fistula            Medical Decision Making:  Today's Assessment / Status / Plan:     ***    I, Dr Sainz, have entered, reviewed  and confirmed the above diagnoses related to this patient on this date of service, as per the time and date noted at top of this note.    with massive washout using the Pulsavac.     IR catheter exchange (3/26/24)     PROCEDURE (3/20/24)  IMPRESSION:  1.  Over-the-wire exchange of a 14 [place] locking loop pigtail catheter within a large right midabdomen complex pseudocyst for an upsized 18 [place] locking loop pigtail catheter.  2.  Abscessogram demonstrating large residual collection.     PROCEDURE (3/12/24)  Procedure(s): PSEUDOCYST ABSCESSOGRAM AND EXCHANGE UPSIZE FROM 12F TO 14F PIGTAIL      Peritoneal drain placed on 2/15/24    Diagnosis:     1. Necrotizing pancreatitis        2. Infected pseudocyst of pancreas        3. Peritoneal abscess (HCC)        4. S/P exploratory laparotomy        5. Abdominal wall fistula            Medical Decision Making:  Today's Assessment / Status / Plan:     In light of the present findings, the patient has resolved his fistula track 3 weeks ago, has intermittent pain but his last imaging study from August appears to show almost near complete resolution of the fluid collection in the retroperitoneum.  It should be managed through his cyst gastrostomy.    He also has gained weight and looks wonderful.  My recommendation is just to follow him clinically if anything changes he will call us including fevers, nausea or vomiting or worsening pain.  I would not image at this time.    I, Dr. Sainz have entered, reviewed and confirmed the above diagnoses related to this patient on this date of service, 10/1/2024  7:32 AM.    He agreed and verbalized his agreement and understanding with the current plan. I answered all questions and concerns he has at this time and advised him to call at any time in the interim with questions or concerns in regards to his care.    Thank you for allowing me to participate in his care, I will continue to follow closely.       Please note that this dictation was created using voice recognition software. I have made every reasonable attempt to correct obvious errors, but I expect that  there are errors of grammar and possibly content that I did not discover before finalizing the note.     Thank you for this consultation and allowing me to participate in your patient's care. If I can be of further service please contact my office.      I, Dr Sainz, have entered, reviewed and confirmed the above diagnoses related to this patient on this date of service, as per the time and date noted at top of this note.

## 2024-10-01 ENCOUNTER — APPOINTMENT (OUTPATIENT)
Dept: SURGICAL ONCOLOGY | Facility: MEDICAL CENTER | Age: 39
End: 2024-10-01
Payer: COMMERCIAL

## 2024-10-01 VITALS
TEMPERATURE: 98.1 F | OXYGEN SATURATION: 98 % | HEART RATE: 80 BPM | BODY MASS INDEX: 26.22 KG/M2 | WEIGHT: 177 LBS | HEIGHT: 69 IN | SYSTOLIC BLOOD PRESSURE: 120 MMHG | DIASTOLIC BLOOD PRESSURE: 86 MMHG

## 2024-10-01 DIAGNOSIS — K63.2 ABDOMINAL WALL FISTULA: ICD-10-CM

## 2024-10-01 DIAGNOSIS — K65.1 PERITONEAL ABSCESS (HCC): ICD-10-CM

## 2024-10-01 DIAGNOSIS — Z98.890 S/P EXPLORATORY LAPAROTOMY: ICD-10-CM

## 2024-10-01 DIAGNOSIS — K85.91 NECROTIZING PANCREATITIS: ICD-10-CM

## 2024-10-01 DIAGNOSIS — K86.3 INFECTED PSEUDOCYST OF PANCREAS: ICD-10-CM

## 2024-10-01 PROCEDURE — 99214 OFFICE O/P EST MOD 30 MIN: CPT | Performed by: SURGERY

## 2024-10-01 PROCEDURE — 3079F DIAST BP 80-89 MM HG: CPT | Performed by: SURGERY

## 2024-10-01 PROCEDURE — 3074F SYST BP LT 130 MM HG: CPT | Performed by: SURGERY

## 2024-10-01 ASSESSMENT — FIBROSIS 4 INDEX: FIB4 SCORE: 0.16

## 2024-10-01 ASSESSMENT — ENCOUNTER SYMPTOMS: ABDOMINAL PAIN: 1

## 2024-12-20 NOTE — CARE PLAN
Progress made toward(s) clinical / shift goals:    Problem: Knowledge Deficit - Standard  Goal: Patient and family/care givers will demonstrate understanding of plan of care, disease process/condition, diagnostic tests and medications  Description: Target End Date:  1-3 days or as soon as patient condition allows    Document in Patient Education    1.  Patient and family/caregiver oriented to unit, equipment, visitation policy and means for communicating concern  2.  Complete/review Learning Assessment  3.  Assess knowledge level of disease process/condition, treatment plan, diagnostic tests and medications  4.  Explain disease process/condition, treatment plan, diagnostic tests and medications  Outcome: Progressing  Note: Pt Alert and oriented. Understands the need of the hospital stay.  Drain flushed this shift and pain controlled   New antibiotics ordered     Problem: Urinary - Renal Perfusion  Goal: Ability to achieve and maintain adequate renal perfusion and functioning will improve  Description: Target End Date:  Prior to discharge or change in level of care    Document on I/O and Assessment flowsheet    1.  Urine output will remain greater than 0.5ml/Kg/HR  2.  Monitor amount and/or characteristics of urine per order/policy. Specific gravity per order/policy  3.  Assess signs and symptoms of renal dysfunction  Outcome: Progressing  Note: Patient reported pain and decreased urination at beginning of shift improved and no issues stated by patient by end of shift     Problem: Pain - Standard  Goal: Alleviation of pain or a reduction in pain to the patient’s comfort goal  Description: Target End Date:  Prior to discharge or change in level of care    Document on Vitals flowsheet    1.  Document pain using the appropriate pain scale per order or unit policy  2.  Educate and implement non-pharmacologic comfort measures (i.e. relaxation, distraction, massage, cold/heat therapy, etc.)  3.  Pain management  medications as ordered  4.  Reassess pain after pain med administration per policy  5.  If opiods administered assess patient's response to pain medication is appropriate per POSS sedation scale  6.  Follow pain management plan developed in collaboration with patient and interdisciplinary team (including palliative care or pain specialists if applicable)  Outcome: Progressing  Note: Patient pain decreasing this shift with PRN medications       Patient is not progressing towards the following goals:       EKG

## 2025-08-25 ENCOUNTER — TELEPHONE (OUTPATIENT)
Facility: MEDICAL CENTER | Age: 40
End: 2025-08-25
Payer: COMMERCIAL

## 2025-08-28 ENCOUNTER — HOSPITAL ENCOUNTER (OUTPATIENT)
Dept: RADIOLOGY | Facility: MEDICAL CENTER | Age: 40
End: 2025-08-28
Attending: NURSE PRACTITIONER

## (undated) DEVICE — CANISTER SUCTION 3000ML MECHANICAL FILTER AUTO SHUTOFF MEDI-VAC NONSTERILE LF DISP  (40EA/CA)

## (undated) DEVICE — RESERVOIR SUCTION 100 CC - SILICONE (20EA/CA)

## (undated) DEVICE — DRAPE ULTRA GUARD LAPAROTOMY WITH POUCHES 102 X 121 X 78 (12EA/CA)

## (undated) DEVICE — LACTATED RINGERS INJ 1000 ML - (14EA/CA 60CA/PF)

## (undated) DEVICE — NEPTUNE 4 PORT MANIFOLD - (20/PK)

## (undated) DEVICE — SET LEADWIRE 5 LEAD BEDSIDE DISPOSABLE ECG (1SET OF 5/EA)

## (undated) DEVICE — Device

## (undated) DEVICE — SUTURE GENERAL

## (undated) DEVICE — MASK WITH FACE SHIELD (25/BX 4BX/CA)

## (undated) DEVICE — CANISTER SUCTION RIGID RED 1500CC (40EA/CA)

## (undated) DEVICE — DRAPE LARGE 3 QUARTER - (20/CA)

## (undated) DEVICE — BITE BLOCK, DISP.

## (undated) DEVICE — DRAIN PENROSE STERILE 1/4 X - 18 IN  (25EA/BX)

## (undated) DEVICE — CLIP MED INTNL HRZN TI ESCP - (25/BX)

## (undated) DEVICE — KNIFE RX NEEDLE

## (undated) DEVICE — FILM CASSETTE ENDO

## (undated) DEVICE — PAD LAP STERILE 18 X 18 - (5/PK 40PK/CA)

## (undated) DEVICE — CLIP LG INTNL HRZN TI ESCP LGT - (20/BX)

## (undated) DEVICE — STAPLER SKIN DISP - (6/BX 10BX/CA) VISISTAT

## (undated) DEVICE — BALLOON CRE WG 12-15MM 240CM 5.5 F G

## (undated) DEVICE — HANDPIECE THUNDERBEAT 5MM 20CM FRONT GRIP TYPE S (5EA/BX)

## (undated) DEVICE — TUBING CLEARLINK DUO-VENT - C-FLO (48EA/CA)

## (undated) DEVICE — DRESSING NON-ADHERING 8 X 3 - (50/BX)

## (undated) DEVICE — TUBE CONNECTING SUCTION - CLEAR PLASTIC STERILE 72 IN (50EA/CA)

## (undated) DEVICE — SUTURE 4-0 PROLENE RB-1 D/A 36 (36PK/BX)"

## (undated) DEVICE — GAUZE PACKING STRIP STERILE IODOFORM 1 IN X 5 YDS

## (undated) DEVICE — GOWN WARMING STANDARD FLEX - (30/CA)

## (undated) DEVICE — ELECTRODE 850 FOAM ADHESIVE - HYDROGEL RADIOTRNSPRNT (50/PK)

## (undated) DEVICE — WATER IRRIGATION STERILE 1000ML (12EA/CA)

## (undated) DEVICE — GLOVE BIOGEL SZ 8 SURGICAL PF LTX - (50PR/BX 4BX/CA)

## (undated) DEVICE — ELECTRODE DUAL RETURN W/ CORD - (50/PK)

## (undated) DEVICE — PACK MAJOR BASIN - (2EA/CA)

## (undated) DEVICE — SUCTION INSTRUMENT YANKAUER BULBOUS TIP W/O VENT (50EA/CA)

## (undated) DEVICE — SENSOR OXIMETER ADULT SPO2 RD SET (20EA/BX)

## (undated) DEVICE — SODIUM CHL IRRIGATION 0.9% 1000ML (12EA/CA)

## (undated) DEVICE — SUTURE 3-0 SILK SH (12PK/BX)

## (undated) DEVICE — PORT AUXILLARY WATER (50EA/BX)

## (undated) DEVICE — SLEEVE VASO CALF MED - (10PR/CA)

## (undated) DEVICE — SURGIFOAM (SIZE 100) - (6EA/CA)

## (undated) DEVICE — KIT ANESTHESIA W/CIRCUIT & 3/LT BAG W/FILTER (20EA/CA)

## (undated) DEVICE — CORETEMP DRAPE FORM-FITTED EASY DROPANDGO DRAPE FOR USE ON THE CORETEMP FLUID MANAGEMENT 56IN X 56IN

## (undated) DEVICE — TUBE E-T HI-LO CUFF 7.5MM (10EA/PK)

## (undated) DEVICE — BOVIE BLADE COATED &INSULATED (50EA/PK)

## (undated) DEVICE — CHLORAPREP 26 ML APPLICATOR - ORANGE TINT(25/CA)

## (undated) DEVICE — DRAPE IOBAN II INCISE 23X17 - (10EA/BX 4BX/CA)

## (undated) DEVICE — ADHESIVE MASTISOL - (48/BX)

## (undated) DEVICE — TIP INTPLS HFLO ML ORFC BTRY - (12/CS)  FOR SURGILAV

## (undated) DEVICE — SUTURE 1 PDS II PLUS TP-1 - (12PK/BX)

## (undated) DEVICE — KIT CUSTOM PROCEDURE SINGLE FOR ENDO  (15/CA)

## (undated) DEVICE — SUTURE 4-0 PROLENE SH 36 (36PK/BX)"

## (undated) DEVICE — MASK OXYGEN VNYL ADLT MED CONC WITH 7 FOOT TUBING  - (50EA/CA)

## (undated) DEVICE — DRAPESURG STERI-DRAPE LONG - (10/BX 4BX/CA)

## (undated) DEVICE — HANDPIECE 10FT INTPLS SCT PLS IRRIGATION HAND CONTROL SET (6/PK)

## (undated) DEVICE — CLIP MED LG INTNL HRZN TI ESCP - (20/BX)

## (undated) DEVICE — SUTURE 2-0 SILK SH 30 IN C/R (12PK/BX)

## (undated) DEVICE — SYSTEM PREVENA INCISION MNGM - (1/EA)

## (undated) DEVICE — TRAY ANESTHESIA - CONTINUOUS EPIDURAL (10EA/CA) THIS IS A CUSTOM PACK

## (undated) DEVICE — TUBE ENTERAL FEEDING 43 INCH X 10 FR  (10/CA)

## (undated) DEVICE — MASK PANORAMIC OXYGEN PRO2 (30EA/CA)

## (undated) DEVICE — VESSELOOP MAXI BLUE STERILE- SURG-I-LOOP (10EA/BX)

## (undated) DEVICE — COVER LIGHT HANDLE ALC PLUS DISP (18EA/BX)

## (undated) DEVICE — BOVIE BLADE COATED - (50/PK)

## (undated) DEVICE — SET EXTENSION WITH 2 PORTS (48EA/CA) ***PART #2C8610 IS A SUBSTITUTE*****

## (undated) DEVICE — TRAY SURESTEP FOLEY TEMP SENSING 16FR (10EA/CA) ORDER  #18764 FOR TEMP FOLEY ONLY

## (undated) DEVICE — SUTURE 3-0 ETHILON FS-1 - (36/BX) 30 INCH

## (undated) DEVICE — BLOCK BITE MAXI DENTAL RETENTION RIM (100EA/BX)

## (undated) DEVICE — WATER IRRIG. STER 3000 ML - (4/CA)

## (undated) DEVICE — BUTTON ENDOSCOPY DISPOSABLE

## (undated) DEVICE — CLIP ENDOSCOPIC MANTIS DEFECT CLOSURE CLIP 235CM X 2.8MM (20EA/BX)

## (undated) DEVICE — SPONGE LAP XR ST 36X36 LF - (1/PK40PK/CA)